# Patient Record
Sex: MALE | Race: WHITE
[De-identification: names, ages, dates, MRNs, and addresses within clinical notes are randomized per-mention and may not be internally consistent; named-entity substitution may affect disease eponyms.]

---

## 2017-03-31 ENCOUNTER — HOSPITAL ENCOUNTER (INPATIENT)
Dept: HOSPITAL 47 - EC | Age: 72
LOS: 4 days | Discharge: HOME | DRG: 871 | End: 2017-04-04
Attending: HOSPITALIST | Admitting: HOSPITALIST
Payer: OTHER GOVERNMENT

## 2017-03-31 VITALS — BODY MASS INDEX: 42.5 KG/M2

## 2017-03-31 DIAGNOSIS — E66.01: ICD-10-CM

## 2017-03-31 DIAGNOSIS — Z96.60: ICD-10-CM

## 2017-03-31 DIAGNOSIS — Z79.84: ICD-10-CM

## 2017-03-31 DIAGNOSIS — Z79.899: ICD-10-CM

## 2017-03-31 DIAGNOSIS — G47.33: ICD-10-CM

## 2017-03-31 DIAGNOSIS — Z79.01: ICD-10-CM

## 2017-03-31 DIAGNOSIS — R33.9: ICD-10-CM

## 2017-03-31 DIAGNOSIS — Z79.82: ICD-10-CM

## 2017-03-31 DIAGNOSIS — Z79.4: ICD-10-CM

## 2017-03-31 DIAGNOSIS — Z88.5: ICD-10-CM

## 2017-03-31 DIAGNOSIS — E78.5: ICD-10-CM

## 2017-03-31 DIAGNOSIS — I50.23: ICD-10-CM

## 2017-03-31 DIAGNOSIS — Z82.49: ICD-10-CM

## 2017-03-31 DIAGNOSIS — K59.00: ICD-10-CM

## 2017-03-31 DIAGNOSIS — E86.1: ICD-10-CM

## 2017-03-31 DIAGNOSIS — I42.9: ICD-10-CM

## 2017-03-31 DIAGNOSIS — M19.91: ICD-10-CM

## 2017-03-31 DIAGNOSIS — J98.11: ICD-10-CM

## 2017-03-31 DIAGNOSIS — I11.0: ICD-10-CM

## 2017-03-31 DIAGNOSIS — N17.9: ICD-10-CM

## 2017-03-31 DIAGNOSIS — I27.2: ICD-10-CM

## 2017-03-31 DIAGNOSIS — F32.9: ICD-10-CM

## 2017-03-31 DIAGNOSIS — H91.90: ICD-10-CM

## 2017-03-31 DIAGNOSIS — E87.2: ICD-10-CM

## 2017-03-31 DIAGNOSIS — J96.01: ICD-10-CM

## 2017-03-31 DIAGNOSIS — Z91.19: ICD-10-CM

## 2017-03-31 DIAGNOSIS — I08.1: ICD-10-CM

## 2017-03-31 DIAGNOSIS — A41.9: Primary | ICD-10-CM

## 2017-03-31 DIAGNOSIS — E11.9: ICD-10-CM

## 2017-03-31 DIAGNOSIS — I48.1: ICD-10-CM

## 2017-03-31 DIAGNOSIS — E03.9: ICD-10-CM

## 2017-03-31 DIAGNOSIS — Z90.49: ICD-10-CM

## 2017-03-31 DIAGNOSIS — J18.9: ICD-10-CM

## 2017-03-31 LAB
ALP SERPL-CCNC: 105 U/L (ref 38–126)
ALT SERPL-CCNC: 44 U/L (ref 21–72)
ANION GAP SERPL CALC-SCNC: 17 MMOL/L
APTT BLD: 29.9 SEC (ref 22–30)
AST SERPL-CCNC: 51 U/L (ref 17–59)
BUN SERPL-SCNC: 34 MG/DL (ref 9–20)
CALCIUM SPEC-MCNC: 9.3 MG/DL (ref 8.4–10.2)
CELLS COUNTED: 100
CH: 26.5
CHCM: 32.2
CHLORIDE SERPL-SCNC: 103 MMOL/L (ref 98–107)
CK SERPL-CCNC: 760 U/L (ref 55–170)
CO2 SERPL-SCNC: 23 MMOL/L (ref 22–30)
ERYTHROCYTE [DISTWIDTH] IN BLOOD BY AUTOMATED COUNT: 4.71 M/UL (ref 4.3–5.9)
ERYTHROCYTE [DISTWIDTH] IN BLOOD: 15.3 % (ref 11.5–15.5)
GLUCOSE BLD-MCNC: 113 MG/DL (ref 75–99)
GLUCOSE BLD-MCNC: 87 MG/DL (ref 75–99)
GLUCOSE SERPL-MCNC: 97 MG/DL (ref 74–99)
HCT VFR BLD AUTO: 39 % (ref 39–53)
HDW: 2.96
HEMOGLOBIN A1C: 5.3 % (ref 4.2–6.1)
HGB BLD-MCNC: 12.4 GM/DL (ref 13–17.5)
INR PPP: 1.9 (ref ?–1.1)
MCH RBC QN AUTO: 26.3 PG (ref 25–35)
MCHC RBC AUTO-ENTMCNC: 31.8 G/DL (ref 31–37)
MCV RBC AUTO: 82.7 FL (ref 80–100)
NON-AFRICAN AMERICAN GFR(MDRD): 19
PARTICLE COUNT: 4166
PH UR: 5 [PH] (ref 5–8)
POTASSIUM SERPL-SCNC: 4.6 MMOL/L (ref 3.5–5.1)
PROT SERPL-MCNC: 7.4 G/DL (ref 6.3–8.2)
PT BLD: 18.6 SEC (ref 9–12)
RBC UR QL: 67 /HPF (ref 0–5)
SODIUM SERPL-SCNC: 143 MMOL/L (ref 137–145)
SP GR UR: 1.01 (ref 1–1.03)
SQUAMOUS UR QL AUTO: <1 /HPF (ref 0–4)
TROPONIN I SERPL-MCNC: 0.03 NG/ML (ref 0–0.03)
UA BILLING (MACRO VS. MICRO): (no result)
UROBILINOGEN UR QL STRIP: <2 MG/DL (ref ?–2)
WBC # BLD AUTO: 13 K/UL (ref 3.8–10.6)
WBC #/AREA URNS HPF: 4 /HPF (ref 0–5)
WBC (PEROX): 14.19

## 2017-03-31 PROCEDURE — 83036 HEMOGLOBIN GLYCOSYLATED A1C: CPT

## 2017-03-31 PROCEDURE — 93005 ELECTROCARDIOGRAM TRACING: CPT

## 2017-03-31 PROCEDURE — 81001 URINALYSIS AUTO W/SCOPE: CPT

## 2017-03-31 PROCEDURE — 74000: CPT

## 2017-03-31 PROCEDURE — 87086 URINE CULTURE/COLONY COUNT: CPT

## 2017-03-31 PROCEDURE — 82550 ASSAY OF CK (CPK): CPT

## 2017-03-31 PROCEDURE — 96375 TX/PRO/DX INJ NEW DRUG ADDON: CPT

## 2017-03-31 PROCEDURE — 85025 COMPLETE CBC W/AUTO DIFF WBC: CPT

## 2017-03-31 PROCEDURE — 84484 ASSAY OF TROPONIN QUANT: CPT

## 2017-03-31 PROCEDURE — 80053 COMPREHEN METABOLIC PANEL: CPT

## 2017-03-31 PROCEDURE — 87040 BLOOD CULTURE FOR BACTERIA: CPT

## 2017-03-31 PROCEDURE — 99285 EMERGENCY DEPT VISIT HI MDM: CPT

## 2017-03-31 PROCEDURE — 82553 CREATINE MB FRACTION: CPT

## 2017-03-31 PROCEDURE — 96374 THER/PROPH/DIAG INJ IV PUSH: CPT

## 2017-03-31 PROCEDURE — 76770 US EXAM ABDO BACK WALL COMP: CPT

## 2017-03-31 PROCEDURE — 87077 CULTURE AEROBIC IDENTIFY: CPT

## 2017-03-31 PROCEDURE — 93306 TTE W/DOPPLER COMPLETE: CPT

## 2017-03-31 PROCEDURE — 83880 ASSAY OF NATRIURETIC PEPTIDE: CPT

## 2017-03-31 PROCEDURE — 87186 SC STD MICRODIL/AGAR DIL: CPT

## 2017-03-31 PROCEDURE — 51798 US URINE CAPACITY MEASURE: CPT

## 2017-03-31 PROCEDURE — 36415 COLL VENOUS BLD VENIPUNCTURE: CPT

## 2017-03-31 PROCEDURE — 85027 COMPLETE CBC AUTOMATED: CPT

## 2017-03-31 PROCEDURE — 71020: CPT

## 2017-03-31 PROCEDURE — 85610 PROTHROMBIN TIME: CPT

## 2017-03-31 PROCEDURE — 80048 BASIC METABOLIC PNL TOTAL CA: CPT

## 2017-03-31 PROCEDURE — 85730 THROMBOPLASTIN TIME PARTIAL: CPT

## 2017-03-31 RX ADMIN — TEMAZEPAM SCH MG: 15 CAPSULE ORAL at 20:34

## 2017-03-31 RX ADMIN — GABAPENTIN SCH MG: 300 CAPSULE ORAL at 20:33

## 2017-03-31 RX ADMIN — ATORVASTATIN CALCIUM SCH MG: 10 TABLET, FILM COATED ORAL at 20:34

## 2017-03-31 RX ADMIN — METOPROLOL TARTRATE SCH MG: 25 TABLET, FILM COATED ORAL at 20:34

## 2017-03-31 RX ADMIN — CEFAZOLIN SCH: 330 INJECTION, POWDER, FOR SOLUTION INTRAMUSCULAR; INTRAVENOUS at 17:39

## 2017-03-31 RX ADMIN — INSULIN LISPRO SCH: 100 INJECTION, SOLUTION INTRAVENOUS; SUBCUTANEOUS at 17:39

## 2017-03-31 NOTE — US
EXAMINATION TYPE: US kidneys/renal and bladder

 

DATE OF EXAM: 3/31/2017 7:36 PM

 

COMPARISON: NONE

 

CLINICAL HISTORY: elevated bun/cr. ABN BUN/Creat, pt states he has been unable to urinate in 2 days

 

EXAM MEASUREMENTS:

Right Kidney:  13.2 x 7.1 x 7.1 cm. Right Kidney: Enlarged, no evidence of hydro  

Left Kidney: 14.8 x 7.6 x 7.1 cm. Left Kidney: Enlarged, no evidence of hydro/ Cyst upper pole= 1.9 x
 1.6 x 1.2 cm  

Bladder: [place] distended with volume= 986 ml.  **Bilateral Jets seen: No

 

There is no evidence for hydronephrosis.  No nephrolithiasis is seen.  No masses are identified.  The
 urinary bladder is anechoic.  Bilateral ureteral jets are seen.

 

IMPRESSION:

1. NO ACUTE PROCESS.

2. INCIDENTAL FINDINGS INCLUDE SMALL AMOUNT OF ASCITES AND TOP NORMAL/BORDERLINE ENLARGED SPLEEN DIME
NSIONS.

## 2017-03-31 NOTE — XR
EXAMINATION TYPE: XR chest 2V

 

DATE OF EXAM: 3/31/2017 1:42 PM

 

COMPARISON: 4/17/2014

 

TECHNIQUE: PA and lateral views submitted.

 

HISTORY: Pain

 

FINDINGS:

The lungs are clear and  there is no pneumothorax, pleural effusion, or focal pneumonia.  Heart size 
is prominent. Hypertrophic change of the spine noted.

 

IMPRESSION: 

1. Stable cardiomegaly. Linear change of the left lung base is felt more typical of atelectasis than 
infiltrate. Correlate clinically for confirmation.

## 2017-03-31 NOTE — ED
General Adult HPI





- General


Source: patient, family, RN notes reviewed


Mode of arrival: wheelchair


Limitations: no limitations





<Roc Card - Last Filed: 03/31/17 14:04>





<Ronnie Pedersen - Last Filed: 03/31/17 14:46>





- General


Chief complaint: Urogenital


Stated complaint: URINE RETENSION X 4 DAYS, SWOLLEN FEET


Time Seen by Provider: 03/31/17 12:37





- History of Present Illness


Initial comments: 


72-year-old male patient presents emergency department today for evaluation of 

cough, leg swelling, urinary retention, and constipation.  Patient states that 

he presented to [facility] on Tuesday for evaluation of leg swelling, was 

found to be in new-onset atrial fibrillation, and was started on Lasix and 

Coumadin.  Patient states that he has not had any urinary output or bowel 

movements since Wednesday afternoon.  Patient states he is still having some 

leg swelling.  Patient is also complaining of a persistent cough, with small 

amount of sputum production.  He denies any fever or chills.  Patient denies 

any chest pain, back pain, shortness of breath, weakness, or dizziness.  

Patient is complaining of some suprapubic abdominal discomfort.  Patient states 

that he does feel like he has to urinate on but he just can't.  He denies any 

history of urinary retention.


 (Roc Card)





- Related Data


 Home Medications











 Medication  Instructions  Recorded  Confirmed


 


Aspirin 325 mg PO DAILY 03/31/17 03/31/17


 


Ferrous Sulfate [Feosol] 325 mg PO TID 03/31/17 03/31/17


 


Furosemide [Lasix] 20 mg PO BID 03/31/17 03/31/17


 


Gabapentin [Neurontin] 600 mg PO TID 03/31/17 03/31/17


 


Insulin Detemir [Levemir] 25 unit SQ HS 03/31/17 03/31/17


 


Latanoprost Ophth [Xalatan 0.005%] 1 drops BOTH EYES  03/31/17 03/31/17


 


Levothyroxine Sodium [Synthroid] 50 mcg PO DAILY 03/31/17 03/31/17


 


Lisinopril [Zestril] 5 mg PO DAILY 03/31/17 03/31/17


 


Omega-3 Fatty Acids/Fish Oil [Fish 1 cap PO DAILY 03/31/17 03/31/17





Oil 1,000 mg Softgel]   


 


Oxybutynin Chloride [Ditropan] 5 mg PO BID 03/31/17 03/31/17


 


Potassium Chloride ER [K-Dur 10] 10 meq PO DAILY 03/31/17 03/31/17


 


Sertraline [Zoloft] 100 mg PO DAILY 03/31/17 03/31/17


 


Simvastatin [Zocor] 20 mg PO HS 03/31/17 03/31/17


 


Temazepam [Restoril] 15 mg PO HS 03/31/17 03/31/17


 


Warfarin [Coumadin] 5 mg PO DAILY 03/31/17 03/31/17


 


guaiFENesin [Mucinex] 600 - 1,200 mg PO Q12H PRN 03/31/17 03/31/17


 


metFORMIN HCL [Glucophage] 1,000 mg PO BID@0900,2100 03/31/17 03/31/17


 


metFORMIN HCL [Glucophage] 500 mg PO DAILY@1200 03/31/17 03/31/17











 Allergies











Allergy/AdvReac Type Severity Reaction Status Date / Time


 


hydromorphone [From Dilaudid] AdvReac  Unknown Verified 03/31/17 13:30


 


meperidine [From Demerol] AdvReac  Nausea & Verified 03/31/17 13:30





   Vomiting &  





   Diarrhea  














Review of Systems


ROS Other: All systems not noted in ROS Statement are negative.





<Roc Card - Last Filed: 03/31/17 14:04>


ROS Other: All systems not noted in ROS Statement are negative.





<Ronnie Pedersen - Last Filed: 03/31/17 14:46>


ROS Statement: 


Those systems with pertinent positive or pertinent negative responses have been 

documented in the HPI.








Past Medical History


Past Medical History: Atrial Fibrillation, Heart Failure, Diabetes Mellitus, 

Hyperlipidemia, Hypertension, Thyroid Disorder


History of Any Multi-Drug Resistant Organisms: None Reported


Past Surgical History: Back Surgery, Cholecystectomy, Joint Replacement


Past Psychological History: No Psychological Hx Reported


Smoking Status: Never smoker


Past Alcohol Use History: None Reported


Past Drug Use History: None Reported





<Roc Card - Last Filed: 03/31/17 14:04>





General Exam


Limitations: no limitations


General appearance: alert, in no apparent distress


Eye exam: Present: normal appearance, PERRL, EOMI.  Absent: scleral icterus, 

conjunctival injection, periorbital swelling


ENT exam: Present: normal exam, normal oropharynx, mucous membranes moist


Neck exam: Present: normal inspection.  Absent: tenderness, meningismus, 

lymphadenopathy


Respiratory exam: Present: normal lung sounds bilaterally.  Absent: respiratory 

distress, wheezes, rales, rhonchi, stridor


Cardiovascular Exam: Present: tachycardia, irregular rhythm, normal heart 

sounds.  Absent: systolic murmur, diastolic murmur, rubs, gallop, clicks


GI/Abdominal exam: Present: soft, tenderness (Suprapubic), normal bowel sounds.

  Absent: distended, guarding, rebound, rigid


Extremities exam: Present: normal inspection, full ROM, normal capillary refill

, pedal edema, other (Bilateral lower extremity edema, 2+).  Absent: tenderness

, joint swelling, calf tenderness


Back exam: Present: normal inspection.  Absent: CVA tenderness (R), CVA 

tenderness (L)


Neurological exam: Present: alert, oriented X3, CN II-XII intact


Psychiatric exam: Present: normal affect, normal mood


Skin exam: Present: warm, dry, intact, normal color.  Absent: rash





<Dedoe,Roc M - Last Filed: 03/31/17 14:04>


General appearance: alert, in no apparent distress


Head exam: Present: atraumatic, normocephalic, normal inspection


Eye exam: Present: normal appearance, PERRL, EOMI.  Absent: scleral icterus, 

conjunctival injection, periorbital swelling


ENT exam: Present: normal exam, mucous membranes moist


Neck exam: Present: normal inspection.  Absent: tenderness, meningismus, 

lymphadenopathy


Respiratory exam: Present: normal lung sounds bilaterally.  Absent: respiratory 

distress, wheezes, rales, rhonchi, stridor


Cardiovascular Exam: Present: tachycardia, irregular rhythm, normal heart 

sounds.  Absent: systolic murmur, diastolic murmur, rubs, gallop, clicks


GI/Abdominal exam: Present: soft, normal bowel sounds.  Absent: distended, 

tenderness, guarding, rebound, rigid


Extremities exam: Present: normal inspection, full ROM, normal capillary 

refill.  Absent: tenderness, pedal edema, joint swelling, calf tenderness


Back exam: Present: normal inspection


Neurological exam: Present: alert, oriented X3, CN II-XII intact


Psychiatric exam: Present: normal affect, normal mood


Skin exam: Present: warm, dry, intact, normal color.  Absent: rash





<Ronnie Pedersen - Last Filed: 03/31/17 14:46>





Course





<Roc Card - Last Filed: 03/31/17 14:04>





<Ronnie Pedersen - Last Filed: 03/31/17 14:46>





 Vital Signs











  03/31/17





  12:12


 


Temperature 99.2 F


 


Pulse Rate 118 H


 


Respiratory 18





Rate 


 


Blood Pressure 130/85


 


O2 Sat by Pulse 96





Oximetry 














- Reevaluation(s)


Reevaluation #1: 





03/31/17 13:20


Bladder scan reveals 29 mL. (Roc Card)


Reevaluation #2: 





03/31/17 14:46


Patient's heart rate is improving control this time, started on IV fluids (

Ronnie Pedersen)





EKG Findings





- EKG Comments:


EKG Findings:: EKG obtained at 1315 reveals atrial fibrillation with rapid 

ventricular response, ventricular rate 102, QRS duration 114, , .  

No ST elevation or depression noted.





<Roc Card - Last Filed: 03/31/17 14:04>





Medical Decision Making





- Lab Data


Result diagrams: 


 03/31/17 13:00





 03/31/17 13:00





- Radiology Data


Radiology results: report reviewed





<Roc Card - Last Filed: 03/31/17 14:04>





- Lab Data


Result diagrams: 


 03/31/17 13:00





 03/31/17 13:00





<Ronnie Pedersen - Last Filed: 03/31/17 14:46>





- Medical Decision Making





72-year-old male present emergency department for multiple complaints.  Patient 

appears to be in acute renal failure, new onset A. fib, CHF.  Patient was 

admitted for cardiology consult, nephrology consult. (Roc Card)





72 male year for evaluation of A. fib with RVR and renal failure secondary to 

dehydration and Lasix use, shells with CHF, will admit for cardiac and 

nephrology evaluation (Ronnie Pedersen)





- Lab Data





 Lab Results











  03/31/17 03/31/17 03/31/17 Range/Units





  13:00 13:00 13:00 


 


WBC   13.0 H   (3.8-10.6)  k/uL


 


RBC   4.71   (4.30-5.90)  m/uL


 


Hgb   12.4 L   (13.0-17.5)  gm/dL


 


Hct   39.0   (39.0-53.0)  %


 


MCV   82.7   (80.0-100.0)  fL


 


MCH   26.3   (25.0-35.0)  pg


 


MCHC   31.8   (31.0-37.0)  g/dL


 


RDW   15.3   (11.5-15.5)  %


 


Plt Count   155   (150-450)  k/uL


 


Neutrophils % (Manual)   87.0   %


 


Lymphocytes % (Manual)   10.0   %


 


Monocytes % (Manual)   2.0   %


 


Eosinophils % (Manual)   1.0   %


 


Neutrophils # (Manual)   11.3 H   (1.3-7.7)  k/uL


 


Lymphocytes # (Manual)   1.3   (1.0-4.8)  k/uL


 


Monocytes # (Manual)   0.3   (0-1.0)  k/uL


 


Eosinophils # (Manual)   0.1   (0-0.7)  k/uL


 


Nucleated RBCs   0   (0-0)  /100 WBC


 


Hypochromasia   Slight   


 


Ovalocytes   Present   


 


PT     (9.0-12.0)  sec


 


INR     (<1.1)  


 


APTT     (22.0-30.0)  sec


 


Sodium    143  (137-145)  mmol/L


 


Potassium    4.6  (3.5-5.1)  mmol/L


 


Chloride    103  ()  mmol/L


 


Carbon Dioxide    23  (22-30)  mmol/L


 


Anion Gap    17  mmol/L


 


BUN    34 H  (9-20)  mg/dL


 


Creatinine    3.30 H  (0.66-1.25)  mg/dL


 


Est GFR (MDRD) Af Amer    23  (>60 ml/min/1.73 sqM)  


 


Est GFR (MDRD) Non-Af    19  (>60 ml/min/1.73 sqM)  


 


Glucose    97  (74-99)  mg/dL


 


Calcium    9.3  (8.4-10.2)  mg/dL


 


Total Bilirubin    1.1  (0.2-1.3)  mg/dL


 


AST    51  (17-59)  U/L


 


ALT    44  (21-72)  U/L


 


Alkaline Phosphatase    105  ()  U/L


 


Total Creatine Kinase  760 H    ()  U/L


 


CK-MB (CK-2)  4.8 H*    (0.0-2.4)  ng/mL


 


CK-MB (CK-2) Rel Index  0.6    


 


Troponin I  0.033    (0.000-0.034)  ng/mL


 


NT-Pro-B Natriuret Pep     pg/mL


 


Total Protein    7.4  (6.3-8.2)  g/dL


 


Albumin    4.3  (3.5-5.0)  g/dL














  03/31/17 03/31/17 Range/Units





  13:00 13:00 


 


WBC    (3.8-10.6)  k/uL


 


RBC    (4.30-5.90)  m/uL


 


Hgb    (13.0-17.5)  gm/dL


 


Hct    (39.0-53.0)  %


 


MCV    (80.0-100.0)  fL


 


MCH    (25.0-35.0)  pg


 


MCHC    (31.0-37.0)  g/dL


 


RDW    (11.5-15.5)  %


 


Plt Count    (150-450)  k/uL


 


Neutrophils % (Manual)    %


 


Lymphocytes % (Manual)    %


 


Monocytes % (Manual)    %


 


Eosinophils % (Manual)    %


 


Neutrophils # (Manual)    (1.3-7.7)  k/uL


 


Lymphocytes # (Manual)    (1.0-4.8)  k/uL


 


Monocytes # (Manual)    (0-1.0)  k/uL


 


Eosinophils # (Manual)    (0-0.7)  k/uL


 


Nucleated RBCs    (0-0)  /100 WBC


 


Hypochromasia    


 


Ovalocytes    


 


PT  18.6 H   (9.0-12.0)  sec


 


INR  1.9   (<1.1)  


 


APTT  29.9   (22.0-30.0)  sec


 


Sodium    (137-145)  mmol/L


 


Potassium    (3.5-5.1)  mmol/L


 


Chloride    ()  mmol/L


 


Carbon Dioxide    (22-30)  mmol/L


 


Anion Gap    mmol/L


 


BUN    (9-20)  mg/dL


 


Creatinine    (0.66-1.25)  mg/dL


 


Est GFR (MDRD) Af Amer    (>60 ml/min/1.73 sqM)  


 


Est GFR (MDRD) Non-Af    (>60 ml/min/1.73 sqM)  


 


Glucose    (74-99)  mg/dL


 


Calcium    (8.4-10.2)  mg/dL


 


Total Bilirubin    (0.2-1.3)  mg/dL


 


AST    (17-59)  U/L


 


ALT    (21-72)  U/L


 


Alkaline Phosphatase    ()  U/L


 


Total Creatine Kinase    ()  U/L


 


CK-MB (CK-2)    (0.0-2.4)  ng/mL


 


CK-MB (CK-2) Rel Index    


 


Troponin I    (0.000-0.034)  ng/mL


 


NT-Pro-B Natriuret Pep   28283  pg/mL


 


Total Protein    (6.3-8.2)  g/dL


 


Albumin    (3.5-5.0)  g/dL














- Radiology Data


X-ray KUB dictated by Dr. Jaramillo reveals a nonspecific abdominal picture.





A two-view chest x-ray impression by Dr. Cobb reveals stable cardiomegaly.  

Linear changes the left lung bases more typical atelectasis and infiltrate.  

Correlate clinically for confirmation. (Roc Card)





Disposition


Time of Disposition: 14:06





<Roc Card - Last Filed: 03/31/17 14:04>





<Ronnie Pedersen - Last Filed: 03/31/17 14:46>


Clinical Impression: 


 New onset a-fib, CHF (congestive heart failure), Acute renal failure





Disposition: ADMITTED AS IP TO THIS HOSP


Condition: Fair


Referrals: 


Roc Powers DO [Primary Care Provider] - 1-2 days

## 2017-03-31 NOTE — HP
DATE OF ADMISSION:  03/31/2017



This patient is a 72-year-old gentleman who came in (      ) cough and 

shortness of breath. Patient denied any orthopnea or PND. Patient 

appears to have a history of atrial fibrillation. Patient is on 

anticoagulation with Coumadin. Patient was found to be in atrial 

fibrillation. Patient was seemingly (      ) and patient was found to 

have significant bilateral lower limb swelling, because of which 

patient was given oral Lasix and sent home. Patient came in here with 

worsening shortness of breath, unable to urinate. Two days after Lasix 

patient did urinate okay, after which patient stopped urinating. On 

exam, patient does have elevated JVD but lungs are clear. Chest x-ray 

did not show any significant abnormality. Patient does have elevated 

BNP of (      ). Patient's leg swelling significantly improved and 

patient has only 1+ pitting pedal edema. Patient has small amount of 

whitish sputum production (      ) no significant pneumonic infiltrate 

on the x-ray. Denied any UTI-like symptoms. Patient denied any 

flu-like symptoms at this point of time. Patient has a cough with 

sputum production.  



Patient had an echocardiogram in 2014 which showed moderate pulmonary 

hypertension. Patient is morbidly obese, does have history of sleep 

apnea; stopped using CPAP machine lately because he stopped snoring 

lately, as per his wife. Patient is still morbidly obese. Patient had 

a normal ejection during that time. Patient had a normal kidney 

function with creatinine of 0.9. Now it has gone up to around 3. 

Patient was subsequently admitted because of (      ) and Cardiology 

was consulted. Oncology consult and Pulmonology for pulmonary 

hypertension. I will obtain a repeat echocardiogram on the patient. I 

believe patient has (      ) severe pulmonary hypertension and if so, 

I will (      ) patient may benefit from phosphodiesterase inhibitors 

like sildenafil.  



Home medications include: 

1. Aspirin. 

2. Ferrous sulfate. 

3. Lasix. 

4. Gabapentin. 

5. Levemir.

6. Latanoprost. 

7. Levothyroxine. 

8. Lisinopril. 

9. Omega-3 fatty acids. 

10. Potassium chloride. 

11. Sertraline. 

12. Simvastatin. 

13. Temazepam. 

14. Warfarin. 

15. Guaifenesin.

16. Metformin.



ALLERGIES: HYDROMORPHONE. 



REVIEW OF SYSTEMS: 

CONSTITUTIONAL: No fever, no malaise, no fatigue. 

HEENT: No recent visual problems or hearing problems. Denied any sore 

throat.  

CARDIOVASCULAR: As described in HPI. 

PULMONARY: As described in HPI. 

GASTROINTESTINAL: No diarrhea, no nausea, no vomiting, no abdominal 

pain. Normoactive bowel sounds.  

NEUROLOGICAL: No headaches, no weakness, no numbness. 

HEMATOLOGICAL: Denies any bleeding or petechiae. 

GENITOURINARY: Denies any burning micturition, frequency, or urgency. 

MUSCULOSKELETAL/RHEUMATOLOGICAL: Denies any joint pain, swelling, or 

any muscle pain.  

ENDOCRINE: Denies any polyuria or polydipsia. 



The rest of the 14 point review of systems is negative. 



Past medical history is significant for:

1. Atrial fibrillation. 

2. Moderate pulmonary hypertension. 

3. Diabetes mellitus, type 2. 

4. Hyperlipidemia. 

5. Hypertension. 

6. Hypothyroidism. 

7. Back surgery. 

8. Cholecystectomy. 

9. Joint replacement surgery. 



SOCIAL HISTORY: Denied any smoking, alcohol abuse or any drug abuse. 



FAMILY HISTORY: Significant for hypertension in the family and 

diabetes mellitus in the family. 



PHYSICAL EXAMINATION: 

VITAL SIGNS: Temperature 99.2, pulse of 118; came down to 90 now after 

IV Cardizem. Respiratory rate of 18. Blood pressure is 130/85. 

Saturating at 96% on room air.  

GENERAL: Morbidly obese. Alert and oriented x3. 

HEENT: Pupils are round and equally reacting to light. EOMI. No 

scleral icterus. No conjunctival pallor. Normocephalic, atraumatic. No 

pharyngeal erythema. No thyromegaly.  

CARDIOVASCULAR: S1 and S2 present. Patient has elevated JVD, as 

mentioned above. Very minimal pedal edema. 

PULMONARY: Chest is clear to auscultation, no wheezing or crackles. 

Patient appears to be clinically a little bit short of breath. Denies 

air entry into bilateral lung fields. No wheezing. No crackles were 

appreciated. 

ABDOMEN: Soft, nontender, nondistended, normoactive bowel sounds. No 

palpable organomegaly.  

MUSCULOSKELETAL: No joint swelling or deformity. 

EXTREMITIES: No cyanosis, clubbing. Very minimal pedal edema.

NEUROLOGICAL: Gross neurological examination did not reveal any focal 

deficits.  

SKIN: No rashes. 



LABORATORY DATA: CBC, CMP are abnormal for elevated WBC count of 

13,000 and BNP is elevated. Patient had a creatinine of 3.3 baseline 

as mentioned above. BUN of 34. Patient in 2016 had normal creatinine 

of 0.9. INR is 1.9. Initially he was started on heparin, which we will 

discontinue because INR is almost therapeutic. Will continue the 

Coumadin.  



ASSESSMENT AND PLAN:  

1. Atrial fibrillation (      ) ventricular rate. Patient (      ) 

Cardizem. Heart rate (      ) patient is already on anticoagulation (  

    ) continued. Her (      ) discontinued. Echocardiogram will be 

obtained.  

2. (      ) probably related to right-sided heart failure. Patient may 

have severe pulmonary hypertension (      )  

3. Obstructive sleep apnea. Stopped using CPAP machine (      ) sleep 

apnea.  

4. Morbid obesity. 

5. Acute renal failure secondary to excessive diuretic therapy, 

superimposed pulmonary hypertension. Patient will be actually started 

on IV fluids (      ) pedal edema to improve his kidney function. 

Consult Nephrology. May benefit from Dobutamine. I will leave the 

decision to Nephrology.  

6. Hypertension. Hold off on ACE inhibitor at this point in time.

7. Hyperlipidemia. 

8. Diabetes mellitus. Hold off on metformin. Patient will be started 

on (      ) insulin.  

9. Anuria secondary to renal failure. 

10. Fever with leukocytosis. Will monitor for signs or symptoms of 

sepsis. Have not started him on antibiotics yet. Patient (      ) 

leukocytosis. Will repeat the chest x-ray, make sure patient does not 

have any pneumonic infiltrate. My suspicion is low for (      ) 

culture.  



Patient's primary care physician is Dr. Roc Powers.

## 2017-03-31 NOTE — XR
EXAMINATION TYPE: XR KUB

 

DATE OF EXAM ORDERED: 3/31/2017 1:42 PM

 

HISTORY: Pain.

 

COMPARISON: None.

 

FINDINGS:  There has been previous right upper quadrant surgery.

 

There is mild gaseous distention of the transverse colon. The remainder of the colon is unremarkable.
 There is no small bowel dilatation. There is no free air. No unusual calcifications are seen.

 

IMPRESSION: 

 

NONSPECIFIC ABDOMINAL PICTURE.

## 2017-04-01 LAB
ANION GAP SERPL CALC-SCNC: 15 MMOL/L
BUN SERPL-SCNC: 42 MG/DL (ref 9–20)
CALCIUM SPEC-MCNC: 8.9 MG/DL (ref 8.4–10.2)
CHLORIDE SERPL-SCNC: 106 MMOL/L (ref 98–107)
CO2 SERPL-SCNC: 21 MMOL/L (ref 22–30)
GLUCOSE BLD-MCNC: 113 MG/DL (ref 75–99)
GLUCOSE BLD-MCNC: 141 MG/DL (ref 75–99)
GLUCOSE BLD-MCNC: 198 MG/DL (ref 75–99)
GLUCOSE BLD-MCNC: 205 MG/DL (ref 75–99)
GLUCOSE SERPL-MCNC: 112 MG/DL (ref 74–99)
INR PPP: 2.3 (ref ?–1.1)
NON-AFRICAN AMERICAN GFR(MDRD): 25
POTASSIUM SERPL-SCNC: 4.4 MMOL/L (ref 3.5–5.1)
PT BLD: 22.5 SEC (ref 9–12)
SODIUM SERPL-SCNC: 142 MMOL/L (ref 137–145)

## 2017-04-01 RX ADMIN — GABAPENTIN SCH MG: 300 CAPSULE ORAL at 15:29

## 2017-04-01 RX ADMIN — ATORVASTATIN CALCIUM SCH MG: 10 TABLET, FILM COATED ORAL at 23:53

## 2017-04-01 RX ADMIN — GABAPENTIN SCH: 300 CAPSULE ORAL at 06:46

## 2017-04-01 RX ADMIN — AZITHROMYCIN SCH MG: 500 TABLET, FILM COATED ORAL at 11:58

## 2017-04-01 RX ADMIN — SERTRALINE HYDROCHLORIDE SCH MG: 100 TABLET ORAL at 08:21

## 2017-04-01 RX ADMIN — ISOSORBIDE MONONITRATE SCH MG: 30 TABLET, EXTENDED RELEASE ORAL at 15:29

## 2017-04-01 RX ADMIN — INSULIN LISPRO SCH: 100 INJECTION, SOLUTION INTRAVENOUS; SUBCUTANEOUS at 06:46

## 2017-04-01 RX ADMIN — METOPROLOL TARTRATE SCH MG: 25 TABLET, FILM COATED ORAL at 23:54

## 2017-04-01 RX ADMIN — INSULIN LISPRO SCH UNIT: 100 INJECTION, SOLUTION INTRAVENOUS; SUBCUTANEOUS at 12:02

## 2017-04-01 RX ADMIN — METOPROLOL TARTRATE SCH MG: 25 TABLET, FILM COATED ORAL at 08:21

## 2017-04-01 RX ADMIN — TEMAZEPAM SCH MG: 15 CAPSULE ORAL at 23:58

## 2017-04-01 RX ADMIN — INSULIN LISPRO SCH: 100 INJECTION, SOLUTION INTRAVENOUS; SUBCUTANEOUS at 08:22

## 2017-04-01 RX ADMIN — LEVOTHYROXINE SODIUM SCH MCG: 50 TABLET ORAL at 08:21

## 2017-04-01 RX ADMIN — GABAPENTIN SCH MG: 300 CAPSULE ORAL at 23:54

## 2017-04-01 RX ADMIN — INSULIN LISPRO SCH UNIT: 100 INJECTION, SOLUTION INTRAVENOUS; SUBCUTANEOUS at 23:54

## 2017-04-01 RX ADMIN — GABAPENTIN SCH MG: 300 CAPSULE ORAL at 08:21

## 2017-04-01 RX ADMIN — CEFAZOLIN SCH: 330 INJECTION, POWDER, FOR SOLUTION INTRAMUSCULAR; INTRAVENOUS at 08:02

## 2017-04-01 RX ADMIN — WARFARIN SODIUM SCH MG: 5 TABLET ORAL at 17:56

## 2017-04-01 RX ADMIN — INSULIN LISPRO SCH UNIT: 100 INJECTION, SOLUTION INTRAVENOUS; SUBCUTANEOUS at 17:53

## 2017-04-01 RX ADMIN — CEFAZOLIN SCH: 330 INJECTION, POWDER, FOR SOLUTION INTRAMUSCULAR; INTRAVENOUS at 16:53

## 2017-04-01 NOTE — XR
EXAMINATION TYPE: XR chest 2V

 

DATE OF EXAM: 4/1/2017 6:58 AM

 

HISTORY: pulmonary HTN.

 

REFERENCE: Previous study dated 3/31/2017.

 

FINDINGS: The heart is enlarged. There is left basilar airspace disease. This has worsened slightly. 
This may represent atelectasis or pneumonia. No pleural fluid is seen.

 

IMPRESSION: 

1. CARDIOMEGALY.

2. INCREASING LEFT BASILAR AIRSPACE DISEASE EITHER REPRESENTING ATELECTASIS OR PNEUMONIA.

## 2017-04-01 NOTE — ECHOF
Referral Reason:CHF



MEASUREMENTS

--------

HEIGHT: 182.9 cm

WEIGHT: 144.7 kg

BP: 142/83

RVIDd:   3.9 cm     (< 3.3)

IVSd:   1.3 cm     (0.6 - 1.1)

LVIDd:   6.7 cm     (3.9 - 5.3)

LVPWd:   1.2 cm     (0.6 - 1.1)

IVSs:   2.1 cm

LVIDs:   4.9 cm

LVPWs:   1.9 cm

LA Diam:   5.7 cm     (2.7 - 3.8)

LAESV Index (A-L):   44.65 ml/m

Ao Diam:   4.0 cm     (2.0 - 3.7)

AV Cusp:   2.2 cm     (1.5 - 2.6)

LA Diam:   3.7 cm     (2.7 - 3.8)

MV EXCURSION:   28.200 mm     (> 18.000)

MV EF SLOPE:   219 mm/s     (70 - 150)

EPSS:   2.0 cm

RAP:   5.00 mmHg

RVSP:   56.65 mmHg







FINDINGS

--------

Atrial fibrillation.

This was a technically difficult study with suboptimal 

views.

The left ventricle is moderately dilated.   There is 

mild concentric left ventricular hypertrophy.   Overall 

left ventricular systolic function is moderate-severely 

impaired with, an EF between 30 - 35 %.   Basal 

inferior LV wall motion is hypokinetic.    Basal 

inferoseptal LV wall motion is hypokinetic.    Basal 

anteroseptal LV wall motion is hypokinetic.    Mid 

inferior LV wall motion is hypokinetic.    Mid 

inferoseptal LV wall motion is hypokinetic.    Mid 

anteroseptal LV wall motion is hypokinetic.    Apical 

inferior LV wall motion is hypokinetic.

The right ventricle is mild to moderately enlarged.

LA is severely dilated >40 ml/m2

The right atrium is mildly enlarged.

1.5mg of Definity was utilized for enhancement of images

Aortic valve is trileaflet and is mildly thickened.

The mitral valve leaflets are mildly thickened.   Mild 

mitral annular calcification present.   

Mild-to-moderate mitral regurgitation is present.

Mild-to-moderate tricuspid regurgitation present.   

There is moderate to severe pulmonary hypertension.   

The right ventricular systolic pressure, as measured by 

Doppler, is 56.65mmHg.

Pulmonic valve appears structurally normal.

The aortic root size is normal.

IVC Not well visulized.

There is no pericardial effusion.



CONCLUSIONS

--------

1. Atrial fibrillation.

2. Mid inferoseptal LV wall motion is hypokinetic.

3. Mid anteroseptal LV wall motion is hypokinetic.

4. Apical inferior LV wall motion is hypokinetic.

5. The right ventricle is mild to moderately enlarged.

6. LA is severely dilated >40 ml/m2

7. The right atrium is mildly enlarged.

8. 1.5mg of Definity was utilized for enhancement of images

9. Aortic valve is trileaflet and is mildly thickened.

10. The mitral valve leaflets are mildly thickened.

11. Mild mitral annular calcification present.

12. This was a technically difficult study with suboptimal 

views.

13. Mild-to-moderate mitral regurgitation is present.

14. Mild-to-moderate tricuspid regurgitation present.

15. There is moderate to severe pulmonary hypertension.

16. The right ventricular systolic pressure, as measured by 

Doppler, is 56.65mmHg.

17. Pulmonic valve appears structurally normal.

18. The aortic root size is normal.

19. IVC Not well visulized.

20. There is no pericardial effusion.

21. The left ventricle is moderately dilated.

22. There is mild concentric left ventricular hypertrophy.

23. Overall left ventricular systolic function is 

moderate-severely impaired with, an EF between 30 - 35 

%.

24. Basal inferior LV wall motion is hypokinetic.

25. Basal inferoseptal LV wall motion is hypokinetic.

26. Basal anteroseptal LV wall motion is hypokinetic.

27. Mid inferior LV wall motion is hypokinetic.





SONOGRAPHER: Juan Avitia RDCS

## 2017-04-01 NOTE — P.NPCON
History of Present Illness





- Reason for Consult


acute renal failure





- History of Present Illness





Reason for consultation: Acute kidney injury





History of present illness:


Patient is a 72-year-old  male seen in renal consultation for acute 

kidney injury.  Patient denies any prior history of kidney disease in his 

creatinine and June 2015 was 0.7.  Patient presented to the hospital with 

generalized weakness and low urine output.  Patient states last week he went to 

an outpatient clinic due to a cough and dyspnea.  He was given antibiotics as 

well as Lasix.  About 48 hours later states he had no urine output and felt 

weak.  His creatinine was elevated at 3.3.  Last night a Serna catheter was 

inserted and it appears he had some evidence of urinary retention as over 600 

mL was retained within the first hour.  He was also started on IV fluids with 

normal saline running at 100 mL an hour and his creatinine is down to 2.5 

today.  He denies any vomiting or diarrhea.  Denies any chest pain.  Appetite 

has been good.  Does admit to taking NSAIDs twice daily for back pain.  Denies 

family history of kidney disease.  No other complaints at this time.





Vital signs are stable.


General: The patient appeared well nourished and normally developed. 


HEENT: Head exam is unremarkable. Neck is without jugular venous distension.


LUNGS: Lungs are clear to auscultation and percussion. Breath sounds decreased.


HEART: Rate and Rhythm are regular. First and second heart sounds normal. No 

murmurs, rubs or gallops. 


ABDOMEN: Abdominal exam reveals normal bowel sounds. Non-tender and non-

distended. No evidence of peritonitis.


EXTREMITITES: Trace edema.





Past Medical History


Past Medical History: Atrial Fibrillation, Heart Failure, Diabetes Mellitus, 

Eye Disorder, Hearing Disorder / Deafness, Hyperlipidemia, Hypertension, 

Osteoarthritis (OA), Pneumonia, Sleep Apnea/CPAP/BIPAP, Thyroid Disorder


History of Any Multi-Drug Resistant Organisms: None Reported


Past Surgical History: Back Surgery, Cholecystectomy, Joint Replacement


Past Anesthesia/Blood Transfusion Reactions: No Reported Reaction


Past Psychological History: No Psychological Hx Reported


Smoking Status: Never smoker


Past Alcohol Use History: None Reported


Past Drug Use History: None Reported





- Past Family History


  ** Father


History Unknown: Yes





  ** Mother


Family Medical History: Unable to Obtain





Medications and Allergies


 Home Medications











 Medication  Instructions  Recorded  Confirmed  Type


 


Aspirin 325 mg PO DAILY 03/31/17 03/31/17 History


 


Ferrous Sulfate [Feosol] 325 mg PO TID 03/31/17 03/31/17 History


 


Furosemide [Lasix] 20 mg PO BID 03/31/17 03/31/17 History


 


Gabapentin [Neurontin] 600 mg PO TID 03/31/17 03/31/17 History


 


Insulin Detemir [Levemir] 25 unit SQ HS 03/31/17 03/31/17 History


 


Latanoprost Ophth [Xalatan 0.005%] 1 drops BOTH EYES HS 03/31/17 03/31/17 

History


 


Levothyroxine Sodium [Synthroid] 50 mcg PO DAILY 03/31/17 03/31/17 History


 


Lisinopril [Zestril] 5 mg PO DAILY 03/31/17 03/31/17 History


 


Omega-3 Fatty Acids/Fish Oil [Fish 1 cap PO DAILY 03/31/17 03/31/17 History





Oil 1,000 mg Softgel]    


 


Oxybutynin Chloride [Ditropan] 5 mg PO BID 03/31/17 03/31/17 History


 


Potassium Chloride ER [K-Dur 10] 10 meq PO DAILY 03/31/17 03/31/17 History


 


Sertraline [Zoloft] 100 mg PO DAILY 03/31/17 03/31/17 History


 


Simvastatin [Zocor] 20 mg PO HS 03/31/17 03/31/17 History


 


Temazepam [Restoril] 15 mg PO HS 03/31/17 03/31/17 History


 


Warfarin [Coumadin] 5 mg PO DAILY 03/31/17 03/31/17 History


 


guaiFENesin [Mucinex] 600 - 1,200 mg PO Q12H PRN 03/31/17 03/31/17 History


 


metFORMIN HCL [Glucophage] 1,000 mg PO BID@0900,2100 03/31/17 03/31/17 History


 


metFORMIN HCL [Glucophage] 500 mg PO DAILY@1200 03/31/17 03/31/17 History











 Allergies











Allergy/AdvReac Type Severity Reaction Status Date / Time


 


hydromorphone [From Dilaudid] AdvReac  Unknown Verified 03/31/17 13:30


 


meperidine [From Demerol] AdvReac  Nausea & Verified 03/31/17 13:30





   Vomiting &  





   Diarrhea  














Physical Exam


Vitals: 


 Vital Signs











  Temp Pulse Pulse Resp BP BP Pulse Ox


 


 04/01/17 04:00  96.1 F L   92  18   143/66  95


 


 04/01/17 00:00  97.4 F L   76  18   120/68  91 L


 


 03/31/17 20:00    123 H    169/88  92 L


 


 03/31/17 18:04  97.5 F L   104 H    141/84  93 L


 


 03/31/17 15:09   90   18    96


 


 03/31/17 15:02  100.6 F H  115 H   16  142/83   93 L








 Intake and Output











 03/31/17 04/01/17 04/01/17





 22:59 06:59 14:59


 


Intake Total 300  


 


Output Total 600 1450 


 


Balance -300 -1450 


 


Intake:   


 


  Oral 300  


 


Output:   


 


  Urine 600 1450 


 


  Post Void Residual 0  


 


Other:   


 


  Voiding Method  Indwelling Catheter 


 


  # Bowel Movements  1 


 


  Weight  142.2 kg 














Results





- Lab Results


 Most recent lab results











Calcium  8.9 mg/dL (8.4-10.2)   04/01/17  06:32    














 03/31/17 13:00





 04/01/17 06:32





Assessment and Plan


Plan: 





Assessment:


#1.  Nonoliguric acute kidney injury secondary to urinary retention as well as 

from the use of NSAIDs and diuretics.  Improving.  Creatinine 2.57 today.  

Creatinine from June 2015 was 0.7   UA negative for proteinuria.  Renal 

ultrasound benign.


#2.  Urinary retention status post Serna catheter placement.


#3.  Systolic CHF with ejection fraction of 30-35% with moderate to severe 

pulmonary hypertension.


#4.  Metabolic acidosis secondary to acute kidney injury.  





Plan:


I will decrease rate of IV fluids to 60 mL an hour.


Encourage oral intake.


Maintain Serna catheter for strict I's and Os.


Avoid nephrotoxic agents and hypotensive episodes.  Continue to hold diuretics 

for now.


Repeat electrolytes in the morning.





Thank you for the consultation.  I will continue to follow patient with you 

during his hospital stay.

## 2017-04-01 NOTE — P.CNPUL
History of Present Illness


Consult date: 04/01/17


Requesting physician: Karl Ruth


Reason for consult: dyspnea, abnormal CXR/CT


Chief complaint: Shortness of breath, lower extremity edema, urinary retention


History of present illness: 





this is a very pleasant 72-year-old gentleman who follows with Dr. Powers as 

his primary care physician.  He also follows through the [facility].  He has a 

history of diabetes mellitus, hypothyroidism,hypertension, depression. He has 

not been seen by a pulmonologist in the past. He does have a history of 

obstructive sleep apnea but has not used a mask in several years. He is a 

lifelong nonsmoker. He was recently seen at the [facility] for complaints of 

increasing shortness of breath, lower extremity edema and urinary retention.  

He was found to be in atrial fibrillation and was stated started on warfarin 

and Lasix.  A few days later he was still having issues with shortness of breath

, lower extremity edema and constipation.  He presented here yesterday for the 

same. A KUB revealed a nonspecific abdomen. An ultrasound of the abdomen 

revealed no acute process.  There was incidental findings including a small 

amount of ascites and borderline enlarged spleen. The patient had been 

evaluated by nephrology who feels the patient has a nonoliguric acute kidney 

injury secondary to urinary retention from diuretics and NSAIDs. An 

echocardiogram revealed evidence of atrial fibrillation, severely impaired left 

ventricular systolic function with estimated ejection fraction 30-35%. There is 

also hypokinesia in the inferior septal and anteroseptal left ventricular wall. 

There is also noted pulmonary hypertension with an RVSP of 56 mmHg. he is seen 

today in consultation on the selective care unit.  He is awake and alert in no 

acute distress.  He denies any worsening shortness of breath at this time.  No 

cough or congestion.  No chest pain or palpitations.  He is dyspneic on minimal 

exertion. He is maintaining good O2 saturations in the mid 90s on room air.  He'

s been afebrile.  His heart rate is better controlled.





Review of Systems





14 point review of system was conducted.  All negative other than as mentioned 

in HPI.





Past Medical History


Past Medical History: Atrial Fibrillation, Heart Failure, Diabetes Mellitus, 

Eye Disorder, Hearing Disorder / Deafness, Hyperlipidemia, Hypertension, 

Osteoarthritis (OA), Pneumonia, Sleep Apnea/CPAP/BIPAP, Thyroid Disorder


History of Any Multi-Drug Resistant Organisms: None Reported


Past Surgical History: Back Surgery, Cholecystectomy, Joint Replacement


Past Anesthesia/Blood Transfusion Reactions: No Reported Reaction


Past Psychological History: No Psychological Hx Reported


Smoking Status: Never smoker


Past Alcohol Use History: None Reported


Past Drug Use History: None Reported





- Past Family History


  ** Father


History Unknown: Yes





  ** Mother


Family Medical History: Unable to Obtain





Medications and Allergies


 Home Medications











 Medication  Instructions  Recorded  Confirmed  Type


 


Aspirin 325 mg PO DAILY 03/31/17 03/31/17 History


 


Ferrous Sulfate [Feosol] 325 mg PO TID 03/31/17 03/31/17 History


 


Furosemide [Lasix] 20 mg PO BID 03/31/17 03/31/17 History


 


Gabapentin [Neurontin] 600 mg PO TID 03/31/17 03/31/17 History


 


Insulin Detemir [Levemir] 25 unit SQ HS 03/31/17 03/31/17 History


 


Latanoprost Ophth [Xalatan 0.005%] 1 drops BOTH EYES HS 03/31/17 03/31/17 

History


 


Levothyroxine Sodium [Synthroid] 50 mcg PO DAILY 03/31/17 03/31/17 History


 


Lisinopril [Zestril] 5 mg PO DAILY 03/31/17 03/31/17 History


 


Omega-3 Fatty Acids/Fish Oil [Fish 1 cap PO DAILY 03/31/17 03/31/17 History





Oil 1,000 mg Softgel]    


 


Oxybutynin Chloride [Ditropan] 5 mg PO BID 03/31/17 03/31/17 History


 


Potassium Chloride ER [K-Dur 10] 10 meq PO DAILY 03/31/17 03/31/17 History


 


Sertraline [Zoloft] 100 mg PO DAILY 03/31/17 03/31/17 History


 


Simvastatin [Zocor] 20 mg PO HS 03/31/17 03/31/17 History


 


Temazepam [Restoril] 15 mg PO HS 03/31/17 03/31/17 History


 


Warfarin [Coumadin] 5 mg PO DAILY 03/31/17 03/31/17 History


 


guaiFENesin [Mucinex] 600 - 1,200 mg PO Q12H PRN 03/31/17 03/31/17 History


 


metFORMIN HCL [Glucophage] 1,000 mg PO BID@0900,2100 03/31/17 03/31/17 History


 


metFORMIN HCL [Glucophage] 500 mg PO DAILY@1200 03/31/17 03/31/17 History











 Allergies











Allergy/AdvReac Type Severity Reaction Status Date / Time


 


hydromorphone [From Dilaudid] AdvReac  Unknown Verified 03/31/17 13:30


 


meperidine [From Demerol] AdvReac  Nausea & Verified 03/31/17 13:30





   Vomiting &  





   Diarrhea  














Physical Exam


Vitals: 


 Vital Signs











  Temp Pulse Pulse Resp BP BP Pulse Ox


 


 04/01/17 04:00  96.1 F L   92  18   143/66  95


 


 04/01/17 00:00  97.4 F L   76  18   120/68  91 L


 


 03/31/17 20:00    123 H    169/88  92 L


 


 03/31/17 18:04  97.5 F L   104 H    141/84  93 L


 


 03/31/17 15:09   90   18    96


 


 03/31/17 15:02  100.6 F H  115 H   16  142/83   93 L








 Intake and Output











 03/31/17 04/01/17 04/01/17





 22:59 06:59 14:59


 


Intake Total 300  0


 


Output Total 600 1450 


 


Balance -300 -1450 0


 


Intake:   


 


  Oral 300  0


 


Output:   


 


  Urine 600 1450 


 


  Post Void Residual 0  


 


Other:   


 


  Voiding Method  Indwelling Catheter 


 


  # Bowel Movements  1 


 


  Weight  142.2 kg 142.2 kg








 Patient Weight











 04/02/17





 06:59


 


Weight 142.2 kg














GENERAL EXAM: Morbidly obese.Alert, comfortable in no apparent distress.


HEAD: Normocephalic.


EYES: Normal reaction of pupils, equal size.


NOSE: Clear with pink turbinates.


THROAT: There is crowding the posterior pharynx. No erythema or exudates.


NECK: Short. No masses, no JVD.


CHEST: No chest wall deformity.


LUNGS: Equal air entry with crackles in the bilateral posterior bases.  

Diminished.


CVS: S1 and S2 normal with audible murmur, irregular rhythm.


ABDOMEN: Distended, normal bowel sounds, no guarding or rigidity.


SPINE: No scoliosis or deformity


SKIN: No rashes


CENTRAL NERVOUS SYSTEM: No focal deficits, tone is normal in all 4 extremities.


Extremities: There is 1-2+ lower extremity peripheral edema.  No clubbing, no 

cyanosis.  Peripheral pulses are intact.





Results





- Laboratory Findings


CBC and BMP: 


 03/31/17 13:00





 04/01/17 06:32


PT/INR, D-dimer











PT  22.5 sec (9.0-12.0)  H  04/01/17  06:32    


 


INR  2.3  (<1.1)   04/01/17  06:32    








Abnormal lab findings: 


 Abnormal Labs











  03/31/17 03/31/17 04/01/17





  21:25 23:32 06:25


 


PT   


 


Carbon Dioxide   


 


BUN   


 


Creatinine   


 


Glucose   


 


POC Glucose (mg/dL)   113 H  113 H


 


Urine Blood  Moderate H  


 


Ur Leukocyte Esterase  Small H  


 


Urine RBC  67 H  


 


Urine Bacteria  Rare H  


 


Hyaline Casts  5 H  


 


Urine Mucus  Rare H  


 


Urine Yeast (Budding)  Occasional H  














  04/01/17 04/01/17 04/01/17





  06:32 06:32 11:58


 


PT  22.5 H  


 


Carbon Dioxide   21 L 


 


BUN   42 H 


 


Creatinine   2.57 H 


 


Glucose   112 H 


 


POC Glucose (mg/dL)    198 H


 


Urine Blood   


 


Ur Leukocyte Esterase   


 


Urine RBC   


 


Urine Bacteria   


 


Hyaline Casts   


 


Urine Mucus   


 


Urine Yeast (Budding)   














- Diagnostic Findings


Chest x-ray: image reviewed





Assessment and Plan


Plan: 





impression:





#1 Acute exacerbation of combined systolic and diastolic congestive heart 

failure with impaired left ventricular systolic function and estimated ejection 

fraction of 30-35%.





#2 Moderate to severe pulmonary hypertension.





#3 Obstructive sleep apnea, noncompliant with CPAP.





#4 Morbid obesity.





#5 New-onset atrial fibrillation, currently anticoagulated with warfarin 

therapeutic today's INR 2.3.





#6 Diabetes mellitus, type II.





#7 Hypertension.





#8 Acute renal failure secondary to recent initiation of diuretics and urinary 

retention current creatinine 2.57.





#9Hyperlipidemia.





#10 Hypothyroidism.  





Plan:





The patient was seen and evaluated by Dr. Linares.  His chest x-ray, 

echocardiogram and labs were reviewed.  The patient's dyspnea is most likely 

secondary to his congestive heart failure and fluid volume overload as well as 

pulmonary hypertension and obesity. He was educated regarding the importance of 

CPAP compliance with a diagnosis of obstructive sleep apnea and its deleterious 

effects including atrial fibrillation and heart failure. he would benefit from 

a sleep study in the outpatient setting to reevaluate the severity of his FAM 

and possible obesity/hypoventilation syndrome.  In the interim, we'll continue 

with his current medications.  Nephrology and cardiology are on the case as 

well.  We will continue to follow make further recommendations based on his 

clinical status.

## 2017-04-01 NOTE — P.CRDCN
History of Present Illness


Consult date: 04/01/17


Requesting physician: Karl Ruth


Consult reason: atrial fibrillation


Chief complaint: Inability to urinate


History of present illness: 


This is a pleasant 72-year-old  gentleman with history of hypertension

, diabetes, hyperlipidemia, hypothyroidism, sleep apnea, who [facility] 

[facility], Dr. Rodriguez is his primary care doctor.  He presented to 

the hospital with symptoms of weakness, with associated decrease in urine 

output.  According to the patient he was seen at the [facility], patient had been 

having a mild cough, he denies any shortness of breath, he did also have a 

bilateral leg swelling.  [facility] was treated [facility] the [facility] with antibiotics, and 

was given 3 doses of by mouth Lasix 20 mg.  The patient also states that that 

time he was noted to be in atrial fibrillation which was new for him, was 

started on [facility] by the [facility] at that time.  On Tuesday, patient states he 

urinated approximately 1:00 and since then has not urinated.  For this reason 

he came to the emergency room for further evaluation.  Laboratory data on 

admission, creatinine 3.3, 2.5 this morning.  According to the patient he has 

had no prior kidney issues.  Troponin on admission 0.033.  BNP level 17,200.  

INR 2.3 this morning.  White blood cell count 13,000, hemoglobin 12.4.  EKG on 

admission showed atrial fibrillation with a controlled ventricular response.  

Chest x-ray reveals stable cardiomegaly.  More typical of atelectasis and 

infiltrate.  KUB negative.  Patient had an echocardiogram with Doppler study 

performed on admission here, which revealed an ejection fraction of 30-35%.  

Basal inferior inferior septal and anterior septal hypokinesia.  Mild to 

moderate mitral regurg, mild to moderate tricuspid regurg and moderate to 

severe pulmonary hypertension with an RVSP of 56.  The patient did have an 

echocardiogram with Doppler study performed in 2014 at which time his LV 

function was reported to be normal, he did have moderate to severe MR at that 

time with mild to moderate pulmonary hypertension.  Ultrasound of the abdomen 

and bladder was performed which did not reveal any significant findings.  Chest 

x-ray again repeated this morning showed cardiomegaly, with increasing left 

basilar airspace disease representing atelectasis or pneumonia.  Patient was 

seen in consultation by nephrology, Lasix was held on admission here and 

patient was given IV fluids.  The pressure this morning 142/66.  Patient was 

noted to have a temperature of 102.6 at 3:00 yesterday afternoon, afebrile this 

morning.





Past Medical History


Past Medical History: Atrial Fibrillation, Heart Failure, Diabetes Mellitus, 

Eye Disorder, Hearing Disorder / Deafness, Hyperlipidemia, Hypertension, 

Osteoarthritis (OA), Pneumonia, Sleep Apnea/CPAP/BIPAP, Thyroid Disorder


History of Any Multi-Drug Resistant Organisms: None Reported


Past Surgical History: Back Surgery, Cholecystectomy, Joint Replacement


Past Anesthesia/Blood Transfusion Reactions: No Reported Reaction


Past Psychological History: No Psychological Hx Reported


Smoking Status: Never smoker


Past Alcohol Use History: None Reported


Past Drug Use History: None Reported





- Past Family History


  ** Father


History Unknown: Yes





  ** Mother


Family Medical History: Unable to Obtain





Medications and Allergies


 Home Medications











 Medication  Instructions  Recorded  Confirmed  Type


 


Aspirin 325 mg PO DAILY 03/31/17 03/31/17 History


 


Ferrous Sulfate [Feosol] 325 mg PO TID 03/31/17 03/31/17 History


 


Furosemide [Lasix] 20 mg PO BID 03/31/17 03/31/17 History


 


Gabapentin [Neurontin] 600 mg PO TID 03/31/17 03/31/17 History


 


Insulin Detemir [Levemir] 25 unit SQ HS 03/31/17 03/31/17 History


 


Latanoprost Ophth [Xalatan 0.005%] 1 drops BOTH EYES HS 03/31/17 03/31/17 

History


 


Levothyroxine Sodium [Synthroid] 50 mcg PO DAILY 03/31/17 03/31/17 History


 


Lisinopril [Zestril] 5 mg PO DAILY 03/31/17 03/31/17 History


 


Omega-3 Fatty Acids/Fish Oil [Fish 1 cap PO DAILY 03/31/17 03/31/17 History





Oil 1,000 mg Softgel]    


 


Oxybutynin Chloride [Ditropan] 5 mg PO BID 03/31/17 03/31/17 History


 


Potassium Chloride ER [K-Dur 10] 10 meq PO DAILY 03/31/17 03/31/17 History


 


Sertraline [Zoloft] 100 mg PO DAILY 03/31/17 03/31/17 History


 


Simvastatin [Zocor] 20 mg PO HS 03/31/17 03/31/17 History


 


Temazepam [Restoril] 15 mg PO HS 03/31/17 03/31/17 History


 


Warfarin [Coumadin] 5 mg PO DAILY 03/31/17 03/31/17 History


 


guaiFENesin [Mucinex] 600 - 1,200 mg PO Q12H PRN 03/31/17 03/31/17 History


 


metFORMIN HCL [Glucophage] 1,000 mg PO BID@0900,2100 03/31/17 03/31/17 History


 


metFORMIN HCL [Glucophage] 500 mg PO DAILY@1200 03/31/17 03/31/17 History











 Allergies











Allergy/AdvReac Type Severity Reaction Status Date / Time


 


hydromorphone [From Dilaudid] AdvReac  Unknown Verified 03/31/17 13:30


 


meperidine [From Demerol] AdvReac  Nausea & Verified 03/31/17 13:30





   Vomiting &  





   Diarrhea  














Physical Exam


Vitals: 


 Vital Signs











  Temp Pulse Pulse Resp BP BP Pulse Ox


 


 04/01/17 04:00  96.1 F L   92  18   143/66  95


 


 04/01/17 00:00  97.4 F L   76  18   120/68  91 L


 


 03/31/17 20:00    123 H    169/88  92 L


 


 03/31/17 18:04  97.5 F L   104 H    141/84  93 L


 


 03/31/17 15:09   90   18    96


 


 03/31/17 15:02  100.6 F H  115 H   16  142/83   93 L








 Intake and Output











 03/31/17 04/01/17 04/01/17





 22:59 06:59 14:59


 


Intake Total 300  0


 


Output Total 600 1450 


 


Balance -300 -1450 0


 


Intake:   


 


  Oral 300  0


 


Output:   


 


  Urine 600 1450 


 


  Post Void Residual 0  


 


Other:   


 


  Voiding Method  Indwelling Catheter 


 


  # Bowel Movements  1 


 


  Weight  142.2 kg 














PHYSICAL EXAMINATION: 





HEENT: Head is atraumatic, normocephalic.  Pupils equal, round.  Neck is 

supple.  There is no elevated jugular venous pressure.





HEART EXAMINATION: S1 and S2 irregularly irregular, systolic murmur is heard.





CHEST EXAMINATION: Lungs are clear with fine crackles to bilateral bases.





ABDOMEN:  Soft, obese, nontender. Bowel sounds are heard. No organomegaly noted.


 


EXTREMITIES:1+ peripheral pulses with 1-2+  evidence of peripheral edema and no 

calf tenderness noted.





NEUROLOGIC patient is awake, alert and oriented -3.


 


.


 








Results





 03/31/17 13:00





 04/01/17 06:32


 Coagulation











  04/01/17 Range/Units





  06:32 


 


PT  22.5 H  (9.0-12.0)  sec








 Comprehensive Metabolic Panel











  04/01/17 Range/Units





  06:32 


 


Sodium  142  (137-145)  mmol/L


 


Potassium  4.4  (3.5-5.1)  mmol/L


 


Chloride  106  ()  mmol/L


 


Carbon Dioxide  21 L  (22-30)  mmol/L


 


BUN  42 H  (9-20)  mg/dL


 


Creatinine  2.57 H  (0.66-1.25)  mg/dL


 


Glucose  112 H  (74-99)  mg/dL


 


Calcium  8.9  (8.4-10.2)  mg/dL








 Current Medications











Generic Name Dose Route Start Last Admin





  Trade Name Freq  PRN Reason Stop Dose Admin


 


Aspirin  325 mg  04/01/17 09:00  04/01/17 08:20





  Aspirin  PO   325 mg





  DAILY [facility]   Administration


 


Atorvastatin Calcium  10 mg  03/31/17 21:00  03/31/17 20:34





  Lipitor  PO   10 mg





  HS [facility]   Administration


 


Gabapentin  300 mg  03/31/17 16:00  04/01/17 08:21





  Neurontin  PO   300 mg





  TID [facility]   Administration


 


Sodium Chloride  1,000 mls @ 60 mls/hr  03/31/17 16:15  04/01/17 08:02





  Saline 0.9%  IV   Not Given





  [id number] [facility]   


 


Insulin Human Lispro  0 unit  03/31/17 17:30  04/01/17 08:22





  Humalog  SQ   Not Given





  [facility]   





  Protocol   


 


Levothyroxine Sodium  50 mcg  04/01/17 06:30  04/01/17 08:21





  Synthroid  PO   50 mcg





  DAILY@0630 [facility]   Administration


 


Metoprolol Tartrate  25 mg  03/31/17 21:00  04/01/17 08:21





  Lopressor  PO   25 mg





  BID [facility]   Administration


 


Sertraline HCl  100 mg  04/01/17 09:00  04/01/17 08:21





  Zoloft  PO   100 mg





  DAILY [facility]   Administration


 


Temazepam  15 mg  03/31/17 21:00  03/31/17 20:34





  Restoril  PO   15 mg





  HS [facility]   Administration


 


Warfarin Sodium  5 mg  04/01/17 18:00  





  Coumadin  PO   





  DAILY@1800 [facility]   








 Intake and Output











 03/31/17 04/01/17 04/01/17





 22:59 06:59 14:59


 


Intake Total 300  0


 


Output Total 600 1450 


 


Balance -300 -1450 0


 


Intake:   


 


  Oral 300  0


 


Output:   


 


  Urine 600 1450 


 


  Post Void Residual 0  


 


Other:   


 


  Voiding Method  Indwelling Catheter 


 


  # Bowel Movements  1 


 


  Weight  142.2 kg 








 





 04/01/17 06:32 











EKG Interpretations (text)





EKG shows atrial fibrillation with a controlled ventricular response.





Assessment and Plan


Plan: 


Assessment and plan


#1 symptoms of progressive weakness with evidence of acute kidney injury likely 

secondary to urinary retention.  Diuretics on hold.  Nephrology following.


#2 systolic congestive heart failure acute on chronic


#3 severe pulmonary hypertension


#4 hypertension


#5 atrial fibrillation, appears to be newly diagnosed last week, on Coumadin, 

therapeutic.


#6 cardiomyopathy, echo this admission reveals an ejection fraction of 30-35%, 

echo performed in 2014 revealed normal left ventricular systolic function.


#7 diabetes


#8 hyperlipidemia


#9 sleep apnea, patient stopped using his CPAP a number of years ago.


#10 urinary retention, Serna catheter in place, patient was receiving IV fluids 

of 100 mL per hour, currently at 60 mL per hour.  Diuretics on hold.








Plan


We will decrease patient's aspirin to 81 mg daily, continue metoprolol tartrate 

25 mg one tablet by mouth twice a day along with Coumadin.    We will hold off 

on initiating an ACE inhibitor or Aldactone at this time because of the patient'

s renal function.  Continue to monitor daily lytes BUN and creatinine as well 

as urine output.  Further recommendations to follow.








DNP note has been reviewed, I agree with a documented findings and plan of 

care.  Patient was seen and examined.

## 2017-04-01 NOTE — PN
Patient is a 72-year-old admitted secondary to hypoxemia. Patient is 

found to be in congestive heart failure ejection fraction of 30% to 

35%, which is new. Patient although is on the hypovolemic side because 

of which patient is actually receiving IV fluids at this point of 

time. Patient has moderate to severe pulmonary hypertension. I cannot 

completely rule out pneumonia because the patient does have left 

basilar infiltrate along with leukocytosis and low grade fever 

yesterday because of which I am going ahead and starting him on 

antibiotics for pneumonia and sputum cultures will be obtained.  The 

patient is having  greenish sputum production. Patient at this point 

of time since last night is afebrile. Patient is getting IV fluids at 

this point of time. Kidney function is improving. Patient does not 

have any urinary retention, Serna catheter will be removed. Patient 

does have wall motion abnormalities on the echocardiogram. Cardiology 

is following the patient.  



REVIEW OF SYSTEMS: 

CARDIOVASCULAR:  No chest pain, no orthopnea, no PND, no palpitations.  

RESPIRATORY:  Improving shortness of breath. 

GASTROINTESTINAL:  No diarrhea, nausea or vomiting.  No abdominal 

pain. Normoactive bowel sounds.  

NEUROLOGIC:  No headaches, no weakness, no numbness. 



Medications were reviewed. 



PHYSICAL EXAMINATION: Temperature 96.1, pulse of 92, respiratory rate 

of 18, blood pressure 143/83, saturating at 95% on room air.  

GENERAL: Morbidly obese. Alert and oriented x3. 

HEENT: Pupils are round and equally reacting to light. EOMI. No 

scleral icterus. No conjunctival pallor. Normocephalic, atraumatic. No 

pharyngeal erythema. No thyromegaly.  

CARDIOVASCULAR: S1 and S2 present. Patient does have JVD. Patient's 

pedal edema did worsen a little bit, which is not unexpected of.  

PULMONARY: Chest is clear to auscultation, no wheezing or crackles. 

ABDOMEN: Soft, nontender, nondistended, normoactive bowel sounds. No 

palpable organomegaly.  

MUSCULOSKELETAL: No joint swelling or deformity. 

EXTREMITIES: No cyanosis, clubbing, or pedal edema. 

NEUROLOGICAL: Gross neurological examination did not reveal any focal 

deficits.  

SKIN: No rashes. 



LABORATORY DATA: CBC, CMP are abnormal for elevated WBC count of 

13,000, bicarbonate of 21, BUN of 42, creatinine of 2.52. Hemoglobin 

A1c 5.3. Repeat chest x-ray was reviewed. Patient does have 

leukocytosis.  



ASSESSMENT AND PLAN: 

1. Acute hypoxic respiratory failure. 

2. Atrial fibrillation, presently rate controlled at this point of 

time. Continue with anticoagulation on Coumadin.  

3. Acute hypoxemia related to mostly pulmonary hypertension, morbid 

obesity and sleep apnea. Patient needs to use CPAP machine.  

4. Patient does have congestive heart failure with ejection fraction 

of 35%, but patient is actually on the hypovolemic side because of 

which receiving IV fluids.  

5. Morbid obesity. 

6. Acute renal failure due to excessive diuretic therapy with 

superimposed pulmonary hypertension decreasing perfusion to the left 

ventricle.  

7. Hypertension. 

8. Hyperlipidemia. 

9. Type 2 diabetes mellitus. Continue with sliding scale insulin. 

10. Acute renal failure is improving, which is prerenal azotemia (     

 ) to intravascular depletion.  

11. Bilateral pedal edema due to right-sided heart failure. 

12. Fever with leukocytosis with possibility of community-acquired 

pneumonia most probably pneumococcal contributing to his shortness of 

breath. Will start him on antibiotics today morning. Patient did have 

fever and patient does have leukocytosis.

## 2017-04-02 LAB
ALP SERPL-CCNC: 103 U/L (ref 38–126)
ALT SERPL-CCNC: 60 U/L (ref 21–72)
ANION GAP SERPL CALC-SCNC: 11 MMOL/L
AST SERPL-CCNC: 70 U/L (ref 17–59)
BUN SERPL-SCNC: 39 MG/DL (ref 9–20)
CALCIUM SPEC-MCNC: 8.6 MG/DL (ref 8.4–10.2)
CH: 25.9
CHCM: 31.2
CHLORIDE SERPL-SCNC: 108 MMOL/L (ref 98–107)
CO2 SERPL-SCNC: 24 MMOL/L (ref 22–30)
ERYTHROCYTE [DISTWIDTH] IN BLOOD BY AUTOMATED COUNT: 4.19 M/UL (ref 4.3–5.9)
ERYTHROCYTE [DISTWIDTH] IN BLOOD: 15 % (ref 11.5–15.5)
GLUCOSE BLD-MCNC: 156 MG/DL (ref 75–99)
GLUCOSE BLD-MCNC: 184 MG/DL (ref 75–99)
GLUCOSE BLD-MCNC: 247 MG/DL (ref 75–99)
GLUCOSE BLD-MCNC: 250 MG/DL (ref 75–99)
GLUCOSE SERPL-MCNC: 152 MG/DL (ref 74–99)
HCT VFR BLD AUTO: 34.9 % (ref 39–53)
HDW: 2.84
HGB BLD-MCNC: 10.9 GM/DL (ref 13–17.5)
INR PPP: 2.5 (ref ?–1.1)
MCH RBC QN AUTO: 26 PG (ref 25–35)
MCHC RBC AUTO-ENTMCNC: 31.2 G/DL (ref 31–37)
MCV RBC AUTO: 83.3 FL (ref 80–100)
NON-AFRICAN AMERICAN GFR(MDRD): 54
POTASSIUM SERPL-SCNC: 4.3 MMOL/L (ref 3.5–5.1)
PROT SERPL-MCNC: 6.4 G/DL (ref 6.3–8.2)
PT BLD: 24.2 SEC (ref 9–12)
SODIUM SERPL-SCNC: 143 MMOL/L (ref 137–145)
WBC # BLD AUTO: 9.6 K/UL (ref 3.8–10.6)

## 2017-04-02 RX ADMIN — ASPIRIN 81 MG CHEWABLE TABLET SCH MG: 81 TABLET CHEWABLE at 07:45

## 2017-04-02 RX ADMIN — AZITHROMYCIN SCH MG: 500 TABLET, FILM COATED ORAL at 07:45

## 2017-04-02 RX ADMIN — INSULIN LISPRO SCH UNIT: 100 INJECTION, SOLUTION INTRAVENOUS; SUBCUTANEOUS at 21:03

## 2017-04-02 RX ADMIN — METOPROLOL TARTRATE SCH MG: 25 TABLET, FILM COATED ORAL at 21:03

## 2017-04-02 RX ADMIN — CEFAZOLIN SCH MLS/HR: 330 INJECTION, POWDER, FOR SOLUTION INTRAMUSCULAR; INTRAVENOUS at 06:21

## 2017-04-02 RX ADMIN — TEMAZEPAM SCH MG: 15 CAPSULE ORAL at 21:03

## 2017-04-02 RX ADMIN — INSULIN LISPRO SCH UNIT: 100 INJECTION, SOLUTION INTRAVENOUS; SUBCUTANEOUS at 06:59

## 2017-04-02 RX ADMIN — SERTRALINE HYDROCHLORIDE SCH MG: 100 TABLET ORAL at 07:46

## 2017-04-02 RX ADMIN — METOPROLOL TARTRATE SCH MG: 25 TABLET, FILM COATED ORAL at 07:45

## 2017-04-02 RX ADMIN — GABAPENTIN SCH MG: 300 CAPSULE ORAL at 15:36

## 2017-04-02 RX ADMIN — LEVOTHYROXINE SODIUM SCH MCG: 50 TABLET ORAL at 06:22

## 2017-04-02 RX ADMIN — ATORVASTATIN CALCIUM SCH MG: 10 TABLET, FILM COATED ORAL at 21:03

## 2017-04-02 RX ADMIN — CEFAZOLIN SCH: 330 INJECTION, POWDER, FOR SOLUTION INTRAMUSCULAR; INTRAVENOUS at 19:03

## 2017-04-02 RX ADMIN — GABAPENTIN SCH MG: 300 CAPSULE ORAL at 07:45

## 2017-04-02 RX ADMIN — ISOSORBIDE MONONITRATE SCH MG: 30 TABLET, EXTENDED RELEASE ORAL at 07:46

## 2017-04-02 RX ADMIN — INSULIN LISPRO SCH UNIT: 100 INJECTION, SOLUTION INTRAVENOUS; SUBCUTANEOUS at 17:16

## 2017-04-02 RX ADMIN — INSULIN LISPRO SCH UNIT: 100 INJECTION, SOLUTION INTRAVENOUS; SUBCUTANEOUS at 12:29

## 2017-04-02 RX ADMIN — GABAPENTIN SCH MG: 300 CAPSULE ORAL at 21:03

## 2017-04-02 RX ADMIN — TAMSULOSIN HYDROCHLORIDE SCH MG: 0.4 CAPSULE ORAL at 11:52

## 2017-04-02 RX ADMIN — SODIUM CHLORIDE SCH MLS/HR: 9 INJECTION, SOLUTION INTRAVENOUS at 14:04

## 2017-04-02 RX ADMIN — WARFARIN SODIUM SCH MG: 5 TABLET ORAL at 17:41

## 2017-04-02 NOTE — P.PN
Subjective





72-year-old male who was seen with a diagnosis of primarily heart failure.  He 

has a combination of both systolic and diastolic heart failure.  Systolic 

ejection fraction about 30%.  The patient is improved from yesterday we saw 

him.  He was seen by myself and our nurse practitioner.  In addition to the 

heart failure he has a history of moderate to severe pulmonary hypertension 

sleep apnea syndrome noncompliant with CPAP morbid obesity atrial fibrillation 

diabetes hypertension acute kidney injury hyperlipidemia and hypothyroidism.  

The chest x-ray was consistent with heart failure and I believe his dyspnea 

primarily related to heart failure.  The patient was seen by cardiology and the 

primary service.  We also recommend that her compliance with his CPAP device.  

He is a bit better today compared to yesterday.  Less short of breath.











Objective





- Vital Signs


Vital signs: 


 Vital Signs











Temp  98 F   04/02/17 07:42


 


Pulse  86   04/02/17 08:00


 


Resp  20   04/02/17 08:00


 


BP  130/67   04/02/17 07:42


 


Pulse Ox  93 L  04/02/17 04:00








 Intake & Output











 04/01/17 04/02/17 04/02/17





 18:59 06:59 18:59


 


Intake Total 690  240


 


Output Total  5800 


 


Balance 690 -5800 240


 


Weight 142.2 kg 143.4 kg 


 


Intake:   


 


  Intake, IV Titration 460  





  Amount   


 


    Sodium Chloride 0.9% 1, 360  





    000 ml @ 60 mls/hr IV .   





    [id number] [facility] Rx#:[id number]   


 


    cefTRIAXone 1,000 mg In 100  





    Sodium Chloride 0.9% 50   





    ml @ 100 mls/hr IVPB   





    Q24HR [facility] Rx#:[id number]   


 


  Oral 230  240


 


Output:   


 


  Urine  5800 


 


Other:   


 


  Voiding Method Indwelling Catheter Indwelling Catheter Indwelling Catheter


 


  # Bowel Movements  2 














- Exam





No acute distress, oriented 3.  Wearing nasal O2.





HEENT examination is grossly unremarkable.  Mucous membranes are moist.  No 

oral lesions.





Neck supple.  Full range of motion.  No adenopathy or thyromegaly.  Cannot 

comment on neck veins.





Cardiovascular examination reveals regular rhythm rate.  S1-S2 normal.  Soft 

systolic murmur noted.





Lungs reveal few bibasilar crackles.  No wheezes or rhonchi.  Breath sounds are 

diminished.





Abdomen is obese.  Bowel sounds are heard.





Extremities are intact.  This one to 2+ pitting pedal edema.  It's pitting.





- Labs


CBC & Chem 7: 


 04/02/17 06:31





 04/02/17 06:31


Labs: 


 Abnormal Lab Results - Last 24 Hours (Table)











  04/01/17 04/01/17 04/02/17 Range/Units





  17:06 21:46 05:54 


 


RBC     (4.30-5.90)  m/uL


 


Hgb     (13.0-17.5)  gm/dL


 


Hct     (39.0-53.0)  %


 


PT     (9.0-12.0)  sec


 


Chloride     ()  mmol/L


 


BUN     (9-20)  mg/dL


 


Creatinine     (0.66-1.25)  mg/dL


 


Glucose     (74-99)  mg/dL


 


POC Glucose (mg/dL)  141 H  205 H  156 H  (75-99)  mg/dL


 


AST     (17-59)  U/L














  04/02/17 04/02/17 04/02/17 Range/Units





  06:31 06:31 06:31 


 


RBC    4.19 L  (4.30-5.90)  m/uL


 


Hgb    10.9 L  (13.0-17.5)  gm/dL


 


Hct    34.9 L  (39.0-53.0)  %


 


PT  24.2 H    (9.0-12.0)  sec


 


Chloride   108 H   ()  mmol/L


 


BUN   39 H   (9-20)  mg/dL


 


Creatinine   1.31 H   (0.66-1.25)  mg/dL


 


Glucose   152 H   (74-99)  mg/dL


 


POC Glucose (mg/dL)     (75-99)  mg/dL


 


AST   70 H   (17-59)  U/L














  04/02/17 Range/Units





  12:04 


 


RBC   (4.30-5.90)  m/uL


 


Hgb   (13.0-17.5)  gm/dL


 


Hct   (39.0-53.0)  %


 


PT   (9.0-12.0)  sec


 


Chloride   ()  mmol/L


 


BUN   (9-20)  mg/dL


 


Creatinine   (0.66-1.25)  mg/dL


 


Glucose   (74-99)  mg/dL


 


POC Glucose (mg/dL)  184 H  (75-99)  mg/dL


 


AST   (17-59)  U/L








 Microbiology - Last 24 Hours (Table)











 03/31/17 21:25 Urine Culture - Final





 Urine,Catheterized 


 


 04/01/17 10:52 Blood Culture Gram Stain - Preliminary





 Blood 


 


 04/01/17 10:52 Blood Culture - Final





 Blood 














Assessment and Plan


(1) Sleep apnea syndrome


Status: Acute   





(2) Hypertension


Status: Acute   





(3) Hyperlipidemia


Status: Acute   





(4) Obesity


Status: Acute   





(5) Acute kidney injury


Status: Acute   





(6) Morbid obesity


Status: Acute   





(7) Acute renal failure


Status: Acute   





(8) CHF (congestive heart failure)


Status: Acute   





(9) New onset a-fib


Status: Acute   


Plan: 





Plan dated 04/02/2017





We'll continue with current regimen.  No additional recommendations are made.  

Prognosis is guarded.  He is very noncompliant.  We'll continue to follow.  

Medications x-rays and labs have all been reviewed.


Time with Patient: Less than 30

## 2017-04-02 NOTE — P.PN
Subjective





Patient is seen in follow-up for acute kidney injury.  Creatinine was 3.3 on 

admission and is improved to 1.3 today.  Diuretics are held and is currently 

maintained on normal saline running at 60 mL an hour.  He was also noted to 

have urinary retention and has a Serna catheter in place.  His urine output 

over the last 24 hours was greater than 5 L.  Appetite is good.  Denies chest 

pain or shortness of breath.  He does have systolic CHF with ejection fraction 

of 30-35% with moderate pulmonary hypertension.  No vomiting or diarrhea.  Does 

have some lower extremity edema.





Vital signs are stable.


General: The patient appeared well nourished and normally developed. 


HEENT: Head exam is unremarkable. Neck is without jugular venous distension.


LUNGS: Lungs are clear to auscultation and percussion. Breath sounds decreased.


HEART: Rate and Rhythm are regular. First and second heart sounds normal. No 

murmurs, rubs or gallops. 


ABDOMEN: Abdominal exam reveals normal bowel sounds. Non-tender and non-

distended. No evidence of peritonitis.


EXTREMITITES: 1+ edema.





Objective





- Vital Signs


Vital signs: 


 Vital Signs











Temp  98 F   04/02/17 07:42


 


Pulse  86   04/02/17 07:42


 


Resp  18   04/02/17 07:42


 


BP  130/67   04/02/17 07:42


 


Pulse Ox  93 L  04/02/17 04:00








 Intake & Output











 04/01/17 04/02/17 04/02/17





 18:59 06:59 18:59


 


Intake Total 690  240


 


Output Total  5800 


 


Balance 690 -5800 240


 


Weight 142.2 kg 143.4 kg 


 


Intake:   


 


  Intake, IV Titration 460  





  Amount   


 


    Sodium Chloride 0.9% 1, 360  





    000 ml @ 60 mls/hr IV .   





    [id number] [facility] Rx#:[id number]   


 


    cefTRIAXone 1,000 mg In 100  





    Sodium Chloride 0.9% 50   





    ml @ 100 mls/hr IVPB   





    Q24HR [facility] Rx#:[id number]   


 


  Oral 230  240


 


Output:   


 


  Urine  5800 


 


Other:   


 


  Voiding Method Indwelling Catheter Indwelling Catheter 


 


  # Bowel Movements  2 














- Labs


CBC & Chem 7: 


 04/02/17 06:31





 04/02/17 06:31


Labs: 


 Abnormal Lab Results - Last 24 Hours (Table)











  04/01/17 04/01/17 04/01/17 Range/Units





  11:58 17:06 21:46 


 


RBC     (4.30-5.90)  m/uL


 


Hgb     (13.0-17.5)  gm/dL


 


Hct     (39.0-53.0)  %


 


PT     (9.0-12.0)  sec


 


Chloride     ()  mmol/L


 


BUN     (9-20)  mg/dL


 


Creatinine     (0.66-1.25)  mg/dL


 


Glucose     (74-99)  mg/dL


 


POC Glucose (mg/dL)  198 H  141 H  205 H  (75-99)  mg/dL


 


AST     (17-59)  U/L














  04/02/17 04/02/17 04/02/17 Range/Units





  05:54 06:31 06:31 


 


RBC     (4.30-5.90)  m/uL


 


Hgb     (13.0-17.5)  gm/dL


 


Hct     (39.0-53.0)  %


 


PT   24.2 H   (9.0-12.0)  sec


 


Chloride    108 H  ()  mmol/L


 


BUN    39 H  (9-20)  mg/dL


 


Creatinine    1.31 H  (0.66-1.25)  mg/dL


 


Glucose    152 H  (74-99)  mg/dL


 


POC Glucose (mg/dL)  156 H    (75-99)  mg/dL


 


AST    70 H  (17-59)  U/L














  04/02/17 Range/Units





  06:31 


 


RBC  4.19 L  (4.30-5.90)  m/uL


 


Hgb  10.9 L  (13.0-17.5)  gm/dL


 


Hct  34.9 L  (39.0-53.0)  %


 


PT   (9.0-12.0)  sec


 


Chloride   ()  mmol/L


 


BUN   (9-20)  mg/dL


 


Creatinine   (0.66-1.25)  mg/dL


 


Glucose   (74-99)  mg/dL


 


POC Glucose (mg/dL)   (75-99)  mg/dL


 


AST   (17-59)  U/L








 Microbiology - Last 24 Hours (Table)











 04/01/17 10:52 Blood Culture Gram Stain - Preliminary





 Blood 


 


 04/01/17 10:52 Blood Culture - Preliminary





 Blood 


 


 03/31/17 21:25 Urine Culture - Preliminary





 Urine,Catheterized 














Assessment and Plan


Plan: 





Assessment:


#1.  Nonoliguric acute kidney injury secondary to urinary retention as well as 

from the use of NSAIDs and diuretics.  Improving.  Creatinine 1.3 today.  

Creatinine from June 2015 was 0.7   UA negative for proteinuria.  Renal 

ultrasound benign.


#2.  Urinary retention status post Serna catheter placement.


#3.  Systolic CHF with ejection fraction of 30-35% with moderate to severe 

pulmonary hypertension.


#4.  Metabolic acidosis secondary to acute kidney injury.  Resolved.





Plan:


Hep-Lock IV fluids.


Lasix 40 mg IV once today.


Encourage oral intake.


Discontinue Serna catheter today and monitor serial postvoid residuals.


Add Flomax.


Avoid nephrotoxic agents and hypotensive episodes.


Repeat electrolytes in the morning.

## 2017-04-02 NOTE — PN
Mr. Mercado is a 72-year-old male who presented with evidence of 

worsening renal failure. He has atrial fibrillation. He is feeling 

better today. His breathing is better. His energy is better. He is 

ambulating in the room.  



He continues to be on aspirin 81 mg daily, Lipitor 10 mg daily, 

isosorbide mononitrate 30 mg daily, metoprolol tartrate 25 mg twice a 

day, Coumadin, hydralazine 25 mg twice a day.  



PHYSICAL EXAMINATION: Blood pressure 130/60 with a heart rate in the 

80s.  

LUNGS: Clear. 

HEART: Irregularly irregular. S1, S2, no S3, no rub. 

ABDOMEN: Soft and nontender. 

EXTREMITIES: +1 edema bilaterally. 



He had an echocardiogram done that showed a severely impaired left 

ventricular systolic function, which is new to him. His lab data 

revealed BUN and creatinine of 39 and 1.31, improving compared with 

his admission. His potassium 4.3. INR of 2.5. Hemoglobin of 10.9.  



IMPRESSION: 

1. Renal failure, nonoliguric, improving. 

2. Atrial fibrillation anticoagulated.  

3. Cardiomyopathy of unclear etiology. 



RECOMMENDATIONS: From the cardiac standpoint, will continue on the 

present therapy, continue to follow his renal function. The patient 

would require further intervention because of his cardiomyopathy of 

unclear etiology, but I will await until his renal function is 

stabilized, most likely he would require coronary angiography. Will 

follow his renal function closely and increase his level of activity 

as tolerated.

## 2017-04-02 NOTE — PN
Patient is admitted with atrial fibrillation, shortness of breath 

secondary to that. Patient is clinically doing much better today and 

patient is also being treated for pneumonia, renal failure and patient 

has moderate to severe pulmonary hypertension and patient has ejection 

fraction of 35% although the patient is hypovolemic. Kidney function 

did improve with IV fluids and that is being managed by Nephrology. 

Patient has bilateral pedal edema, which is a bit worse.   



REVIEW OF SYSTEMS:  

CARDIOVASCULAR:  No chest pain, no orthopnea, no PND, no palpitations.  

PULMONARY:  Denied any shortness of breath.  No cough or hemoptysis. 

GASTROINTESTINAL:  No diarrhea, nausea or vomiting.  No abdominal 

pain. Normoactive bowel sounds.  

NEUROLOGIC:  No headaches, no weakness, no numbness. 



Medications were reviewed. 



PHYSICAL EXAMINATION: 

VITAL SIGNS: Temperature 96, pulse of 86, respiratory rate 20, blood 

pressure is 130/67. Saturating at 88% on room air.  

GENERAL: Morbidly obese. Alert and oriented x3. Respiratory rate is 

significantly improved.  

HEENT: Pupils are round and equally reacting to light. EOMI. No 

scleral icterus. No conjunctival pallor. Normocephalic, atraumatic. No 

pharyngeal erythema. No thyromegaly.  

CARDIOVASCULAR: S1 and S2 present. No murmurs, rubs, or gallops. 

PULMONARY: Chest is clear to auscultation, no wheezing or crackles. 

ABDOMEN: Soft, nontender, nondistended, normoactive bowel sounds. No 

palpable organomegaly.  

MUSCULOSKELETAL: No joint swelling or deformity. 

EXTREMITIES: Patient does not have any cyanosis or clubbing, but 

patient does have minimally worsening pedal edema.  

NEUROLOGICAL: Gross neurological examination did not reveal any focal 

deficits.  

SKIN: No rashes. 



LABORATORY DATA: CBC and CMP are abnormal for elevated BUN and 

creatinine of 39 and 1.21, which is an improvement. Chloride is 

elevated, because of IV normal saline and patient's leukocytosis is 

improved.  



ASSESSMENT AND PLAN: 

1. Acute hypoxic respiratory failure (    ) secondary to atrial 

fibrillation and also possibility of pneumonia, community-acquired, 

and most probably pneumococcal in origin.  

2. Patient also has bacteremia with gram-positive cocci in clusters. 

Will repeat blood cultures today and tomorrow. Patient will be started 

on IV vancomycin.  

3. Possible sepsis secondary to either pneumonia or bacteremia. 

4. Acute hypoxemia that is contribution of pulmonary hypertension too. 

5. Atrial fibrillation, rate controlled at this point of time. 

Continue with anticoagulation with Coumadin. Patient is therapeutic on 

Coumadin. Continue to monitor INR.  

6. Morbid obesity. 

7. Severe pulmonary hypertension, moderate to severe pulmonary 

hypertension.  

8. Hypertension. 

9. Hyperlipidemia.

10. Congestive heart failure, chronic systolic dysfunction, ejection 

fraction of around 30% to 35%. Patient is hypovolemic.  

11. Acute renal failure, prerenal azotemia, secondary to intravascular 

volume depletion and patient is improving with IV fluid gentle IV 

hydration.  

12. Bilateral pedal edema secondary to congestive heart failure as 

well as predominant right-sided heart failure, which is getting a bit 

worse.  

13. Sepsis secondary to assessment #1.

## 2017-04-03 VITALS — RESPIRATION RATE: 18 BRPM

## 2017-04-03 LAB
ANION GAP SERPL CALC-SCNC: 10 MMOL/L
BUN SERPL-SCNC: 34 MG/DL (ref 9–20)
CALCIUM SPEC-MCNC: 8.6 MG/DL (ref 8.4–10.2)
CHLORIDE SERPL-SCNC: 108 MMOL/L (ref 98–107)
CO2 SERPL-SCNC: 25 MMOL/L (ref 22–30)
GLUCOSE BLD-MCNC: 138 MG/DL (ref 75–99)
GLUCOSE BLD-MCNC: 161 MG/DL (ref 75–99)
GLUCOSE BLD-MCNC: 209 MG/DL (ref 75–99)
GLUCOSE BLD-MCNC: 215 MG/DL (ref 75–99)
GLUCOSE SERPL-MCNC: 138 MG/DL (ref 74–99)
INR PPP: 2.6 (ref ?–1.1)
NON-AFRICAN AMERICAN GFR(MDRD): >60
POTASSIUM SERPL-SCNC: 3.9 MMOL/L (ref 3.5–5.1)
PT BLD: 25 SEC (ref 9–12)
SODIUM SERPL-SCNC: 143 MMOL/L (ref 137–145)

## 2017-04-03 RX ADMIN — INSULIN LISPRO SCH UNIT: 100 INJECTION, SOLUTION INTRAVENOUS; SUBCUTANEOUS at 21:16

## 2017-04-03 RX ADMIN — GABAPENTIN SCH MG: 300 CAPSULE ORAL at 08:38

## 2017-04-03 RX ADMIN — INSULIN LISPRO SCH UNIT: 100 INJECTION, SOLUTION INTRAVENOUS; SUBCUTANEOUS at 06:30

## 2017-04-03 RX ADMIN — ASPIRIN 81 MG CHEWABLE TABLET SCH MG: 81 TABLET CHEWABLE at 08:38

## 2017-04-03 RX ADMIN — METOPROLOL TARTRATE SCH MG: 25 TABLET, FILM COATED ORAL at 21:16

## 2017-04-03 RX ADMIN — INSULIN LISPRO SCH UNIT: 100 INJECTION, SOLUTION INTRAVENOUS; SUBCUTANEOUS at 17:34

## 2017-04-03 RX ADMIN — ISOSORBIDE MONONITRATE SCH MG: 30 TABLET, EXTENDED RELEASE ORAL at 08:39

## 2017-04-03 RX ADMIN — ATORVASTATIN CALCIUM SCH MG: 10 TABLET, FILM COATED ORAL at 21:16

## 2017-04-03 RX ADMIN — TAMSULOSIN HYDROCHLORIDE SCH MG: 0.4 CAPSULE ORAL at 08:38

## 2017-04-03 RX ADMIN — GABAPENTIN SCH MG: 300 CAPSULE ORAL at 15:25

## 2017-04-03 RX ADMIN — AZITHROMYCIN SCH MG: 500 TABLET, FILM COATED ORAL at 08:39

## 2017-04-03 RX ADMIN — GABAPENTIN SCH MG: 300 CAPSULE ORAL at 21:16

## 2017-04-03 RX ADMIN — SODIUM CHLORIDE SCH MLS/HR: 9 INJECTION, SOLUTION INTRAVENOUS at 05:27

## 2017-04-03 RX ADMIN — SERTRALINE HYDROCHLORIDE SCH MG: 100 TABLET ORAL at 08:39

## 2017-04-03 RX ADMIN — WARFARIN SODIUM SCH MG: 5 TABLET ORAL at 17:35

## 2017-04-03 RX ADMIN — LEVOTHYROXINE SODIUM SCH MCG: 50 TABLET ORAL at 06:30

## 2017-04-03 RX ADMIN — METOPROLOL TARTRATE SCH MG: 25 TABLET, FILM COATED ORAL at 08:39

## 2017-04-03 RX ADMIN — INSULIN LISPRO SCH UNIT: 100 INJECTION, SOLUTION INTRAVENOUS; SUBCUTANEOUS at 11:42

## 2017-04-03 RX ADMIN — TEMAZEPAM SCH MG: 15 CAPSULE ORAL at 21:17

## 2017-04-03 NOTE — P.PN
Subjective





Date of service 04/03/2017





Progress note being dictated for Dr. Forrest.








Interval history: This a 72-year-old gentleman admitted with shortness of breath

, atrial fibrillation, pneumonia, renal failure, moderate to severe pulmonary 

hypertension with an EF of 35% and multiple other medical issues.  Maintained 

on Zithromax and Rocephin.  Hypovolemic and received IV fluids, worsening pedal 

edema, renal function improving.  Chest x-ray reporting stable left basilar 

airspace disease possible small left effusion, cardiomegaly.  Denies chest pain

, palpitations or increasing shortness of breath





Objective





- Vital Signs


Vital signs: 


 Vital Signs











Temp  96.7 F L  04/03/17 15:20


 


Pulse  84   04/03/17 15:20


 


Resp  18   04/03/17 15:20


 


BP  120/66   04/03/17 15:20


 


Pulse Ox  95   04/03/17 15:20








 Intake & Output











 04/02/17 04/03/17 04/03/17





 18:59 06:59 18:59


 


Intake Total 2240 420 822


 


Output Total 1500 2700 


 


Balance 740 -2280 822


 


Weight  143 kg 


 


Intake:   


 


  Intake, IV Titration 1080 420 





  Amount   


 


    Sodium Chloride 0.9% 1, 480 420 





    000 ml @ 60 mls/hr IV .   





    [id number] [facility] Rx#:[id number]   


 


    Vancomycin 2,000 mg In 500  





    Sodium Chloride 0.9% 500   





    ml @ 167 mls/hr IVPB Q16H   





    [facility] Rx#:[id number]   


 


    cefTRIAXone 1,000 mg In 100  





    Sodium Chloride 0.9% 50   





    ml @ 100 mls/hr IVPB   





    Q24HR [facility] Rx#:[id number]   


 


  Oral 1160  822


 


Output:   


 


  Urine 800 2700 


 


    Straight  650 


 


    Uretheral (Serna)  475 


 


  Post Void Residual 700  


 


Other:   


 


  Voiding Method Urinal  Indwelling Catheter


 


  # Voids 0  1


 


  # Bowel Movements   2














- Exam





PHYSICAL EXAM:


VITAL SIGNS: [As above]


GENERAL: [Sitting up in bed, no acute distress]


HEENT: [Pupils equal conjunctiva normal.]


NECK: [Supple, no JVD]


RESPIRATORY EFFORT:[Normal]


LUNGS:  [Clear to auscultation, no wheezes rhonchi or crackles]


CARDIOVASCULAR[irregular, no murmurs rubs or gallops, worsening pedal edema]


GI: [Abdomen soft, nontender, positive bowel sounds.]


PSYCH: [Alert and oriented -3, mood and affect normal.]


NEURO: Gross neurological examination did not reveal any focal deficits








- Labs


CBC & Chem 7: 


 04/02/17 06:31





 04/03/17 06:12


Labs: 


 Abnormal Lab Results - Last 24 Hours (Table)











  04/02/17 04/03/17 04/03/17 Range/Units





  20:25 06:12 06:12 


 


PT   25.0 H   (9.0-12.0)  sec


 


Chloride    108 H  ()  mmol/L


 


BUN    34 H  (9-20)  mg/dL


 


Glucose    138 H  (74-99)  mg/dL


 


POC Glucose (mg/dL)  247 H    (75-99)  mg/dL














  04/03/17 04/03/17 04/03/17 Range/Units





  06:18 11:41 16:29 


 


PT     (9.0-12.0)  sec


 


Chloride     ()  mmol/L


 


BUN     (9-20)  mg/dL


 


Glucose     (74-99)  mg/dL


 


POC Glucose (mg/dL)  138 H  161 H  215 H  (75-99)  mg/dL








 Microbiology - Last 24 Hours (Table)











 04/02/17 12:51 Blood Culture - Preliminary





 Blood    No Growth after 24 hours


 


 04/01/17 10:52 Blood Culture Gram Stain - Preliminary





 Blood Blood Culture - Preliminary





    Coagulase Negative Staph














Assessment and Plan


Plan: 


1. [Acute hypoxic respiratory failure secondary to atrial fibrillation, 

possible pneumonia, community-acquired and most probably pneumococcal in origin]

.


2.  Bacteremia with coagulase-negative staph, repeat blood cultures pending].


3. [Possible sepsis secondary to either pneumonia or bacteremia].


4. [Severe Pulmonary hypertension].


5. [Acute renal failure, prerenal azotemia secondary to intravascular volume 

depletion, improved with gentle IV fluid hydration].


6. [Chronic persistent Atrial fibrillation,].


7. [Coumadin monitoring].


8.  Morbid obesity, BMI 42.8


9.  Hypertension


10.  Hyperlipidemia


11.  Congestive heart failure, chronic systolic dysfunction, EF 30-35%.  Newly 

diagnosed Cardiomyopathy with unknown etiology


12.  Diabetes


13.  Urinary retention, Serna catheter placed.  Outpatient urology follow-up.


14.  Sleep apnea














Plan: Continue on current medication regime , Flomax, monitoring and 

symptomatic treatment.  IV fluids have discontinued.  Follow cultures closely.  

Cardiology discussing cardiac catheterization in the future, date to be 

arranged after patient discharged.  Compression stockings ordered.  Discharge 

planning in progress for tomorrow, pending clearance from consults.











The impression and plan of care has been dictated as directed.





:


I performed a H&P examination of this patient and discussed the same with the 

dictator.  I agree with the dictator's note.  Any additional findings/opinions/

etc. will be noted.

## 2017-04-03 NOTE — XR
EXAMINATION TYPE: XR chest 2V

 

DATE OF EXAM: 4/3/2017 11:39 AM

 

HISTORY: f/u.

 

REFERENCE: Previous study dated 4/1/2017.

 

FINDINGS: The heart is enlarged. There is left basilar airspace disease, unchanged from previous. The
re is some blunting of the left CP angle. I could not exclude a small effusion.

 

IMPRESSION: 

1. CARDIOMEGALY.

2. STABLE LEFT BASILAR AIRSPACE DISEASE.

3. I CANNOT EXCLUDE A SMALL LEFT EFFUSION.

## 2017-04-03 NOTE — P.PN
Subjective








this is a very pleasant 72-year-old gentleman who follows with Dr. Powers as 

his primary care physician.  He also follows through the [facility].  He has a 

history of diabetes mellitus, hypothyroidism,hypertension, depression. He has 

not been seen by a pulmonologist in the past. He does have a history of 

obstructive sleep apnea but has not used a mask in several years. He is a 

lifelong nonsmoker. He was recently seen at the [facility] for complaints of 

increasing shortness of breath, lower extremity edema and urinary retention.  

He was found to be in atrial fibrillation and was stated started on warfarin 

and Lasix.  A few days later he was still having issues with shortness of breath

, lower extremity edema and constipation.  He presented here yesterday for the 

same. A KUB revealed a nonspecific abdomen. An ultrasound of the abdomen 

revealed no acute process.  There was incidental findings including a small 

amount of ascites and borderline enlarged spleen. The patient had been 

evaluated by nephrology who feels the patient has a nonoliguric acute kidney 

injury secondary to urinary retention from diuretics and NSAIDs. An 

echocardiogram revealed evidence of atrial fibrillation, severely impaired left 

ventricular systolic function with estimated ejection fraction 30-35%. There is 

also hypokinesia in the inferior septal and anteroseptal left ventricular wall. 

There is also noted pulmonary hypertension with an RVSP of 56 mmHg. he is seen 

today in consultation on the selective care unit.  He is awake and alert in no 

acute distress.  He denies any worsening shortness of breath at this time.  No 

cough or congestion.  No chest pain or palpitations.  He is dyspneic on minimal 

exertion. He is maintaining good O2 saturations in the mid 90s on room air.  He'

s been afebrile.  His heart rate is better controlled.





On today's evaluation of 04/03/2017, the patient is less short of breath.  The 

patient has normalized renal function creatinine is down to 1.0.  The patient 

has diuresed more than 8 L since he came into the hospital.  The net fluid 

balance for yesterday was -1.5 L.  The patient continues to have a therapeutic 

PT/INR.  The rest of the electrodes are all within normal limits.  The chest x-

ray shows cardiac megaly with a stable left basilar airspace disease and small 

effusion.  The patient is afebrile.  The patient is hemodynamically stable.  

The patient was further moved to her room air oxygen and his current pulse ox 

is around 95%.





Objective





- Vital Signs


Vital signs: 


 Vital Signs











Temp  96.7 F L  04/03/17 15:20


 


Pulse  84   04/03/17 15:20


 


Resp  18   04/03/17 15:20


 


BP  120/66   04/03/17 15:20


 


Pulse Ox  95   04/03/17 15:20








 Intake & Output











 04/02/17 04/03/17 04/03/17





 18:59 06:59 18:59


 


Intake Total 2240 420 822


 


Output Total 1500 2700 


 


Balance 740 -2280 822


 


Weight  143 kg 


 


Intake:   


 


  Intake, IV Titration 1080 420 





  Amount   


 


    Sodium Chloride 0.9% 1, 480 420 





    000 ml @ 60 mls/hr IV .   





    [id number] [facility] Rx#:[id number]   


 


    Vancomycin 2,000 mg In 500  





    Sodium Chloride 0.9% 500   





    ml @ 167 mls/hr IVPB Q16H   





    [facility] Rx#:[id number]   


 


    cefTRIAXone 1,000 mg In 100  





    Sodium Chloride 0.9% 50   





    ml @ 100 mls/hr IVPB   





    Q24HR [facility] Rx#:[id number]   


 


  Oral 1160  822


 


Output:   


 


  Urine 800 2700 


 


    Straight  650 


 


    Uretheral (Serna)  475 


 


  Post Void Residual 700  


 


Other:   


 


  Voiding Method Urinal  Indwelling Catheter


 


  # Voids 0  1


 


  # Bowel Movements   2














- Exam





Head exam was generally normal. There was no scleral icterus or corneal arcus. 

Mucous membranes were moist.Neck was supple and without jugular venous 

distension, thyromegaly, or carotid bruits. Carotids were easily palpable 

bilaterally. There was no adenopathy.  Lung sounds are diminished and there is 

some few crackles in lung bases bilaterally.  Heart sounds are irregular, 

positive S1-S2, no cervical murmurs appreciated.Abdominal exam revealed normal 

bowel sounds. The abdomen was soft, non-tender, and without masses, organomegaly

, or appreciable enlargement of the abdominal aorta.  Extremities show +1 

pitting edema there is no cyanosis or clubbing.  Neurologic exam is nonfocal.





- Labs


CBC & Chem 7: 


 04/02/17 06:31





 04/03/17 06:12


Labs: 


 Abnormal Lab Results - Last 24 Hours (Table)











  04/02/17 04/03/17 04/03/17 Range/Units





  20:25 06:12 06:12 


 


PT   25.0 H   (9.0-12.0)  sec


 


Chloride    108 H  ()  mmol/L


 


BUN    34 H  (9-20)  mg/dL


 


Glucose    138 H  (74-99)  mg/dL


 


POC Glucose (mg/dL)  247 H    (75-99)  mg/dL














  04/03/17 04/03/17 04/03/17 Range/Units





  06:18 11:41 16:29 


 


PT     (9.0-12.0)  sec


 


Chloride     ()  mmol/L


 


BUN     (9-20)  mg/dL


 


Glucose     (74-99)  mg/dL


 


POC Glucose (mg/dL)  138 H  161 H  215 H  (75-99)  mg/dL








 Microbiology - Last 24 Hours (Table)











 04/02/17 12:51 Blood Culture - Preliminary





 Blood    No Growth after 24 hours


 


 04/01/17 10:52 Blood Culture Gram Stain - Preliminary





 Blood Blood Culture - Preliminary





    Coagulase Negative Staph














Assessment and Plan


Plan: 





Assessment





1 acute CHF exacerbation.  The patient is known to have systolic dysfunction 

with an ejection fraction of 30-35%.  In any rate, the patient was in 

significant fluid overload and he has diuresed more than 7 L since his current 

hospitalization.  He is less short of breath and he is oxidation is improved 

considerably.





2 acute kidney injury, recovered and the renal function is normalized





3 hypertension





4 chronic atrial fibrillation rate is controlled and the patient is on long-

term medical condition with warfarin with a therapeutic PT/INR





5 secondary severe pulmonary hypertension





6 diabetes mellitus





7 hyperlipidemia





8 obstructive sleep apnea





9 urinary retention, recovered post Serna catheter insertion.  Rule out a 

component of obstructive uropathy.





Plan coagulase-negative staph in the blood, likely a contaminant.





Plan





Continue same treatment.  We will likely stop antibiotics and the next 24-48 

hours.  Continue long-term and evaluation with warfarin.  Chest x-ray from 

today was noted.  Echocardiogram was noted.  Outpatient cardiac 

catheterization.  We'll follow.

## 2017-04-03 NOTE — PN
Patient is seen for follow-up for acute kidney injury. His renal 

function has improved. Patient was hydrated with fluids and his serum 

creatinine is down from 3.3 on initial admission to 1.0 now. He does 

have significant lower extremity edema. Patient has been eating and 

drinking fairly well. We can cut down his IV fluids.  



On examination, blood pressure is 122/83, heart rate 86 per minute. 

She is afebrile. Examination of the heart S1 and S2. Examination of 

the bilateral breath sounds are heard.  Decreased breath sounds at the 

bases.  

ABDOMEN: Soft, nontender, obese. Examination of lower extremities 

shows edema with chronic skin changes.  

Bilateral extremities are currently wrapped.



Labs show sodium 143, potassium 3.9, chloride 108, BUN 34, serum 

creatinine 1.0.  



ASSESSMENT:

1. Acute kidney injury, prerenal, currently improved with IV 

hydration.  We can discontinue the IV fluids at this time.  

2. Cardiomyopathy with ejection fraction of 30% to 35%. Currently not 

in clinical heart failure.  

3. Urine retention, status post Serna catheter placement and patient 

will need to see urology.  

4. Moderate to severe pulmonary hypertension with resultant lower 

extremity edema which is chronic.  

5. Metabolic acidosis secondary to acute kidney injury, currently 

resolved.  

6. Coagulase negative Staph bacteremia on 04/01 and another culture on 

the same day showing the same gram-positive cocci.  



PLAN: Discontinue IV fluids. May continue with Serna catheter. See 

urology as outpatient. The patient may need to be discharged with 

Serna catheter. We can do voiding trials if it has not been done yet. 

He has been started on Flomax.   Patient was given one dose of IV 

vancomycin. We should repeat another set of blood cultures.

## 2017-04-03 NOTE — P.PN
Subjective


Principal diagnosis: 





Acute renal failure


This is a pleasant 72-year-old  gentleman who presented to the 

hospital with acute renal failure and decreased urine output.  Patient has 

known chronic persistent atrial fibrillation, therapeutic on Coumadin.  He did 

have an echocardiogram with Doppler study performed on this admission which 

revealed an ejection fraction of 30-35%, echocardiogram with Doppler study 

performed in 2014 was reported to be normal.  Because of the newly diagnosed 

cardiomyopathy patient was advised at some point that he would need to undergo 

cardiac catheterization.  Seen and examined today, INR 2.6.  Creatinine 1.0, 

patient overall is feeling quite well today.  Dr. Schwartz did have a lengthy 

discussion with the patient and his wife, once he is discharged home from the 

hospital he will follow-up in the office, outpatient cardiac catheterization 

will then be performed once the patient is more stable.








Objective





- Vital Signs


Vital signs: 


 Vital Signs











Temp  97.4 F L  04/03/17 11:05


 


Pulse  86   04/03/17 11:05


 


Resp  18   04/03/17 11:05


 


BP  114/67   04/03/17 11:05


 


Pulse Ox  96   04/03/17 11:05








 Intake & Output











 04/02/17 04/03/17 04/03/17





 18:59 06:59 18:59


 


Intake Total 2240 420 822


 


Output Total 1500 2700 


 


Balance 740 -2280 822


 


Weight  143 kg 


 


Intake:   


 


  Intake, IV Titration 1080 420 





  Amount   


 


    Sodium Chloride 0.9% 1, 480 420 





    000 ml @ 60 mls/hr IV .   





    [id number] [facility] Rx#:[id number]   


 


    Vancomycin 2,000 mg In 500  





    Sodium Chloride 0.9% 500   





    ml @ 167 mls/hr IVPB Q16H   





    [facility] Rx#:[id number]   


 


    cefTRIAXone 1,000 mg In 100  





    Sodium Chloride 0.9% 50   





    ml @ 100 mls/hr IVPB   





    Q24HR [facility] Rx#:[id number]   


 


  Oral 1160  822


 


Output:   


 


  Urine 800 2700 


 


    Straight  650 


 


    Uretheral (Serna)  475 


 


  Post Void Residual 700  


 


Other:   


 


  Voiding Method Urinal  Indwelling Catheter


 


  # Voids 0  


 


  # Bowel Movements   1














- Exam


PHYSICAL EXAMINATION: 





HEENT: Head is atraumatic, normocephalic.  Pupils equal, round.  Neck is 

supple.  There is no elevated jugular venous pressure.





HEART EXAMINATION: R S1 and S2 irregularly irregular





CHEST EXAMINATION: Lungs are clear to auscultation and precussion. No chest 

wall tenderness is noted on palpation or with deep breathing.





ABDOMEN:  Soft, obese, nontender. Bowel sounds are heard. No organomegaly noted.


 


EXTREMITIES: 2+ peripheral pulses with 1+  evidence of peripheral edema and no 

calf tenderness noted.





NEUROLOGIC patient is awake, alert and oriented -3.


 


.


 











- Labs


CBC & Chem 7: 


 04/02/17 06:31





 04/03/17 06:12


Labs: 


 Abnormal Lab Results - Last 24 Hours (Table)











  04/02/17 04/02/17 04/03/17 Range/Units





  16:49 20:25 06:12 


 


PT    25.0 H  (9.0-12.0)  sec


 


Chloride     ()  mmol/L


 


BUN     (9-20)  mg/dL


 


Glucose     (74-99)  mg/dL


 


POC Glucose (mg/dL)  250 H  247 H   (75-99)  mg/dL














  04/03/17 04/03/17 04/03/17 Range/Units





  06:12 06:18 11:41 


 


PT     (9.0-12.0)  sec


 


Chloride  108 H    ()  mmol/L


 


BUN  34 H    (9-20)  mg/dL


 


Glucose  138 H    (74-99)  mg/dL


 


POC Glucose (mg/dL)   138 H  161 H  (75-99)  mg/dL








 Microbiology - Last 24 Hours (Table)











 04/02/17 12:51 Blood Culture - Preliminary





 Blood    No Growth after 24 hours


 


 04/01/17 10:52 Blood Culture Gram Stain - Preliminary





 Blood Blood Culture - Preliminary





    Coagulase Negative Staph


 


 03/31/17 21:25 Urine Culture - Final





 Urine,Catheterized 














Assessment and Plan


Plan: 


Assessment and plan


#1 symptoms of progressive weakness with evidence of acute kidney injury likely 

secondary to urinary retention.  Creatinine 1.0 today.  Nephrology following.


#2 systolic congestive heart failure acute on chronic


#3 severe pulmonary hypertension


#4 hypertension


#5 atrial fibrillation, chronic persistent, on Coumadin


#6 cardiomyopathy, unknown etiology, echo this admission reveals an ejection 

fraction of 30-35%, echo performed in 2014 revealed normal left ventricular 

systolic function.


#7 diabetes


#8 hyperlipidemia


#9 sleep apnea, patient stopped using his CPAP a number of years ago.


#10 urinary retention, Serna catheter in place, patient was receiving IV fluids 

of 100 mL per hour, currently at 60 mL per hour.  Diuretics on hold.








Plan


From cardiology's perspective, we'll continue current medications.  Once the 

patient is discharged home from the hospital follow-up appointment will be made 

with Dr. Schwartz in the office.  Outpatient cardiac catheterization, to evaluate 

the new diagnosed cardiomyopathy.  Plan of care was discussed in detail with 

the patient and his wife by Dr. Schwartz.





DNP note has been reviewed, I agree with a documented findings and plan of 

care.  Patient was seen and examined.

## 2017-04-04 VITALS — SYSTOLIC BLOOD PRESSURE: 121 MMHG | DIASTOLIC BLOOD PRESSURE: 74 MMHG | HEART RATE: 85 BPM

## 2017-04-04 VITALS — TEMPERATURE: 96.4 F

## 2017-04-04 LAB
ANION GAP SERPL CALC-SCNC: 13 MMOL/L
BUN SERPL-SCNC: 28 MG/DL (ref 9–20)
CALCIUM SPEC-MCNC: 8.8 MG/DL (ref 8.4–10.2)
CHLORIDE SERPL-SCNC: 110 MMOL/L (ref 98–107)
CO2 SERPL-SCNC: 21 MMOL/L (ref 22–30)
GLUCOSE BLD-MCNC: 159 MG/DL (ref 75–99)
GLUCOSE BLD-MCNC: 213 MG/DL (ref 75–99)
GLUCOSE BLD-MCNC: 275 MG/DL (ref 75–99)
GLUCOSE SERPL-MCNC: 156 MG/DL (ref 74–99)
INR PPP: 2.4 (ref ?–1.1)
NON-AFRICAN AMERICAN GFR(MDRD): >60
POTASSIUM SERPL-SCNC: 4.4 MMOL/L (ref 3.5–5.1)
PT BLD: 22.7 SEC (ref 9–12)
SODIUM SERPL-SCNC: 144 MMOL/L (ref 137–145)

## 2017-04-04 RX ADMIN — SERTRALINE HYDROCHLORIDE SCH MG: 100 TABLET ORAL at 08:26

## 2017-04-04 RX ADMIN — TAMSULOSIN HYDROCHLORIDE SCH MG: 0.4 CAPSULE ORAL at 08:27

## 2017-04-04 RX ADMIN — INSULIN LISPRO SCH UNIT: 100 INJECTION, SOLUTION INTRAVENOUS; SUBCUTANEOUS at 11:33

## 2017-04-04 RX ADMIN — METOPROLOL TARTRATE SCH MG: 25 TABLET, FILM COATED ORAL at 08:26

## 2017-04-04 RX ADMIN — LEVOTHYROXINE SODIUM SCH MCG: 50 TABLET ORAL at 06:43

## 2017-04-04 RX ADMIN — ISOSORBIDE MONONITRATE SCH MG: 30 TABLET, EXTENDED RELEASE ORAL at 08:26

## 2017-04-04 RX ADMIN — AZITHROMYCIN SCH MG: 500 TABLET, FILM COATED ORAL at 08:26

## 2017-04-04 RX ADMIN — GABAPENTIN SCH MG: 300 CAPSULE ORAL at 16:32

## 2017-04-04 RX ADMIN — INSULIN LISPRO SCH UNIT: 100 INJECTION, SOLUTION INTRAVENOUS; SUBCUTANEOUS at 06:43

## 2017-04-04 RX ADMIN — ASPIRIN 81 MG CHEWABLE TABLET SCH MG: 81 TABLET CHEWABLE at 08:27

## 2017-04-04 RX ADMIN — GABAPENTIN SCH MG: 300 CAPSULE ORAL at 08:26

## 2017-04-04 NOTE — P.DS
Providers


Date of admission: 


03/31/17 14:45





Expected date of discharge: 04/04/17


Attending physician: 


MD Dr. Adriane French





Consults: 





 





03/31/17 15:51


Consult Physician Urgent 


   Consulting Provider: Frankie Linares


   Consult Reason/Comments: pulmonary HTN


   Do you want consulting provider notified?: Yes








[facility]


[facility] care physician: 


[facility]





[facility] Course: 


Final Diagnoses:











1. [Acute hypoxic respiratory failure secondary to atrial fibrillation, 

possible pneumonia, community-acquired and most probably pneumococcal in origin]

.


2.  Bacteremia with coagulase-negative staph, repeat blood cultures pending].


3. [Possible sepsis secondary to either pneumonia or bacteremia].


4. [Severe Pulmonary hypertension].


5. [Acute renal failure, prerenal azotemia secondary to intravascular volume 

depletion, improved with gentle IV fluid hydration].


6. [Chronic persistent Atrial fibrillation,].


7. [Coumadin monitoring].


8.  Morbid obesity, BMI 42.8


9.  Hypertension


10.  Hyperlipidemia


11.  Congestive heart failure, chronic systolic dysfunction, EF 30-35%.  Newly 

diagnosed Cardiomyopathy with unknown etiology


12.  Diabetes


13.  Urinary retention, Serna catheter placed.  Outpatient urology follow-up.


14.  Sleep apnea, stopped using his CPAP machine years ago; to follow-up with 

pulmonary outpatient to arrange for sleep study/CPAP machine.




















Hospital course:This is a 72-year-old gentleman admitted with shortness of 

breath, atrial fibrillation, pneumonia, renal failure, moderate to severe 

pulmonary hypertension with an EF of 35% and multiple other medical issues.  

Evaluated by multiple consults, cardiology, pulmonary, nephrology .Maintained 

on Zithromax and Rocephin.  Hypovolemic and received IV fluids, Lasix placed on 

hold.  Chest x-ray reporting stable left basilar airspace disease possible 

small left effusion, cardiomegaly.  Prior echo of 2014 reported as normal, 

echocardiogram from this admission reported EF of 30-35%. Patient will follow-

up with cardiology outpatient to arrange for cardiac catheterization/further 

workup with newly diagnosed cardiomyopathy. Despite addition of Flomax to med 

regime, continued to have urinary retention.  UA negative for protein uremia, 

renal ultrasound benign. Patient will go home with Serna catheter in follow-up 

with urology in 1 week.  Significant clinical improvement.  Patient has been 

cleared for discharge by all consults.





 Microbiology





04/02/17 12:51   Blood   Blood Culture - Preliminary


                            No Growth after 48 hours


04/03/17 06:12   Blood   Blood Culture - Preliminary


                            No Growth after 24 hours


04/01/17 10:52   Blood   Blood Culture Gram Stain - Final


04/01/17 10:52   Blood   Blood Culture - Final


                            Staphylococcus epidermidis


03/31/17 13:00   Blood   Blood Culture - Preliminary


                            No Growth after 72 hours


03/31/17 21:25   Urine,Catheterized   Urine Culture - Final


04/01/17 10:52   Blood   Blood Culture - Final




















Patient Condition at Discharge: Stable





Plan - Discharge Summary


New Discharge Prescriptions: 


Aspirin 81 mg PO DAILY #30 


Azithromycin [Zithromax] 500 mg PO DAILY #4 tab


Isosorbide Mononitrate ER [Imdur] 30 mg PO DAILY #30 tab.er.24h


Metoprolol Tartrate [Lopressor] 25 mg PO BID #60 tab


Tamsulosin [Flomax] 0.4 mg PO PC-BRKFST #30 cap.er.24h


hydrALAZINE HCL [Apresoline] 25 mg PO BID #60 tab


Discharge Medication List





Ferrous Sulfate [Feosol] 325 mg PO TID 03/31/17 [History]


Gabapentin [Neurontin] 600 mg PO TID 03/31/17 [History]


Insulin Detemir [Levemir] 25 unit SQ HS 03/31/17 [History]


Latanoprost Ophth [Xalatan 0.005%] 1 drops BOTH EYES HS 03/31/17 [History]


Levothyroxine Sodium [Synthroid] 50 mcg PO DAILY 03/31/17 [History]


Lisinopril [Zestril] 5 mg PO DAILY 03/31/17 [History]


Omega-3 Fatty Acids/Fish Oil [Fish Oil 1,000 mg Softgel] 1 cap PO DAILY 03/31/ 17 [History]


Oxybutynin Chloride [Ditropan] 5 mg PO BID 03/31/17 [History]


Sertraline [Zoloft] 100 mg PO DAILY 03/31/17 [History]


Simvastatin [Zocor] 20 mg PO HS 03/31/17 [History]


Temazepam [Restoril] 15 mg PO HS 03/31/17 [History]


Warfarin [Coumadin] 5 mg PO DAILY 03/31/17 [History]


guaiFENesin [Mucinex] 600 - 1,200 mg PO Q12H PRN 03/31/17 [History]


Aspirin 81 mg PO DAILY #30  04/04/17 [Rx]


Azithromycin [Zithromax] 500 mg PO DAILY #4 tab 04/04/17 [Rx]


Isosorbide Mononitrate ER [Imdur] 30 mg PO DAILY #30 tab.er.24h 04/04/17 [Rx]


Metoprolol Tartrate [Lopressor] 25 mg PO BID #60 tab 04/04/17 [Rx]


Tamsulosin [Flomax] 0.4 mg PO PC-BRKFST #30 cap.er.24h 04/04/17 [Rx]


hydrALAZINE HCL [Apresoline] 25 mg PO BID #60 tab 04/04/17 [Rx]








Follow up Appointment(s)/Referral(s): 


Tita Cervantes MD [STAFF PHYSICIAN] - 1 Week


Sky Schwartz MD [STAFF PHYSICIAN] - 1 Week


Yfn Guzman MD [STAFF PHYSICIAN] - 1 Week (Urinary retention, home with Serna)


Roc Powers DO [Primary Care Provider] - 3 Days


Dion Holman MD [STAFF PHYSICIAN] - 1 Week


Ambulatory/Diagnostic Orders: 


Prothrombin Time INR [LAB.AMB] Time Frame: 04/07/17, Location: Determined By 

Patient


Activity/Diet/Wound Care/Special Instructions: 


   Diuretics on hold until F/U with cardiology.








Please arrange sleep study with Dr. Linares.











Diet: Consistent carb, cardiac





Accu-Cheks before meals and at bedtime, maintaining log, take to follow-up 

visit with PCP for further recommendations











Bilateral Compression stockings as advised











Final culture results to PCP


Discharge Disposition: HOME WITH HOME HEALTH SERVICES

## 2017-04-04 NOTE — P.PN
Subjective








this is a very pleasant 72-year-old gentleman who follows with Dr. Powers as 

his primary care physician.  He also follows through the [facility].  He has a 

history of diabetes mellitus, hypothyroidism,hypertension, depression. He has 

not been seen by a pulmonologist in the past. He does have a history of 

obstructive sleep apnea but has not used a mask in several years. He is a 

lifelong nonsmoker. He was recently seen at the [facility] for complaints of 

increasing shortness of breath, lower extremity edema and urinary retention.  

He was found to be in atrial fibrillation and was stated started on warfarin 

and Lasix.  A few days later he was still having issues with shortness of breath

, lower extremity edema and constipation.  He presented here yesterday for the 

same. A KUB revealed a nonspecific abdomen. An ultrasound of the abdomen 

revealed no acute process.  There was incidental findings including a small 

amount of ascites and borderline enlarged spleen. The patient had been 

evaluated by nephrology who feels the patient has a nonoliguric acute kidney 

injury secondary to urinary retention from diuretics and NSAIDs. An 

echocardiogram revealed evidence of atrial fibrillation, severely impaired left 

ventricular systolic function with estimated ejection fraction 30-35%. There is 

also hypokinesia in the inferior septal and anteroseptal left ventricular wall. 

There is also noted pulmonary hypertension with an RVSP of 56 mmHg. he is seen 

today in consultation on the selective care unit.  He is awake and alert in no 

acute distress.  He denies any worsening shortness of breath at this time.  No 

cough or congestion.  No chest pain or palpitations.  He is dyspneic on minimal 

exertion. He is maintaining good O2 saturations in the mid 90s on room air.  He'

s been afebrile.  His heart rate is better controlled.





On today's evaluation of 04/03/2017, the patient is less short of breath.  The 

patient has normalized renal function creatinine is down to 1.0.  The patient 

has diuresed more than 8 L since he came into the hospital.  The net fluid 

balance for yesterday was -1.5 L.  The patient continues to have a therapeutic 

PT/INR.  The rest of the electrodes are all within normal limits.  The chest x-

ray shows cardiac megaly with a stable left basilar airspace disease and small 

effusion.  The patient is afebrile.  The patient is hemodynamically stable.  

The patient was further moved to her room air oxygen and his current pulse ox 

is around 95%.





On 04/04/2017 the patient is feeling well.  Lower extremity edema is also 

improving.  No cough or sputum production.  No chest pain.  The patient was 

able to lay down in his bed using 1-2 pillows.  No orthopnea this point.  No 

chest pain.  He is hemodynamically stable.  His ambulating.  His PT/INR is 

therapeutic right now while being on Coumadin.  Renal function is also stable.





Objective





- Vital Signs


Vital signs: 


 Vital Signs











Temp  96.4 F L  04/04/17 08:00


 


Pulse  90   04/04/17 08:00


 


Resp  18   04/04/17 08:00


 


BP  127/64   04/04/17 08:00


 


Pulse Ox  92 L  04/04/17 08:00








 Intake & Output











 04/03/17 04/04/17 04/04/17





 18:59 06:59 18:59


 


Intake Total 1222 250 


 


Output Total  1000 


 


Balance 1222 -750 


 


Weight  146 kg 


 


Intake:   


 


  Intake, IV Titration  10 





  Amount   


 


    Sodium Chloride 0.9% 1,  10 





    000 ml @ 60 mls/hr IV .   





    [id number] [facility] Rx#:[id number]   


 


  Oral 1222 240 


 


Output:   


 


  Urine  1000 


 


    Uretheral (Serna)  1000 


 


Other:   


 


  Voiding Method Indwelling Catheter Indwelling Catheter Indwelling Catheter


 


  # Voids 1  


 


  # Bowel Movements 2  














- Exam





Head exam was generally normal. There was no scleral icterus or corneal arcus. 

Mucous membranes were moist.Neck was supple and without jugular venous 

distension, thyromegaly, or carotid bruits. Carotids were easily palpable 

bilaterally. There was no adenopathy.  Lung sounds are diminished and there is 

some few crackles in lung bases bilaterally.  Heart sounds are irregular, 

positive S1-S2, no cervical murmurs appreciated.Abdominal exam revealed normal 

bowel sounds. The abdomen was soft, non-tender, and without masses, organomegaly

, or appreciable enlargement of the abdominal aorta.  Extremities show +1 

pitting edema there is no cyanosis or clubbing.  Neurologic exam is nonfocal.





- Labs


CBC & Chem 7: 


 04/02/17 06:31





 04/04/17 06:01


Labs: 


 Abnormal Lab Results - Last 24 Hours (Table)











  04/03/17 04/03/17 04/04/17 Range/Units





  16:29 20:48 06:01 


 


PT    22.7 H  (9.0-12.0)  sec


 


Chloride     ()  mmol/L


 


Carbon Dioxide     (22-30)  mmol/L


 


BUN     (9-20)  mg/dL


 


Glucose     (74-99)  mg/dL


 


POC Glucose (mg/dL)  215 H  209 H   (75-99)  mg/dL














  04/04/17 04/04/17 04/04/17 Range/Units





  06:01 06:26 11:29 


 


PT     (9.0-12.0)  sec


 


Chloride  110 H    ()  mmol/L


 


Carbon Dioxide  21 L    (22-30)  mmol/L


 


BUN  28 H    (9-20)  mg/dL


 


Glucose  156 H    (74-99)  mg/dL


 


POC Glucose (mg/dL)   159 H  213 H  (75-99)  mg/dL








 Microbiology - Last 24 Hours (Table)











 04/03/17 06:12 Blood Culture - Preliminary





 Blood    No Growth after 24 hours


 


 04/01/17 10:52 Blood Culture Gram Stain - Final





 Blood Blood Culture - Final





    Staphylococcus epidermidis


 


 04/02/17 12:51 Blood Culture - Preliminary





 Blood    No Growth after 24 hours














Assessment and Plan


Plan: 





Assessment





1 acute CHF exacerbation.  The patient is known to have systolic dysfunction 

with an ejection fraction of 30-35%.  In any rate, the patient was in 

significant fluid overload and he has diuresed more than 7 L since his current 

hospitalization.  He is less short of breath and he is oxidation is improved 

considerably.





On 04/04/2017, the patient's condition is stable.  His chest x-ray from 

yesterday showed a limited left basilar infiltrate and there are no overt signs 

of failure at this point.





2 acute kidney injury, recovered and the renal function is normalized





3 hypertension





4 chronic atrial fibrillation rate is controlled and the patient is on long-

term medical condition with warfarin with a therapeutic PT/INR





5 secondary severe pulmonary hypertension





6 diabetes mellitus





7 hyperlipidemia





8 obstructive sleep apnea





9 urinary retention, recovered post Serna catheter insertion.  Rule out a 

component of obstructive uropathy.





10 coagulase-negative staph in the blood, likely a contaminant.





Plan





 we'll complete a total of seven-day course of antibiotics.  Patient is on 

Rocephin and Zithromax and this will be continued.  A follow-up chest x-ray in 

a.m..  Continue long-term and evaluation with warfarin.  Chest x-ray from today 

was noted.  Echocardiogram was noted.  Outpatient cardiac catheterization.  We'

ll follow.

## 2017-04-04 NOTE — P.PN
Subjective


Principal diagnosis: 





Acute renal failure


This is a pleasant 72-year-old  gentleman who presented to the 

hospital with acute renal failure and decreased urine output.  Patient has 

known chronic persistent atrial fibrillation, therapeutic on Coumadin.  He did 

have an echocardiogram with Doppler study performed on this admission which 

revealed an ejection fraction of 30-35%, echocardiogram with Doppler study 

performed in 2014 was reported to be normal.  Because of the newly diagnosed 

cardiomyopathy patient was advised at some point that he would need to undergo 

cardiac catheterization.  Seen and examined today, INR 2.4.  Creatinine 0.8, 

patient overall is feeling quite well today.  Dr. Schwartz did have a lengthy 

discussion with the patient and his wife, once he is discharged home from the 

hospital he will follow-up in the office, outpatient cardiac catheterization 

will then be performed once the patient is more stable.








Objective





- Vital Signs


Vital signs: 


 Vital Signs











Temp  96.4 F L  04/04/17 08:00


 


Pulse  85   04/04/17 12:00


 


Resp  18   04/04/17 12:00


 


BP  121/74   04/04/17 12:00


 


Pulse Ox  97   04/04/17 12:00








 Intake & Output











 04/03/17 04/04/17 04/04/17





 18:59 06:59 18:59


 


Intake Total 1222 250 240


 


Output Total  1000 


 


Balance 1222 -750 240


 


Weight  146 kg 


 


Intake:   


 


  Intake, IV Titration  10 





  Amount   


 


    Sodium Chloride 0.9% 1,  10 





    000 ml @ 60 mls/hr IV .   





    [id number] [facility] Rx#:[id number]   


 


  Oral 1222 240 240


 


Output:   


 


  Urine  1000 


 


    Uretheral (Serna)  1000 


 


Other:   


 


  Voiding Method Indwelling Catheter Indwelling Catheter Indwelling Catheter


 


  # Voids 1  


 


  # Bowel Movements 2  














- Exam


PHYSICAL EXAMINATION: 





HEENT: Head is atraumatic, normocephalic.  Pupils equal, round.  Neck is 

supple.  There is no elevated jugular venous pressure.





HEART EXAMINATION: R S1 and S2 irregularly irregular





CHEST EXAMINATION: Lungs are clear to auscultation and precussion. No chest 

wall tenderness is noted on palpation or with deep breathing.





ABDOMEN:  Soft, obese, nontender. Bowel sounds are heard. No organomegaly noted.


 


EXTREMITIES: 2+ peripheral pulses with 1+  evidence of peripheral edema and no 

calf tenderness noted.





NEUROLOGIC patient is awake, alert and oriented -3.


 


.


 











- Labs


CBC & Chem 7: 


 04/02/17 06:31





 04/04/17 06:01


Labs: 


 Abnormal Lab Results - Last 24 Hours (Table)











  04/03/17 04/03/17 04/04/17 Range/Units





  16:29 20:48 06:01 


 


PT    22.7 H  (9.0-12.0)  sec


 


Chloride     ()  mmol/L


 


Carbon Dioxide     (22-30)  mmol/L


 


BUN     (9-20)  mg/dL


 


Glucose     (74-99)  mg/dL


 


POC Glucose (mg/dL)  215 H  209 H   (75-99)  mg/dL














  04/04/17 04/04/17 04/04/17 Range/Units





  06:01 06:26 11:29 


 


PT     (9.0-12.0)  sec


 


Chloride  110 H    ()  mmol/L


 


Carbon Dioxide  21 L    (22-30)  mmol/L


 


BUN  28 H    (9-20)  mg/dL


 


Glucose  156 H    (74-99)  mg/dL


 


POC Glucose (mg/dL)   159 H  213 H  (75-99)  mg/dL








 Microbiology - Last 24 Hours (Table)











 04/03/17 06:12 Blood Culture - Preliminary





 Blood    No Growth after 24 hours


 


 04/01/17 10:52 Blood Culture Gram Stain - Final





 Blood Blood Culture - Final





    Staphylococcus epidermidis


 


 04/02/17 12:51 Blood Culture - Preliminary





 Blood    No Growth after 24 hours














Assessment and Plan


Plan: 


Assessment and plan


#1 symptoms of progressive weakness with evidence of acute kidney injury likely 

secondary to urinary retention.  Creatinine 0.8 today.  Nephrology following.


#2 systolic congestive heart failure acute on chronic


#3 severe pulmonary hypertension


#4 hypertension


#5 atrial fibrillation, chronic persistent, on Coumadin, INR 2.4


#6 cardiomyopathy, unknown etiology, echo this admission reveals an ejection 

fraction of 30-35%, echo performed in 2014 revealed normal left ventricular 

systolic function.


#7 diabetes


#8 hyperlipidemia


#9 sleep apnea, patient stopped using his CPAP a number of years ago.


#10 urinary retention, Serna catheter in place, patient was receiving IV fluids 

of 100 mL per hour, currently at 60 mL per hour.  Diuretics on hold.








Plan


From cardiology's perspective, we'll continue current medications.  Once the 

patient is discharged home from the hospital follow-up appointment will be made 

with Dr. Schwartz in the office.  Outpatient cardiac catheterization, to evaluate 

the new diagnosed cardiomyopathy.  Plan of care was discussed in detail with 

the patient and his wife by Dr. Schwartz.





DNP note has been reviewed, I agree with a documented findings and plan of 

care.  Patient was seen and examined.

## 2017-04-05 NOTE — PN
DATE OF SERVICE:  04/04/2017



Patient is seen for followup for acute kidney injury. His renal 

function has improved with creatinine down to 0.8 mg/dL from 3.3 on 

initial admission.  



On examination, patient is comfortable. 

Blood pressure is 127/64, heart rate 90 per minute. He is afebrile. 

Examination of the heart, S1 and S2. Examination of the lungs, 

bilateral breath sounds are heard. Abdomen is soft, nontender. 

Examination of the lower extremities shows bilateral extremities to be 

wrapped.  Chronic skin changes, chronic edema is noted.  



Labs show serum creatinine 0.8, sodium 144, potassium 4.4. 



ASSESSMENT: 

1. Acute kidney injury, currently resolved, mainly prerenal IV fluids 

have been discontinued.  

2. Cardiomyopathy, ejection fraction of 30% to 35%. 

3. Urine retention, status post Serna catheter placement. 

4. Moderate to severe pulmonary hypertension with chronic lower 

extremity edema.  

5. Coagulase-negative staph bacteremia. 



PLAN: Patient can be discharged.  Follow up as outpatient with 

Urology. They have tried previous voiding trials, which were 

unsuccessful; therefore, Serna catheter will need to be left in.

## 2017-04-06 NOTE — CDI
In responding to this query, please exercise your independent professional 
judgment. The [facility] Coding Staff and Clinical Documentation Specialists 
appreciate your assistance in clarifying documentation, maintaining compliance 
with coding guidelines, accurately documenting patients condition and 
capturing severity of illness. The fact that a question is asked does not imply 
that any particular answer is desired or expected. Communication forms are a 
method of clarifying documentation and are not made part of the Legal Health 
Record. Thank you in advance for your clarification.

 Last Revision, November 2015



Kattyee Robison

[address] [place] [address]



Documentation Clarification Form



Date: 4/6/2017 12:20:00 PM

From: Jannet Bagley

Phone: 

MRN: [id number]

Admit Date: 3/31/2017 2:45:00 PM

Patient Name: Garry Mercado

Visit Number: [id number]

Discharge Date:  4/6/17



Dr. Paola Forrest



Conflicting documentation has been found in the medical record.



in the discharge summary documentation it states "Bacteremia  with coagulase-
negative staph, repeat blood cultures pending and Possible sepsis secondary to 
either pneumonia or bacteremia." 4/4 PN by Dr LUIS Holman documents "Coagulase-
negative staph in the blood, likely a contaminant." 4/3 blood culture 
preliminary results no growth".



History/Risk Factors: pneumonia, persistent atrial fibrillation, acute renal 
failure, acute on chronic systolic CHF, cardiomyopathy



Clinical Indicators: WBC 13.0, Neutrophils 11.3, Temp 100.6, KS - 118, 02 93



Treatment:  IV Zithromax & IV Ceftriaxone then changed to IV Vancomycin



In your opinion what is the most clinically appropriate diagnosis for this 
patient?  



      Bacteremia

      Sepsis

      OTHER explanation of clinical findings

      Unable to determine (no explanation for clinical findings)



Please document in your progress notes and discharge summary in order to 
capture severity of illness and risk of mortality. Include clinical findings 
that support your diagnosis.



FYI: Press F11 to launch patient chart.



Jannet Bagley, JEET, CCS, AHIMA Certified I-10 /Trainer

 II

Coagulase neg staph is contamination not infection

MTDD

## 2017-04-10 NOTE — CDI
In responding to this query, please exercise your independent professional 
judgment. The [facility] Coding Staff and Clinical Documentation Specialists 
appreciate your assistance in clarifying documentation, maintaining compliance 
with coding guidelines, accurately documenting patients condition and 
capturing severity of illness. The fact that a question is asked does not imply 
that any particular answer is desired or expected. Communication forms are a 
method of clarifying documentation and are not made part of the Legal Health 
Record. Thank you in advance for your clarification.

 Last Revision, November 2015



Kattyee Robison

[address] [facility] [place] [address]



Documentation Clarification Form



Date: 4/6/2017 12:20:00 PM

From: Jannet Bagley

Phone: 

MRN: [id number]

Admit Date: 3/31/2017 2:45:00 PM

Patient Name: Garry Mercado

Visit Number: [id number]

Discharge Date:  4/10/17





Dr. Paola Forrest



Conflicting documentation has been found in the medical record.



in the discharge summary documentation it states "Bacteremia  with coagulase-
negative staph, repeat blood cultures pending and Possible sepsis secondary to 
either pneumonia or bacteremia." 4/4 PN by Dr LUIS Holman documents "Coagulase-
negative staph in the blood, likely a contaminant." 4/3 blood culture final 
results no growth".



History/Risk Factors: pneumonia, persistent atrial fibrillation, acute renal 
failure, acute on chronic systolic CHF, cardiomyopathy



Clinical Indicators: WBC 13.0, Neutrophils 11.3, Temp 100.6, [place] - 118, 02 93



Treatment:  IV Zithromax & IV Ceftriaxone then changed to IV Vancomycin



In your opinion what is the most clinically appropriate diagnosis for this 
patient?  



      Bacteremia

      Sepsis

      OTHER explanation of clinical findings

      Unable to determine (no explanation for clinical findings)



Please document in your progress notes and discharge summary in order to 
capture severity of illness and risk of mortality. Include clinical findings 
that support your diagnosis.



FYI: Press F11 to launch patient chart.



Jannet Bagley, JEET, CCS, AHIMA Certified I-10 /Trainer

 II



Coagulase negative is contamination.
MTDD

## 2017-04-25 ENCOUNTER — HOSPITAL ENCOUNTER (OUTPATIENT)
Dept: HOSPITAL 47 - CATHCVL | Age: 72
Discharge: HOME | End: 2017-04-25
Payer: MEDICARE

## 2017-04-25 VITALS — BODY MASS INDEX: 37.3 KG/M2

## 2017-04-25 VITALS — DIASTOLIC BLOOD PRESSURE: 72 MMHG | RESPIRATION RATE: 18 BRPM | SYSTOLIC BLOOD PRESSURE: 132 MMHG

## 2017-04-25 VITALS — TEMPERATURE: 97.9 F | HEART RATE: 80 BPM

## 2017-04-25 DIAGNOSIS — Z79.84: ICD-10-CM

## 2017-04-25 DIAGNOSIS — E11.9: ICD-10-CM

## 2017-04-25 DIAGNOSIS — I10: ICD-10-CM

## 2017-04-25 DIAGNOSIS — E78.00: ICD-10-CM

## 2017-04-25 DIAGNOSIS — Z79.82: ICD-10-CM

## 2017-04-25 DIAGNOSIS — Z79.01: ICD-10-CM

## 2017-04-25 DIAGNOSIS — I25.10: Primary | ICD-10-CM

## 2017-04-25 DIAGNOSIS — Z88.5: ICD-10-CM

## 2017-04-25 DIAGNOSIS — I48.1: ICD-10-CM

## 2017-04-25 DIAGNOSIS — I34.0: ICD-10-CM

## 2017-04-25 DIAGNOSIS — I42.9: ICD-10-CM

## 2017-04-25 DIAGNOSIS — Z91.09: ICD-10-CM

## 2017-04-25 DIAGNOSIS — E78.2: ICD-10-CM

## 2017-04-25 DIAGNOSIS — I27.2: ICD-10-CM

## 2017-04-25 DIAGNOSIS — Z79.899: ICD-10-CM

## 2017-04-25 LAB
GLUCOSE BLD-MCNC: 115 MG/DL (ref 75–99)
INR PPP: 1.4 (ref ?–1.1)
PT BLD: 13.3 SEC (ref 9–12)

## 2017-04-25 PROCEDURE — 93458 L HRT ARTERY/VENTRICLE ANGIO: CPT

## 2017-04-25 PROCEDURE — 99153 MOD SED SAME PHYS/QHP EA: CPT

## 2017-04-25 PROCEDURE — 85610 PROTHROMBIN TIME: CPT

## 2017-04-25 PROCEDURE — 99152 MOD SED SAME PHYS/QHP 5/>YRS: CPT

## 2017-04-25 RX ADMIN — VERAPAMIL HYDROCHLORIDE ONE ML: 2.5 INJECTION, SOLUTION INTRAVENOUS at 07:37

## 2017-04-25 RX ADMIN — VERAPAMIL HYDROCHLORIDE ONE ML: 2.5 INJECTION, SOLUTION INTRAVENOUS at 07:46

## 2017-04-25 RX ADMIN — VERAPAMIL HYDROCHLORIDE ONE ML: 2.5 INJECTION, SOLUTION INTRAVENOUS at 07:45

## 2017-04-25 NOTE — LTR
April 25, 2017













RE:  Garry Mercado







Dear Dr. Rojo;



I had the pleasure to perform cardiac catheterization on Mr. Mercado at 

[facility] on April 25, 2017 and a full copy of the procedure 

note will be forwarded to you. 



In brief, he was found to have moderate coronary artery disease with 

evidence of severely impaired left ventricular systolic function 

consistent with nonischemic cardiomyopathy and based on those 

findings, I have recommended to maximize his medical therapy and 

depending on his progress, further recommendation will be made.  



Thank you again from allowing me to participate in this patient's 

care. Please feel free to call for any questions.  





Sincerely yours, 







ADWOA GUZMAN MD

## 2017-04-25 NOTE — CC
DATE OF SERVICE:  



Mr. Mercado is a 72-year-old male with a known history of atrial 

fibrillation, history of hypertension, hyperlipidemia, and diabetes 

mellitus. Recently was presented with symptoms of congestive heart 

failure and was found to have a cardiomyopathy.  In view of that, 

recommendation was made regarding cardiac catheterization. The 

procedure as well as risks and complications were discussed with the 

patient who is in full understanding and agreement.  



PROCEDURE: Patient was brought to the Cath Lab in a fasting 

semisedated state after receiving fentanyl and Benadryl. He was draped 

and prepped in conventional fashion. Using Xylocaine anesthesia and 

Seldinger technique, a 6 [place] sheath was introduced in the right 

radial artery. Selective right and left coronary angiography was 

performed using 5 [place] 3-1/2 Bend right Justine catheter and a 6 

[place] Jalil catheter. Images of the coronary arteries including 

hemiaxial views were obtained. Following that, a 5 [place] tight 

pigtail catheter was inserted in the left ventricle and a 30 degree 

BUNCH view of the lft ventricle was obtained. Following that, catheter 

and sheaths were removed. Hemostasis was obtained with deployment of a 

TR band. There was no complication. Patient is returned to his room in 

stable condition.  



FINDINGS: 

LEFT MAIN: This is a short-sized vessel bifurcating into left 

circumflex, left anterior descending artery. Left main coronary artery 

is without any significant obstructive coronary artery disease.  

LEFT ANTERIOR DESCENDING ARTERY: This is a large-size vessel reaching 

toward the apex with a wraparound apex segment, giving rise to a large 

septal  proximally.  After the take off of the septal 

, there is 10% plaque. The rest of the vessel has no 

high-grade stenosis.  

LEFT CIRCUMFLEX: This is a large nondominant vessel, giving rise to a 

large obtuse marginal branch. The left circumflex as well as its 

branches have no evidence of significant obstructive coronary artery 

disease.  

RIGHT CORONARY ARTERY: This is a moderate-sized dominant vessel, 

bifurcating distally into PDA and a PLV.  The right coronary artery in 

mid-segment has 10% to 20% plaque without any evidence of high-grade 

stenosis.  



LEFT VENTRICULOGRAM: Left ventriculogram was performed in 30 degree 

BUNCH view and revealed a dilated left ventricle with severe global 

hypokinesis. The estimated ejection fraction is 30%. There was 2+ 

mitral regurgitation.  

HEMODYNAMICS: There was no gradient across the aortic valve. The left 

ventricular end-diastolic pressure was 12 to 15 mmHg.  



CONCLUSION: 

1. Mild coronary artery disease involving the left anterior descending 

and the right coronary artery. 

2. Severely impaired left ventricular systolic function with 2+ mitral 

regurgitation.  



RECOMMENDATION: Those findings are consistent with nonischemic 

cardiomyopathy. I would recommend to maximize his medical therapy and 

depending on his progress, further recommendation will be made. Those 

findings and recommendations were discussed with the patient and his 

family and they are in full understanding and agreement.

## 2017-04-28 NOTE — CDI
The cardiac cath report states "semisedated state after receiving fentanyl and 
Benadryl." Will you please clarify the level of sedation, such as moderate. 
This information is required for proper coding and billing purposes. Please 
answer as an addendum to your op report. If you don't understand what is needed
, please contact my , Cammie Caceres [phone]. Thank you.

CASTILLO Penny

Please add moderate sedation.
MTDD

## 2017-05-05 NOTE — CDI
The cardiac cath report states "semisedated state after receiving fentanyl and 
Benadryl." Will you please clarify the level of sedation, such as moderate. 
This information is required for proper coding and billing purposes. Please 
answer as an addendum to your op report. If you don't understand what is needed
, please contact my , Cammie Caceres [phone]. Thank you.

CASTILLO Penny

## 2017-05-15 NOTE — PCN
DATE OF PROCEDURE:  



 

ADDENDUM TO PROCEDURE: 



Moderate sedation was utilized during this patient's procedure.

## 2017-05-19 ENCOUNTER — HOSPITAL ENCOUNTER (OUTPATIENT)
Dept: HOSPITAL 47 - CATHCVL | Age: 72
Discharge: HOME | End: 2017-05-19
Payer: MEDICARE

## 2017-05-19 VITALS — TEMPERATURE: 98 F

## 2017-05-19 VITALS — HEART RATE: 56 BPM | SYSTOLIC BLOOD PRESSURE: 97 MMHG | DIASTOLIC BLOOD PRESSURE: 55 MMHG

## 2017-05-19 VITALS — RESPIRATION RATE: 18 BRPM

## 2017-05-19 VITALS — BODY MASS INDEX: 34.5 KG/M2

## 2017-05-19 DIAGNOSIS — Z79.01: ICD-10-CM

## 2017-05-19 DIAGNOSIS — N40.0: ICD-10-CM

## 2017-05-19 DIAGNOSIS — I25.10: ICD-10-CM

## 2017-05-19 DIAGNOSIS — Z79.899: ICD-10-CM

## 2017-05-19 DIAGNOSIS — E78.2: ICD-10-CM

## 2017-05-19 DIAGNOSIS — I42.9: ICD-10-CM

## 2017-05-19 DIAGNOSIS — Z88.5: ICD-10-CM

## 2017-05-19 DIAGNOSIS — I11.9: ICD-10-CM

## 2017-05-19 DIAGNOSIS — F32.9: ICD-10-CM

## 2017-05-19 DIAGNOSIS — Z79.84: ICD-10-CM

## 2017-05-19 DIAGNOSIS — E11.9: ICD-10-CM

## 2017-05-19 DIAGNOSIS — Z88.8: ICD-10-CM

## 2017-05-19 DIAGNOSIS — E07.9: ICD-10-CM

## 2017-05-19 DIAGNOSIS — I08.3: ICD-10-CM

## 2017-05-19 DIAGNOSIS — R94.31: ICD-10-CM

## 2017-05-19 DIAGNOSIS — I48.1: Primary | ICD-10-CM

## 2017-05-19 DIAGNOSIS — Z79.4: ICD-10-CM

## 2017-05-19 LAB
ANION GAP SERPL CALC-SCNC: 11 MMOL/L
BUN SERPL-SCNC: 23 MG/DL (ref 9–20)
CALCIUM SPEC-MCNC: 9.5 MG/DL (ref 8.4–10.2)
CHLORIDE SERPL-SCNC: 108 MMOL/L (ref 98–107)
CO2 SERPL-SCNC: 22 MMOL/L (ref 22–30)
GLUCOSE BLD-MCNC: 106 MG/DL (ref 75–99)
GLUCOSE BLD-MCNC: 110 MG/DL (ref 75–99)
GLUCOSE SERPL-MCNC: 109 MG/DL (ref 74–99)
INR PPP: 2 (ref ?–1.1)
NON-AFRICAN AMERICAN GFR(MDRD): >60
POTASSIUM SERPL-SCNC: 4.6 MMOL/L (ref 3.5–5.1)
PT BLD: 19.5 SEC (ref 9–12)
SODIUM SERPL-SCNC: 141 MMOL/L (ref 137–145)

## 2017-05-19 PROCEDURE — 93312 ECHO TRANSESOPHAGEAL: CPT

## 2017-05-19 PROCEDURE — 92960 CARDIOVERSION ELECTRIC EXT: CPT

## 2017-05-19 PROCEDURE — 93320 DOPPLER ECHO COMPLETE: CPT

## 2017-05-19 PROCEDURE — 80048 BASIC METABOLIC PNL TOTAL CA: CPT

## 2017-05-19 PROCEDURE — 85610 PROTHROMBIN TIME: CPT

## 2017-05-19 PROCEDURE — 93325 DOPPLER ECHO COLOR FLOW MAPG: CPT

## 2017-05-19 NOTE — ECHOT
DATE OF SERVICE:  



INDICATION:  Evaluation of left atrial appendage. 



PROCEDURE: After explaining the procedure to patient, its risk and 

complications, his blood pressure, heart rate, O2 saturation was 

monitored. The throat was sprayed with Cetacaine. He received sedation 

per Anesthesia Department. The probe was introduced into the esophagus 

without difficulty. Images were obtained. Following that, the probe 

was removed. There were no immediate complications.  



FINDINGS:  Left atrial size is dilated.  Left atrial appendage is 

normal.  Right atrial size is dilated.  Right  ventricular size is 

normal. The overall ventricular systolic function is severely 

impaired. Estimated ejection fraction is 30% with global hypokinesis. 

The aortic valve revealed mitral fibrocalcification of the aortic 

cusp.  (   ) Mitral valve and tricuspid valve appear to be normal. 

Descending thoracic aorta appears to be normal. No pericardial 

effusion was noted. Contrast bubble study revealed no evidence of 

shunting across the interatrial septum.  



Doppler pulse wave obtained, revealed moderate to severe eccentric 

mitral regurgitation with moderate tricuspid regurgitation.  The 

estimated right ventricular systolic pressure was 35 mmHg.  There was 

no shunting by color Doppler study.  



CONCLUSION: 

1. Biatrial enlargement.

2. Normal left ventricular size with severe global hypokinesis. 

3. Moderate to severe eccentric mitral regurgitation with moderate 

tricuspid regurgitation.  

4. Mild aortic regurgitation. 

5. There was no shunting across the interatrial septum. 







FINDINGS:  



FINAL IMPRESSION:

## 2017-05-19 NOTE — CE
DATE OF SERVICE:  



CARDIOVERSION PROCEDURE NOTE 



INDICATION: Atrial fibrillation. 



PROCEDURE: After explaining the procedure to the patient as well as 

risks and complications and performing transesophageal echocardiogram 

and obtaining sedated state by the anesthesia department, a 

synchronized biphasic cardioversion was performed using 200, 300 and 

subsequently 360 joules that was successful in restoring normal sinus 

rhythm. There was no immediate complication.

## 2017-06-28 ENCOUNTER — HOSPITAL ENCOUNTER (OUTPATIENT)
Dept: HOSPITAL 47 - LABPAT | Age: 72
Discharge: HOME | End: 2017-06-28
Payer: MEDICARE

## 2017-06-28 DIAGNOSIS — I50.9: ICD-10-CM

## 2017-06-28 DIAGNOSIS — R35.0: ICD-10-CM

## 2017-06-28 DIAGNOSIS — E78.5: ICD-10-CM

## 2017-06-28 DIAGNOSIS — Z01.810: Primary | ICD-10-CM

## 2017-06-28 DIAGNOSIS — I11.0: ICD-10-CM

## 2017-06-28 DIAGNOSIS — Z01.812: ICD-10-CM

## 2017-06-28 DIAGNOSIS — E03.9: ICD-10-CM

## 2017-06-28 DIAGNOSIS — N40.1: ICD-10-CM

## 2017-06-28 LAB
ANION GAP SERPL CALC-SCNC: 14 MMOL/L
BASOPHILS # BLD AUTO: 0 K/UL (ref 0–0.2)
BASOPHILS NFR BLD AUTO: 0 %
BUN SERPL-SCNC: 27 MG/DL (ref 9–20)
CALCIUM SPEC-MCNC: 9.3 MG/DL (ref 8.4–10.2)
CH: 26.8
CHCM: 33.2
CHLORIDE SERPL-SCNC: 101 MMOL/L (ref 98–107)
CO2 SERPL-SCNC: 26 MMOL/L (ref 22–30)
EOSINOPHIL # BLD AUTO: 0.2 K/UL (ref 0–0.7)
EOSINOPHIL NFR BLD AUTO: 2 %
ERYTHROCYTE [DISTWIDTH] IN BLOOD BY AUTOMATED COUNT: 4.49 M/UL (ref 4.3–5.9)
ERYTHROCYTE [DISTWIDTH] IN BLOOD: 18.1 % (ref 11.5–15.5)
GLUCOSE SERPL-MCNC: 77 MG/DL (ref 74–99)
HCT VFR BLD AUTO: 36.4 % (ref 39–53)
HDW: 2.8
HGB BLD-MCNC: 12.3 GM/DL (ref 13–17.5)
LUC NFR BLD AUTO: 1 %
LYMPHOCYTES # SPEC AUTO: 1.1 K/UL (ref 1–4.8)
LYMPHOCYTES NFR SPEC AUTO: 13 %
MCH RBC QN AUTO: 27.3 PG (ref 25–35)
MCHC RBC AUTO-ENTMCNC: 33.7 G/DL (ref 31–37)
MCV RBC AUTO: 81 FL (ref 80–100)
MONOCYTES # BLD AUTO: 0.3 K/UL (ref 0–1)
MONOCYTES NFR BLD AUTO: 4 %
NEUTROPHILS # BLD AUTO: 6.9 K/UL (ref 1.3–7.7)
NEUTROPHILS NFR BLD AUTO: 80 %
NON-AFRICAN AMERICAN GFR(MDRD): 55
POTASSIUM SERPL-SCNC: 5.2 MMOL/L (ref 3.5–5.1)
SODIUM SERPL-SCNC: 141 MMOL/L (ref 137–145)
WBC # BLD AUTO: 0.11 10*3/UL
WBC # BLD AUTO: 8.7 K/UL (ref 3.8–10.6)
WBC (PEROX): 8.32

## 2017-06-28 PROCEDURE — 87086 URINE CULTURE/COLONY COUNT: CPT

## 2017-06-28 PROCEDURE — 80048 BASIC METABOLIC PNL TOTAL CA: CPT

## 2017-06-28 PROCEDURE — 93005 ELECTROCARDIOGRAM TRACING: CPT

## 2017-06-28 PROCEDURE — 36415 COLL VENOUS BLD VENIPUNCTURE: CPT

## 2017-06-28 PROCEDURE — 85025 COMPLETE CBC W/AUTO DIFF WBC: CPT

## 2017-07-06 ENCOUNTER — HOSPITAL ENCOUNTER (OUTPATIENT)
Dept: HOSPITAL 47 - OR | Age: 72
LOS: 1 days | Discharge: HOME | End: 2017-07-07
Payer: MEDICARE

## 2017-07-06 VITALS — BODY MASS INDEX: 34.9 KG/M2

## 2017-07-06 VITALS — HEART RATE: 57 BPM

## 2017-07-06 DIAGNOSIS — Z79.01: ICD-10-CM

## 2017-07-06 DIAGNOSIS — E78.00: ICD-10-CM

## 2017-07-06 DIAGNOSIS — Z79.899: ICD-10-CM

## 2017-07-06 DIAGNOSIS — Z88.5: ICD-10-CM

## 2017-07-06 DIAGNOSIS — I10: ICD-10-CM

## 2017-07-06 DIAGNOSIS — H40.9: ICD-10-CM

## 2017-07-06 DIAGNOSIS — E11.9: ICD-10-CM

## 2017-07-06 DIAGNOSIS — Z79.84: ICD-10-CM

## 2017-07-06 DIAGNOSIS — E03.9: ICD-10-CM

## 2017-07-06 DIAGNOSIS — E78.5: ICD-10-CM

## 2017-07-06 DIAGNOSIS — I48.91: ICD-10-CM

## 2017-07-06 DIAGNOSIS — Z79.891: ICD-10-CM

## 2017-07-06 DIAGNOSIS — N40.1: Primary | ICD-10-CM

## 2017-07-06 DIAGNOSIS — Z79.82: ICD-10-CM

## 2017-07-06 DIAGNOSIS — R33.9: ICD-10-CM

## 2017-07-06 DIAGNOSIS — E66.01: ICD-10-CM

## 2017-07-06 DIAGNOSIS — I50.9: ICD-10-CM

## 2017-07-06 LAB
APTT BLD: 26.4 SEC (ref 22–30)
BASOPHILS # BLD AUTO: 0 K/UL (ref 0–0.2)
BASOPHILS NFR BLD AUTO: 0 %
CH: 26.9
CHCM: 32.6
EOSINOPHIL # BLD AUTO: 0 K/UL (ref 0–0.7)
EOSINOPHIL NFR BLD AUTO: 0 %
ERYTHROCYTE [DISTWIDTH] IN BLOOD BY AUTOMATED COUNT: 4.29 M/UL (ref 4.3–5.9)
ERYTHROCYTE [DISTWIDTH] IN BLOOD: 18.5 % (ref 11.5–15.5)
GLUCOSE BLD-MCNC: 105 MG/DL (ref 75–99)
GLUCOSE BLD-MCNC: 143 MG/DL (ref 75–99)
GLUCOSE BLD-MCNC: 330 MG/DL (ref 75–99)
GLUCOSE BLD-MCNC: 340 MG/DL (ref 75–99)
HCT VFR BLD AUTO: 35.6 % (ref 39–53)
HDW: 2.7
HEMOGLOBIN A1C: 6.4 % (ref 4.2–6.1)
HGB BLD-MCNC: 11.7 GM/DL (ref 13–17.5)
INR PPP: 1.3 (ref ?–1.1)
LUC NFR BLD AUTO: 0 %
LYMPHOCYTES # SPEC AUTO: 0.6 K/UL (ref 1–4.8)
LYMPHOCYTES NFR SPEC AUTO: 6 %
MCH RBC QN AUTO: 27.3 PG (ref 25–35)
MCHC RBC AUTO-ENTMCNC: 32.9 G/DL (ref 31–37)
MCV RBC AUTO: 82.9 FL (ref 80–100)
MONOCYTES # BLD AUTO: 0.1 K/UL (ref 0–1)
MONOCYTES NFR BLD AUTO: 1 %
NEUTROPHILS # BLD AUTO: 9.8 K/UL (ref 1.3–7.7)
NEUTROPHILS NFR BLD AUTO: 93 %
PT BLD: 12.5 SEC (ref 9–12)
WBC # BLD AUTO: 0.02 10*3/UL
WBC # BLD AUTO: 10.6 K/UL (ref 3.8–10.6)
WBC (PEROX): 11.26

## 2017-07-06 PROCEDURE — 94760 N-INVAS EAR/PLS OXIMETRY 1: CPT

## 2017-07-06 PROCEDURE — 85025 COMPLETE CBC W/AUTO DIFF WBC: CPT

## 2017-07-06 PROCEDURE — 99283 EMERGENCY DEPT VISIT LOW MDM: CPT

## 2017-07-06 PROCEDURE — 85730 THROMBOPLASTIN TIME PARTIAL: CPT

## 2017-07-06 PROCEDURE — 88305 TISSUE EXAM BY PATHOLOGIST: CPT

## 2017-07-06 PROCEDURE — 84132 ASSAY OF SERUM POTASSIUM: CPT

## 2017-07-06 PROCEDURE — 55899 UNLISTED PX MALE GENITAL SYS: CPT

## 2017-07-06 PROCEDURE — 85610 PROTHROMBIN TIME: CPT

## 2017-07-06 PROCEDURE — 83036 HEMOGLOBIN GLYCOSYLATED A1C: CPT

## 2017-07-06 RX ADMIN — CIPROFLOXACIN SCH MG: 500 TABLET, FILM COATED ORAL at 21:12

## 2017-07-06 RX ADMIN — Medication SCH MG: at 15:42

## 2017-07-06 RX ADMIN — INSULIN LISPRO SCH UNIT: 100 INJECTION, SOLUTION INTRAVENOUS; SUBCUTANEOUS at 18:36

## 2017-07-06 RX ADMIN — FUROSEMIDE SCH MG: 20 TABLET ORAL at 15:42

## 2017-07-06 RX ADMIN — DOCUSATE SODIUM SCH MG: 100 CAPSULE, LIQUID FILLED ORAL at 21:12

## 2017-07-06 RX ADMIN — GABAPENTIN SCH MG: 300 CAPSULE ORAL at 15:42

## 2017-07-06 RX ADMIN — Medication SCH MG: at 21:12

## 2017-07-06 RX ADMIN — INSULIN LISPRO SCH UNIT: 100 INJECTION, SOLUTION INTRAVENOUS; SUBCUTANEOUS at 21:10

## 2017-07-06 RX ADMIN — CEFAZOLIN SCH MLS/HR: 330 INJECTION, POWDER, FOR SOLUTION INTRAMUSCULAR; INTRAVENOUS at 15:41

## 2017-07-06 RX ADMIN — GABAPENTIN SCH MG: 300 CAPSULE ORAL at 21:12

## 2017-07-06 RX ADMIN — METOPROLOL TARTRATE SCH: 25 TABLET, FILM COATED ORAL at 21:12

## 2017-07-06 RX ADMIN — POTASSIUM CHLORIDE SCH MLS: 14.9 INJECTION, SOLUTION INTRAVENOUS at 08:32

## 2017-07-07 ENCOUNTER — HOSPITAL ENCOUNTER (EMERGENCY)
Dept: HOSPITAL 47 - EC | Age: 72
Discharge: HOME | End: 2017-07-07
Payer: MEDICARE

## 2017-07-07 VITALS — DIASTOLIC BLOOD PRESSURE: 57 MMHG | SYSTOLIC BLOOD PRESSURE: 114 MMHG | RESPIRATION RATE: 18 BRPM | TEMPERATURE: 97 F

## 2017-07-07 VITALS — DIASTOLIC BLOOD PRESSURE: 58 MMHG | HEART RATE: 59 BPM | TEMPERATURE: 98 F | SYSTOLIC BLOOD PRESSURE: 114 MMHG

## 2017-07-07 VITALS — RESPIRATION RATE: 16 BRPM

## 2017-07-07 DIAGNOSIS — Z79.01: ICD-10-CM

## 2017-07-07 DIAGNOSIS — I11.0: ICD-10-CM

## 2017-07-07 DIAGNOSIS — Z98.890: ICD-10-CM

## 2017-07-07 DIAGNOSIS — Z79.84: ICD-10-CM

## 2017-07-07 DIAGNOSIS — T83.098A: Primary | ICD-10-CM

## 2017-07-07 DIAGNOSIS — E11.9: ICD-10-CM
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
## 2017-07-07 DIAGNOSIS — I50.9: ICD-10-CM

## 2017-07-07 DIAGNOSIS — I48.91: ICD-10-CM

## 2017-07-07 DIAGNOSIS — Z88.5: ICD-10-CM

## 2017-07-07 DIAGNOSIS — E07.9: ICD-10-CM

## 2017-07-07 DIAGNOSIS — Z79.899: ICD-10-CM

## 2017-07-07 DIAGNOSIS — E78.5: ICD-10-CM

## 2017-07-07 DIAGNOSIS — Z79.4: ICD-10-CM

## 2017-07-07 LAB
GLUCOSE BLD-MCNC: 136 MG/DL (ref 75–99)
GLUCOSE BLD-MCNC: 228 MG/DL (ref 75–99)

## 2017-07-07 PROCEDURE — 99283 EMERGENCY DEPT VISIT LOW MDM: CPT

## 2017-07-07 RX ADMIN — CIPROFLOXACIN SCH MG: 500 TABLET, FILM COATED ORAL at 08:13

## 2017-07-07 RX ADMIN — CEFAZOLIN SCH MLS/HR: 330 INJECTION, POWDER, FOR SOLUTION INTRAMUSCULAR; INTRAVENOUS at 04:32

## 2017-07-07 RX ADMIN — Medication SCH MG: at 08:13

## 2017-07-07 RX ADMIN — DOCUSATE SODIUM SCH MG: 100 CAPSULE, LIQUID FILLED ORAL at 08:12

## 2017-07-07 RX ADMIN — GABAPENTIN SCH MG: 300 CAPSULE ORAL at 08:14

## 2017-07-07 RX ADMIN — POTASSIUM CHLORIDE SCH: 14.9 INJECTION, SOLUTION INTRAVENOUS at 05:55

## 2017-07-07 RX ADMIN — METOPROLOL TARTRATE SCH MG: 25 TABLET, FILM COATED ORAL at 08:14

## 2017-07-07 RX ADMIN — INSULIN LISPRO SCH UNIT: 100 INJECTION, SOLUTION INTRAVENOUS; SUBCUTANEOUS at 08:12

## 2017-07-07 RX ADMIN — FUROSEMIDE SCH MG: 20 TABLET ORAL at 08:14

## 2017-07-07 RX ADMIN — INSULIN LISPRO SCH UNIT: 100 INJECTION, SOLUTION INTRAVENOUS; SUBCUTANEOUS at 12:29

## 2017-07-07 NOTE — ED
Male Urogenital HPI





- General


Chief complaint: Urogenital


Stated complaint: catheter plugged post op


Time Seen by Provider: 07/07/17 19:09


Source: patient, family, RN notes reviewed


Mode of arrival: ambulatory


Limitations: no limitations





- History of Present Illness


Initial comments: 





This a 72-year-old male presents emergency Department with chief complaint of 

plugged Serna.  Patient states that he had TURP procedure by Dr. Guzman 

yesterday and was released from the hospital today.  Patient states he had 

irrigated his Serna several times while in the hospital.  Patient states that 

is leaking around his Serna and he is advised to come here to have it irrigated 

by his urologist.  Patient states that when he hasn't urged ago it just leaks 

around and states that he just wants a does not have much discomfort.  Denies 

fever or chills.  Patient denies any nausea vomiting diarrhea constipation.





- Related Data


 Home Medications











 Medication  Instructions  Recorded  Confirmed


 


Ferrous Sulfate [Feosol] 325 mg PO TID 03/31/17 07/07/17


 


Gabapentin [Neurontin] 600 mg PO TID 03/31/17 07/07/17


 


Insulin Detemir [Levemir] 25 unit SQ DAILY@2000 03/31/17 07/07/17


 


Latanoprost Ophth [Xalatan 0.005%] 1 drops BOTH EYES  03/31/17 07/07/17


 


Levothyroxine Sodium [Synthroid] 50 mcg PO QAM 03/31/17 07/07/17


 


Lisinopril [Zestril] 5 mg PO QAM 03/31/17 07/07/17


 


Omega-3 Fatty Acids/Fish Oil [Fish 1 cap PO DAILY 03/31/17 07/07/17





Oil 1,000 mg Softgel]   


 


Sertraline [Zoloft] 100 mg PO DAILY 03/31/17 07/07/17


 


Simvastatin [Zocor] 20 mg PO HS 03/31/17 07/07/17


 


Temazepam [Restoril] 15 mg PO HS 03/31/17 07/07/17


 


metFORMIN HCL 1,000 mg PO BID 04/25/17 07/07/17


 


metFORMIN HCL [Glucophage] 500 mg PO DAILY@1200 04/25/17 07/07/17


 


Amiodarone [Cordarone] 200 mg PO QAM 05/16/17 07/07/17


 


Furosemide [Lasix] 20 mg PO BID 05/16/17 07/07/17


 


Isosorbide Mononitrate ER [Imdur] 30 mg PO QAM 05/16/17 07/07/17


 


Sleep Aid 1 tab PO HS 05/16/17 07/07/17


 


Warfarin [Coumadin] 7.5 mg PO HS 05/16/17 07/07/17


 


traMADol HCL [Ultram] 50 mg PO TID PRN 05/16/17 07/07/17


 


Tamsulosin [Flomax] 0.4 mg PO DAILY 06/30/17 07/07/17


 


Ciprofloxacin HCl [Cipro] 500 mg PO Q12HR 07/06/17 07/07/17








 Previous Rx's











 Medication  Instructions  Recorded


 


Metoprolol Tartrate [Lopressor] 25 mg PO BID #60 tab 04/04/17











 Allergies











Allergy/AdvReac Type Severity Reaction Status Date / Time


 


hydromorphone [From Dilaudid] AdvReac  SEVERE Verified 07/07/17 19:09





   VOMITING  


 


meperidine [From Demerol] AdvReac  Nausea & Verified 07/07/17 19:09





   Vomiting &  





   Diarrhea  














Review of Systems


ROS Statement: 


Those systems with pertinent positive or pertinent negative responses have been 

documented in the HPI.





ROS Other: All systems not noted in ROS Statement are negative.





Past Medical History


Past Medical History: Atrial Fibrillation, Heart Failure, Diabetes Mellitus, 

Eye Disorder, Hearing Disorder / Deafness, Hyperlipidemia, Hypertension, 

Osteoarthritis (OA), Pneumonia, Prostate Disorder, Sleep Apnea/CPAP/BIPAP, 

Thyroid Disorder


Additional Past Medical History / Comment(s): GLAUCOMA, STATES AWAITING 

PROSTATE SX


History of Any Multi-Drug Resistant Organisms: None Reported


Past Surgical History: Back Surgery, Cholecystectomy, Heart Catheterization, 

Joint Replacement, Prostate Surgery


Additional Past Surgical History / Comment(s): greer knee replacement, 

cardioverion, bilc cataracts, Bipolar TURP


Past Anesthesia/Blood Transfusion Reactions: No Reported Reaction


Past Psychological History: No Psychological Hx Reported


Smoking Status: Never smoker


Past Alcohol Use History: Rare


Past Drug Use History: None Reported





- Past Family History


  ** Father


History Unknown: Yes





  ** Mother


Family Medical History: No Reported History





  ** Brother(s)


Family Medical History: Cancer





General Exam


Limitations: no limitations


General appearance: alert, in no apparent distress


Respiratory exam: Present: normal lung sounds bilaterally.  Absent: respiratory 

distress, wheezes, rales, rhonchi, stridor


Cardiovascular Exam: Present: regular rate, normal rhythm, normal heart sounds.

  Absent: systolic murmur, diastolic murmur, rubs, gallop, clicks


GI/Abdominal exam: Present: soft, tenderness (Mild suprapubic), normal bowel 

sounds.  Absent: distended, guarding, rebound, rigid


Back exam: Present: CVA tenderness (L).  Absent: CVA tenderness (R)


Neurological exam: Present: alert, oriented X3, CN II-XII intact


Skin exam: Present: warm, dry, intact, normal color.  Absent: rash





Course


 Vital Signs











  07/07/17 07/07/17 07/07/17





  19:06 19:21 20:39


 


Temperature 96.7 F L  98.0 F


 


Pulse Rate 59 L 60 59 L


 


Respiratory 18 16 16





Rate   


 


Blood Pressure 111/59 115/59 114/58


 


O2 Sat by Pulse 97 94 L 95





Oximetry   














Medical Decision Making





- Medical Decision Making





72-year-old male present emergency department for clogged Serna catheter after 

TURP procedure.  This was irrigated and is flowing.  Patient states he feels 

much improved.  Patient will be discharged return parameters were discussed.





Disposition


Clinical Impression: 


 Serna catheter problem





Disposition: HOME SELF-CARE


Condition: Stable


Instructions:  Serna Catheter Placement and Care (ED)


Additional Instructions: 


Please return to the Emergency Department if symptoms worsen or any other 

concerns.


Referrals: 


Mike Rojo DO [Primary Care Provider] - 1-2 days


Time of Disposition: 20:49

## 2017-07-07 NOTE — P.DS
Providers


Expected date of discharge: 07/07/17


Attending physician: 


Yfn Guzman





Primary care physician: 


[facility] Course: 





On the day of admission, the patient underwent an uncomplicated bipolar TURP.  

The prostate was significantly enlarged.  The postoperative course was 

unremarkable.  On the first postoperative day, the Serna catheter was draining 

blood-tinged urine.  The patient was comfortable.  He was afebrile and 

hemodynamically stable.


Procedures: 





Cysto, TURP on 7/6/2017.


Patient Condition at Discharge: Good





Plan - Discharge Summary


New Discharge Prescriptions: 


No Action


   Ferrous Sulfate [Feosol] 325 mg PO TID


   Omega-3 Fatty Acids/Fish Oil [Fish Oil 1,000 mg Softgel] 1 cap PO DAILY


   Levothyroxine Sodium [Synthroid] 50 mcg PO QAM


   Latanoprost Ophth [Xalatan 0.005%] 1 drops BOTH EYES HS


   Temazepam [Restoril] 15 mg PO HS


   Sertraline [Zoloft] 100 mg PO DAILY


   Simvastatin [Zocor] 20 mg PO HS


   Lisinopril [Zestril] 5 mg PO QAM


   Gabapentin [Neurontin] 600 mg PO TID


   Insulin Detemir [Levemir] 25 unit SQ 2000


   Metoprolol Tartrate [Lopressor] 25 mg PO BID #60 tab


   metFORMIN HCL [Glucophage] 500 mg PO 1200


   metFORMIN HCL 1,000 mg PO BID


   Isosorbide Mononitrate ER [Imdur] 30 mg PO QAM


   Warfarin [Coumadin] 7.5 mg PO HS


   Sleep Aid 1 tab PO HS


   Furosemide [Lasix] 20 mg PO BID


   traMADol HCL [Ultram] 50 mg PO TID PRN


     PRN Reason: Pain


   Amiodarone [Cordarone] 200 mg PO QAM


   Tamsulosin [Flomax] 0.4 mg PO DAILY


   Ciprofloxacin HCl [Cipro] 500 mg PO Q12HR


Discharge Medication List





Ferrous Sulfate [Feosol] 325 mg PO TID 03/31/17 [History]


Gabapentin [Neurontin] 600 mg PO TID 03/31/17 [History]


Insulin Detemir [Levemir] 25 unit SQ 2000 03/31/17 [History]


Latanoprost Ophth [Xalatan 0.005%] 1 drops BOTH EYES HS 03/31/17 [History]


Levothyroxine Sodium [Synthroid] 50 mcg PO QAM 03/31/17 [History]


Lisinopril [Zestril] 5 mg PO QAM 03/31/17 [History]


Omega-3 Fatty Acids/Fish Oil [Fish Oil 1,000 mg Softgel] 1 cap PO DAILY 03/31/ 17 [History]


Sertraline [Zoloft] 100 mg PO DAILY 03/31/17 [History]


Simvastatin [Zocor] 20 mg PO HS 03/31/17 [History]


Temazepam [Restoril] 15 mg PO HS 03/31/17 [History]


Metoprolol Tartrate [Lopressor] 25 mg PO BID #60 tab 04/04/17 [Rx]


metFORMIN HCL 1,000 mg PO BID 04/25/17 [History]


metFORMIN HCL [Glucophage] 500 mg PO 1200 04/25/17 [History]


Amiodarone [Cordarone] 200 mg PO QAM 05/16/17 [History]


Furosemide [Lasix] 20 mg PO BID 05/16/17 [History]


Isosorbide Mononitrate ER [Imdur] 30 mg PO QAM 05/16/17 [History]


Sleep Aid 1 tab PO HS 05/16/17 [History]


Warfarin [Coumadin] 7.5 mg PO HS 05/16/17 [History]


traMADol HCL [Ultram] 50 mg PO TID PRN 05/16/17 [History]


Tamsulosin [Flomax] 0.4 mg PO DAILY 06/30/17 [History]


Ciprofloxacin HCl [Cipro] 500 mg PO Q12HR 07/06/17 [History]








Follow up Appointment(s)/Referral(s): 


Yfn Guzman MD [STAFF PHYSICIAN] - 07/12/17 8:00 am


Activity/Diet/Wound Care/Special Instructions: 


Discharge home with Serna.  Diet as tolerated.  No strenuous activity.  Take 

stool softener if needed to prevent constipation.  Hold Coumadin.


Discharge Disposition: HOME SELF-CARE

## 2017-07-07 NOTE — P.OP
Date of Procedure: 07/06/17


Preoperative Diagnosis: 


Urinary Retention Secondary to BPH


Postoperative Diagnosis: 


Same


Procedure(s) Performed: 


Cystoscopy, Bipolar Transurethral Resection of the Prostate (TURP)


Implants: 





Anesthesia: spinal


Surgeon: Yfn Guzman


Estimated Blood Loss (ml): 250


IV fluids (ml): 500


Pathology: other (Prostate chips)


Condition: stable


Disposition: PACU


Indications for Procedure: 


He is a 72-year-old male who has taken oxybutynin chloride for over one year. 

He recently underwent Serna catheter placement for urinary retention, and was 

placed on tamsulosin. PITA revealed the prostate to be moderately enlarged but 

smooth. Oxybutynin chloride was discontinued, but the retention persists. A CMG 

showed detrusor recompensation, and cystoscopy showed BPH. He desires a TURP 

and understands that this will require that he be off of Coumadin for a minimum 

of 2 weeks. TUMT, Urolift, and Greenlight laser were discussed as alternative 

treatment modalities which would minimize the time off of Coumadin.


Operative Findings: 


Complete occlusion, significantly enlarged prostate, bilobar configuration.


Description of Procedure: 


The patient was taken in the operating room and placed in the dorsolithotomy 

position after being given a spinal anesthetic,  The external genitalia was 

prepped and draped sterilely.  The 25-[place] [employer] resectoscope sheath was 

introduced into the bladder.  The bladder was inspected.  Both ureteral 

orifices were of normal anatomic location and configuration, and clear urine 

effluxed from both.  No tumors or foreign bodies were seen.  Examination of the 

prostate revealed complete obstruction with a bilobar configuration.  Using the 

bipolar cutting loop, the lateral lobes were resected down to the surgical 

capsule.  The floor of the prostate was then resected, proximal to the 

verumontanum.  Lastly, any remaining anterior tissue was resected.  The 

prostatic fossa was then carefully examined.  The remaining apical tissue was 

then carefully resected.  The resection was carried down to the surgical 

capsule in all 4 quadrants.  The prostatic fossa was then carefully examined, 

and any areas of bleeding were controlled with electrocautery.  Adequate 

hemostasis was attained.  The resectoscope was withdrawn into the bulbous 

urethra.  The external urinary sphincter remained intact.  The prostatic fossa 

was open.  The [employer] evacuator was used to remove all prostate chips from the 

bladder.  These were saved and sent for pathologic examination.  The 

resectoscope was removed, and a 22 [place]ian, 3-Way Serna catheter was placed.  

The return was essentially clear.  Continuous bladder irrigation was started 

using 0.9 normal saline.  The patient tolerated the procedure well was taken to 

the recovery room in stable condition.

## 2017-08-21 ENCOUNTER — HOSPITAL ENCOUNTER (OUTPATIENT)
Dept: HOSPITAL 47 - LABWHC1 | Age: 72
Discharge: HOME | End: 2017-08-21
Payer: MEDICARE

## 2017-08-21 DIAGNOSIS — E78.2: Primary | ICD-10-CM

## 2017-08-21 DIAGNOSIS — I48.1: ICD-10-CM

## 2017-08-21 LAB
ALP SERPL-CCNC: 102 U/L (ref 38–126)
ALT SERPL-CCNC: 41 U/L (ref 21–72)
ANION GAP SERPL CALC-SCNC: 10 MMOL/L
AST SERPL-CCNC: 27 U/L (ref 17–59)
BUN SERPL-SCNC: 19 MG/DL (ref 9–20)
CALCIUM SPEC-MCNC: 8.8 MG/DL (ref 8.4–10.2)
CHLORIDE SERPL-SCNC: 104 MMOL/L (ref 98–107)
CHOLEST SERPL-MCNC: 170 MG/DL (ref ?–200)
CO2 SERPL-SCNC: 28 MMOL/L (ref 22–30)
GLUCOSE SERPL-MCNC: 140 MG/DL (ref 74–99)
HDLC SERPL-MCNC: 43 MG/DL (ref 40–60)
NON-AFRICAN AMERICAN GFR(MDRD): >60
POTASSIUM SERPL-SCNC: 5 MMOL/L (ref 3.5–5.1)
PROT SERPL-MCNC: 6.9 G/DL (ref 6.3–8.2)
SODIUM SERPL-SCNC: 142 MMOL/L (ref 137–145)

## 2017-08-21 PROCEDURE — 80061 LIPID PANEL: CPT

## 2017-08-21 PROCEDURE — 80053 COMPREHEN METABOLIC PANEL: CPT

## 2017-08-21 PROCEDURE — 36415 COLL VENOUS BLD VENIPUNCTURE: CPT

## 2017-08-21 PROCEDURE — 84443 ASSAY THYROID STIM HORMONE: CPT

## 2018-03-13 ENCOUNTER — HOSPITAL ENCOUNTER (EMERGENCY)
Dept: HOSPITAL 47 - EC | Age: 73
Discharge: HOME | End: 2018-03-13
Payer: MEDICARE

## 2018-03-13 VITALS
SYSTOLIC BLOOD PRESSURE: 145 MMHG | TEMPERATURE: 97.6 F | RESPIRATION RATE: 20 BRPM | DIASTOLIC BLOOD PRESSURE: 78 MMHG | HEART RATE: 66 BPM

## 2018-03-13 DIAGNOSIS — Z88.5: ICD-10-CM

## 2018-03-13 DIAGNOSIS — Z99.89: ICD-10-CM

## 2018-03-13 DIAGNOSIS — Z79.4: ICD-10-CM

## 2018-03-13 DIAGNOSIS — Z79.01: ICD-10-CM

## 2018-03-13 DIAGNOSIS — E78.5: ICD-10-CM

## 2018-03-13 DIAGNOSIS — K62.89: ICD-10-CM

## 2018-03-13 DIAGNOSIS — F31.9: ICD-10-CM

## 2018-03-13 DIAGNOSIS — I50.9: ICD-10-CM

## 2018-03-13 DIAGNOSIS — K59.00: Primary | ICD-10-CM

## 2018-03-13 DIAGNOSIS — I48.91: ICD-10-CM

## 2018-03-13 DIAGNOSIS — I11.0: ICD-10-CM

## 2018-03-13 DIAGNOSIS — E07.9: ICD-10-CM

## 2018-03-13 DIAGNOSIS — H40.9: ICD-10-CM

## 2018-03-13 DIAGNOSIS — H91.90: ICD-10-CM

## 2018-03-13 DIAGNOSIS — Z79.899: ICD-10-CM

## 2018-03-13 DIAGNOSIS — Z96.653: ICD-10-CM

## 2018-03-13 DIAGNOSIS — E11.9: ICD-10-CM

## 2018-03-13 DIAGNOSIS — Z90.49: ICD-10-CM

## 2018-03-13 DIAGNOSIS — G47.30: ICD-10-CM

## 2018-03-13 LAB
ALBUMIN SERPL-MCNC: 3.9 G/DL (ref 3.5–5)
ALP SERPL-CCNC: 98 U/L (ref 38–126)
ALT SERPL-CCNC: 30 U/L (ref 21–72)
AMYLASE SERPL-CCNC: 32 U/L (ref 30–110)
ANION GAP SERPL CALC-SCNC: 12 MMOL/L
APTT BLD: 37.1 SEC (ref 22–30)
AST SERPL-CCNC: 19 U/L (ref 17–59)
BASOPHILS # BLD AUTO: 0 K/UL (ref 0–0.2)
BASOPHILS NFR BLD AUTO: 0 %
BUN SERPL-SCNC: 13 MG/DL (ref 9–20)
CALCIUM SPEC-MCNC: 9.4 MG/DL (ref 8.4–10.2)
CHLORIDE SERPL-SCNC: 109 MMOL/L (ref 98–107)
CO2 SERPL-SCNC: 24 MMOL/L (ref 22–30)
EOSINOPHIL # BLD AUTO: 0.2 K/UL (ref 0–0.7)
EOSINOPHIL NFR BLD AUTO: 2 %
ERYTHROCYTE [DISTWIDTH] IN BLOOD BY AUTOMATED COUNT: 4.7 M/UL (ref 4.3–5.9)
ERYTHROCYTE [DISTWIDTH] IN BLOOD: 14.6 % (ref 11.5–15.5)
GLUCOSE SERPL-MCNC: 148 MG/DL (ref 74–99)
GLUCOSE UR QL: (no result)
HCT VFR BLD AUTO: 37.9 % (ref 39–53)
HGB BLD-MCNC: 12.8 GM/DL (ref 13–17.5)
INR PPP: 4.3 (ref ?–1.2)
LIPASE SERPL-CCNC: 36 U/L (ref 23–300)
LYMPHOCYTES # SPEC AUTO: 0.9 K/UL (ref 1–4.8)
LYMPHOCYTES NFR SPEC AUTO: 10 %
MCH RBC QN AUTO: 27.3 PG (ref 25–35)
MCHC RBC AUTO-ENTMCNC: 33.8 G/DL (ref 31–37)
MCV RBC AUTO: 80.7 FL (ref 80–100)
MONOCYTES # BLD AUTO: 0.5 K/UL (ref 0–1)
MONOCYTES NFR BLD AUTO: 5 %
NEUTROPHILS # BLD AUTO: 7.8 K/UL (ref 1.3–7.7)
NEUTROPHILS NFR BLD AUTO: 83 %
PH UR: 5.5 [PH] (ref 5–8)
PLATELET # BLD AUTO: 149 K/UL (ref 150–450)
POTASSIUM SERPL-SCNC: 4.1 MMOL/L (ref 3.5–5.1)
PROT SERPL-MCNC: 6.9 G/DL (ref 6.3–8.2)
PROT UR QL: (no result)
PT BLD: 38.5 SEC (ref 9–12)
RBC UR QL: 1 /HPF (ref 0–5)
SODIUM SERPL-SCNC: 145 MMOL/L (ref 137–145)
SP GR UR: 1.01 (ref 1–1.03)
UROBILINOGEN UR QL STRIP: <2 MG/DL (ref ?–2)
WBC # BLD AUTO: 9.4 K/UL (ref 3.8–10.6)
WBC #/AREA URNS HPF: 3 /HPF (ref 0–5)

## 2018-03-13 PROCEDURE — 99284 EMERGENCY DEPT VISIT MOD MDM: CPT

## 2018-03-13 PROCEDURE — 36415 COLL VENOUS BLD VENIPUNCTURE: CPT

## 2018-03-13 PROCEDURE — 85610 PROTHROMBIN TIME: CPT

## 2018-03-13 PROCEDURE — 85730 THROMBOPLASTIN TIME PARTIAL: CPT

## 2018-03-13 PROCEDURE — 81001 URINALYSIS AUTO W/SCOPE: CPT

## 2018-03-13 PROCEDURE — 85025 COMPLETE CBC W/AUTO DIFF WBC: CPT

## 2018-03-13 PROCEDURE — 74018 RADEX ABDOMEN 1 VIEW: CPT

## 2018-03-13 PROCEDURE — 83690 ASSAY OF LIPASE: CPT

## 2018-03-13 PROCEDURE — 82150 ASSAY OF AMYLASE: CPT

## 2018-03-13 PROCEDURE — 80053 COMPREHEN METABOLIC PANEL: CPT

## 2018-03-13 NOTE — XR
EXAMINATION TYPE: XR KUB

 

DATE OF EXAM: 3/13/2018

 

COMPARISON: NONE

 

INDICATION: Abdomen pain rectal pain

 

TECHNIQUE: Single view abdomen frontal upright view

 

FINDINGS:  

Nonspecific small bowel gas is within the mid abdomen. No dilated loops of bowel are evident. Colonic
 bowel gas present. Surgical clips in the right upper quadrant. No free air is evident. No suspicious
 air-fluid levels or differential air-fluid levels are present. Small amount of fecal debris is in th
e proximal descending colon.

Psoas margins are normal.

No organomegaly is present.

Mild degenerative changes are within the lumbar spine.

 

IMPRESSION: 

1. Nonspecific abdomen.

## 2018-03-13 NOTE — ED
Abdominal Pain HPI





- General


Chief Complaint: Abdominal Pain


Stated Complaint: Constipation


Time Seen by Provider: 03/13/18 07:19


Source: patient, RN notes reviewed


Mode of arrival: wheelchair


Limitations: no limitations





- History of Present Illness


Initial Comments: 





This is a 73-year-old male presents emergency Department chief complaint of 

constipation.  Patient states that he's having rectal pain.  Patient states he 

feels that stools coming out but not completely, now.  Patient states he is 

having some leakage.  Patient states pain is become unbearable.  Patient denies 

any fever, chills, nausea vomiting.  Patient does take tramadol daily.  Patient 

states a stress and stool softeners and laxatives.  Patient denies any and was.

  Patient denies any rectal bleeding at this time.





- Related Data


 Home Medications











 Medication  Instructions  Recorded  Confirmed


 


Ferrous Sulfate [Feosol] 325 mg PO BID@1200,2100 03/31/17 03/13/18


 


Gabapentin [Neurontin] 600 mg PO TID 03/31/17 03/13/18


 


Insulin Detemir [Levemir] 40 unit SQ HS 03/31/17 03/13/18


 


Latanoprost Ophth [Xalatan 0.005%] 1 drops BOTH EYES HS 03/31/17 03/13/18


 


Omega-3 Fatty Acids/Fish Oil [Fish 1 cap PO DAILY 03/31/17 03/13/18





Oil 1,000 mg Softgel]   


 


Sertraline [Zoloft] 100 mg PO DAILY 03/31/17 03/13/18


 


Simvastatin [Zocor] 40 mg PO HS 03/31/17 03/13/18


 


Temazepam [Restoril] 15 mg PO HS 03/31/17 03/13/18


 


Sleep Aid 1 tab PO HS 05/16/17 03/13/18


 


Warfarin [Coumadin] 7.5 mg PO HS 05/16/17 03/13/18


 


traMADol HCL [Ultram] 50 mg PO TID PRN 05/16/17 03/13/18


 


Calcitriol [Rocaltrol] 0.25 mcg PO Q7D 03/13/18 03/13/18


 


Docusate [Colace] 100 mg PO DAILY 03/13/18 03/13/18


 


Ergocalciferol (Vitamin D2) 50,000 unit PO Q30D 03/13/18 03/13/18





[Vitamin D2]   


 


Levothyroxine Sodium [Synthroid] 100 mcg PO DAILY 03/13/18 03/13/18


 


Metoprolol Tartrate [Lopressor] 12.5 mg PO HS 03/13/18 03/13/18











 Allergies











Allergy/AdvReac Type Severity Reaction Status Date / Time


 


hydromorphone [From Dilaudid] AdvReac  SEVERE Verified 03/13/18 07:52





   VOMITING  


 


meperidine [From Demerol] AdvReac  Nausea & Verified 03/13/18 07:52





   Vomiting &  





   Diarrhea  














Review of Systems


ROS Statement: 


Those systems with pertinent positive or pertinent negative responses have been 

documented in the HPI.





ROS Other: All systems not noted in ROS Statement are negative.





Past Medical History


Past Medical History: Atrial Fibrillation, Heart Failure, Diabetes Mellitus, 

Eye Disorder, Hearing Disorder / Deafness, Hyperlipidemia, Hypertension, 

Osteoarthritis (OA), Pneumonia, Prostate Disorder, Sleep Apnea/CPAP/BIPAP, 

Thyroid Disorder


Additional Past Medical History / Comment(s): GLAUCOMA, STATES AWAITING 

PROSTATE SX


History of Any Multi-Drug Resistant Organisms: None Reported


Past Surgical History: Back Surgery, Cholecystectomy, Heart Catheterization, 

Joint Replacement, Prostate Surgery


Additional Past Surgical History / Comment(s): greer knee replacement, 

cardioverion, bilc cataracts, Bipolar TURP


Past Anesthesia/Blood Transfusion Reactions: No Reported Reaction


Past Psychological History: Bipolar


Smoking Status: Never smoker


Past Alcohol Use History: Rare


Past Drug Use History: None Reported





- Past Family History


  ** Father


History Unknown: Yes





  ** Mother


Family Medical History: No Reported History





  ** Brother(s)


Family Medical History: Cancer





General Exam


General appearance: alert, in no apparent distress


Head exam: Present: atraumatic, normocephalic, normal inspection


Respiratory exam: Present: normal lung sounds bilaterally.  Absent: respiratory 

distress, wheezes, rales, rhonchi, stridor


Cardiovascular Exam: Present: regular rate, normal rhythm, normal heart sounds.

  Absent: systolic murmur, diastolic murmur, rubs, gallop, clicks


GI/Abdominal exam: Present: soft, tenderness (Minimal left lower quadrant), 

normal bowel sounds.  Absent: distended, guarding, rebound, rigid


Back exam: Absent: CVA tenderness (R), CVA tenderness (L)


Skin exam: Present: warm, dry, intact, normal color.  Absent: rash





Course


 Vital Signs











  03/13/18





  07:15


 


Temperature 97.9 F


 


Pulse Rate 63


 


Respiratory 22





Rate 


 


Blood Pressure 142/73


 


O2 Sat by Pulse 98





Oximetry 














Medical Decision Making





- Medical Decision Making





73-year-old male present emergency from for constipation.  Patient lab work, x-

ray consistent with constipation.  Patient did receive an enema had some stool 

out.  Patient states that he still felt some pressure digital disimpaction was 

performed by me.  There was soft stool in multiple seeds noted.  Patient then 

tried had another bowel movement which he did have some up and felt some 

relief.  Patient states he still feels some pressure he was offered another, 

though he declines.  Patient states he would rather drink something orally and 

go home.  I did discuss rectal care lidocaine jelly over-the-counter.  Patient 

will.  Return parameters were discussed.





- Lab Data


Result diagrams: 


 03/13/18 08:45





 03/13/18 08:45


 Lab Results











  03/13/18 03/13/18 03/13/18 Range/Units





  08:45 08:45 08:45 


 


WBC    9.4  (3.8-10.6)  k/uL


 


RBC    4.70  (4.30-5.90)  m/uL


 


Hgb    12.8 L  (13.0-17.5)  gm/dL


 


Hct    37.9 L  (39.0-53.0)  %


 


MCV    80.7  (80.0-100.0)  fL


 


MCH    27.3  (25.0-35.0)  pg


 


MCHC    33.8  (31.0-37.0)  g/dL


 


RDW    14.6  (11.5-15.5)  %


 


Plt Count    149 L  (150-450)  k/uL


 


Neutrophils %    83  %


 


Lymphocytes %    10  %


 


Monocytes %    5  %


 


Eosinophils %    2  %


 


Basophils %    0  %


 


Neutrophils #    7.8 H  (1.3-7.7)  k/uL


 


Lymphocytes #    0.9 L  (1.0-4.8)  k/uL


 


Monocytes #    0.5  (0-1.0)  k/uL


 


Eosinophils #    0.2  (0-0.7)  k/uL


 


Basophils #    0.0  (0-0.2)  k/uL


 


PT  38.5 H    (9.0-12.0)  sec


 


INR  4.3 H    (<1.2)  


 


APTT  37.1 H    (22.0-30.0)  sec


 


Sodium   145   (137-145)  mmol/L


 


Potassium   4.1   (3.5-5.1)  mmol/L


 


Chloride   109 H   ()  mmol/L


 


Carbon Dioxide   24   (22-30)  mmol/L


 


Anion Gap   12   mmol/L


 


BUN   13   (9-20)  mg/dL


 


Creatinine   0.74   (0.66-1.25)  mg/dL


 


Est GFR (CKD-EPI)AfAm   >90   (>60 ml/min/1.73 sqM)  


 


Est GFR (CKD-EPI)NonAf   >90   (>60 ml/min/1.73 sqM)  


 


Glucose   148 H   (74-99)  mg/dL


 


Calcium   9.4   (8.4-10.2)  mg/dL


 


Total Bilirubin   1.1   (0.2-1.3)  mg/dL


 


AST   19   (17-59)  U/L


 


ALT   30   (21-72)  U/L


 


Alkaline Phosphatase   98   ()  U/L


 


Total Protein   6.9   (6.3-8.2)  g/dL


 


Albumin   3.9   (3.5-5.0)  g/dL


 


Amylase   32   ()  U/L


 


Lipase   36   ()  U/L


 


Urine Color     


 


Urine Appearance     (Clear)  


 


Urine pH     (5.0-8.0)  


 


Ur Specific Gravity     (1.001-1.035)  


 


Urine Protein     (Negative)  


 


Urine Glucose (UA)     (Negative)  


 


Urine Ketones     (Negative)  


 


Urine Blood     (Negative)  


 


Urine Nitrite     (Negative)  


 


Urine Bilirubin     (Negative)  


 


Urine Urobilinogen     (<2.0)  mg/dL


 


Ur Leukocyte Esterase     (Negative)  


 


Urine RBC     (0-5)  /hpf


 


Urine WBC     (0-5)  /hpf


 


Urine Mucus     (None)  /hpf














  03/13/18 Range/Units





  08:55 


 


WBC   (3.8-10.6)  k/uL


 


RBC   (4.30-5.90)  m/uL


 


Hgb   (13.0-17.5)  gm/dL


 


Hct   (39.0-53.0)  %


 


MCV   (80.0-100.0)  fL


 


MCH   (25.0-35.0)  pg


 


MCHC   (31.0-37.0)  g/dL


 


RDW   (11.5-15.5)  %


 


Plt Count   (150-450)  k/uL


 


Neutrophils %   %


 


Lymphocytes %   %


 


Monocytes %   %


 


Eosinophils %   %


 


Basophils %   %


 


Neutrophils #   (1.3-7.7)  k/uL


 


Lymphocytes #   (1.0-4.8)  k/uL


 


Monocytes #   (0-1.0)  k/uL


 


Eosinophils #   (0-0.7)  k/uL


 


Basophils #   (0-0.2)  k/uL


 


PT   (9.0-12.0)  sec


 


INR   (<1.2)  


 


APTT   (22.0-30.0)  sec


 


Sodium   (137-145)  mmol/L


 


Potassium   (3.5-5.1)  mmol/L


 


Chloride   ()  mmol/L


 


Carbon Dioxide   (22-30)  mmol/L


 


Anion Gap   mmol/L


 


BUN   (9-20)  mg/dL


 


Creatinine   (0.66-1.25)  mg/dL


 


Est GFR (CKD-EPI)AfAm   (>60 ml/min/1.73 sqM)  


 


Est GFR (CKD-EPI)NonAf   (>60 ml/min/1.73 sqM)  


 


Glucose   (74-99)  mg/dL


 


Calcium   (8.4-10.2)  mg/dL


 


Total Bilirubin   (0.2-1.3)  mg/dL


 


AST   (17-59)  U/L


 


ALT   (21-72)  U/L


 


Alkaline Phosphatase   ()  U/L


 


Total Protein   (6.3-8.2)  g/dL


 


Albumin   (3.5-5.0)  g/dL


 


Amylase   ()  U/L


 


Lipase   ()  U/L


 


Urine Color  Yellow  


 


Urine Appearance  Clear  (Clear)  


 


Urine pH  5.5  (5.0-8.0)  


 


Ur Specific Gravity  1.015  (1.001-1.035)  


 


Urine Protein  1+ H  (Negative)  


 


Urine Glucose (UA)  3+ H  (Negative)  


 


Urine Ketones  Negative  (Negative)  


 


Urine Blood  Negative  (Negative)  


 


Urine Nitrite  Negative  (Negative)  


 


Urine Bilirubin  Negative  (Negative)  


 


Urine Urobilinogen  <2.0  (<2.0)  mg/dL


 


Ur Leukocyte Esterase  Trace H  (Negative)  


 


Urine RBC  1  (0-5)  /hpf


 


Urine WBC  3  (0-5)  /hpf


 


Urine Mucus  Rare H  (None)  /hpf














Disposition


Clinical Impression: 


 Constipation, Rectal pain





Disposition: HOME SELF-CARE


Condition: Stable


Instructions:  Constipation (ED)


Additional Instructions: 


Please return to the Emergency Department if symptoms worsen or any other 

concerns.  Please  over-the-counter recticare over-the-counter lidocaine 

jelly.


Referrals: 


Mike Rojo DO [Primary Care Provider] - 1-2 days


Time of Disposition: 11:22

## 2019-11-26 ENCOUNTER — HOSPITAL ENCOUNTER (OUTPATIENT)
Dept: HOSPITAL 47 - SLEEP | Age: 74
Discharge: HOME | End: 2019-11-26
Attending: INTERNAL MEDICINE
Payer: MEDICARE

## 2019-11-26 DIAGNOSIS — Z79.890: ICD-10-CM

## 2019-11-26 DIAGNOSIS — R06.83: ICD-10-CM

## 2019-11-26 DIAGNOSIS — E78.5: ICD-10-CM

## 2019-11-26 DIAGNOSIS — E61.1: ICD-10-CM

## 2019-11-26 DIAGNOSIS — F32.9: ICD-10-CM

## 2019-11-26 DIAGNOSIS — Z79.01: ICD-10-CM

## 2019-11-26 DIAGNOSIS — Z79.899: ICD-10-CM

## 2019-11-26 DIAGNOSIS — H40.9: ICD-10-CM

## 2019-11-26 DIAGNOSIS — N40.0: ICD-10-CM

## 2019-11-26 DIAGNOSIS — E03.9: ICD-10-CM

## 2019-11-26 DIAGNOSIS — Z79.4: ICD-10-CM

## 2019-11-26 DIAGNOSIS — E11.9: Primary | ICD-10-CM

## 2019-11-26 DIAGNOSIS — I48.20: ICD-10-CM

## 2019-11-26 PROCEDURE — 99211 OFF/OP EST MAY X REQ PHY/QHP: CPT

## 2019-11-26 NOTE — CONS
CONSULTATION



Consultation for sleep apnea.



A 74-year-old male patient, suspected of obstructive sleep apnea. Referred to me.  The

patient still does not have any complaints.  He has a history of sleep apnea.  Came

across as the patient was requesting a refill for tramadol and there was concern of a

brain disorder and use of narcotics for pain control.  For that reason, was referred to

me.  He sleeps well.  He goes to bed around 11 o'clock, wakes up 5-6 o'clock in the

morning.  Occasional snores, patient has no stenotic consistent symptoms.  Wakes up few

times.  Use the bathroom and urinate.  No major hypersomnia or sleepiness during the

day.  Or[place] score is at 2.



PAST MEDICAL HISTORY:

Diabetes mellitus, hypothyroidism, hypervolemia, glaucoma, iron-deficiency RLS,

peripheral neuropathy, chronic atrial fibrillation, depression, BPH.



SURGICAL HISTORY:

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

eye surgery for removal of a lens, cardioversion on 3 different occasions for AFIB.

Cardiac catheterization or stent insertion 2017, cataracts in both eyes. Shots to the

back for chronic back pain.  Bilateral knee replacement, cholecystectomy.



SOCIAL HISTORY:

The patient is a nonsmoker.  No history of alcohol.  No history of IV drug use.



FAMILY HISTORY:

Negative for sleep apnea and for other form of sleep breathing disorder.



OUTPATIENT MEDICATION LIST:

Includes gabapentin 300 mg 2 in the morning, 2 at noontime and 2 at night.  Zoloft 100

mg p.o. q. daily, levothyroxine 88 mcg p.o. q. daily, metoprolol 25 mg half tablet a

day, Zocor 80 mg p.o. q. daily, iron tablet 325 mg p.o. q. daily, insulin Levemir 50

units q. daily, sleep aids, latanoprost eye drops. Warfarin 7.5 alternating with 5 mg.

Metformin 500 mg p.o. twice a day, melatonin 1 mg 2 tablets at bedtime, Requip 8 mg at

bedtime.



REVIEW OF SYSTEMS:

A 14-point review of system was done.  Positive findings as mentioned above in history

of present illness.  No issues with hearing, no issues with vision and no-issues with

weight gain or weight loss.  No grinding of the teeth.  No sleepwalking or sleep

talking.  She has restlessness and neuropathy in lower extremities bilaterally.  No

issues with waking up, tiredness or fatigue or sleepiness.  No issues with memory and

concentration.  No anxiety was noted.  No active signs of depression are well treated

with Zoloft.



PHYSICAL EXAMINATION:

VITAL SIGNS: BP is 155/82, pulse 78, respirations 16, temperature 97.1 saturation 92%

on room air.  Height is _____, weight is 285.  BMI is 38.6.  Neck size 17.5 inches.

GENERAL APPEARANCE:  Calm, comfortable.

HEAD:  Atraumatic, normocephalic.

NECK:  Supple.  There is no JVD.  No goiter or neck mass.  Mallampati class I.

LUNGS:  Clear to auscultation.

HEART:  Heart sounds are regular rate and rhythm.  Normal S1, S2.  No murmurs.

ABDOMEN:  Soft, nontender.  No organomegaly.

EXTREMITIES:  No edema.  No cyanosis or clubbing.  Neurologic we the patient is awake

and alert.  There are no focal neurological deficits.  He has some signs of peripheral

neuropathy in lower extremities bilaterally.



IMPRESSION:

1. .  Snoring with low suspicion for obstructive sleep apnea.

2. Is no major hypersomnia or sleepiness.  Orlando score is a 2.

3. Restless leg syndrome/peripheral neuropathy.

4. Diabetes mellitus.

5. Hypothyroidism.

6. Hyperlipidemia.

7. Glaucoma.

8. Iron deficiency.

9. Chronic atrial fibrillation.

10.Depression.

11.Benign prostatic hypertrophy.



PLAN:

Overall suspicion for sleep apnea is low.  We will proceed with a home sleep study And

make appropriate recommendations for the patient and his primary care team.





MMODL / IJN: [id number] / Job#: [id number]

## 2020-01-31 ENCOUNTER — HOSPITAL ENCOUNTER (EMERGENCY)
Dept: HOSPITAL 47 - EC | Age: 75
Discharge: HOME | End: 2020-01-31
Payer: OTHER GOVERNMENT

## 2020-01-31 VITALS — DIASTOLIC BLOOD PRESSURE: 85 MMHG | SYSTOLIC BLOOD PRESSURE: 150 MMHG | HEART RATE: 75 BPM | RESPIRATION RATE: 18 BRPM

## 2020-01-31 VITALS — TEMPERATURE: 97.8 F

## 2020-01-31 DIAGNOSIS — H91.90: ICD-10-CM

## 2020-01-31 DIAGNOSIS — H40.9: ICD-10-CM

## 2020-01-31 DIAGNOSIS — R79.89: ICD-10-CM

## 2020-01-31 DIAGNOSIS — Z98.890: ICD-10-CM

## 2020-01-31 DIAGNOSIS — Z79.899: ICD-10-CM

## 2020-01-31 DIAGNOSIS — E07.9: ICD-10-CM

## 2020-01-31 DIAGNOSIS — Z95.818: ICD-10-CM

## 2020-01-31 DIAGNOSIS — I11.0: ICD-10-CM

## 2020-01-31 DIAGNOSIS — I44.0: ICD-10-CM

## 2020-01-31 DIAGNOSIS — Z79.4: ICD-10-CM

## 2020-01-31 DIAGNOSIS — M19.90: ICD-10-CM

## 2020-01-31 DIAGNOSIS — E11.9: ICD-10-CM

## 2020-01-31 DIAGNOSIS — I49.9: Primary | ICD-10-CM

## 2020-01-31 DIAGNOSIS — Z79.890: ICD-10-CM

## 2020-01-31 DIAGNOSIS — F31.9: ICD-10-CM

## 2020-01-31 DIAGNOSIS — I50.9: ICD-10-CM

## 2020-01-31 DIAGNOSIS — I48.91: ICD-10-CM

## 2020-01-31 DIAGNOSIS — Z96.653: ICD-10-CM

## 2020-01-31 DIAGNOSIS — T45.515A: ICD-10-CM

## 2020-01-31 DIAGNOSIS — E78.5: ICD-10-CM

## 2020-01-31 DIAGNOSIS — R60.0: ICD-10-CM

## 2020-01-31 DIAGNOSIS — Z88.5: ICD-10-CM

## 2020-01-31 DIAGNOSIS — Z79.01: ICD-10-CM

## 2020-01-31 LAB
ALBUMIN SERPL-MCNC: 3.8 G/DL (ref 3.5–5)
ALP SERPL-CCNC: 100 U/L (ref 38–126)
ALT SERPL-CCNC: 24 U/L (ref 4–49)
ANION GAP SERPL CALC-SCNC: 9 MMOL/L
AST SERPL-CCNC: 36 U/L (ref 17–59)
BASOPHILS # BLD AUTO: 0 K/UL (ref 0–0.2)
BASOPHILS NFR BLD AUTO: 0 %
BUN SERPL-SCNC: 10 MG/DL (ref 9–20)
CALCIUM SPEC-MCNC: 8.2 MG/DL (ref 8.4–10.2)
CHLORIDE SERPL-SCNC: 108 MMOL/L (ref 98–107)
CO2 SERPL-SCNC: 25 MMOL/L (ref 22–30)
EOSINOPHIL # BLD AUTO: 0.2 K/UL (ref 0–0.7)
EOSINOPHIL NFR BLD AUTO: 5 %
ERYTHROCYTE [DISTWIDTH] IN BLOOD BY AUTOMATED COUNT: 4.46 M/UL (ref 4.3–5.9)
ERYTHROCYTE [DISTWIDTH] IN BLOOD: 14.9 % (ref 11.5–15.5)
GLUCOSE SERPL-MCNC: 138 MG/DL (ref 74–99)
HCT VFR BLD AUTO: 36.9 % (ref 39–53)
HGB BLD-MCNC: 11.8 GM/DL (ref 13–17.5)
INR PPP: 8.6 (ref ?–1.2)
LYMPHOCYTES # SPEC AUTO: 0.6 K/UL (ref 1–4.8)
LYMPHOCYTES NFR SPEC AUTO: 14 %
MAGNESIUM SPEC-SCNC: 1.6 MG/DL (ref 1.6–2.3)
MCH RBC QN AUTO: 26.4 PG (ref 25–35)
MCHC RBC AUTO-ENTMCNC: 31.9 G/DL (ref 31–37)
MCV RBC AUTO: 82.7 FL (ref 80–100)
MONOCYTES # BLD AUTO: 0.2 K/UL (ref 0–1)
MONOCYTES NFR BLD AUTO: 5 %
NEUTROPHILS # BLD AUTO: 3.3 K/UL (ref 1.3–7.7)
NEUTROPHILS NFR BLD AUTO: 74 %
PLATELET # BLD AUTO: 130 K/UL (ref 150–450)
POTASSIUM SERPL-SCNC: 3.9 MMOL/L (ref 3.5–5.1)
PROT SERPL-MCNC: 6.9 G/DL (ref 6.3–8.2)
PT BLD: 89.3 SEC (ref 9–12)
SODIUM SERPL-SCNC: 142 MMOL/L (ref 137–145)
WBC # BLD AUTO: 4.4 K/UL (ref 3.8–10.6)

## 2020-01-31 PROCEDURE — 93005 ELECTROCARDIOGRAM TRACING: CPT

## 2020-01-31 PROCEDURE — 83880 ASSAY OF NATRIURETIC PEPTIDE: CPT

## 2020-01-31 PROCEDURE — 99283 EMERGENCY DEPT VISIT LOW MDM: CPT

## 2020-01-31 PROCEDURE — 85025 COMPLETE CBC W/AUTO DIFF WBC: CPT

## 2020-01-31 PROCEDURE — 85610 PROTHROMBIN TIME: CPT

## 2020-01-31 PROCEDURE — 80053 COMPREHEN METABOLIC PANEL: CPT

## 2020-01-31 PROCEDURE — 36415 COLL VENOUS BLD VENIPUNCTURE: CPT

## 2020-01-31 PROCEDURE — 83735 ASSAY OF MAGNESIUM: CPT

## 2020-01-31 NOTE — ED
Recheck HPI





- General


Chief Complaint: Recheck/Abnormal Lab/Rx


Stated Complaint: INR level


Time Seen by Provider: 01/31/20 11:39


Source: patient, RN notes reviewed


Mode of arrival: ambulatory


Limitations: no limitations





- History of Present Illness


Initial Comments: 





This is a 75-year-old male history of atrial fibrillation who is on Coumadin who

[facility] and was found have an INR of

8 he denies any easy bruising denies any blood per rectum coughing up any blood 

denies any epistaxis.  He does state he has some increased swelling to his lower

extremities recently he has recently also had the flu he says he is getting over

that.  No fevers chills nausea vomiting sweats no chest pain or other symptoms. 

No other modifying factors at this time.  He has no recent changes medication 

regimen.


MD Complaint: abnormal lab





- Related Data


                                Home Medications











 Medication  Instructions  Recorded  Confirmed


 


Ferrous Sulfate [Feosol] 325 mg PO BID@1200,2100 03/31/17 03/13/18


 


Gabapentin [Neurontin] 600 mg PO TID 03/31/17 03/13/18


 


Insulin Detemir (Levemir) [Levemir] 40 unit SQ HS 03/31/17 03/13/18


 


Latanoprost Ophth [Xalatan 0.005%] 1 drops BOTH EYES HS 03/31/17 03/13/18


 


Omega-3 Fatty Acids/Fish Oil [Fish 1 cap PO DAILY 03/31/17 03/13/18





Oil 1,000 mg Softgel]   


 


Sertraline [Zoloft] 100 mg PO DAILY 03/31/17 03/13/18


 


Simvastatin [Zocor] 40 mg PO HS 03/31/17 03/13/18


 


Temazepam [Restoril] 15 mg PO HS 03/31/17 03/13/18


 


Sleep Aid 1 tab PO HS 05/16/17 03/13/18


 


Warfarin [Coumadin] 7.5 mg PO HS 05/16/17 03/13/18


 


traMADol HCL [Ultram] 50 mg PO TID PRN 05/16/17 03/13/18


 


Calcitriol [Rocaltrol] 0.25 mcg PO Q7D 03/13/18 03/13/18


 


Docusate [Colace] 100 mg PO DAILY 03/13/18 03/13/18


 


Ergocalciferol (Vitamin D2) 50,000 unit PO Q30D 03/13/18 03/13/18





[Vitamin D2]   


 


Levothyroxine Sodium [Synthroid] 100 mcg PO DAILY 03/13/18 03/13/18


 


Metoprolol Tartrate [Lopressor] 12.5 mg PO HS 03/13/18 03/13/18








                                  Previous Rx's











 Medication  Instructions  Recorded


 


Furosemide [Lasix] 20 mg PO DAILY #3 tab 01/31/20











                                    Allergies











Allergy/AdvReac Type Severity Reaction Status Date / Time


 


hydromorphone [From Dilaudid] AdvReac  SEVERE Verified 01/31/20 11:33





   VOMITING  


 


meperidine [From Demerol] AdvReac  Nausea & Verified 01/31/20 11:33





   Vomiting &  





   Diarrhea  














Review of Systems


ROS Statement: 


Those systems with pertinent positive or pertinent negative responses have been 

documented in the HPI.





ROS Other: All systems not noted in ROS Statement are negative.





Past Medical History


Past Medical History: Atrial Fibrillation, Heart Failure, Diabetes Mellitus, Eye

Disorder, Hearing Disorder / Deafness, Hyperlipidemia, Hypertension, 

Osteoarthritis (OA), Pneumonia, Prostate Disorder, Sleep Apnea/CPAP/BIPAP, 

Thyroid Disorder


Additional Past Medical History / Comment(s): GLAUCOMA, STATES AWAITING PROSTATE

SX


History of Any Multi-Drug Resistant Organisms: None Reported


Past Surgical History: Back Surgery, Cholecystectomy, Heart Catheterization, 

Joint Replacement, Prostate Surgery


Additional Past Surgical History / Comment(s): greer knee replacement, 

cardioverion, bilc cataracts, Bipolar TURP


Past Anesthesia/Blood Transfusion Reactions: No Reported Reaction


Past Psychological History: Bipolar


Smoking Status: Never smoker


Past Alcohol Use History: Rare


Past Drug Use History: None Reported





- Past Family History


  ** Father


History Unknown: Yes





  ** Mother


Family Medical History: No Reported History





  ** Brother(s)


Family Medical History: Cancer





General Exam





- General Exam Comments


Initial Comments: 





This is a well-developed well-nourished awake alert oriented 3 male


Limitations: no limitations


General appearance: alert, in no apparent distress


Head exam: Present: atraumatic, normocephalic, normal inspection


Eye exam: Present: normal appearance, PERRL, EOMI.  Absent: scleral icterus, 

conjunctival injection, periorbital swelling


ENT exam: Present: mucous membranes dry


Neck exam: Present: normal inspection.  Absent: tenderness, meningismus, 

lymphadenopathy


Respiratory exam: Present: normal lung sounds bilaterally.  Absent: respiratory 

distress, wheezes, rales, rhonchi, stridor


Cardiovascular Exam: Present: irregular rhythm.  Absent: systolic murmur, new

tolic murmur, rubs, gallop, clicks


GI/Abdominal exam: Present: soft, normal bowel sounds.  Absent: distended, 

tenderness, guarding, rebound, rigid


Extremities exam: Present: full ROM, normal capillary refill, pedal edema, other

(Pulse 1 to +2 edema laterally some stasis changes noted.  No increased 

localized temperature).  Absent: tenderness, joint swelling, calf tenderness


Back exam: Present: normal inspection


Neurological exam: Present: alert, oriented X3, CN II-XII intact


Psychiatric exam: Present: normal affect, normal mood


Skin exam: Present: warm, dry, intact, normal color.  Absent: rash





Course


                                   Vital Signs











  01/31/20





  11:31


 


Temperature 97.8 F


 


Pulse Rate 73


 


Respiratory 20





Rate 


 


Blood Pressure 140/82


 


O2 Sat by Pulse 95





Oximetry 














Medical Decision Making





- Medical Decision Making





Patient is asymptomatic with the INR noted 8.6.  Due to the patient's risk for 

fall be given the low dose of vitamin K he is are ready get a follow-up at the 

[facility] clinic in 4 days he will keep his follow-up was suggested that he 

not take any more Coumadin by both his provider I also agree with this.  

Additionally the patient does have elevated BNP and edema he is currently not on

any diuretics he is asymptomatic with respect to this he'll be placed on a low-

dose of Lasix for several days and follow-up again as planned.





- Lab Data


Result diagrams: 


                                 01/31/20 12:02





                                 01/31/20 12:02


                                   Lab Results











  01/31/20 01/31/20 01/31/20 Range/Units





  12:02 12:02 12:02 


 


WBC    4.4  (3.8-10.6)  k/uL


 


RBC    4.46  (4.30-5.90)  m/uL


 


Hgb    11.8 L  (13.0-17.5)  gm/dL


 


Hct    36.9 L  (39.0-53.0)  %


 


MCV    82.7  (80.0-100.0)  fL


 


MCH    26.4  (25.0-35.0)  pg


 


MCHC    31.9  (31.0-37.0)  g/dL


 


RDW    14.9  (11.5-15.5)  %


 


Plt Count    130 L  (150-450)  k/uL


 


Neutrophils %    74  %


 


Lymphocytes %    14  %


 


Monocytes %    5  %


 


Eosinophils %    5  %


 


Basophils %    0  %


 


Neutrophils #    3.3  (1.3-7.7)  k/uL


 


Lymphocytes #    0.6 L  (1.0-4.8)  k/uL


 


Monocytes #    0.2  (0-1.0)  k/uL


 


Eosinophils #    0.2  (0-0.7)  k/uL


 


Basophils #    0.0  (0-0.2)  k/uL


 


Hypochromasia    Slight  


 


Poikilocytosis    Slight  


 


PT  89.3 H    (9.0-12.0)  sec


 


INR  8.6 H*    (<1.2)  


 


Sodium     (137-145)  mmol/L


 


Potassium     (3.5-5.1)  mmol/L


 


Chloride     ()  mmol/L


 


Carbon Dioxide     (22-30)  mmol/L


 


Anion Gap     mmol/L


 


BUN     (9-20)  mg/dL


 


Creatinine     (0.66-1.25)  mg/dL


 


Est GFR (CKD-EPI)AfAm     (>60 ml/min/1.73 sqM)  


 


Est GFR (CKD-EPI)NonAf     (>60 ml/min/1.73 sqM)  


 


Glucose     (74-99)  mg/dL


 


Calcium     (8.4-10.2)  mg/dL


 


Magnesium     (1.6-2.3)  mg/dL


 


Total Bilirubin     (0.2-1.3)  mg/dL


 


AST     (17-59)  U/L


 


ALT     (4-49)  U/L


 


Alkaline Phosphatase     ()  U/L


 


NT-Pro-B Natriuret Pep   3750   pg/mL


 


Total Protein     (6.3-8.2)  g/dL


 


Albumin     (3.5-5.0)  g/dL














  01/31/20 Range/Units





  12:02 


 


WBC   (3.8-10.6)  k/uL


 


RBC   (4.30-5.90)  m/uL


 


Hgb   (13.0-17.5)  gm/dL


 


Hct   (39.0-53.0)  %


 


MCV   (80.0-100.0)  fL


 


MCH   (25.0-35.0)  pg


 


MCHC   (31.0-37.0)  g/dL


 


RDW   (11.5-15.5)  %


 


Plt Count   (150-450)  k/uL


 


Neutrophils %   %


 


Lymphocytes %   %


 


Monocytes %   %


 


Eosinophils %   %


 


Basophils %   %


 


Neutrophils #   (1.3-7.7)  k/uL


 


Lymphocytes #   (1.0-4.8)  k/uL


 


Monocytes #   (0-1.0)  k/uL


 


Eosinophils #   (0-0.7)  k/uL


 


Basophils #   (0-0.2)  k/uL


 


Hypochromasia   


 


Poikilocytosis   


 


PT   (9.0-12.0)  sec


 


INR   (<1.2)  


 


Sodium  142  (137-145)  mmol/L


 


Potassium  3.9  (3.5-5.1)  mmol/L


 


Chloride  108 H  ()  mmol/L


 


Carbon Dioxide  25  (22-30)  mmol/L


 


Anion Gap  9  mmol/L


 


BUN  10  (9-20)  mg/dL


 


Creatinine  0.81  (0.66-1.25)  mg/dL


 


Est GFR (CKD-EPI)AfAm  >90  (>60 ml/min/1.73 sqM)  


 


Est GFR (CKD-EPI)NonAf  88  (>60 ml/min/1.73 sqM)  


 


Glucose  138 H  (74-99)  mg/dL


 


Calcium  8.2 L  (8.4-10.2)  mg/dL


 


Magnesium  1.6  (1.6-2.3)  mg/dL


 


Total Bilirubin  0.6  (0.2-1.3)  mg/dL


 


AST  36  (17-59)  U/L


 


ALT  24  (4-49)  U/L


 


Alkaline Phosphatase  100  ()  U/L


 


NT-Pro-B Natriuret Pep   pg/mL


 


Total Protein  6.9  (6.3-8.2)  g/dL


 


Albumin  3.8  (3.5-5.0)  g/dL














Disposition


Clinical Impression: 


 Coumadin toxicity, Peripheral edema, Leg edema





Disposition: HOME SELF-CARE


Condition: Good


Instructions (If sedation given, give patient instructions):  Leg Edema (ED)


Additional Instructions: 


Hold her Coumadin into your seen by your provider as already planned, Lasix 

daily 3 days.  Your prescription was sent to the preferred [facility] pharmacy in

[facility]


Prescriptions: 


Furosemide [Lasix] 20 mg PO DAILY #3 tab


Is patient prescribed a controlled substance at d/c from ED?: No


Referrals: 


[facility] [Primary Care Provider] - 1-2 days

## 2020-09-21 ENCOUNTER — HOSPITAL ENCOUNTER (EMERGENCY)
Dept: HOSPITAL 47 - EC | Age: 75
Discharge: HOME | End: 2020-09-21
Payer: OTHER GOVERNMENT

## 2020-09-21 VITALS — HEART RATE: 68 BPM

## 2020-09-21 VITALS — TEMPERATURE: 96.9 F | RESPIRATION RATE: 18 BRPM

## 2020-09-21 VITALS — DIASTOLIC BLOOD PRESSURE: 90 MMHG | SYSTOLIC BLOOD PRESSURE: 145 MMHG

## 2020-09-21 DIAGNOSIS — Z99.89: ICD-10-CM
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
## 2020-09-21 DIAGNOSIS — E78.5: ICD-10-CM

## 2020-09-21 DIAGNOSIS — Z79.890: ICD-10-CM

## 2020-09-21 DIAGNOSIS — H42: ICD-10-CM

## 2020-09-21 DIAGNOSIS — R93.89: ICD-10-CM

## 2020-09-21 DIAGNOSIS — H91.90: ICD-10-CM

## 2020-09-21 DIAGNOSIS — Z79.899: ICD-10-CM

## 2020-09-21 DIAGNOSIS — E07.9: ICD-10-CM
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
## 2020-09-21 DIAGNOSIS — Z88.5: ICD-10-CM

## 2020-09-21 DIAGNOSIS — S00.212A: Primary | ICD-10-CM

## 2020-09-21 DIAGNOSIS — I50.9: ICD-10-CM

## 2020-09-21 DIAGNOSIS — G47.30: ICD-10-CM

## 2020-09-21 DIAGNOSIS — E11.39: ICD-10-CM

## 2020-09-21 DIAGNOSIS — Z96.653: ICD-10-CM

## 2020-09-21 DIAGNOSIS — F31.9: ICD-10-CM

## 2020-09-21 DIAGNOSIS — I11.0: ICD-10-CM

## 2020-09-21 DIAGNOSIS — Z79.01: ICD-10-CM
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
## 2020-09-21 DIAGNOSIS — Z79.4: ICD-10-CM

## 2020-09-21 PROCEDURE — 71046 X-RAY EXAM CHEST 2 VIEWS: CPT

## 2020-09-21 PROCEDURE — 99284 EMERGENCY DEPT VISIT MOD MDM: CPT

## 2020-09-21 PROCEDURE — 72125 CT NECK SPINE W/O DYE: CPT

## 2020-09-21 PROCEDURE — 70450 CT HEAD/BRAIN W/O DYE: CPT

## 2020-09-21 NOTE — CT
EXAMINATION TYPE: CT brain cspine wo con

 

DATE OF EXAM: 9/21/2020

 

COMPARISON: None

 

HISTORY: Fall with head injury.

 

CT DLP: 1705.7 mGycm

Automated exposure control for dose reduction was used.

 

There is cerebral cortical atrophy. There is no mass effect nor midline shift. There is no evidence o
f intracranial hemorrhage. The calvarium is intact. Temporal bones are intact. The occipital bone is 
intact. Skull base appears normal. There is mucus retention cyst in the right maxillary sinus.

 

Cervical vertebra have normal alignment. There is narrowing of disc spaces from C5 to T1 with spurrin
g of the endplates. There is moderate uncovertebral spurring and neural foraminal impingement at C5-6
 bilaterally. There is multilevel cervical facet arthropathy. There is no compression fracture. There
 is moderately severe spinal stenosis at C5-6 due to posterior disc bulging and spur formation. Spina
l canal measures 5 to 6 mm. There is less severe spinal stenosis at L3-4 and L4-5.

 

IMPRESSION:

Cerebral atrophy. No acute intracranial abnormality.

 

Spondylotic changes in the cervical spine. Multilevel moderate cervical spinal stenosis. This is more
 severe at C5-6 measuring 5 mm. No fracture seen. Left pleural effusion is noted. Fluid has intermedi
ate density at that raises the possibility of a hemothorax.

## 2020-09-21 NOTE — XR
EXAMINATION TYPE: XR chest 2V

 

DATE OF EXAM: 9/21/2020

 

COMPARISON: 4/3/2017

 

HISTORY: Cough. Short of breath

 

TECHNIQUE: 2 views

 

FINDINGS: Heart is enlarged. There is coarse pulmonary interstitial density throughout the lungs. The
re is slight blunting left costophrenic angle. There is a fracture of the right posterior seventh rib
 of uncertain age. This fracture appears to be new compared to old exam. IMPRESSION: Small left pleur
al effusion. Coarse pulmonary interstitial density consistent with pulmonary fibrosis. Abnormalities 
appear increased compared to old exam..

 

Right side rib fracture could be acute.

## 2020-09-21 NOTE — ED
Fall HPI





- General


Chief Complaint: Fall


Stated Complaint: fall/head lac


Time Seen by Provider: 09/21/20 17:23


Source: patient, RN notes reviewed, old records reviewed


Mode of arrival: wheelchair





- History of Present Illness


Initial Comments: 





75-year-old male presented to the ER today for evaluation for complaint of fall 

minor head injury and abrasion over the left eyebrow.  Patient reports he 

[facility].  He states that he struck 

the eyebrow on the edge of the curb.  He is on Coumadin, and reports INR was 

recently checked and within normal limits.   He states that he has no other 

complaints at this time.  He is sent here for further evaluation.  Patient had 

no loss of consciousness.





- Related Data


                                Home Medications











 Medication  Instructions  Recorded  Confirmed


 


Ferrous Sulfate [Feosol] 325 mg PO BID@1200,2100 03/31/17 03/13/18


 


Gabapentin [Neurontin] 600 mg PO TID 03/31/17 03/13/18


 


Insulin Detemir (Levemir) [Levemir] 40 unit SQ HS 03/31/17 03/13/18


 


Latanoprost Ophth [Xalatan 0.005%] 1 drops BOTH EYES HS 03/31/17 03/13/18


 


Omega-3 Fatty Acids/Fish Oil [Fish 1 cap PO DAILY 03/31/17 03/13/18





Oil 1,000 mg Softgel]   


 


Sertraline [Zoloft] 100 mg PO DAILY 03/31/17 03/13/18


 


Simvastatin [Zocor] 40 mg PO HS 03/31/17 03/13/18


 


Temazepam [Restoril] 15 mg PO HS 03/31/17 03/13/18


 


Sleep Aid 1 tab PO HS 05/16/17 03/13/18


 


Warfarin [Coumadin] 7.5 mg PO HS 05/16/17 03/13/18


 


traMADol HCL [Ultram] 50 mg PO TID PRN 05/16/17 03/13/18


 


Docusate [Colace] 100 mg PO DAILY 03/13/18 03/13/18


 


Ergocalciferol (Vitamin D2) 50,000 unit PO Q30D 03/13/18 03/13/18





[Vitamin D2]   


 


Levothyroxine Sodium [Synthroid] 100 mcg PO DAILY 03/13/18 03/13/18


 


Metoprolol Tartrate [Lopressor] 12.5 mg PO HS 03/13/18 03/13/18


 


calcitrioL [Rocaltrol] 0.25 mcg PO Q7D 03/13/18 03/13/18








                                  Previous Rx's











 Medication  Instructions  Recorded


 


Furosemide [Lasix] 20 mg PO DAILY #3 tab 01/31/20











                                    Allergies











Allergy/AdvReac Type Severity Reaction Status Date / Time


 


hydromorphone [From Dilaudid] AdvReac  SEVERE Verified 09/21/20 16:58





   VOMITING  


 


meperidine [From Demerol] AdvReac  Nausea & Verified 09/21/20 16:58





   Vomiting &  





   Diarrhea  














Review of Systems


ROS Statement: 


Those systems with pertinent positive or pertinent negative responses have been 

documented in the HPI.





ROS Other: All systems not noted in ROS Statement are negative.





Past Medical History


Past Medical History: Atrial Fibrillation, Heart Failure, Diabetes Mellitus, Eye

Disorder, Hearing Disorder / Deafness, Hyperlipidemia, Hypertension, 

Osteoarthritis (OA), Pneumonia, Prostate Disorder, Sleep Apnea/CPAP/BIPAP, 

Thyroid Disorder


Additional Past Medical History / Comment(s): GLAUCOMA, STATES AWAITING PROSTATE

SX


History of Any Multi-Drug Resistant Organisms: None Reported


Past Surgical History: Back Surgery, Cholecystectomy, Heart Catheterization, 

Joint Replacement, Prostate Surgery


Additional Past Surgical History / Comment(s): greer knee replacement, 

cardioverion, bilc cataracts, Bipolar TURP


Past Anesthesia/Blood Transfusion Reactions: No Reported Reaction


Past Psychological History: Bipolar


Smoking Status: Never smoker


Past Alcohol Use History: None Reported


Past Drug Use History: None Reported





- Past Family History


  ** Father


History Unknown: Yes





  ** Mother


Family Medical History: No Reported History





  ** Brother(s)


Family Medical History: Cancer





General Exam





- General Exam Comments


Initial Comments: 





75-year-old male.  Alert and oriented.  No distress.


Limitations: no limitations


General appearance: alert, in no apparent distress


Head exam: Present: atraumatic, normocephalic, normal inspection


Eye exam: Present: normal appearance, PERRL, EOMI, other ( has a abrasion 

and contusion over the left eyebrow.  No active bleeding.).  Absent: scleral 

icterus, conjunctival injection, periorbital swelling


ENT exam: Present: normal exam, mucous membranes moist


Neck exam: Present: normal inspection.  Absent: tenderness, meningismus, 

lymphadenopathy


Respiratory exam: Present: normal lung sounds bilaterally.  Absent: respiratory 

distress, wheezes, rales, rhonchi, stridor


Cardiovascular Exam: Present: regular rate, normal rhythm, normal heart sounds. 

Absent: systolic murmur, diastolic murmur, rubs, gallop, clicks


GI/Abdominal exam: Present: soft, normal bowel sounds.  Absent: distended, 

tenderness, guarding, rebound, rigid


Extremities exam: Present: normal inspection, full ROM, normal capillary refill.

 Absent: tenderness, pedal edema, joint swelling, calf tenderness


Back exam: Present: normal inspection


Neurological exam: Present: alert, oriented X3, CN II-XII intact


Psychiatric exam: Present: normal affect, normal mood


Skin exam: Present: warm, dry, intact, normal color.  Absent: rash





Course


                                   Vital Signs











  09/21/20 09/21/20 09/21/20





  16:53 18:56 20:36


 


Temperature 96.9 F L  96.9 F L


 


Pulse Rate 71 68 68


 


Respiratory 18 18 18





Rate   


 


Blood Pressure 151/72 141/100 145/90


 


O2 Sat by Pulse 94 L 95 95





Oximetry   














Procedures





- Laceration


  ** Laceration #1


Site: face (Left eyebrow abrasion)


Size (cm): 2


Description: linear


Depth: simple, single layer


Pre-repair: wound explored, irrigated extensively


Type of Sutures: other (dermabond)


Patient Tolerated Procedure: well, no complications





Medical Decision Making





- Medical Decision Making





This patient's a 75-year-old male with a history of trip and fall today [facility].  He states that he is on Coumadin.  

He has an abrasion over the left eyebrow that was closed with Dermabond and some

contusion.  Had no loss of consciousness.  She has no acute neurological 

deficits.  He denies any other injury from the fall.  Patient CT of the brain 

was negative for acute intracranial abnormality or bleed.  He does have evidence

of cervical spinal stenosis.  Computed tomography scan did mention evidence of a

left pleural effusion.  He has had no fall or trauma and complaining of no chest

pain or significant shortness of breath today.  Recommend doing a chest x-ray 

from radiology.  Chest x-ray was completed and does show small left pleural 

effusion with pulmonary fibrosis.  Patient's oxygen is 95-96% on room air while 

evaluating him.  He has no significant shortness of breath.  He has no abdominal

or rib tenderness or contusion from the fall today.  He does report that a few 

months ago he bent over his hot tub and felt a pull in his right rib.  His chest

x-ray did show a right posterior seventh rib fracture.  I discussed this is 

likely subacute that he has no pain bruising or tenderness or shortness of 

breath at this time.  I advised Patient to follow-up with PCP in regards to 

chest x-ray findings that she is in no distress no further workup at this time 

Patient is anxious to be discharged home.





- Radiology Data


Radiology results: report reviewed


Chest x-ray impression shows a small left pleural effusion.  Coarse pulmonary 

interstitial density with pulmonary fibrosis.  Abnormalities compared increased 

compared old exam.  Right-sided rib fracture could be acute.  Read by Dr. Kwong.





CT shows cervical atrophy.  No acute intracranial normality.  Spondylitic 

changes in cervical spine.  Multilevel moderate spinal stenosis.  More severe 

C5-C6 measured 5 mm.  No fracture is identified.  Left pleural effusion is 

noted.  Fluid has intermediate density raises possibility of hemothorax.





Disposition


Clinical Impression: 


 Fall, Abrasion of eyebrow, Abnormal CXR





Disposition: HOME SELF-CARE


Condition: Good


Instructions (If sedation given, give patient instructions):  Head Injury (ED), 

Pleural Effusion (ED)


Additional Instructions: 


Patient advised to allow the skin glue to follow up on its own.  Please follow 

up with family doctor if symptoms have not improved over the next two days and 

about Chest XR findings.  Please return to the emergency room if your symptoms 

increase or worsen or for any other concerns.


Is patient prescribed a controlled substance at d/c from ED?: No


Referrals: 


[facility] [Primary Care Provider] - 1-2 days


Time of Disposition: 19:19

## 2021-04-14 ENCOUNTER — HOSPITAL ENCOUNTER (OUTPATIENT)
Dept: HOSPITAL 47 - RADXRMAIN | Age: 76
Discharge: HOME | End: 2021-04-14
Attending: FAMILY MEDICINE
Payer: OTHER GOVERNMENT

## 2021-04-14 DIAGNOSIS — I10: ICD-10-CM

## 2021-04-14 DIAGNOSIS — R91.8: Primary | ICD-10-CM

## 2021-04-14 PROCEDURE — 71046 X-RAY EXAM CHEST 2 VIEWS: CPT

## 2021-05-13 ENCOUNTER — HOSPITAL ENCOUNTER (INPATIENT)
Dept: HOSPITAL 47 - EC | Age: 76
LOS: 7 days | Discharge: HOME | DRG: 292 | End: 2021-05-20
Attending: INTERNAL MEDICINE | Admitting: INTERNAL MEDICINE
Payer: MEDICARE

## 2021-05-13 VITALS — BODY MASS INDEX: 43.6 KG/M2

## 2021-05-13 DIAGNOSIS — Z96.653: ICD-10-CM

## 2021-05-13 DIAGNOSIS — Z79.890: ICD-10-CM

## 2021-05-13 DIAGNOSIS — Z90.79: ICD-10-CM

## 2021-05-13 DIAGNOSIS — E66.01: ICD-10-CM

## 2021-05-13 DIAGNOSIS — D69.6: ICD-10-CM

## 2021-05-13 DIAGNOSIS — D63.8: ICD-10-CM

## 2021-05-13 DIAGNOSIS — I11.0: Primary | ICD-10-CM

## 2021-05-13 DIAGNOSIS — H40.9: ICD-10-CM

## 2021-05-13 DIAGNOSIS — Z79.899: ICD-10-CM

## 2021-05-13 DIAGNOSIS — G25.81: ICD-10-CM

## 2021-05-13 DIAGNOSIS — E11.40: ICD-10-CM

## 2021-05-13 DIAGNOSIS — Z86.16: ICD-10-CM

## 2021-05-13 DIAGNOSIS — I08.1: ICD-10-CM

## 2021-05-13 DIAGNOSIS — I48.0: ICD-10-CM

## 2021-05-13 DIAGNOSIS — H91.90: ICD-10-CM

## 2021-05-13 DIAGNOSIS — N17.9: ICD-10-CM

## 2021-05-13 DIAGNOSIS — N40.0: ICD-10-CM

## 2021-05-13 DIAGNOSIS — Z88.2: ICD-10-CM

## 2021-05-13 DIAGNOSIS — I50.23: ICD-10-CM

## 2021-05-13 DIAGNOSIS — Z88.5: ICD-10-CM

## 2021-05-13 DIAGNOSIS — T45.515A: ICD-10-CM

## 2021-05-13 DIAGNOSIS — R79.1: ICD-10-CM

## 2021-05-13 DIAGNOSIS — E78.5: ICD-10-CM

## 2021-05-13 DIAGNOSIS — I27.20: ICD-10-CM

## 2021-05-13 DIAGNOSIS — G47.33: ICD-10-CM

## 2021-05-13 DIAGNOSIS — Z79.01: ICD-10-CM

## 2021-05-13 DIAGNOSIS — J96.11: ICD-10-CM

## 2021-05-13 DIAGNOSIS — I42.8: ICD-10-CM

## 2021-05-13 DIAGNOSIS — L03.115: ICD-10-CM

## 2021-05-13 DIAGNOSIS — I25.10: ICD-10-CM

## 2021-05-13 DIAGNOSIS — Z79.84: ICD-10-CM

## 2021-05-13 DIAGNOSIS — E03.9: ICD-10-CM

## 2021-05-13 DIAGNOSIS — F33.9: ICD-10-CM

## 2021-05-13 DIAGNOSIS — L03.116: ICD-10-CM

## 2021-05-13 DIAGNOSIS — Z20.822: ICD-10-CM

## 2021-05-13 DIAGNOSIS — M15.9: ICD-10-CM

## 2021-05-13 DIAGNOSIS — Z90.49: ICD-10-CM

## 2021-05-13 LAB
ALBUMIN SERPL-MCNC: 3.8 G/DL (ref 3.5–5)
ALP SERPL-CCNC: 131 U/L (ref 38–126)
ALT SERPL-CCNC: 20 U/L (ref 4–49)
ANION GAP SERPL CALC-SCNC: 7 MMOL/L
APTT BLD: 41 SEC (ref 22–30)
AST SERPL-CCNC: 45 U/L (ref 17–59)
BASOPHILS # BLD AUTO: 0 K/UL (ref 0–0.2)
BASOPHILS NFR BLD AUTO: 0 %
BUN SERPL-SCNC: 38 MG/DL (ref 9–20)
CALCIUM SPEC-MCNC: 9.1 MG/DL (ref 8.4–10.2)
CHLORIDE SERPL-SCNC: 101 MMOL/L (ref 98–107)
CO2 SERPL-SCNC: 32 MMOL/L (ref 22–30)
EOSINOPHIL # BLD AUTO: 0.2 K/UL (ref 0–0.7)
EOSINOPHIL NFR BLD AUTO: 4 %
ERYTHROCYTE [DISTWIDTH] IN BLOOD BY AUTOMATED COUNT: 3.67 M/UL (ref 4.3–5.9)
ERYTHROCYTE [DISTWIDTH] IN BLOOD: 17.1 % (ref 11.5–15.5)
GLUCOSE SERPL-MCNC: 121 MG/DL (ref 74–99)
HCT VFR BLD AUTO: 29.2 % (ref 39–53)
HGB BLD-MCNC: 9.6 GM/DL (ref 13–17.5)
INR PPP: 3.4 (ref ?–1.2)
LYMPHOCYTES # SPEC AUTO: 0.5 K/UL (ref 1–4.8)
LYMPHOCYTES NFR SPEC AUTO: 9 %
MCH RBC QN AUTO: 26.2 PG (ref 25–35)
MCHC RBC AUTO-ENTMCNC: 32.9 G/DL (ref 31–37)
MCV RBC AUTO: 79.5 FL (ref 80–100)
MONOCYTES # BLD AUTO: 0.3 K/UL (ref 0–1)
MONOCYTES NFR BLD AUTO: 5 %
NEUTROPHILS # BLD AUTO: 4.5 K/UL (ref 1.3–7.7)
NEUTROPHILS NFR BLD AUTO: 80 %
PLATELET # BLD AUTO: 119 K/UL (ref 150–450)
POTASSIUM SERPL-SCNC: 5.4 MMOL/L (ref 3.5–5.1)
PROT SERPL-MCNC: 6.9 G/DL (ref 6.3–8.2)
PT BLD: 32.7 SEC (ref 9–12)
SODIUM SERPL-SCNC: 140 MMOL/L (ref 137–145)
WBC # BLD AUTO: 5.6 K/UL (ref 3.8–10.6)

## 2021-05-13 PROCEDURE — 85730 THROMBOPLASTIN TIME PARTIAL: CPT

## 2021-05-13 PROCEDURE — 83880 ASSAY OF NATRIURETIC PEPTIDE: CPT

## 2021-05-13 PROCEDURE — 80048 BASIC METABOLIC PNL TOTAL CA: CPT

## 2021-05-13 PROCEDURE — 93306 TTE W/DOPPLER COMPLETE: CPT

## 2021-05-13 PROCEDURE — 84484 ASSAY OF TROPONIN QUANT: CPT

## 2021-05-13 PROCEDURE — 93005 ELECTROCARDIOGRAM TRACING: CPT

## 2021-05-13 PROCEDURE — 83735 ASSAY OF MAGNESIUM: CPT

## 2021-05-13 PROCEDURE — 71046 X-RAY EXAM CHEST 2 VIEWS: CPT

## 2021-05-13 PROCEDURE — 85027 COMPLETE CBC AUTOMATED: CPT

## 2021-05-13 PROCEDURE — 85025 COMPLETE CBC W/AUTO DIFF WBC: CPT

## 2021-05-13 PROCEDURE — 99285 EMERGENCY DEPT VISIT HI MDM: CPT

## 2021-05-13 PROCEDURE — 36415 COLL VENOUS BLD VENIPUNCTURE: CPT

## 2021-05-13 PROCEDURE — 85610 PROTHROMBIN TIME: CPT

## 2021-05-13 PROCEDURE — 94760 N-INVAS EAR/PLS OXIMETRY 1: CPT

## 2021-05-13 PROCEDURE — 87635 SARS-COV-2 COVID-19 AMP PRB: CPT

## 2021-05-13 PROCEDURE — 80053 COMPREHEN METABOLIC PANEL: CPT

## 2021-05-13 PROCEDURE — 96374 THER/PROPH/DIAG INJ IV PUSH: CPT

## 2021-05-13 NOTE — ED
General Adult HPI





- General


Chief complaint: Extremity Problem,Nontraumatic


Stated complaint: Bilateral Leg Swelling


Time Seen by Provider: 05/13/21 17:16


Source: patient, RN notes reviewed, old records reviewed


Mode of arrival: wheelchair


Limitations: physical limitation





- History of Present Illness


Initial comments: 





77 yo male presenting from outpatient primary care physician for fluid 

retention, lower extremity edema and dyspnea.  Patient does have history of 

congestive heart failure.  He's had worsening lower extremity edema for several 

months and has become more short of breath.  No fever.  No central chest pain.  

Denies abdominal pain nausea vomiting.  No history of COPD or asthma.





- Related Data


                                Home Medications











 Medication  Instructions  Recorded  Confirmed


 


Ferrous Sulfate [Feosol] 325 mg PO BID@1200,2100 03/31/17 03/13/18


 


Gabapentin [Neurontin] 600 mg PO TID 03/31/17 03/13/18


 


Insulin Detemir (Levemir) [Levemir] 40 unit SQ HS 03/31/17 03/13/18


 


Latanoprost Ophth [Xalatan 0.005%] 1 drops BOTH EYES HS 03/31/17 03/13/18


 


Omega-3 Fatty Acids/Fish Oil [Fish 1 cap PO DAILY 03/31/17 03/13/18





Oil 1,000 mg Softgel]   


 


Sertraline [Zoloft] 100 mg PO DAILY 03/31/17 03/13/18


 


Simvastatin [Zocor] 40 mg PO HS 03/31/17 03/13/18


 


Temazepam [Restoril] 15 mg PO HS 03/31/17 03/13/18


 


Sleep Aid 1 tab PO HS 05/16/17 03/13/18


 


Warfarin [Coumadin] 7.5 mg PO HS 05/16/17 03/13/18


 


traMADol HCL [Ultram] 50 mg PO TID PRN 05/16/17 03/13/18


 


Docusate [Colace] 100 mg PO DAILY 03/13/18 03/13/18


 


Ergocalciferol (Vitamin D2) 50,000 unit PO Q30D 03/13/18 03/13/18





[Vitamin D2]   


 


Levothyroxine Sodium [Synthroid] 100 mcg PO DAILY 03/13/18 03/13/18


 


Metoprolol Tartrate [Lopressor] 12.5 mg PO HS 03/13/18 03/13/18


 


calcitrioL [Rocaltrol] 0.25 mcg PO Q7D 03/13/18 03/13/18








                                  Previous Rx's











 Medication  Instructions  Recorded


 


Furosemide [Lasix] 20 mg PO DAILY #3 tab 01/31/20











                                    Allergies











Allergy/AdvReac Type Severity Reaction Status Date / Time


 


hydromorphone [From Dilaudid] AdvReac  SEVERE Verified 05/13/21 17:02





   VOMITING  


 


meperidine [From Demerol] AdvReac  Nausea & Verified 05/13/21 17:02





   Vomiting &  





   Diarrhea  














Review of Systems


ROS Statement: 


Those systems with pertinent positive or pertinent negative responses have been 

documented in the HPI.





ROS Other: All systems not noted in ROS Statement are negative.





Past Medical History


Past Medical History: Atrial Fibrillation, Heart Failure, Diabetes Mellitus, Eye

Disorder, Hearing Disorder / Deafness, Hyperlipidemia, Hypertension, 

Osteoarthritis (OA), Pneumonia, Prostate Disorder, Sleep Apnea/CPAP/BIPAP, 

Thyroid Disorder


Additional Past Medical History / Comment(s): GLAUCOMA, covid


History of Any Multi-Drug Resistant Organisms: None Reported


Past Surgical History: Back Surgery, Cholecystectomy, Heart Catheterization, 

Joint Replacement, Prostate Surgery


Additional Past Surgical History / Comment(s): greer knee replacement, 

cardioverion, bilc cataracts, TURP


Past Anesthesia/Blood Transfusion Reactions: No Reported Reaction


Past Psychological History: Bipolar


Smoking Status: Never smoker


Past Alcohol Use History: None Reported


Past Drug Use History: None Reported





- Past Family History


  ** Father


History Unknown: Yes





  ** Mother


Family Medical History: No Reported History





  ** Brother(s)


Family Medical History: Cancer





General Exam


Limitations: physical limitation


General appearance: alert, in no apparent distress


Head exam: Present: atraumatic, normocephalic


Eye exam: Present: normal appearance, PERRL


ENT exam: Present: normal exam


Neck exam: Present: normal inspection.  Absent: tenderness, meningismus


Respiratory exam: Present: rales.  Absent: respiratory distress


Cardiovascular Exam: Present: tachycardia, irregular rhythm


GI/Abdominal exam: Present: soft, distended.  Absent: tenderness, guarding


Extremities exam: Present: pedal edema, other (Chronic venous stasis)


Neurological exam: Present: alert, oriented X3, CN II-XII intact.  Absent: motor

sensory deficit


Psychiatric exam: Present: normal affect, normal mood


Skin exam: Present: warm, dry, intact.  Absent: cyanosis, diaphoretic





Course


                                   Vital Signs











  05/13/21





  16:57


 


Temperature 97.9 F


 


Pulse Rate 90


 


Respiratory 19





Rate 


 


Blood Pressure 111/53


 


O2 Sat by Pulse 91 L





Oximetry 














EKG Findings





- EKG Comments:


EKG Findings:: Atrial fibrillation, low voltage no ST segment elevation, rate 

97, QRS duration 110, 





Medical Decision Making





- Medical Decision Making





76-year-old male history of CHF presenting with signs and symptoms of CHF 

exacerbation.  He has a chest x-ray which shows mild pulmonary edema.  He has 

elevated BNP at 8000.  Additionally he has a mild drop in his hemoglobin, he INR

3.4, Coumadin will be held.  He has a creatinine of 1.4.  He'll be admitted for 

IV Lasix.  Case discussed with Dr. Rainey who will admit.





- Lab Data


Result diagrams: 


                                 05/13/21 17:20





                                 05/13/21 17:21


                                   Lab Results











  05/13/21 05/13/21 05/13/21 Range/Units





  17:20 17:21 17:21 


 


WBC  5.6    (3.8-10.6)  k/uL


 


RBC  3.67 L    (4.30-5.90)  m/uL


 


Hgb  9.6 L    (13.0-17.5)  gm/dL


 


Hct  29.2 L    (39.0-53.0)  %


 


MCV  79.5 L    (80.0-100.0)  fL


 


MCH  26.2    (25.0-35.0)  pg


 


MCHC  32.9    (31.0-37.0)  g/dL


 


RDW  17.1 H    (11.5-15.5)  %


 


Plt Count  119 L    (150-450)  k/uL


 


MPV  8.9    


 


Neutrophils %  80    %


 


Lymphocytes %  9    %


 


Monocytes %  5    %


 


Eosinophils %  4    %


 


Basophils %  0    %


 


Neutrophils #  4.5    (1.3-7.7)  k/uL


 


Lymphocytes #  0.5 L    (1.0-4.8)  k/uL


 


Monocytes #  0.3    (0-1.0)  k/uL


 


Eosinophils #  0.2    (0-0.7)  k/uL


 


Basophils #  0.0    (0-0.2)  k/uL


 


Hypochromasia  Slight    


 


Anisocytosis  Slight    


 


Microcytosis  Slight    


 


PT   32.7 H   (9.0-12.0)  sec


 


INR   3.4 H   (<1.2)  


 


APTT   41.0 H   (22.0-30.0)  sec


 


Sodium    140  (137-145)  mmol/L


 


Potassium    5.4 H  (3.5-5.1)  mmol/L


 


Chloride    101  ()  mmol/L


 


Carbon Dioxide    32 H  (22-30)  mmol/L


 


Anion Gap    7  mmol/L


 


BUN    38 H  (9-20)  mg/dL


 


Creatinine    1.46 H  (0.66-1.25)  mg/dL


 


Est GFR (CKD-EPI)AfAm    54  (>60 ml/min/1.73 sqM)  


 


Est GFR (CKD-EPI)NonAf    47  (>60 ml/min/1.73 sqM)  


 


Glucose    121 H  (74-99)  mg/dL


 


Calcium    9.1  (8.4-10.2)  mg/dL


 


Total Bilirubin    0.9  (0.2-1.3)  mg/dL


 


AST    45  (17-59)  U/L


 


ALT    20  (4-49)  U/L


 


Alkaline Phosphatase    131 H  ()  U/L


 


Troponin I     (0.000-0.034)  ng/mL


 


NT-Pro-B Natriuret Pep     pg/mL


 


Total Protein    6.9  (6.3-8.2)  g/dL


 


Albumin    3.8  (3.5-5.0)  g/dL














  05/13/21 05/13/21 Range/Units





  17:21 17:21 


 


WBC    (3.8-10.6)  k/uL


 


RBC    (4.30-5.90)  m/uL


 


Hgb    (13.0-17.5)  gm/dL


 


Hct    (39.0-53.0)  %


 


MCV    (80.0-100.0)  fL


 


MCH    (25.0-35.0)  pg


 


MCHC    (31.0-37.0)  g/dL


 


RDW    (11.5-15.5)  %


 


Plt Count    (150-450)  k/uL


 


MPV    


 


Neutrophils %    %


 


Lymphocytes %    %


 


Monocytes %    %


 


Eosinophils %    %


 


Basophils %    %


 


Neutrophils #    (1.3-7.7)  k/uL


 


Lymphocytes #    (1.0-4.8)  k/uL


 


Monocytes #    (0-1.0)  k/uL


 


Eosinophils #    (0-0.7)  k/uL


 


Basophils #    (0-0.2)  k/uL


 


Hypochromasia    


 


Anisocytosis    


 


Microcytosis    


 


PT    (9.0-12.0)  sec


 


INR    (<1.2)  


 


APTT    (22.0-30.0)  sec


 


Sodium    (137-145)  mmol/L


 


Potassium    (3.5-5.1)  mmol/L


 


Chloride    ()  mmol/L


 


Carbon Dioxide    (22-30)  mmol/L


 


Anion Gap    mmol/L


 


BUN    (9-20)  mg/dL


 


Creatinine    (0.66-1.25)  mg/dL


 


Est GFR (CKD-EPI)AfAm    (>60 ml/min/1.73 sqM)  


 


Est GFR (CKD-EPI)NonAf    (>60 ml/min/1.73 sqM)  


 


Glucose    (74-99)  mg/dL


 


Calcium    (8.4-10.2)  mg/dL


 


Total Bilirubin    (0.2-1.3)  mg/dL


 


AST    (17-59)  U/L


 


ALT    (4-49)  U/L


 


Alkaline Phosphatase    ()  U/L


 


Troponin I  <0.012   (0.000-0.034)  ng/mL


 


NT-Pro-B Natriuret Pep   8010  pg/mL


 


Total Protein    (6.3-8.2)  g/dL


 


Albumin    (3.5-5.0)  g/dL














Disposition


Clinical Impression: 


 CHF (congestive heart failure)





Disposition: ADMITTED AS IP TO THIS [facility]


Condition: Stable


Is patient prescribed a controlled substance at d/c from ED?: No


Referrals: 


Mike Rojo DO [Primary Care Provider] - 1-2 days


Decision to Admit Reason: Admit from EC


Decision Date: 05/13/21


Decision Time: 18:55

## 2021-05-13 NOTE — XR
EXAMINATION: XR chest 2V

DATE AND TIME:  5/13/2021 6:02 PM

 

CLINICAL INDICATION: PHH; difficulty breathing

 

TECHNIQUE: Departmental protocol

 

COMPARISON: 4/14/2021

 

FINDINGS: 

The chest radiograph is similar to that of the 4/14/2021 radiograph, with moderate silhouetting of th
e pulmonary vasculature by a fine reticular pattern of increased attenuation reaching the periphery, 
suggesting mild interstitial phase pulmonary edema. Radiographic differential includes early infectio
n, as well as chronic interstitial lung change, clinical delineation.

 

The pleural spaces are negative.

 

The cardiac silhouette is enlarged, unchanged. 

 

The skeletal structures and soft tissues are negative for acute findings.

 

IMPRESSION:

Suspect interstitial phase pulmonary edema if clinically corroborated, as discussed.

## 2021-05-14 LAB
ANION GAP SERPL CALC-SCNC: 7 MMOL/L
BASOPHILS # BLD AUTO: 0 K/UL (ref 0–0.2)
BASOPHILS NFR BLD AUTO: 0 %
BUN SERPL-SCNC: 38 MG/DL (ref 9–20)
CALCIUM SPEC-MCNC: 9 MG/DL (ref 8.4–10.2)
CHLORIDE SERPL-SCNC: 102 MMOL/L (ref 98–107)
CO2 SERPL-SCNC: 32 MMOL/L (ref 22–30)
EOSINOPHIL # BLD AUTO: 0.2 K/UL (ref 0–0.7)
EOSINOPHIL NFR BLD AUTO: 5 %
ERYTHROCYTE [DISTWIDTH] IN BLOOD BY AUTOMATED COUNT: 3.62 M/UL (ref 4.3–5.9)
ERYTHROCYTE [DISTWIDTH] IN BLOOD: 17.2 % (ref 11.5–15.5)
GLUCOSE BLD-MCNC: 106 MG/DL (ref 75–99)
GLUCOSE BLD-MCNC: 124 MG/DL (ref 75–99)
GLUCOSE BLD-MCNC: 161 MG/DL (ref 75–99)
GLUCOSE BLD-MCNC: 303 MG/DL (ref 75–99)
GLUCOSE SERPL-MCNC: 104 MG/DL (ref 74–99)
HCT VFR BLD AUTO: 29.2 % (ref 39–53)
HGB BLD-MCNC: 9.6 GM/DL (ref 13–17.5)
INR PPP: 4.6 (ref ?–1.2)
LYMPHOCYTES # SPEC AUTO: 0.5 K/UL (ref 1–4.8)
LYMPHOCYTES NFR SPEC AUTO: 10 %
MCH RBC QN AUTO: 26.6 PG (ref 25–35)
MCHC RBC AUTO-ENTMCNC: 32.9 G/DL (ref 31–37)
MCV RBC AUTO: 80.8 FL (ref 80–100)
MONOCYTES # BLD AUTO: 0.3 K/UL (ref 0–1)
MONOCYTES NFR BLD AUTO: 6 %
NEUTROPHILS # BLD AUTO: 4 K/UL (ref 1.3–7.7)
NEUTROPHILS NFR BLD AUTO: 78 %
PLATELET # BLD AUTO: 128 K/UL (ref 150–450)
POTASSIUM SERPL-SCNC: 4.6 MMOL/L (ref 3.5–5.1)
PT BLD: 43.8 SEC (ref 9–12)
SODIUM SERPL-SCNC: 141 MMOL/L (ref 137–145)
WBC # BLD AUTO: 5.1 K/UL (ref 3.8–10.6)

## 2021-05-14 RX ADMIN — Medication SCH MG: at 20:55

## 2021-05-14 RX ADMIN — Medication SCH MG: at 08:46

## 2021-05-14 RX ADMIN — INSULIN ASPART SCH UNIT: 100 INJECTION, SOLUTION INTRAVENOUS; SUBCUTANEOUS at 12:48

## 2021-05-14 RX ADMIN — LEVOTHYROXINE SODIUM SCH MCG: 0.12 TABLET ORAL at 06:02

## 2021-05-14 RX ADMIN — INSULIN ASPART SCH: 100 INJECTION, SOLUTION INTRAVENOUS; SUBCUTANEOUS at 21:08

## 2021-05-14 RX ADMIN — ATORVASTATIN CALCIUM SCH MG: 20 TABLET, FILM COATED ORAL at 20:55

## 2021-05-14 RX ADMIN — Medication SCH MCG: at 08:46

## 2021-05-14 RX ADMIN — INSULIN ASPART SCH: 100 INJECTION, SOLUTION INTRAVENOUS; SUBCUTANEOUS at 07:44

## 2021-05-14 RX ADMIN — SERTRALINE HYDROCHLORIDE SCH MG: 100 TABLET ORAL at 08:45

## 2021-05-14 RX ADMIN — POTASSIUM CHLORIDE SCH MEQ: 20 TABLET, EXTENDED RELEASE ORAL at 08:45

## 2021-05-14 RX ADMIN — INSULIN DETEMIR SCH UNIT: 100 INJECTION, SOLUTION SUBCUTANEOUS at 20:56

## 2021-05-14 RX ADMIN — INSULIN ASPART SCH UNIT: 100 INJECTION, SOLUTION INTRAVENOUS; SUBCUTANEOUS at 17:24

## 2021-05-14 RX ADMIN — GABAPENTIN SCH MG: 300 CAPSULE ORAL at 20:55

## 2021-05-14 RX ADMIN — FUROSEMIDE SCH MG: 10 INJECTION, SOLUTION INTRAMUSCULAR; INTRAVENOUS at 20:54

## 2021-05-14 RX ADMIN — METOPROLOL TARTRATE SCH MG: 25 TABLET, FILM COATED ORAL at 20:55

## 2021-05-14 RX ADMIN — Medication SCH MG: at 16:25

## 2021-05-14 RX ADMIN — LATANOPROST SCH DROPS: 50 SOLUTION OPHTHALMIC at 21:13

## 2021-05-14 NOTE — P.HPIM
History of Present Illness


H&P Date: 05/14/21





76 years old  male patient of Dr. Rojo for past medical history 

of paroxysmal atrial fibrillation, hyperlipidemia, hypertension, coronary artery

disease, history of nonischemic cardiomyopathy with EF 30% 2017, valvular heart 

disease, moderate to severe pulmonary hypertension, hypothyroidism, obesity, 

and, type 2 diabetes comes in with worsening lower extremity swelling and 

shortness of breath for the past 3-4 days.  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 

therapy and was decided to come to the ER.  Patient does endorse shortness of 

breath, denies any chest pain, palpitation, dizziness or lightheadedness.  Mamadou ramírez is given a dose of IV Lasix yesterday and was in admitted with cardiology 

evaluation.


Vitals suggestive of 97.4 pulse 88 respiratory rate 18 blood pressure 1:15/70 

oxygen saturation 99% on 2 L


 labs reviewed hemoglobin 9.6, INR 4.6 this morning increased from 3.4, BUN 32 

creatinine 1.4, baseline creatinine 0.9 troponin negative, BNP 8000.  COVID-19 

negative. 


Chest x-ray pulmonary edema


EKG suggestive of atrial fibrillation











Review of Systems





Constitutional: Denies chills, Denies  fever, Denies lethargy, Denies malaise, 

Denies poor appetite, Denies weakness, Denies weight loss


Eyes: denies decreased vision, denies diplopia, denies discharge, denies pain


Ears: deny: decreased hearing


Ears, nose, mouth and throat: Denies dental pain, Denies headache, Denies nasal 

discharge, Denies nose pain


Cardiovascular: Denies chest pain, endorses decreased exercise tolerance, 

endorses edema, Denies high blood pressure, endorses endorses irregular heart 

beat, Denies palpitations, Denies paroxysmal nocturnal dyspnea, Denies rapid 

heart beat, endorses shortness of breath


Respiratory: Denies congestion, Denies cough, Denies cough with sputum, Denies 

dyspnea, Denies home oxygen, Denies wheezing


Gastrointestinal: Denies abdominal pain, Denies change in bowel habits, Denies 

coffee ground emesis, Denies early satiety, Denies excessive gas, Denies 

heartburn, Denies hematemesis, Denies hematochezia, Denies loss of appetite, 

Denies nausea, Denies vomiting


Genitourinary: Denies dysuria, Denies flank pain, Denies kidney stones, Denies 

menorrhagia, Denies urgency, Denies urinary frequency


Musculoskeletal: Denies gait dysfunction, Denies limitation of motion, Denies 

morning stiffness, Denies muscle cramps


Integumentary: Denies rash, Denies wounds, Denies brittle nails, Denies change 

in hair/nails, Denies darkening of skin


Neurological: Denies balance difficulties, Denies change in speech, Denies 

double vision, Denies gait dysfunction, Denies loss of vision, Denies motor 

disturbance, Denies numbness, Denies paralysis, Denies paresthesias, Denies 

seizures


Psychiatric: Denies anxiety, Denies depression


Endocrine: Denies excessive sweating, Denies excessive thirst, Denies high blood

sugars, Denies palpitations


Hematologic/Lymphatic: Denies easy bruising, Denies lymphadenopathy





Past Medical History


Past Medical History: Atrial Fibrillation, Heart Failure, Diabetes Mellitus, Eye

Disorder, Hearing Disorder / Deafness, Hyperlipidemia, Hypertension, 

Osteoarthritis (OA), Pneumonia, Prostate Disorder, Sleep Apnea/CPAP/BIPAP, 

Thyroid Disorder


Additional Past Medical History / Comment(s): GLAUCOMA, covid


History of Any Multi-Drug Resistant Organisms: None Reported


Past Surgical History: Back Surgery, Cholecystectomy, Heart Catheterization, Fide

nt Replacement, Prostate Surgery


Additional Past Surgical History / Comment(s): greer knee replacement, 

cardioverion, bilc cataracts, TURP


Past Anesthesia/Blood Transfusion Reactions: No Reported Reaction


Past Psychological History: No Psychological Hx Reported


Smoking Status: Never smoker


Past Alcohol Use History: None Reported


Past Drug Use History: None Reported





- Past Family History


  ** Father


History Unknown: Yes





  ** Mother


Family Medical History: No Reported History





  ** Brother(s)


Family Medical History: Cancer





Medications and Allergies


                                Home Medications











 Medication  Instructions  Recorded  Confirmed  Type


 


Ferrous Sulfate [Feosol] 325 mg PO TID 03/31/17 05/13/21 History


 


Gabapentin [Neurontin] 300 mg PO TID PRN 03/31/17 05/13/21 History


 


Insulin Detemir (Levemir) [Levemir] 40 unit SQ HS 03/31/17 05/13/21 History


 


Latanoprost Ophth [Xalatan 0.005%] 1 drops BOTH EYES HS 03/31/17 05/13/21 

History


 


Sertraline [Zoloft] 100 mg PO DAILY 03/31/17 05/13/21 History


 


Simvastatin [Zocor] 40 mg PO HS 03/31/17 05/13/21 History


 


traMADol HCL [Ultram] 50 mg PO BID PRN 05/16/17 05/13/21 History


 


Metoprolol Tartrate [Lopressor] 12.5 mg PO HS 03/13/18 05/13/21 History


 


Cholecalciferol (Vitamin D3) 125 mcg PO DAILY 05/13/21 05/13/21 History





[Vitamin D3 (5000 Iu)]    


 


Fluocinolone Acetonide Body Oil 1 applic TOPICAL HS 05/13/21 05/13/21 History





0.01%    


 


Furosemide [Lasix] 40 mg PO DAILY 05/13/21 05/13/21 History


 


Levothyroxine Sodium [Synthroid] 125 mcg PO DAILY 05/13/21 05/13/21 History


 


Melatonin 1 mg PO HS 05/13/21 05/13/21 History


 


Nystatin 100,000Unit/gm Cream 1 applic TOPICAL BID PRN 05/13/21 05/13/21 History





[Mycostatin Cream]    


 


Potassium Chloride ER [K-Dur 20] 20 meq PO DAILY 05/13/21 05/13/21 History


 


Triamcinolone 0.1% Cream [Kenalog 1 applicatio TOPICAL BID PRN 05/13/21 05/13/21

 History





0.1% Cream]    


 


Warfarin [Coumadin] 2.5 mg PO TUFR@2100 05/13/21 05/13/21 History


 


Warfarin [Coumadin] 5 mg PO SUMOWETHSA@2100 05/13/21 05/13/21 History


 


metFORMIN HCL [Glucophage] 500 mg PO BID 05/13/21 05/13/21 History


 


rOPINIRole HCL [Requip XL] 8 mg PO HS 05/13/21 05/13/21 History








                                    Allergies











Allergy/AdvReac Type Severity Reaction Status Date / Time


 


hydromorphone [From Dilaudid] AdvReac  SEVERE Verified 05/13/21 17:02





   VOMITING  


 


meperidine [From Demerol] AdvReac  Nausea & Verified 05/13/21 17:02





   Vomiting &  





   Diarrhea  














Physical Exam


Vitals: 


                                   Vital Signs











  Temp Pulse Pulse Resp BP BP Pulse Ox


 


 05/14/21 07:00  97.4 F L   88  16   115/70  99


 


 05/14/21 02:00  97.8 F   83  16   130/78  98


 


 05/13/21 23:35     20   


 


 05/13/21 22:00  97.7 F   95  18   142/78  92 L


 


 05/13/21 20:48     22   


 


 05/13/21 20:30  97.8 F  77   18  129/66   99


 


 05/13/21 19:35   81   18  119/75   99


 


 05/13/21 18:55   87   20  116/68   98


 


 05/13/21 16:57  97.9 F  90   19  111/53   91 L








                                Intake and Output











 05/13/21 05/14/21 05/14/21





 22:59 06:59 14:59


 


Other:   


 


  Voiding Method  Toilet 


 


  # Voids 2 2 


 


  Weight 145.15 kg 149.9 kg 














- Constitutional


General appearance: cooperative, mild acute distress, obese





- EENT


Eyes: anicteric sclerae, PERRLA, normal appearance


ENT: hearing grossly normal





- Neck


Neck: no lymphadenopathy, normal ROM, no other, no rigidity, no stridor, no 

thyromegaly





- Respiratory


Respiratory: bilateral: Mild crackles at the bases negative for wheezing


- Cardiovascular


Rhythm: Irregular


Heart sounds: normal: S1, S2 S3 gallop


Abnormal Heart Sounds: 3/6 systolic murmur, no diastolic murmur, no rub,, no S4 

Gallop, no click





- Gastrointestinal


General gastrointestinal: normal bowel sounds, soft nontender





- Integumentary


Integumentary:  3+ pitting edema up to the knee chronic dermatitis





- Neurologic


Neurologic: No gross motor or sensory deficit





- Musculoskeletal


Musculoskeletal: gait not assessed, strength equal bilaterally





- Psychiatric


Psychiatric: A&O x's 3, appropriate affect





Results


CBC & Chem 7: 


                                 05/14/21 05:59





                                 05/14/21 05:59


Labs: 


                  Abnormal Lab Results - Last 24 Hours (Table)











  05/13/21 05/13/21 05/13/21 Range/Units





  17:20 17:21 17:21 


 


RBC  3.67 L    (4.30-5.90)  m/uL


 


Hgb  9.6 L    (13.0-17.5)  gm/dL


 


Hct  29.2 L    (39.0-53.0)  %


 


MCV  79.5 L    (80.0-100.0)  fL


 


RDW  17.1 H    (11.5-15.5)  %


 


Plt Count  119 L    (150-450)  k/uL


 


Lymphocytes #  0.5 L    (1.0-4.8)  k/uL


 


PT   32.7 H   (9.0-12.0)  sec


 


INR   3.4 H   (<1.2)  


 


APTT   41.0 H   (22.0-30.0)  sec


 


Potassium    5.4 H  (3.5-5.1)  mmol/L


 


Carbon Dioxide    32 H  (22-30)  mmol/L


 


BUN    38 H  (9-20)  mg/dL


 


Creatinine    1.46 H  (0.66-1.25)  mg/dL


 


Glucose    121 H  (74-99)  mg/dL


 


POC Glucose (mg/dL)     (75-99)  mg/dL


 


Alkaline Phosphatase    131 H  ()  U/L














  05/14/21 05/14/21 05/14/21 Range/Units





  05:59 05:59 07:14 


 


RBC  3.62 L    (4.30-5.90)  m/uL


 


Hgb  9.6 L    (13.0-17.5)  gm/dL


 


Hct  29.2 L    (39.0-53.0)  %


 


MCV     (80.0-100.0)  fL


 


RDW  17.2 H    (11.5-15.5)  %


 


Plt Count  128 L    (150-450)  k/uL


 


Lymphocytes #  0.5 L    (1.0-4.8)  k/uL


 


PT     (9.0-12.0)  sec


 


INR     (<1.2)  


 


APTT     (22.0-30.0)  sec


 


Potassium     (3.5-5.1)  mmol/L


 


Carbon Dioxide   32 H   (22-30)  mmol/L


 


BUN   38 H   (9-20)  mg/dL


 


Creatinine   1.44 H   (0.66-1.25)  mg/dL


 


Glucose   104 H   (74-99)  mg/dL


 


POC Glucose (mg/dL)    106 H  (75-99)  mg/dL


 


Alkaline Phosphatase     ()  U/L














  05/14/21 Range/Units





  09:22 


 


RBC   (4.30-5.90)  m/uL


 


Hgb   (13.0-17.5)  gm/dL


 


Hct   (39.0-53.0)  %


 


MCV   (80.0-100.0)  fL


 


RDW   (11.5-15.5)  %


 


Plt Count   (150-450)  k/uL


 


Lymphocytes #   (1.0-4.8)  k/uL


 


PT  43.8 H  (9.0-12.0)  sec


 


INR  4.6 H  (<1.2)  


 


APTT   (22.0-30.0)  sec


 


Potassium   (3.5-5.1)  mmol/L


 


Carbon Dioxide   (22-30)  mmol/L


 


BUN   (9-20)  mg/dL


 


Creatinine   (0.66-1.25)  mg/dL


 


Glucose   (74-99)  mg/dL


 


POC Glucose (mg/dL)   (75-99)  mg/dL


 


Alkaline Phosphatase   ()  U/L














Thrombosis Risk Factor Assmnt





- DVT/VTE Prophylaxis


DVT/VTE Prophylaxis: Pharmacologic Prophylaxis ordered





- Choose All That Apply


Each Risk Factor Represents 2 Points: Age 61-74 years


Thrombosis Risk Factor Assessment Total Risk Factor Score: 2


Thrombosis Risk Factor Assessment Level: Low Risk





Assessment and Plan


Plan: 





#1 acute exacerbation of congestive heart failure with previous EF reported to 

be 30%.  Increase Lasix to 60 IV twice a day.  FIGUEROA.  Daily weight.  ProBNP 

elevated





#2 acute kidney injury likely cardiorenal process as baseline creatinine 0.9.  

Continue Lasix 60 IV twice a day.  Nephrology consulted.





#3 paroxysmal atrial fibrillation on and evaluation with Coumadin.  INRs a 

supratherapeutic hold Coumadin





#4 Coumadin coagulopathy.  Hold Coumadin  goal INR 2.5-3.5





#5 hyperlipidemia on simvastatin 40 mg bedtime





#6 hypertension continue metoprolol 12.5 daily at bedtime





#7 mild coronary artery disease continue Coumadin, metoprolol





#8 nonischemic cardiomyopathy follows cardiology last EF reported to be 30%.  

Repeat echo ordered





#9 hypothyroidism on levothyroxine 125 by mouth daily





#10 type 2 diabetes with diabetic neuropathy.  Gabapentin reduced to 300 twice a

day from 3 times a day continue insulin sliding scale hold metformin continue 

Levemir at 40 units at bedtime





#11 morbid obesity





#12 osteoarthritis continue tramadol for pain control stable





#13 obstructive sleep apnea on CPAP





#14 BPH status post TURP





#15 CODE STATUS full code





#16 DVT prophylaxis INR supratherapeutic hold Coumadin





#17 disposition patient will need 2 inpatient nights for stabilization

## 2021-05-14 NOTE — P.CRDCN
History of Present Illness


Consult date: 05/14/21


History of present illness: 





HISTORY OF PRESENT ILLNESS:  





This is a 76-year-old male with a past medical history significant for 

paroxysmal atrial fibrillation, diabetes mellitus, hypertension, hyperlipidemia,

hypothyroidism, and congestive heart failure. Patient follows in the office with

Dr. Schwartz. We have been asked to see the patient in consultation for congestive

heart failure. Patient examined at the bedside. Patient reports increased sh

ortness of breath and increased lower extremity edema over the past 3-4 days. He

denies chest pain or pressure. He reports compliance with all of his 

medications. Patient was started on IV lasix in the emergency room. He states 

his shortness of breath has improved this morning. Blood pressure 115/70. Heart 

rate in the 80s. He is on 2L nasal cannula with oxygen saturations greater than 

92%. He is afebrile. 





EKG reveals atrial fibrillation with controlled ventricular rate.


Chest xray suspect interstitial phase pulmonary edema 


Laboratory data: WBC 5.1.  Hemoglobin 9.6.  Platelet count 128.  INR 3.4.  

Sodium 141.  Potassium 4.6.  BUN 38.  Creatinine 1.44.  Troponin negative 1.  

BNP 8010.


Current home cardiac medications include Coumadin 5 mg daily on Sunday Monday Wednesday Thursday and Saturday and 2.5 mg on Thursday and Friday, Lasix 40 mg 

daily, simvastatin 40 mg daily, metoprolol tartrate 12.5 mg at night


Most recent echocardiogram obtained in 2017 reveals ejection fraction 30-35%, 

mild-to-moderate mitral regurgitation and mild-to-moderate tricuspid 

regurgitation with moderate-to-severe pulmonary hypertension


Cardiac catheterization history: April 2017 revealing mild coronary artery 

disease involving the left anterior descending and right coronary artery.  

Severely impaired left ventricular systolic function with 2+ mitral 

regurgitation





REVIEW OF SYSTEMS: 


At the time of my exam:


CONSTITUTIONAL: Denies fever or chills.


HEENT: Denies blurred vision, vision changes, or eye pain. Denies hemoptysis 


CARDIOVASCULAR: Denies chest pain. Denies orthopnea. Denies PND. Denies 

palpitations


RESPIRATORY: Denies shortness of breath. 


GASTROINTESTINAL: Denies abdominal pain. Denies nausea or vomiting. 


HEMATOLOGIC: Denies bleeding disorders.


GENITOURINARY:  Denies any blood in urine.


SKIN: Denies pruitis. Denies rash.





PHYSICAL EXAM: 


VITAL SIGNS: Reviewed.


GENERAL: Well-developed in no acute distress. 


HEENT: Head is normocephalic. Pupils are equal, round. Sclerae anicteric. Mucous

membranes of the mouth are moist. Neck supple. No JVD or thyromegaly


LUNGS: Respirations even and unlabored. Lungs essentially clear to auscultation 

bilaterally.


HEART: Irregular rate and rhythm.  S1 and S2 heard.  Systolic murmur noted.


ABDOMEN: Soft. Nondistended. Nontender.


EXTREMITIES: Normal range of motion.  No clubbing or cyanosis.  Peripheral 

pulses intact.  1+ bilateral lower extremity edema


NEUROLOGIC: Awake and alert. Oriented x 3. 





ASSESSMENT: 


Acute exacerbation of chronic systolic heart failure


Paroxysmal atrial fibrillation, on anticoagulation with Coumadin


Hyperlipidemia


Hypertension


Mild nonobstructive coronary artery disease


History of nonischemic cardiomyopathy, EF 30% per cath in 2017


Valvular heart disease


Moderate to severe pulmonary hypertension


Hypothyroidism


Obesity





PLAN: 


Obtain 2D echo to assess cardiac structure and function


Resume home cardiac medications


Continue IV lasix


Monitor kidney function


Daily weights


Accurate I&O


Continue telemetry monitoring


Daily INR. Coumadin per pharmacy


Further recommendations pending patient course








Nurse practitioner note has been reviewed by physician. Signing provider agrees 

with the documented findings, assessment, and plan of care. 








Past Medical History


Past Medical History: Atrial Fibrillation, Heart Failure, Diabetes Mellitus, Eye

Disorder, Hearing Disorder / Deafness, Hyperlipidemia, Hypertension, 

Osteoarthritis (OA), Pneumonia, Prostate Disorder, Sleep Apnea/CPAP/BIPAP, 

Thyroid Disorder


Additional Past Medical History / Comment(s): GLAUCOMA, covid


History of Any Multi-Drug Resistant Organisms: None Reported


Past Surgical History: Back Surgery, Cholecystectomy, Heart Catheterization, 

Joint Replacement, Prostate Surgery


Additional Past Surgical History / Comment(s): greer knee replacement, 

cardioverion, bilc cataracts, TURP


Past Anesthesia/Blood Transfusion Reactions: No Reported Reaction


Past Psychological History: No Psychological Hx Reported


Smoking Status: Never smoker


Past Alcohol Use History: None Reported


Past Drug Use History: None Reported





- Past Family History


  ** Father


History Unknown: Yes





  ** Mother


Family Medical History: No Reported History





  ** Brother(s)


Family Medical History: Cancer





Medications and Allergies


                                Home Medications











 Medication  Instructions  Recorded  Confirmed  Type


 


Ferrous Sulfate [Feosol] 325 mg PO TID 03/31/17 05/13/21 History


 


Gabapentin [Neurontin] 300 mg PO TID PRN 03/31/17 05/13/21 History


 


Insulin Detemir (Levemir) [Levemir] 40 unit SQ HS 03/31/17 05/13/21 History


 


Latanoprost Ophth [Xalatan 0.005%] 1 drops BOTH EYES HS 03/31/17 05/13/21 

History


 


Sertraline [Zoloft] 100 mg PO DAILY 03/31/17 05/13/21 History


 


Simvastatin [Zocor] 40 mg PO HS 03/31/17 05/13/21 History


 


traMADol HCL [Ultram] 50 mg PO BID PRN 05/16/17 05/13/21 History


 


Metoprolol Tartrate [Lopressor] 12.5 mg PO HS 03/13/18 05/13/21 History


 


Cholecalciferol (Vitamin D3) 125 mcg PO DAILY 05/13/21 05/13/21 History





[Vitamin D3 (5000 Iu)]    


 


Fluocinolone Acetonide Body Oil 1 applic TOPICAL HS 05/13/21 05/13/21 History





0.01%    


 


Furosemide [Lasix] 40 mg PO DAILY 05/13/21 05/13/21 History


 


Levothyroxine Sodium [Synthroid] 125 mcg PO DAILY 05/13/21 05/13/21 History


 


Melatonin 1 mg PO HS 05/13/21 05/13/21 History


 


Nystatin 100,000Unit/gm Cream 1 applic TOPICAL BID PRN 05/13/21 05/13/21 History





[Mycostatin Cream]    


 


Potassium Chloride ER [K-Dur 20] 20 meq PO DAILY 05/13/21 05/13/21 History


 


Triamcinolone 0.1% Cream [Kenalog 1 applicatio TOPICAL BID PRN 05/13/21 05/13/21

 History





0.1% Cream]    


 


Warfarin [Coumadin] 2.5 mg PO TUFR@2100 05/13/21 05/13/21 History


 


Warfarin [Coumadin] 5 mg PO SUMOWETHSA@2100 05/13/21 05/13/21 History


 


metFORMIN HCL [Glucophage] 500 mg PO BID 05/13/21 05/13/21 History


 


rOPINIRole HCL [Requip XL] 8 mg PO HS 05/13/21 05/13/21 History








                                    Allergies











Allergy/AdvReac Type Severity Reaction Status Date / Time


 


hydromorphone [From Dilaudid] AdvReac  SEVERE Verified 05/13/21 17:02





   VOMITING  


 


meperidine [From Demerol] AdvReac  Nausea & Verified 05/13/21 17:02





   Vomiting &  





   Diarrhea  














Physical Exam


Vitals: 


                                   Vital Signs











  Temp Pulse Pulse Resp BP BP Pulse Ox


 


 05/14/21 07:00  97.4 F L   88  16   115/70  99


 


 05/14/21 02:00  97.8 F   83  16   130/78  98


 


 05/13/21 23:35     20   


 


 05/13/21 22:00  97.7 F   95  18   142/78  92 L


 


 05/13/21 20:48     22   


 


 05/13/21 20:30  97.8 F  77   18  129/66   99


 


 05/13/21 19:35   81   18  119/75   99


 


 05/13/21 18:55   87   20  116/68   98


 


 05/13/21 16:57  97.9 F  90   19  111/53   91 L








                                Intake and Output











 05/13/21 05/14/21 05/14/21





 22:59 06:59 14:59


 


Other:   


 


  Voiding Method  Toilet 


 


  # Voids 2 2 


 


  Weight 145.15 kg 149.9 kg 














Results





                                 05/14/21 05:59





                                 05/14/21 05:59


                                 Cardiac Enzymes











  05/13/21 05/13/21 Range/Units





  17:21 17:21 


 


AST  45   (17-59)  U/L


 


Troponin I   <0.012  (0.000-0.034)  ng/mL








                                   Coagulation











  05/13/21 Range/Units





  17:21 


 


PT  32.7 H  (9.0-12.0)  sec


 


APTT  41.0 H  (22.0-30.0)  sec








                                       CBC











  05/13/21 05/14/21 Range/Units





  17:20 05:59 


 


WBC  5.6  5.1  (3.8-10.6)  k/uL


 


RBC  3.67 L  3.62 L  (4.30-5.90)  m/uL


 


Hgb  9.6 L  9.6 L  (13.0-17.5)  gm/dL


 


Hct  29.2 L  29.2 L  (39.0-53.0)  %


 


Plt Count  119 L  128 L  (150-450)  k/uL








                          Comprehensive Metabolic Panel











  05/13/21 05/14/21 Range/Units





  17:21 05:59 


 


Sodium  140  141  (137-145)  mmol/L


 


Potassium  5.4 H  4.6  (3.5-5.1)  mmol/L


 


Chloride  101  102  ()  mmol/L


 


Carbon Dioxide  32 H  32 H  (22-30)  mmol/L


 


BUN  38 H  38 H  (9-20)  mg/dL


 


Creatinine  1.46 H  1.44 H  (0.66-1.25)  mg/dL


 


Glucose  121 H  104 H  (74-99)  mg/dL


 


Calcium  9.1  9.0  (8.4-10.2)  mg/dL


 


AST  45   (17-59)  U/L


 


ALT  20   (4-49)  U/L


 


Alkaline Phosphatase  131 H   ()  U/L


 


Total Protein  6.9   (6.3-8.2)  g/dL


 


Albumin  3.8   (3.5-5.0)  g/dL








                               Current Medications











Generic Name Dose Route Start Last Admin





  Trade Name Freq  PRN Reason Stop Dose Admin


 


Acetaminophen  650 mg  05/13/21 18:56 





  Acetaminophen Tab 325 Mg Tab  PO  





  Q6HR PRN  





  Mild Pain or Fever > 100.5  


 


Atorvastatin Calcium  20 mg  05/14/21 21:00 





  Atorvastatin 20 Mg Tab  PO  





  [facility]  


 


Cholecalciferol  125 mcg  05/14/21 09:00  05/14/21 08:46





  Cholecalciferol 25 Mcg (1000 Iu) Tablet  PO   125 mcg





  DAILY [facility]   Administration


 


Ferrous Sulfate  325 mg  05/14/21 09:00  05/14/21 08:46





  Ferrous Sulfate 325 Mg Tab  PO   325 mg





  TID [facility]   Administration


 


Furosemide  40 mg  05/14/21 09:00  05/14/21 08:47





  Furosemide 10 Mg/Ml 4 Ml Vial  IV   40 mg





  Q12HR [facility]   Administration


 


Gabapentin  300 mg  05/14/21 21:00 





  Gabapentin 300 Mg Cap  PO  





  BID [facility]  


 


Insulin Aspart  0 unit  05/14/21 07:30  05/14/21 07:44





  Insulin Aspart (Novolog) 100 Unit/Ml Vial  SQ   Not Given





  [facility]  





  Protocol  


 


Insulin Detemir  40 unit  05/14/21 21:00 





  Insulin Detemir (Levemir) 100 Unit/Ml Syr  SQ  





  [facility]  


 


Latanoprost  1 drops  05/14/21 21:00 





  Latanoprost 0.005% Ophth Drops 2.5 Ml Btl  BOTH EYES  





  [facility]  


 


Levothyroxine Sodium  125 mcg  05/14/21 06:30  05/14/21 06:02





  Levothyroxine 125 Mcg Tab  PO   125 mcg





  DAILY@0630 [facility]   Administration


 


Melatonin  1 mg  05/14/21 21:00 





  Melatonin 1 Mg Tab  PO  





  [facility]  


 


Metoprolol Tartrate  12.5 mg  05/14/21 21:00 





  Metoprolol Tartrate 12.5 Mg Tab  PO  





  [facility]  


 


Miscellaneous Information  0 each  05/14/21 09:01 





  Warfarin Per Pharmacy  MISCELLANE  





  AS DIRECTED PRN  





  INR  


 


Naloxone HCl  0.2 mg  05/13/21 18:56 





  Naloxone 0.4 Mg/Ml 1 Ml Vial  IV  





  Q2M PRN  





  Opioid Reversal  


 


Potassium Chloride  20 meq  05/14/21 09:00  05/14/21 08:45





  Potassium Chloride Er 20 Meq Tab.Er  PO   20 meq





  DAILY [facility]   Administration


 


Ropinirole HCl  2.5 mg  05/14/21 09:00  05/14/21 08:46





  Ropinirole Hcl 1 Mg Tab  PO   2.5 mg





  TID [facility]   Administration


 


Sertraline HCl  100 mg  05/14/21 09:00  05/14/21 08:45





  Sertraline 100 Mg Tab  PO   100 mg





  DAILY [facility]   Administration


 


Tramadol HCl  50 mg  05/13/21 22:42 





  Tramadol 50 Mg Tab  PO  





  BID PRN  





  Pain  








                                Intake and Output











 05/13/21 05/14/21 05/14/21





 22:59 06:59 14:59


 


Other:   


 


  Voiding Method  Toilet 


 


  # Voids 2 2 


 


  Weight 145.15 kg 149.9 kg 








                                        





                                 05/14/21 05:59 





                                 05/14/21 05:59

## 2021-05-14 NOTE — ECHOF
Referral Reason:LV function, CHF



MEASUREMENTS

--------

HEIGHT: 180.3 cm

WEIGHT: 149.7 kg

BP: 

RVIDd:   3.1 cm     (< 3.3)

IVSd:   1.6 cm     (0.6 - 1.1)

LVIDd:   5.4 cm     (3.9 - 5.3)

LVPWd:   1.6 cm     (0.6 - 1.1)

IVSs:   2.0 cm

LVIDs:   4.6 cm

LVPWs:   2.0 cm

Ao Diam:   4.2 cm     (2.0 - 3.7)

AV Cusp:   3.0 cm     (1.5 - 2.6)

LA Diam:   6.3 cm     (2.7 - 3.8)

MV EXCURSION:   22.126 mm     (> 18.000)

MV EF SLOPE:   206 mm/s     (70 - 150)

EPSS:   1.5 cm

RAP:   5.00 mmHg

RVSP:   36.83 mmHg







FINDINGS

--------

This was a technically difficult study with suboptimal views.

The left ventricular size is normal.   There is moderate concentric left ventricular hypertrophy.   O
verall left ventricular systolic function is mild-moderately impaired with, an EF between 40 - 45 %.

The right ventricle is normal in size.

The left atrium is markedly dilated.

The right atrial size is normal.

5.0mg of Lumason was utilized for enhancement of images

The aortic valve is trileaflet and appears structurally normal.

The mitral valve is normal.   Mild mitral regurgitation is present.

The tricuspid valve appears structurally normal.   Mild tricuspid regurgitation present.   Right vent
ricular systolic pressure is normal at < 35 mmHg.

There is no pulmonic regurgitation present.

The aortic root size is normal.

IVC Not well visulized.

There is no pericardial effusion.



CONCLUSIONS

--------

1. The left ventricular size is normal.

2. There is moderate concentric left ventricular hypertrophy.

3. Overall left ventricular systolic function is mild-moderately impaired with, an EF between 40 - 45
 %.

4. The left atrium is markedly dilated.

5. Mild mitral regurgitation is present.

6. Mild tricuspid regurgitation present.

7. There is no pericardial effusion.





SONOGRAPHER: Norah Banegas [facility]

## 2021-05-15 LAB
GLUCOSE BLD-MCNC: 125 MG/DL (ref 75–99)
GLUCOSE BLD-MCNC: 148 MG/DL (ref 75–99)
GLUCOSE BLD-MCNC: 176 MG/DL (ref 75–99)
GLUCOSE BLD-MCNC: 60 MG/DL (ref 75–99)
GLUCOSE BLD-MCNC: 70 MG/DL (ref 75–99)
INR PPP: 2.4 (ref ?–1.2)
PT BLD: 22.9 SEC (ref 9–12)

## 2021-05-15 RX ADMIN — INSULIN ASPART SCH: 100 INJECTION, SOLUTION INTRAVENOUS; SUBCUTANEOUS at 17:36

## 2021-05-15 RX ADMIN — Medication SCH MCG: at 08:20

## 2021-05-15 RX ADMIN — INSULIN ASPART SCH: 100 INJECTION, SOLUTION INTRAVENOUS; SUBCUTANEOUS at 12:46

## 2021-05-15 RX ADMIN — FUROSEMIDE SCH MG: 10 INJECTION, SOLUTION INTRAMUSCULAR; INTRAVENOUS at 23:05

## 2021-05-15 RX ADMIN — Medication SCH MG: at 15:49

## 2021-05-15 RX ADMIN — ATORVASTATIN CALCIUM SCH MG: 20 TABLET, FILM COATED ORAL at 21:18

## 2021-05-15 RX ADMIN — POTASSIUM CHLORIDE SCH MEQ: 20 TABLET, EXTENDED RELEASE ORAL at 08:20

## 2021-05-15 RX ADMIN — SERTRALINE HYDROCHLORIDE SCH MG: 100 TABLET ORAL at 08:20

## 2021-05-15 RX ADMIN — INSULIN ASPART SCH: 100 INJECTION, SOLUTION INTRAVENOUS; SUBCUTANEOUS at 07:43

## 2021-05-15 RX ADMIN — GABAPENTIN SCH MG: 300 CAPSULE ORAL at 08:19

## 2021-05-15 RX ADMIN — GABAPENTIN SCH MG: 300 CAPSULE ORAL at 21:09

## 2021-05-15 RX ADMIN — METOPROLOL TARTRATE SCH MG: 25 TABLET, FILM COATED ORAL at 21:09

## 2021-05-15 RX ADMIN — FUROSEMIDE SCH MG: 10 INJECTION, SOLUTION INTRAMUSCULAR; INTRAVENOUS at 08:20

## 2021-05-15 RX ADMIN — Medication SCH MG: at 08:20

## 2021-05-15 RX ADMIN — Medication SCH MG: at 21:10

## 2021-05-15 RX ADMIN — LEVOTHYROXINE SODIUM SCH MCG: 0.12 TABLET ORAL at 05:42

## 2021-05-15 RX ADMIN — Medication SCH MG: at 21:09

## 2021-05-15 RX ADMIN — FUROSEMIDE SCH MG: 10 INJECTION, SOLUTION INTRAMUSCULAR; INTRAVENOUS at 15:49

## 2021-05-15 RX ADMIN — LATANOPROST SCH DROPS: 50 SOLUTION OPHTHALMIC at 21:10

## 2021-05-15 RX ADMIN — INSULIN DETEMIR SCH UNIT: 100 INJECTION, SOLUTION SUBCUTANEOUS at 21:10

## 2021-05-15 RX ADMIN — INSULIN ASPART SCH: 100 INJECTION, SOLUTION INTRAVENOUS; SUBCUTANEOUS at 21:10

## 2021-05-15 NOTE — P.PN
Subjective





HISTORY OF PRESENT ILLNESS:  





This is a 76-year-old male with a past medical history significant for 

paroxysmal atrial fibrillation, diabetes mellitus, hypertension, hyperlipidemia,

hypothyroidism, and congestive heart failure. Patient follows in the office with

Dr. Schwartz. We have been asked to see the patient in consultation for congestive

heart failure. Patient examined at the bedside. Patient reports increased 

shortness of breath and increased lower extremity edema over the past 3-4 days. 

He denies chest pain or pressure. He reports compliance with all of his 

medications. Patient was started on IV lasix in the emergency room. He states 

his shortness of breath has improved this morning. Blood pressure 115/70. Heart 

rate in the 80s. He is on 2L nasal cannula with oxygen saturations greater than 

92%. He is afebrile. 





5/15


Patient seen and examined.  Patient still has significant lower extremity edema.

 No significant shortness breath.  Discussed keeping his legs elevated.  He has 

good urine output with Lasix 60 mg IV twice a day.  We will increase this to 3 

times a day.  Echocardiogram performed yesterday shows ejection fraction 45%.





REVIEW OF SYSTEMS: 


At the time of my exam:


CONSTITUTIONAL: Denies fever or chills.


HEENT: Denies blurred vision, vision changes, or eye pain. Denies hemoptysis 


CARDIOVASCULAR: Denies chest pain. Denies orthopnea. Denies PND. Denies palpi

tations


RESPIRATORY: Denies shortness of breath. 


GASTROINTESTINAL: Denies abdominal pain. Denies nausea or vomiting. 


HEMATOLOGIC: Denies bleeding disorders.


GENITOURINARY:  Denies any blood in urine.


SKIN: Denies pruitis. Denies rash.





PHYSICAL EXAM: 


VITAL SIGNS: Reviewed.


GENERAL: Well-developed in no acute distress. 


HEENT: Head is normocephalic. Pupils are equal, round. Sclerae anicteric. Mucous

membranes of the mouth are moist. Neck supple. No JVD or thyromegaly


LUNGS: Respirations even and unlabored. Lungs essentially clear to auscultation 

bilaterally.


HEART: Irregular rate and rhythm.  S1 and S2 heard.  Systolic murmur noted.


ABDOMEN: Soft. Nondistended. Nontender.


EXTREMITIES: Normal range of motion.  No clubbing or cyanosis.  Peripheral 

pulses intact.  1+ bilateral lower extremity edema


NEUROLOGIC: Awake and alert. Oriented x 3. 





ASSESSMENT: 


Acute exacerbation of chronic systolic heart failure


Paroxysmal atrial fibrillation, on anticoagulation with Coumadin


Hyperlipidemia


Hypertension


Mild nonobstructive coronary artery disease


History of nonischemic cardiomyopathy, EF 30% per cath in 2017, improved to 45%


Valvular heart disease


Moderate to severe pulmonary hypertension


Hypothyroidism


Obesity





PLAN: 


Echocardiogram reviewed with ejection fraction 45%


Increase Lasix to 60 mg IV 3 times a day


Monitor kidney function


Daily weights


Accurate I&O


Continue telemetry monitoring


Daily INR. Coumadin per pharmacy


Further recommendations pending patient course








Objective





- Vital Signs


Vital signs: 


                                   Vital Signs











Temp  98.3 F   05/15/21 07:35


 


Pulse  72   05/15/21 07:35


 


Resp  16   05/15/21 08:00


 


BP  108/66   05/15/21 07:35


 


Pulse Ox  98   05/15/21 07:35








                                 Intake & Output











 05/14/21 05/15/21 05/15/21





 18:59 06:59 18:59


 


Weight 150.2 kg 150.1 kg 


 


Other:   


 


  Voiding Method  Toilet Toilet














- Labs


CBC & Chem 7: 


                                 05/14/21 05:59





                                 05/14/21 05:59


Labs: 


                  Abnormal Lab Results - Last 24 Hours (Table)











  05/14/21 05/14/21 05/14/21 Range/Units





  09:22 11:23 17:15 


 


PT  43.8 H    (9.0-12.0)  sec


 


INR  4.6 H    (<1.2)  


 


POC Glucose (mg/dL)   161 H  303 H  (75-99)  mg/dL














  05/14/21 05/15/21 05/15/21 Range/Units





  20:25 05:30 07:37 


 


PT   22.9 H   (9.0-12.0)  sec


 


INR   2.4 H   (<1.2)  


 


POC Glucose (mg/dL)  124 H   60 L  (75-99)  mg/dL














  05/15/21 Range/Units





  07:53 


 


PT   (9.0-12.0)  sec


 


INR   (<1.2)  


 


POC Glucose (mg/dL)  70 L  (75-99)  mg/dL

## 2021-05-15 NOTE — P.PN
Subjective





76 years old  male patient of Dr. Rojo for past medical history 

of paroxysmal atrial fibrillation, hyperlipidemia, hypertension, coronary artery

disease, history of nonischemic cardiomyopathy with EF 30% 2017, valvular heart 

disease, moderate to severe pulmonary hypertension, hypothyroidism, obesity, 

and, type 2 diabetes comes in with worsening lower extremity swelling and 

shortness of breath for the past 3-4 days.  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 

therapy and was decided to come to the ER.  Patient does endorse shortness of 

breath, denies any chest pain, palpitation, dizziness or lightheadedness.  

Patient is given a dose of IV Lasix yesterday and was in admitted with 

cardiology evaluation.


Vitals suggestive of 97.4 pulse 88 respiratory rate 18 blood pressure 1:15/70 

oxygen saturation 99% on 2 L


 labs reviewed hemoglobin 9.6, INR 4.6 this morning increased from 3.4, BUN 32 

creatinine 1.4, baseline creatinine 0.9 troponin negative, BNP 8000.  COVID-19 

negative. 


Chest x-ray pulmonary edema


EKG suggestive of atrial fibrillation





5/15: Patient evaluated at bedside for follow-up.  Patient reports his shortness

of breath has improved from yesterday. He continues to have significant lower 

extremity edema, Ace wraps have been ordered.  Lasix was increased to 3 times a 

day per cardiology.  Patient has not had any significant weight loss, will 

continue to monitor I&O's.  Patient's bladder seemed distended on exam today 

post void residual ordered.  He had complaints of insomnia, melatonin was 

ordered. Patient had echocardiogram yesterday which showed moderate concentric 

left ventricular hypertrophy, left ventricular systolic function moderately 

impaired with an EF between 40-45%, mild mitral and tricuspid regurgitation.  

Vital signs are stable, he is afebrile at 98.3, blood pressure 108/66, with a 

heart rate is 72, he is 98% on 2 L via nasal cannula.





Objective





- Vital Signs


Vital signs: 


                                   Vital Signs











Temp  98.3 F   05/15/21 07:35


 


Pulse  72   05/15/21 07:35


 


Resp  16   05/15/21 08:00


 


BP  108/66   05/15/21 07:35


 


Pulse Ox  98   05/15/21 07:35








                                 Intake & Output











 05/14/21 05/15/21 05/15/21





 18:59 06:59 18:59


 


Weight 150.2 kg 150.1 kg 


 


Other:   


 


  Voiding Method  Toilet Toilet














- Exam





- Constitutional


General appearance: cooperative, mild acute distress, obese





- EENT


Eyes: anicteric sclerae, PERRLA, normal appearance


ENT: hearing grossly normal





- Neck


Neck: no lymphadenopathy, normal ROM, no other, no rigidity, no stridor, no 

thyromegaly





- Respiratory


Respiratory: bilateral: Mild crackles at the bases negative for wheezing


- Cardiovascular


Rhythm: Irregular


Heart sounds: normal: S1, S2 S3 gallop


Abnormal Heart Sounds: 3/6 systolic murmur, no diastolic murmur, no rub,, no S4 

Gallop, no click





- Gastrointestinal


General gastrointestinal: normal bowel sounds, soft nontender





- Integumentary


Integumentary:  3+ pitting edema up to the knee, chronic dermatitis





- Neurologic


Neurologic: No gross motor or sensory deficit





- Musculoskeletal


Musculoskeletal: gait not assessed, strength equal bilaterally





- Psychiatric


Psychiatric: A&O x's 3, appropriate affect





- Labs


CBC & Chem 7: 


                                 05/14/21 05:59





                                 05/14/21 05:59


Labs: 


                  Abnormal Lab Results - Last 24 Hours (Table)











  05/14/21 05/14/21 05/15/21 Range/Units





  17:15 20:25 05:30 


 


PT    22.9 H  (9.0-12.0)  sec


 


INR    2.4 H  (<1.2)  


 


POC Glucose (mg/dL)  303 H  124 H   (75-99)  mg/dL














  05/15/21 05/15/21 Range/Units





  07:37 07:53 


 


PT    (9.0-12.0)  sec


 


INR    (<1.2)  


 


POC Glucose (mg/dL)  60 L  70 L  (75-99)  mg/dL














Assessment and Plan


Plan: 


#1 acute exacerbation of congestive heart failure with previous EF reported to 

be 30%.  Increase Lasix to 60 IV 3 times a day.  FIGUEROA.  Daily weight.  ProBNP 

elevated





#2 acute kidney injury likely cardiorenal process as baseline creatinine 0.9.  

Continue Lasix 60 IV twice a day.  Nephrology consulted.





#3 paroxysmal atrial fibrillation on and evaluation with Coumadin.  INRs a 

supratherapeutic hold Coumadin





#4 Coumadin coagulopathy.  Hold Coumadin  goal INR 2.5-3.5.  2.4 today, pharmacy

to dose Coumadin





#5 hyperlipidemia on simvastatin 40 mg bedtime





#6 hypertension continue metoprolol 12.5 daily at bedtime





#7 mild coronary artery disease continue Coumadin, metoprolol





#8 nonischemic cardiomyopathy follows cardiology last EF reported to be 30%.  

Repeat echo shows EF of 40-45%





#9 hypothyroidism on levothyroxine 125 by mouth daily





#10 type 2 diabetes with diabetic neuropathy.  Gabapentin reduced to 300 twice a

day from 3 times a day continue insulin sliding scale hold metformin continue 

Levemir at 40 units at bedtime





#11 morbid obesity





#12 osteoarthritis continue tramadol for pain control stable





#13 obstructive sleep apnea on CPAP





#14 BPH status post TURP





#15 CODE STATUS full code





#16 DVT prophylaxis INR supratherapeutic pharmacy to dose Coumadin





#17 disposition patient will need 2 inpatient nights for stabilization








The above impression and plan of care have been discussed and directed by 

signing physician. Do Maloney nurse practitioner acting as scribe for 

signing physician.

## 2021-05-16 LAB
ALBUMIN SERPL-MCNC: 3.4 G/DL (ref 3.5–5)
ALBUMIN/GLOB SERPL: 1.1 {RATIO}
ALP SERPL-CCNC: 110 U/L (ref 38–126)
ALT SERPL-CCNC: 20 U/L (ref 4–49)
ANION GAP SERPL CALC-SCNC: 8 MMOL/L
AST SERPL-CCNC: 43 U/L (ref 17–59)
BUN SERPL-SCNC: 34 MG/DL (ref 9–20)
CALCIUM SPEC-MCNC: 8.6 MG/DL (ref 8.4–10.2)
CHLORIDE SERPL-SCNC: 99 MMOL/L (ref 98–107)
CO2 SERPL-SCNC: 32 MMOL/L (ref 22–30)
ERYTHROCYTE [DISTWIDTH] IN BLOOD BY AUTOMATED COUNT: 3.38 M/UL (ref 4.3–5.9)
ERYTHROCYTE [DISTWIDTH] IN BLOOD: 17.2 % (ref 11.5–15.5)
GLOBULIN SER CALC-MCNC: 3 G/DL
GLUCOSE BLD-MCNC: 125 MG/DL (ref 75–99)
GLUCOSE BLD-MCNC: 156 MG/DL (ref 75–99)
GLUCOSE BLD-MCNC: 185 MG/DL (ref 75–99)
GLUCOSE BLD-MCNC: 95 MG/DL (ref 75–99)
GLUCOSE SERPL-MCNC: 137 MG/DL (ref 74–99)
HCT VFR BLD AUTO: 27.1 % (ref 39–53)
HGB BLD-MCNC: 8.5 GM/DL (ref 13–17.5)
INR PPP: 1.7 (ref ?–1.2)
MAGNESIUM SPEC-SCNC: 1.6 MG/DL (ref 1.6–2.3)
MCH RBC QN AUTO: 25.2 PG (ref 25–35)
MCHC RBC AUTO-ENTMCNC: 31.4 G/DL (ref 31–37)
MCV RBC AUTO: 80.3 FL (ref 80–100)
PLATELET # BLD AUTO: 103 K/UL (ref 150–450)
POTASSIUM SERPL-SCNC: 3.6 MMOL/L (ref 3.5–5.1)
PROT SERPL-MCNC: 6.4 G/DL (ref 6.3–8.2)
PT BLD: 17.1 SEC (ref 9–12)
SODIUM SERPL-SCNC: 139 MMOL/L (ref 137–145)
WBC # BLD AUTO: 4.1 K/UL (ref 3.8–10.6)

## 2021-05-16 RX ADMIN — SERTRALINE HYDROCHLORIDE SCH MG: 100 TABLET ORAL at 08:37

## 2021-05-16 RX ADMIN — Medication SCH MG: at 16:54

## 2021-05-16 RX ADMIN — Medication SCH MG: at 19:52

## 2021-05-16 RX ADMIN — GABAPENTIN SCH MG: 300 CAPSULE ORAL at 19:52

## 2021-05-16 RX ADMIN — METOPROLOL TARTRATE SCH MG: 25 TABLET, FILM COATED ORAL at 19:52

## 2021-05-16 RX ADMIN — Medication SCH MG: at 19:51

## 2021-05-16 RX ADMIN — INSULIN DETEMIR SCH UNIT: 100 INJECTION, SOLUTION SUBCUTANEOUS at 21:00

## 2021-05-16 RX ADMIN — POTASSIUM CHLORIDE SCH MEQ: 20 TABLET, EXTENDED RELEASE ORAL at 19:52

## 2021-05-16 RX ADMIN — INSULIN ASPART SCH UNIT: 100 INJECTION, SOLUTION INTRAVENOUS; SUBCUTANEOUS at 21:01

## 2021-05-16 RX ADMIN — INSULIN ASPART SCH UNIT: 100 INJECTION, SOLUTION INTRAVENOUS; SUBCUTANEOUS at 17:47

## 2021-05-16 RX ADMIN — FUROSEMIDE SCH MG: 10 INJECTION, SOLUTION INTRAMUSCULAR; INTRAVENOUS at 16:54

## 2021-05-16 RX ADMIN — INSULIN ASPART SCH: 100 INJECTION, SOLUTION INTRAVENOUS; SUBCUTANEOUS at 07:30

## 2021-05-16 RX ADMIN — Medication SCH MG: at 08:38

## 2021-05-16 RX ADMIN — POTASSIUM CHLORIDE SCH MEQ: 20 TABLET, EXTENDED RELEASE ORAL at 08:38

## 2021-05-16 RX ADMIN — FUROSEMIDE SCH MG: 10 INJECTION, SOLUTION INTRAMUSCULAR; INTRAVENOUS at 23:41

## 2021-05-16 RX ADMIN — INSULIN ASPART SCH: 100 INJECTION, SOLUTION INTRAVENOUS; SUBCUTANEOUS at 12:27

## 2021-05-16 RX ADMIN — LATANOPROST SCH DROPS: 50 SOLUTION OPHTHALMIC at 19:52

## 2021-05-16 RX ADMIN — FUROSEMIDE SCH MG: 10 INJECTION, SOLUTION INTRAMUSCULAR; INTRAVENOUS at 09:00

## 2021-05-16 RX ADMIN — ATORVASTATIN CALCIUM SCH MG: 20 TABLET, FILM COATED ORAL at 19:52

## 2021-05-16 RX ADMIN — Medication SCH MCG: at 08:38

## 2021-05-16 RX ADMIN — LEVOTHYROXINE SODIUM SCH MCG: 0.12 TABLET ORAL at 06:10

## 2021-05-16 RX ADMIN — GABAPENTIN SCH MG: 300 CAPSULE ORAL at 08:37

## 2021-05-16 NOTE — P.PN
Subjective





HISTORY OF PRESENT ILLNESS:  





This is a 76-year-old male with a past medical history significant for 

paroxysmal atrial fibrillation, diabetes mellitus, hypertension, hyperlipidemia,

hypothyroidism, and congestive heart failure. Patient follows in the office with

Dr. Schwartz. We have been asked to see the patient in consultation for congestive

heart failure. Patient examined at the bedside. Patient reports increased 

shortness of breath and increased lower extremity edema over the past 3-4 days. 

He denies chest pain or pressure. He reports compliance with all of his 

medications. Patient was started on IV lasix in the emergency room. He states 

his shortness of breath has improved this morning. Blood pressure 115/70. Heart 

rate in the 80s. He is on 2L nasal cannula with oxygen saturations greater than 

92%. He is afebrile. 





5/15


Patient seen and examined.  Patient still has significant lower extremity edema.

 No significant shortness breath.  Discussed keeping his legs elevated.  He has 

good urine output with Lasix 60 mg IV twice a day.  We will increase this to 3 

times a day.  Echocardiogram performed yesterday shows ejection fraction 45%.





5/16


Patient seen and examined.  Lasix was increased to 3 times a day.  He admits to 

continued high urine output.  His creatinine was down to 1.1 today.





REVIEW OF SYSTEMS: 


At the time of my exam:


CONSTITUTIONAL: Denies fever or chills.


HEENT: Denies blurred vision, vision changes, or eye pain. Denies hemoptysis 


CARDIOVASCULAR: Denies chest pain. Denies orthopnea. Denies PND. Denies 

palpitations


RESPIRATORY: Denies shortness of breath. 


GASTROINTESTINAL: Denies abdominal pain. Denies nausea or vomiting. 


HEMATOLOGIC: Denies bleeding disorders.


GENITOURINARY:  Denies any blood in urine.


SKIN: Denies pruitis. Denies rash.





PHYSICAL EXAM: 


VITAL SIGNS: Reviewed.


GENERAL: Well-developed in no acute distress. 


HEENT: Head is normocephalic. Pupils are equal, round. Sclerae anicteric. Mucous

membranes of the mouth are moist. Neck supple. No JVD or thyromegaly


LUNGS: Respirations even and unlabored. Lungs essentially clear to auscultation 

bilaterally.


HEART: Irregular rate and rhythm.  S1 and S2 heard.  Systolic murmur noted.


ABDOMEN: Soft. Nondistended. Nontender.


EXTREMITIES: Normal range of motion.  No clubbing or cyanosis.  Peripheral 

pulses intact.  1+ bilateral lower extremity edema


NEUROLOGIC: Awake and alert. Oriented x 3. 





ASSESSMENT: 


Acute exacerbation of chronic systolic heart failure


Paroxysmal atrial fibrillation, on anticoagulation with Coumadin


Hyperlipidemia


Hypertension


Mild nonobstructive coronary artery disease


History of nonischemic cardiomyopathy, EF 30% per cath in 2017, improved to 45%


Valvular heart disease


Moderate to severe pulmonary hypertension


Hypothyroidism


Obesity





PLAN: 


Echocardiogram reviewed with ejection fraction 45%


Lasix to 60 mg IV 3 times a day


Monitor kidney function, improving with diuresis


Daily weights


Accurate I&O


Continue telemetry monitoring


Daily INR. Coumadin per pharmacy


Further recommendations pending patient course





Objective





- Vital Signs


Vital signs: 


                                   Vital Signs











Temp  97.8 F   05/16/21 06:50


 


Pulse  76   05/16/21 06:50


 


Resp  16   05/16/21 06:50


 


BP  109/67   05/16/21 06:50


 


Pulse Ox  92 L  05/16/21 06:50








                                 Intake & Output











 05/15/21 05/16/21 05/16/21





 18:59 06:59 18:59


 


Output Total  250 


 


Balance  -250 


 


Weight   149.4 kg


 


Output:   


 


  Urine  250 


 


Other:   


 


  Voiding Method Toilet Toilet 














- Labs


CBC & Chem 7: 


                                 05/16/21 04:00





                                 05/16/21 04:00


Labs: 


                  Abnormal Lab Results - Last 24 Hours (Table)











  05/15/21 05/15/21 05/15/21 Range/Units





  11:42 17:30 20:43 


 


RBC     (4.30-5.90)  m/uL


 


Hgb     (13.0-17.5)  gm/dL


 


Hct     (39.0-53.0)  %


 


RDW     (11.5-15.5)  %


 


Plt Count     (150-450)  k/uL


 


PT     (9.0-12.0)  sec


 


INR     (<1.2)  


 


Carbon Dioxide     (22-30)  mmol/L


 


BUN     (9-20)  mg/dL


 


Glucose     (74-99)  mg/dL


 


POC Glucose (mg/dL)  148 H  125 H  176 H  (75-99)  mg/dL


 


Albumin     (3.5-5.0)  g/dL














  05/16/21 05/16/21 05/16/21 Range/Units





  04:00 04:00 04:00 


 


RBC   3.38 L   (4.30-5.90)  m/uL


 


Hgb   8.5 L   (13.0-17.5)  gm/dL


 


Hct   27.1 L   (39.0-53.0)  %


 


RDW   17.2 H   (11.5-15.5)  %


 


Plt Count   103 L   (150-450)  k/uL


 


PT  17.1 H    (9.0-12.0)  sec


 


INR  1.7 H    (<1.2)  


 


Carbon Dioxide    32 H  (22-30)  mmol/L


 


BUN    34 H  (9-20)  mg/dL


 


Glucose    137 H  (74-99)  mg/dL


 


POC Glucose (mg/dL)     (75-99)  mg/dL


 


Albumin    3.4 L  (3.5-5.0)  g/dL

## 2021-05-16 NOTE — XR
EXAMINATION TYPE: XR chest 2V

 

DATE OF EXAM: 5/16/2021

 

COMPARISON: 5/13/2021

 

TECHNIQUE: PA and lateral views submitted.

 

HISTORY: Difficulty breathing

 

FINDINGS:

Persistent diffuse increased interstitial pattern with small left effusion. Heart size stable. No pne
umothorax. Arthropathy of the shoulders.

 

IMPRESSION:

1. Correlate for interstitial pneumonia or venous congestion stable in appearance.

## 2021-05-16 NOTE — P.PN
Subjective





76 years old  male patient of Dr. Rojo for past medical history 

of paroxysmal atrial fibrillation, hyperlipidemia, hypertension, coronary artery

disease, history of nonischemic cardiomyopathy with EF 30% 2017, valvular heart 

disease, moderate to severe pulmonary hypertension, hypothyroidism, obesity, 

and, type 2 diabetes comes in with worsening lower extremity swelling and 

shortness of breath for the past 3-4 days.  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 

therapy and was decided to come to the ER.  Patient does endorse shortness of 

breath, denies any chest pain, palpitation, dizziness or lightheadedness.  

Patient is given a dose of IV Lasix yesterday and was in admitted with 

cardiology evaluation.


Vitals suggestive of 97.4 pulse 88 respiratory rate 18 blood pressure 1:15/70 

oxygen saturation 99% on 2 L


 labs reviewed hemoglobin 9.6, INR 4.6 this morning increased from 3.4, BUN 32 

creatinine 1.4, baseline creatinine 0.9 troponin negative, BNP 8000.  COVID-19 

negative. 


Chest x-ray pulmonary edema


EKG suggestive of atrial fibrillation





5/15: Patient evaluated at bedside for follow-up.  Patient reports his shortness

of breath has improved from yesterday. He continues to have significant lower 

extremity edema, Ace wraps have been ordered.  Lasix was increased to 3 times a 

day per cardiology.  Patient has not had any significant weight loss, will 

continue to monitor I&O's.  Patient's bladder seemed distended on exam today 

post void residual ordered.  He had complaints of insomnia, melatonin was 

ordered. Patient had echocardiogram yesterday which showed moderate concentric 

left ventricular hypertrophy, left ventricular systolic function moderately 

impaired with an EF between 40-45%, mild mitral and tricuspid regurgitation.  

Vital signs are stable, he is afebrile at 98.3, blood pressure 108/66, with a 

heart rate is 72, he is 98% on 2 L via nasal cannula.





5/16: Patient evaluated sitting up at the bedside chair.  Continues to have 

significant lower extremity edema and dyspnea with exertion.  Patient encouraged

to elevate feet while in the bedside chair and also continue with Ace wraps.  He

continues to be maintained on Lasix 60 mg IV 3 times daily, metolazone 2.5 mg 

have been added for Sunday and Thursday.  Will obtain a chest x-ray as well.  

Laboratory values today show WBC 4.1, hemoglobin 8.5, sodium 139, BUN 34, 

creatinine 1.1. He has a 0.7 kg weight loss since yesterday.  Blood pressure a 

little on the softer side at 109/67, he is afebrile with a temperature of 97.8, 

heart rate 76, 95% on 2 L via nasal cannula.





Objective





- Vital Signs


Vital signs: 


                                   Vital Signs











Temp  97.8 F   05/16/21 06:50


 


Pulse  76   05/16/21 06:50


 


Resp  16   05/16/21 06:50


 


BP  109/67   05/16/21 06:50


 


Pulse Ox  92 L  05/16/21 06:50








                                 Intake & Output











 05/15/21 05/16/21 05/16/21





 18:59 06:59 18:59


 


Output Total  250 


 


Balance  -250 


 


Weight   149.4 kg


 


Output:   


 


  Urine  250 


 


Other:   


 


  Voiding Method Toilet Toilet 














- Exam





- Constitutional


General appearance: cooperative, mild acute distress, obese





- EENT


Eyes: anicteric sclerae, PERRLA, normal appearance


ENT: hearing grossly normal





- Neck


Neck: no lymphadenopathy, normal ROM, no other, no rigidity, no stridor, no 

thyromegaly





- Respiratory


Respiratory: bilateral: Mild crackles at the bases negative for wheezing


- Cardiovascular


Rhythm: Irregular


Heart sounds: normal: S1, S2 S3 gallop


Abnormal Heart Sounds: 3/6 systolic murmur, no diastolic murmur, no rub,, no S4 

Gallop, no click





- Gastrointestinal


General gastrointestinal: normal bowel sounds, soft nontender





- Integumentary


Integumentary:  3+ pitting edema up to the knee, chronic dermatitis.





- Neurologic


Neurologic: No gross motor or sensory deficit





- Musculoskeletal


Musculoskeletal: gait not assessed, strength equal bilaterally





- Psychiatric


Psychiatric: A&O x's 3, appropriate affect





- Labs


CBC & Chem 7: 


                                 05/16/21 04:00





                                 05/16/21 04:00


Labs: 


                  Abnormal Lab Results - Last 24 Hours (Table)











  05/15/21 05/15/21 05/15/21 Range/Units





  11:42 17:30 20:43 


 


RBC     (4.30-5.90)  m/uL


 


Hgb     (13.0-17.5)  gm/dL


 


Hct     (39.0-53.0)  %


 


RDW     (11.5-15.5)  %


 


Plt Count     (150-450)  k/uL


 


PT     (9.0-12.0)  sec


 


INR     (<1.2)  


 


Carbon Dioxide     (22-30)  mmol/L


 


BUN     (9-20)  mg/dL


 


Glucose     (74-99)  mg/dL


 


POC Glucose (mg/dL)  148 H  125 H  176 H  (75-99)  mg/dL


 


Albumin     (3.5-5.0)  g/dL














  05/16/21 05/16/21 05/16/21 Range/Units





  04:00 04:00 04:00 


 


RBC   3.38 L   (4.30-5.90)  m/uL


 


Hgb   8.5 L   (13.0-17.5)  gm/dL


 


Hct   27.1 L   (39.0-53.0)  %


 


RDW   17.2 H   (11.5-15.5)  %


 


Plt Count   103 L   (150-450)  k/uL


 


PT  17.1 H    (9.0-12.0)  sec


 


INR  1.7 H    (<1.2)  


 


Carbon Dioxide    32 H  (22-30)  mmol/L


 


BUN    34 H  (9-20)  mg/dL


 


Glucose    137 H  (74-99)  mg/dL


 


POC Glucose (mg/dL)     (75-99)  mg/dL


 


Albumin    3.4 L  (3.5-5.0)  g/dL














Assessment and Plan


Plan: 


#1 acute exacerbation of congestive heart failure with previous EF reported to 

be 30%.  Increase Lasix to 60 IV 3 times a day.  I&O.  Daily weight.  ProBNP 

elevated.  Metolazone 2.5 mg for Sunday and Thursday added





#2 acute kidney injury likely cardiorenal process as baseline creatinine 0.9.  

Continue Lasix 60 IV three a day. continue to monitor kidney function closely 





#3 paroxysmal atrial fibrillation on and evaluation with Coumadin.  INR 1.7 

today, pharmacy dosing Coumadin





#4 Coumadin coagulopathy.   INR was supratherapeutic at 4.6, now 1.7 pharmacy to

dose Coumadin





#5 hyperlipidemia on simvastatin 40 mg bedtime





#6 hypertension continue metoprolol 12.5 daily at bedtime





#7 mild coronary artery disease continue Coumadin, metoprolol





#8 nonischemic cardiomyopathy follows cardiology last EF reported to be 30%.  

Repeat echo shows EF of 40-45%





#9 hypothyroidism on levothyroxine 125 by mouth daily





#10 type 2 diabetes with diabetic neuropathy.  Gabapentin reduced to 300 twice a

day from 3 times a day continue insulin sliding scale hold metformin continue 

Levemir at 40 units at bedtime





#11 morbid obesity.





#12 osteoarthritis continue tramadol for pain control stable





#13 obstructive sleep apnea on CPAP





#14 BPH status post TURP





#15 CODE STATUS full code





#16 DVT prophylaxis INR was supratherapeutic. pharmacy to dose Coumadin





#17 disposition patient will need 2 inpatient nights for stabilization








The above impression and plan of care have been discussed and directed by 

signing physician. Do Maloney nurse practitioner acting as scribe for 

signing physician.

## 2021-05-17 LAB
ALBUMIN SERPL-MCNC: 3.9 G/DL (ref 3.8–4.9)
ALBUMIN/GLOB SERPL: 1.3 G/DL (ref 1.6–3.17)
ALP SERPL-CCNC: 128 U/L (ref 41–126)
ALT SERPL-CCNC: 24 U/L (ref 10–49)
ANION GAP SERPL CALC-SCNC: 9.4 MMOL/L (ref 4–12)
AST SERPL-CCNC: 44 U/L (ref 14–35)
BUN SERPL-SCNC: 33 MG/DL (ref 9–27)
BUN/CREAT SERPL: 27.5 RATIO (ref 12–20)
CALCIUM SPEC-MCNC: 8.9 MG/DL (ref 8.7–10.3)
CHLORIDE SERPL-SCNC: 98 MMOL/L (ref 96–109)
CO2 SERPL-SCNC: 33.6 MMOL/L (ref 21.6–31.8)
ERYTHROCYTE [DISTWIDTH] IN BLOOD BY AUTOMATED COUNT: 3.62 X 10*6/UL (ref 4.4–5.6)
ERYTHROCYTE [DISTWIDTH] IN BLOOD: 17.8 % (ref 11.5–14.5)
GLOBULIN SER CALC-MCNC: 3 G/DL (ref 1.6–3.3)
GLUCOSE BLD-MCNC: 129 MG/DL (ref 75–99)
GLUCOSE BLD-MCNC: 170 MG/DL (ref 75–99)
GLUCOSE BLD-MCNC: 194 MG/DL (ref 75–99)
GLUCOSE BLD-MCNC: 68 MG/DL (ref 75–99)
GLUCOSE BLD-MCNC: 79 MG/DL (ref 75–99)
GLUCOSE SERPL-MCNC: 75 MG/DL (ref 70–110)
HCT VFR BLD AUTO: 29.6 % (ref 39.6–50)
HGB BLD-MCNC: 9 G/DL (ref 13–17)
INR PPP: 1.4 (ref ?–1.2)
MCH RBC QN AUTO: 24.9 PG (ref 27–32)
MCHC RBC AUTO-ENTMCNC: 30.4 G/DL (ref 32–37)
MCV RBC AUTO: 81.8 FL (ref 80–97)
PLATELET # BLD AUTO: 132 X 10*3/UL (ref 140–440)
POTASSIUM SERPL-SCNC: 3.7 MMOL/L (ref 3.5–5.5)
PROT SERPL-MCNC: 6.9 G/DL (ref 6.2–8.2)
PT BLD: 14 SEC (ref 9–12)
SODIUM SERPL-SCNC: 141 MMOL/L (ref 135–145)
WBC # BLD AUTO: 6.19 X 10*3/UL (ref 4.5–10)

## 2021-05-17 RX ADMIN — LATANOPROST SCH DROPS: 50 SOLUTION OPHTHALMIC at 20:46

## 2021-05-17 RX ADMIN — POTASSIUM CHLORIDE SCH MEQ: 20 TABLET, EXTENDED RELEASE ORAL at 20:45

## 2021-05-17 RX ADMIN — INSULIN DETEMIR SCH UNIT: 100 INJECTION, SOLUTION SUBCUTANEOUS at 20:47

## 2021-05-17 RX ADMIN — FUROSEMIDE SCH MG: 10 INJECTION, SOLUTION INTRAMUSCULAR; INTRAVENOUS at 08:15

## 2021-05-17 RX ADMIN — FUROSEMIDE SCH MG: 10 INJECTION, SOLUTION INTRAMUSCULAR; INTRAVENOUS at 23:04

## 2021-05-17 RX ADMIN — INSULIN ASPART SCH UNIT: 100 INJECTION, SOLUTION INTRAVENOUS; SUBCUTANEOUS at 17:21

## 2021-05-17 RX ADMIN — FUROSEMIDE SCH MG: 10 INJECTION, SOLUTION INTRAMUSCULAR; INTRAVENOUS at 15:52

## 2021-05-17 RX ADMIN — GABAPENTIN SCH MG: 300 CAPSULE ORAL at 20:45

## 2021-05-17 RX ADMIN — GABAPENTIN SCH MG: 300 CAPSULE ORAL at 08:16

## 2021-05-17 RX ADMIN — Medication SCH MG: at 15:53

## 2021-05-17 RX ADMIN — SILVER SULFADIAZINE SCH APPLIC: 10 CREAM TOPICAL at 11:48

## 2021-05-17 RX ADMIN — LEVOTHYROXINE SODIUM SCH MCG: 0.12 TABLET ORAL at 06:20

## 2021-05-17 RX ADMIN — METOPROLOL TARTRATE SCH MG: 25 TABLET, FILM COATED ORAL at 20:45

## 2021-05-17 RX ADMIN — Medication SCH MG: at 08:16

## 2021-05-17 RX ADMIN — Medication SCH MG: at 20:45

## 2021-05-17 RX ADMIN — ATORVASTATIN CALCIUM SCH MG: 20 TABLET, FILM COATED ORAL at 20:45

## 2021-05-17 RX ADMIN — INSULIN ASPART SCH: 100 INJECTION, SOLUTION INTRAVENOUS; SUBCUTANEOUS at 13:30

## 2021-05-17 RX ADMIN — Medication SCH MG: at 20:46

## 2021-05-17 RX ADMIN — SERTRALINE HYDROCHLORIDE SCH MG: 100 TABLET ORAL at 08:16

## 2021-05-17 RX ADMIN — Medication SCH MCG: at 08:16

## 2021-05-17 RX ADMIN — INSULIN ASPART SCH UNIT: 100 INJECTION, SOLUTION INTRAVENOUS; SUBCUTANEOUS at 20:46

## 2021-05-17 RX ADMIN — POTASSIUM CHLORIDE SCH MEQ: 20 TABLET, EXTENDED RELEASE ORAL at 08:16

## 2021-05-17 RX ADMIN — INSULIN ASPART SCH: 100 INJECTION, SOLUTION INTRAVENOUS; SUBCUTANEOUS at 07:35

## 2021-05-17 NOTE — P.PN
Subjective


Progress Note Date: 05/17/21


HISTORY OF PRESENT ILLNESS


76 years old  male patient of Dr. Rojo for past medical history 

of paroxysmal atrial fibrillation, hyperlipidemia, hypertension, coronary artery

disease, history of nonischemic cardiomyopathy with EF 30% 2017, valvular heart 

disease, moderate to severe pulmonary hypertension, hypothyroidism, obesity, 

and, type 2 diabetes comes in with worsening lower extremity swelling and 

shortness of breath for the past 3-4 days.  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 

therapy and was decided to come to the ER.  Patient does endorse shortness of 

breath, denies any chest pain, palpitation, dizziness or lightheadedness.  

Patient is given a dose of IV Lasix yesterday and was in admitted with 

cardiology evaluation.


Vitals suggestive of 97.4 pulse 88 respiratory rate 18 blood pressure 1:15/70 

oxygen saturation 99% on 2 L


 labs reviewed hemoglobin 9.6, INR 4.6 this morning increased from 3.4, BUN 32 

creatinine 1.4, baseline creatinine 0.9 troponin negative, BNP 8000.  COVID-19 

negative. 


Chest x-ray pulmonary edema


EKG suggestive of atrial fibrillation





5/15: Patient evaluated at bedside for follow-up.  Patient reports his shortness

of breath has improved from yesterday. He continues to have significant lower 

extremity edema, Ace wraps have been ordered.  Lasix was increased to 3 times a 

day per cardiology.  Patient has not had any significant weight loss, will 

continue to monitor I&O's.  Patient's bladder seemed distended on exam today 

post void residual ordered.  He had complaints of insomnia, melatonin was 

ordered. Patient had echocardiogram yesterday which showed moderate concentric 

left ventricular hypertrophy, left ventricular systolic function moderately 

impaired with an EF between 40-45%, mild mitral and tricuspid regurgitation.  

Vital signs are stable, he is afebrile at 98.3, blood pressure 108/66, with a 

heart rate is 72, he is 98% on 2 L via nasal cannula.





5/16: Patient evaluated sitting up at the bedside chair.  Continues to have 

significant lower extremity edema and dyspnea with exertion.  Patient encouraged

to elevate feet while in the bedside chair and also continue with Ace wraps.  He

continues to be maintained on Lasix 60 mg IV 3 times daily, metolazone 2.5 mg 

have been added for Sunday and Thursday.  Will obtain a chest x-ray as well.  

Laboratory values today show WBC 4.1, hemoglobin 8.5, sodium 139, BUN 34, 

creatinine 1.1. He has a 0.7 kg weight loss since yesterday.  Blood pressure a 

little on the softer side at 109/67, he is afebrile with a temperature of 97.8, 

heart rate 76, 95% on 2 L via nasal cannula.





5/17: Patient continues to have lower extremity edema with mild erythema.  He 

has been up to the bathroom. Home oxygen is 2L n/c.  Pulse ox is 97% on 2 L 

nasal cannula.  He has been afebrile, heart rate 79, blood pressure 110/64.  

Repeat blood work reveals WBC 6.1, hemoglobin 9.0.  Platelet count 132.  CO2 33 

otherwise electrolytes are normal.  BUN 33 creatinine 1.2.  Blood sugars are 

running between 66 and 185.  AST 44, alkaline phosphatase 128.  Patient has been

continued on Lasix 60 mg IV 3 times daily.  INR is 1.4 and pharmacy is dosing 

Coumadin.  Discussed compression options with the patient and recommended Cipro 

compression socks after discharge.  Anticipate IV Lasix for another 24 hours and

possible discharge on Tuesday or Wednesday of this week.





REVIEW OF SYSTEMS


Constitutional: No fever, no chills, no night sweats.  No weight change.  No 

weakness, fatigue or lethargy.  No daytime sleepiness.


EENT: No headache.  No blurred vision or double vision, no loss of vision.  No 

loss of Hearing, no ringing in the ears, no dizziness.  No nasal drainage or 

congestion.  No epistaxis.  No sore throat.


Lungs: No shortness of breath, cough, no sputum production.  No wheezing.


Cardiovascular: No chest pain, reports lower extremity edema.  No palpitations. 

No paroxysmal nocturnal dyspnea.  No orthopnea.  No lightheadedness or 

dizziness.  No syncopal episodes.


Abdominal: No abdominal pain.  No nausea, vomiting.  No diarrhea.  No 

constipation.  No bloody or tarry stools..  No loss of appetite.


Genitourinary: No dysuria, increased frequency, urgency.  No urinary retention.


Musculoskeletal: No myalgias.  Reports muscle weakness, no gait dysfunction, no 

frequent falls.  No back pain.  No neck pain.


Integumentary: Reports wounds, no lesions.  No rash or pruritus.  No unusual 

bruising.  No change in hair or nails.


Neurologic: No aphasia. No facial droop. No change in mentation. No head injury.

No headache. No paralysis. No paresthesia.


Psychiatric: No depression.  No anxiety.  No mood swings.


Endocrine: No abnormal blood sugars.





PHYSICAL EXAMINATION


Gen: This is a morbidly obese 76-year-old  male.  He is resting in a 

recliner and appears to be comfortable and in no acute distress.


HEENT: Head is atraumatic, normocephalic. Pupils equal, round. Sclerae is 

anicteric. 


NECK: Supple. No JVD. No lymphadenopathy. No thyromegaly. 


LUNGS: Clear to auscultation. No wheezes or rhonchi.  No intercostal 

retractions.


HEART: Regular rate and rhythm.  3/6 systolic murmur.  No rub, no gallop, no 

click.   


ABDOMEN: Soft. Bowel sounds are present. No masses.  No tenderness.


EXTREMITIES: 2+ bilateral pedal edema with erythema.  No calf tenderness.


NEUROLOGICAL: Patient is awake, alert and oriented x3. Cranial nerves 2 through 

12 are grossly intact. 





ASSESSMENT AND PLAN


1.  Acute on chronic systolic heart failure.  Continue Lasix 60 mg IV 3 times 

daily, monitor I&O and daily weights, monitor electrolytes and renal function.  

Continue metolazone 2.5 mg on Sunday and Thursday.





2.  Acute kidney injury likely secondary to cardiorenal process.  Continue Lasix

for now, monitor renal function.





3.  Paroxysmal atrial fibrillation on Coumadin.  Pharmacy is dosing Coumadin.  

INR 1.4.





4.  Hypercoagulopathy secondary to Coumadin use and illness.  Patient has been 

resumed on Coumadin and pharmacy is dosing.





5.  Mild acute on chronic cellulitis lower extremities.  Patient started on 

Silvadene.





6.  Hypertension, hypertensive cardiovascular disease.  Continue metoprolol 

tartrate 12.5 mg daily at bedtime.





7.  Mild coronary artery disease.  Continue Coumadin, metoprolol, Lasix.





8.  Nonischemic cardiomyopathy.





9.  Hypothyroidism.  Continue levothyroxine 125 g daily.





10.  Diabetes mellitus type 2 with diabetic neuropathy.  Continue Levemir 40 

units at bedtime, NovoLog scale, gabapentin 3 mg twice daily.





11.  Hyperlipidemia.  Continue simvastatin 40 mg at bedtime.





12.  Obstructive sleep apnea on CPAP.





13.  Benign prostatic hypertrophy status post TURP.





14.  Generalized osteoarthritis.  Continue tramadol for pain.





15.  Recurrent depression.  Continue Zoloft 100 mg daily.





16.  Anemia of chronic disease.  Continue ferrous sulfate.





17.  Restless leg syndrome.  Continue Requip 2.5 mg 3 times daily.





18.  DVT prophylaxis.  Coumadin 





19.  COVID-19 testing negative.  Patient has been hospitalized during a 

pandemic.








DISCHARGE PLAN


Home on Tuesday or Wednesday.





Impression and plan of care have been directed as dictated by the signing 

physician.  Mehnaz Quezada nurse practitioner acting as scribe for signing 

physician.








Objective





- Vital Signs


Vital signs: 


                                   Vital Signs











Temp  98.0 F   05/17/21 07:00


 


Pulse  71   05/17/21 07:00


 


Resp  16   05/17/21 07:00


 


BP  113/66   05/17/21 07:00


 


Pulse Ox  97   05/17/21 08:50








                                 Intake & Output











 05/16/21 05/17/21 05/17/21





 18:59 06:59 18:59


 


Weight 149.4 kg 149.5 kg 


 


Other:   


 


  Voiding Method  Toilet 














- Labs


CBC & Chem 7: 


                                 05/17/21 04:36





                                 05/17/21 04:36


Labs: 


                  Abnormal Lab Results - Last 24 Hours (Table)











  05/16/21 05/16/21 05/16/21 Range/Units





  12:03 17:15 20:44 


 


RBC     (4.40-5.60)  X 10*6/uL


 


Hgb     (13.0-17.0)  g/dL


 


Hct     (39.6-50.0)  %


 


MCH     (27.0-32.0)  pg


 


MCHC     (32.0-37.0)  g/dL


 


RDW     (11.5-14.5)  %


 


Plt Count     (140-440)  X 10*3/uL


 


PT     (9.0-12.0)  sec


 


INR     (<1.2)  


 


POC Glucose (mg/dL)  125 H  156 H  185 H  (75-99)  mg/dL














  05/17/21 05/17/21 05/17/21 Range/Units





  04:36 04:38 07:00 


 


RBC  3.62 L    (4.40-5.60)  X 10*6/uL


 


Hgb  9.0 L    (13.0-17.0)  g/dL


 


Hct  29.6 L    (39.6-50.0)  %


 


MCH  24.9 L    (27.0-32.0)  pg


 


MCHC  30.4 L    (32.0-37.0)  g/dL


 


RDW  17.8 H    (11.5-14.5)  %


 


Plt Count  132 L    (140-440)  X 10*3/uL


 


PT   14.0 H   (9.0-12.0)  sec


 


INR   1.4 H   (<1.2)  


 


POC Glucose (mg/dL)    66 L  (75-99)  mg/dL














  05/17/21 Range/Units





  07:15 


 


RBC   (4.40-5.60)  X 10*6/uL


 


Hgb   (13.0-17.0)  g/dL


 


Hct   (39.6-50.0)  %


 


MCH   (27.0-32.0)  pg


 


MCHC   (32.0-37.0)  g/dL


 


RDW   (11.5-14.5)  %


 


Plt Count   (140-440)  X 10*3/uL


 


PT   (9.0-12.0)  sec


 


INR   (<1.2)  


 


POC Glucose (mg/dL)  68 L  (75-99)  mg/dL

## 2021-05-17 NOTE — P.PN
Subjective


HISTORY OF PRESENT ILLNESS:  





This is a 76-year-old male with a past medical history significant for 

paroxysmal atrial fibrillation, diabetes mellitus, hypertension, hyperlipidemia,

hypothyroidism, and congestive heart failure. Patient follows in the office with

Dr. Schwartz. We have been asked to see the patient in consultation for congestive

heart failure. Patient examined at the bedside. Patient reports increased 

shortness of breath and increased lower extremity edema over the past 3-4 days. 

He denies chest pain or pressure. He reports compliance with all of his 

medications. Patient was started on IV lasix in the emergency room. He states 

his shortness of breath has improved this morning. Blood pressure 115/70. Heart 

rate in the 80s. He is on 2L nasal cannula with oxygen saturations greater than 

92%. He is afebrile.  Echocardiogram performed yesterday shows ejection fraction

45%.





5/17/2021


Patient seen and examined, sitting up in the bedside chair.  No acute distress. 

Endorses his breathing has much improved.  Blood pressure 113/66, heart rate 71,

afebrile, maintaining oxygen saturation is 98% on 2 L nasal cannula.  Telemetry 

reviewed patient continues to be in atrial fibrillation and heart rate is 

controlled 60s and 70s.  He is currently being maintained on IV Lasix 60 mg 

every 8 hours, atorvastatin 20 mg nightly, metolazone 2.5 mg Sunday and 

Thursday, Coumadin daily.  Laboratory data review WBC 6.1, hemoglobin 9, 

platelets 132, INR is 1.4, sodium 141, potassium 3.7, serum creatinine 1.2








PHYSICAL EXAM: 


VITAL SIGNS: Reviewed.


GENERAL: Well-developed in no acute distress. 


HEENT:  Neck supple. No JVD or thyromegaly


LUNGS: Respirations even and unlabored. Lungs essentially clear to auscultation 

bilaterally.


HEART: Irregular rate and rhythm.  S1 and S2 heard.  Systolic murmur noted.


ABDOMEN: Soft. Nondistended. Nontender.


EXTREMITIES: Normal range of motion.  No clubbing or cyanosis.  Peripheral 

pulses intact.  1+ bilateral lower extremity edema


NEUROLOGIC: Awake and alert. Oriented x 3. 





ASSESSMENT: 


Acute exacerbation of chronic systolic heart failure


Paroxysmal atrial fibrillation, on anticoagulation with Coumadin


Hyperlipidemia


Hypertension


Mild nonobstructive coronary artery disease


History of nonischemic cardiomyopathy, EF 30% per cath in 2017, improved to 45%


Valvular heart disease


Moderate to severe pulmonary hypertension


Hypothyroidism


Obesity





PLAN: 


Echocardiogram reviewed with ejection fraction 45%


Lasix to 60 mg IV 3 times a day for one more day, most likely transition to PO 

Lasix tomorrow 


Monitor kidney function, I/Os, daily weights. 


Continue telemetry monitoring


Daily INR. Coumadin per pharmacy


Further recommendations pending patient course








Objective





- Vital Signs


Vital signs: 


                                   Vital Signs











Temp  98.0 F   05/17/21 07:00


 


Pulse  71   05/17/21 07:00


 


Resp  16   05/17/21 07:00


 


BP  113/66   05/17/21 07:00


 


Pulse Ox  97   05/17/21 08:50








                                 Intake & Output











 05/16/21 05/17/21 05/17/21





 18:59 06:59 18:59


 


Weight 149.4 kg 149.5 kg 


 


Other:   


 


  Voiding Method  Toilet 














- Labs


CBC & Chem 7: 


                                 05/17/21 04:36





                                 05/17/21 04:36


Labs: 


                  Abnormal Lab Results - Last 24 Hours (Table)











  05/16/21 05/16/21 05/17/21 Range/Units





  17:15 20:44 04:36 


 


RBC    3.62 L  (4.40-5.60)  X 10*6/uL


 


Hgb    9.0 L  (13.0-17.0)  g/dL


 


Hct    29.6 L  (39.6-50.0)  %


 


MCH    24.9 L  (27.0-32.0)  pg


 


MCHC    30.4 L  (32.0-37.0)  g/dL


 


RDW    17.8 H  (11.5-14.5)  %


 


Plt Count    132 L  (140-440)  X 10*3/uL


 


PT     (9.0-12.0)  sec


 


INR     (<1.2)  


 


Carbon Dioxide     (21.6-31.8)  mmol/L


 


BUN     (9.0-27.0)  mg/dL


 


Est GFR (CKD-EPI)NonAf     (60.0-200.0)   


 


BUN/Creatinine Ratio     (12.00-20.00)  Ratio


 


POC Glucose (mg/dL)  156 H  185 H   (75-99)  mg/dL


 


AST     (14-35)  U/L


 


Alkaline Phosphatase     ()  U/L


 


Albumin/Globulin Ratio     (1.60-3.17)  g/dL














  05/17/21 05/17/21 05/17/21 Range/Units





  04:36 04:38 07:00 


 


RBC     (4.40-5.60)  X 10*6/uL


 


Hgb     (13.0-17.0)  g/dL


 


Hct     (39.6-50.0)  %


 


MCH     (27.0-32.0)  pg


 


MCHC     (32.0-37.0)  g/dL


 


RDW     (11.5-14.5)  %


 


Plt Count     (140-440)  X 10*3/uL


 


PT   14.0 H   (9.0-12.0)  sec


 


INR   1.4 H   (<1.2)  


 


Carbon Dioxide  33.6 H    (21.6-31.8)  mmol/L


 


BUN  33.0 H    (9.0-27.0)  mg/dL


 


Est GFR (CKD-EPI)NonAf  59.2 L    (60.0-200.0)   


 


BUN/Creatinine Ratio  27.50 H    (12.00-20.00)  Ratio


 


POC Glucose (mg/dL)    66 L  (75-99)  mg/dL


 


AST  44 H    (14-35)  U/L


 


Alkaline Phosphatase  128 H    ()  U/L


 


Albumin/Globulin Ratio  1.30 L    (1.60-3.17)  g/dL














  05/17/21 05/17/21 Range/Units





  07:15 12:14 


 


RBC    (4.40-5.60)  X 10*6/uL


 


Hgb    (13.0-17.0)  g/dL


 


Hct    (39.6-50.0)  %


 


MCH    (27.0-32.0)  pg


 


MCHC    (32.0-37.0)  g/dL


 


RDW    (11.5-14.5)  %


 


Plt Count    (140-440)  X 10*3/uL


 


PT    (9.0-12.0)  sec


 


INR    (<1.2)  


 


Carbon Dioxide    (21.6-31.8)  mmol/L


 


BUN    (9.0-27.0)  mg/dL


 


Est GFR (CKD-EPI)NonAf    (60.0-200.0)   


 


BUN/Creatinine Ratio    (12.00-20.00)  Ratio


 


POC Glucose (mg/dL)  68 L  129 H  (75-99)  mg/dL


 


AST    (14-35)  U/L


 


Alkaline Phosphatase    ()  U/L


 


Albumin/Globulin Ratio    (1.60-3.17)  g/dL

## 2021-05-18 LAB
ANION GAP SERPL CALC-SCNC: 6 MMOL/L
BUN SERPL-SCNC: 37 MG/DL (ref 9–20)
CALCIUM SPEC-MCNC: 8.7 MG/DL (ref 8.4–10.2)
CHLORIDE SERPL-SCNC: 95 MMOL/L (ref 98–107)
CO2 SERPL-SCNC: 37 MMOL/L (ref 22–30)
ERYTHROCYTE [DISTWIDTH] IN BLOOD BY AUTOMATED COUNT: 3.25 M/UL (ref 4.3–5.9)
ERYTHROCYTE [DISTWIDTH] IN BLOOD: 17 % (ref 11.5–15.5)
GLUCOSE BLD-MCNC: 134 MG/DL (ref 75–99)
GLUCOSE BLD-MCNC: 169 MG/DL (ref 75–99)
GLUCOSE BLD-MCNC: 254 MG/DL (ref 75–99)
GLUCOSE BLD-MCNC: 97 MG/DL (ref 75–99)
GLUCOSE SERPL-MCNC: 97 MG/DL (ref 74–99)
HCT VFR BLD AUTO: 25.5 % (ref 39–53)
HGB BLD-MCNC: 8.6 GM/DL (ref 13–17.5)
INR PPP: 1.5 (ref ?–1.2)
MCH RBC QN AUTO: 26.5 PG (ref 25–35)
MCHC RBC AUTO-ENTMCNC: 33.7 G/DL (ref 31–37)
MCV RBC AUTO: 78.6 FL (ref 80–100)
PLATELET # BLD AUTO: 107 K/UL (ref 150–450)
POTASSIUM SERPL-SCNC: 3.3 MMOL/L (ref 3.5–5.1)
PT BLD: 15 SEC (ref 9–12)
SODIUM SERPL-SCNC: 138 MMOL/L (ref 137–145)
WBC # BLD AUTO: 4.9 K/UL (ref 3.8–10.6)

## 2021-05-18 RX ADMIN — GABAPENTIN SCH MG: 300 CAPSULE ORAL at 08:39

## 2021-05-18 RX ADMIN — METOPROLOL TARTRATE SCH MG: 25 TABLET, FILM COATED ORAL at 19:53

## 2021-05-18 RX ADMIN — INSULIN ASPART SCH UNIT: 100 INJECTION, SOLUTION INTRAVENOUS; SUBCUTANEOUS at 21:41

## 2021-05-18 RX ADMIN — INSULIN ASPART SCH: 100 INJECTION, SOLUTION INTRAVENOUS; SUBCUTANEOUS at 07:31

## 2021-05-18 RX ADMIN — LATANOPROST SCH DROPS: 50 SOLUTION OPHTHALMIC at 19:55

## 2021-05-18 RX ADMIN — FUROSEMIDE SCH: 10 INJECTION, SOLUTION INTRAMUSCULAR; INTRAVENOUS at 08:38

## 2021-05-18 RX ADMIN — POTASSIUM CHLORIDE SCH MEQ: 20 TABLET, EXTENDED RELEASE ORAL at 16:41

## 2021-05-18 RX ADMIN — ATORVASTATIN CALCIUM SCH MG: 20 TABLET, FILM COATED ORAL at 19:54

## 2021-05-18 RX ADMIN — Medication SCH MG: at 19:53

## 2021-05-18 RX ADMIN — SILVER SULFADIAZINE SCH APPLIC: 10 CREAM TOPICAL at 08:40

## 2021-05-18 RX ADMIN — Medication SCH MG: at 19:54

## 2021-05-18 RX ADMIN — INSULIN DETEMIR SCH UNIT: 100 INJECTION, SOLUTION SUBCUTANEOUS at 21:41

## 2021-05-18 RX ADMIN — INSULIN ASPART SCH UNIT: 100 INJECTION, SOLUTION INTRAVENOUS; SUBCUTANEOUS at 13:10

## 2021-05-18 RX ADMIN — POTASSIUM CHLORIDE SCH MEQ: 20 TABLET, EXTENDED RELEASE ORAL at 19:54

## 2021-05-18 RX ADMIN — SERTRALINE HYDROCHLORIDE SCH MG: 100 TABLET ORAL at 08:39

## 2021-05-18 RX ADMIN — Medication SCH MCG: at 08:39

## 2021-05-18 RX ADMIN — LEVOTHYROXINE SODIUM SCH MCG: 0.12 TABLET ORAL at 06:05

## 2021-05-18 RX ADMIN — Medication SCH MG: at 08:39

## 2021-05-18 RX ADMIN — Medication SCH MG: at 16:41

## 2021-05-18 RX ADMIN — GABAPENTIN SCH MG: 300 CAPSULE ORAL at 19:53

## 2021-05-18 RX ADMIN — INSULIN ASPART SCH UNIT: 100 INJECTION, SOLUTION INTRAVENOUS; SUBCUTANEOUS at 17:40

## 2021-05-18 RX ADMIN — POTASSIUM CHLORIDE SCH MEQ: 20 TABLET, EXTENDED RELEASE ORAL at 08:39

## 2021-05-18 NOTE — P.PN
Subjective


HISTORY OF PRESENT ILLNESS:  





This is a 76-year-old male with a past medical history significant for 

paroxysmal atrial fibrillation, diabetes mellitus, hypertension, hyperlipidemia,

hypothyroidism, and congestive heart failure. Patient follows in the office with

Dr. Schwartz. We have been asked to see the patient in consultation for congestive

heart failure. Patient examined at the bedside. Patient reports increased 

shortness of breath and increased lower extremity edema over the past 3-4 days. 

He denies chest pain or pressure. He reports compliance with all of his 

medications. Patient was started on IV lasix in the emergency room. He states 

his shortness of breath has improved this morning. Blood pressure 115/70. Heart 

rate in the 80s. He is on 2L nasal cannula with oxygen saturations greater than 

92%. He is afebrile.  Echocardiogram performed yesterday shows ejection fraction

45%.





5/18/2021


Patient seen and examined, sitting up in the bedside chair.  No acute distress. 

Endorses his breathing has much improved.  Blood pressure 112/65, heart rate 82,

afebrile, maintaining oxygen saturation is 96% on 2 L nasal cannula.  Telemetry 

reviewed patient continues to be in atrial fibrillation and heart rate is 

controlled 60s and 80s.  He is currently being maintained on IV Lasix 60 mg 

every 8 hours, atorvastatin 20 mg nightly, metolazone 2.5 mg Sunday and 

Thursday, Coumadin daily. Potassium replaced.  Laboratory data review WBC 4.9, 

hemoglobin 8.6, platelets 107, INR is 1.5, sodium 138, potassium 3.3, serum 

creatinine 1.26 (1.2 yesterday). I/Os not documented. Patient with weight 

decrease from 149.5kg to 146.8kg. 





PHYSICAL EXAM: 


VITAL SIGNS: Reviewed.


GENERAL: Well-developed in no acute distress. 


HEENT:  Neck supple. No JVD or thyromegaly


LUNGS: Respirations even and unlabored. Lungs with some mild crackles bilateral 

bases. 


HEART: Irregular rate and rhythm.  S1 and S2 heard.  Systolic murmur noted.


ABDOMEN: Soft. Nondistended. Nontender.


EXTREMITIES: Normal range of motion.  No clubbing or cyanosis.  Peripheral 

pulses intact.  1-2+ bilateral lower extremity edema


NEUROLOGIC: Awake and alert. Oriented x 3. 





ASSESSMENT: 


Acute exacerbation of chronic systolic heart failure


Paroxysmal atrial fibrillation, on anticoagulation with Coumadin


Hyperlipidemia


Hypertension


Mild nonobstructive coronary artery disease


History of nonischemic cardiomyopathy, EF 30% per cath in 2017, improved to 45%


Valvular heart disease


Moderate to severe pulmonary hypertension


Hypothyroidism


Obesity





PLAN: 


Echocardiogram reviewed with ejection fraction 45%


One dose of metolazone today 


Will give 2 more doses of IV Lasix to 60 mg IV today and most likely transition 

to PO Lasix tomorrow 


Monitor kidney function, I/Os, daily weights. 


Continue telemetry monitoring


Daily INR. Coumadin per pharmacy


Further recommendations pending patient course








Objective





- Vital Signs


Vital signs: 


                                   Vital Signs











Temp  98.4 F   05/18/21 07:00


 


Pulse  82   05/18/21 07:00


 


Resp  18   05/18/21 07:00


 


BP  112/65   05/18/21 07:00


 


Pulse Ox  96   05/18/21 08:05








                                 Intake & Output











 05/17/21 05/18/21 05/18/21





 18:59 06:59 18:59


 


Weight  146.8 kg 


 


Other:   


 


  Voiding Method  Toilet Toilet














- Labs


CBC & Chem 7: 


                                 05/18/21 05:22





                                 05/18/21 05:22


Labs: 


                  Abnormal Lab Results - Last 24 Hours (Table)











  05/17/21 05/17/21 05/18/21 Range/Units





  17:13 20:13 05:22 


 


RBC    3.25 L  (4.30-5.90)  m/uL


 


Hgb    8.6 L  (13.0-17.5)  gm/dL


 


Hct    25.5 L  (39.0-53.0)  %


 


MCV    78.6 L  (80.0-100.0)  fL


 


RDW    17.0 H  (11.5-15.5)  %


 


Plt Count    107 L  (150-450)  k/uL


 


PT     (9.0-12.0)  sec


 


INR     (<1.2)  


 


Potassium     (3.5-5.1)  mmol/L


 


Chloride     ()  mmol/L


 


Carbon Dioxide     (22-30)  mmol/L


 


BUN     (9-20)  mg/dL


 


Creatinine     (0.66-1.25)  mg/dL


 


POC Glucose (mg/dL)  170 H  194 H   (75-99)  mg/dL














  05/18/21 05/18/21 05/18/21 Range/Units





  05:22 05:22 11:19 


 


RBC     (4.30-5.90)  m/uL


 


Hgb     (13.0-17.5)  gm/dL


 


Hct     (39.0-53.0)  %


 


MCV     (80.0-100.0)  fL


 


RDW     (11.5-15.5)  %


 


Plt Count     (150-450)  k/uL


 


PT   15.0 H   (9.0-12.0)  sec


 


INR   1.5 H   (<1.2)  


 


Potassium  3.3 L    (3.5-5.1)  mmol/L


 


Chloride  95 L    ()  mmol/L


 


Carbon Dioxide  37 H    (22-30)  mmol/L


 


BUN  37 H    (9-20)  mg/dL


 


Creatinine  1.26 H    (0.66-1.25)  mg/dL


 


POC Glucose (mg/dL)    134 H  (75-99)  mg/dL

## 2021-05-18 NOTE — P.PN
Subjective


Progress Note Date: 05/18/21


HISTORY OF PRESENT ILLNESS


76 years old  male patient of Dr. Rojo for past medical history 

of paroxysmal atrial fibrillation, hyperlipidemia, hypertension, coronary artery

disease, history of nonischemic cardiomyopathy with EF 30% 2017, valvular heart 

disease, moderate to severe pulmonary hypertension, hypothyroidism, obesity, 

and, type 2 diabetes comes in with worsening lower extremity swelling and 

shortness of breath for the past 3-4 days.  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 

therapy and was decided to come to the ER.  Patient does endorse shortness of 

breath, denies any chest pain, palpitation, dizziness or lightheadedness.  

Patient is given a dose of IV Lasix yesterday and was in admitted with 

cardiology evaluation.


Vitals suggestive of 97.4 pulse 88 respiratory rate 18 blood pressure 1:15/70 

oxygen saturation 99% on 2 L


 labs reviewed hemoglobin 9.6, INR 4.6 this morning increased from 3.4, BUN 32 

creatinine 1.4, baseline creatinine 0.9 troponin negative, BNP 8000.  COVID-19 

negative. 


Chest x-ray pulmonary edema


EKG suggestive of atrial fibrillation





5/15: Patient evaluated at bedside for follow-up.  Patient reports his shortness

of breath has improved from yesterday. He continues to have significant lower 

extremity edema, Ace wraps have been ordered.  Lasix was increased to 3 times a 

day per cardiology.  Patient has not had any significant weight loss, will 

continue to monitor I&O's.  Patient's bladder seemed distended on exam today 

post void residual ordered.  He had complaints of insomnia, melatonin was 

ordered. Patient had echocardiogram yesterday which showed moderate concentric 

left ventricular hypertrophy, left ventricular systolic function moderately 

impaired with an EF between 40-45%, mild mitral and tricuspid regurgitation.  

Vital signs are stable, he is afebrile at 98.3, blood pressure 108/66, with a 

heart rate is 72, he is 98% on 2 L via nasal cannula.





5/16: Patient evaluated sitting up at the bedside chair.  Continues to have 

significant lower extremity edema and dyspnea with exertion.  Patient encouraged

to elevate feet while in the bedside chair and also continue with Ace wraps.  He

continues to be maintained on Lasix 60 mg IV 3 times daily, metolazone 2.5 mg 

have been added for Sunday and Thursday.  Will obtain a chest x-ray as well.  

Laboratory values today show WBC 4.1, hemoglobin 8.5, sodium 139, BUN 34, 

creatinine 1.1. He has a 0.7 kg weight loss since yesterday.  Blood pressure a 

little on the softer side at 109/67, he is afebrile with a temperature of 97.8, 

heart rate 76, 95% on 2 L via nasal cannula.





5/17: Patient continues to have lower extremity edema with mild erythema.  He 

has been up to the bathroom. Home oxygen is 2L n/c.  Pulse ox is 97% on 2 L 

nasal cannula.  He has been afebrile, heart rate 79, blood pressure 110/64.  

Repeat blood work reveals WBC 6.1, hemoglobin 9.0.  Platelet count 132.  CO2 33 

otherwise electrolytes are normal.  BUN 33 creatinine 1.2.  Blood sugars are 

running between 66 and 185.  AST 44, alkaline phosphatase 128.  Patient has been

continued on Lasix 60 mg IV 3 times daily.  INR is 1.4 and pharmacy is dosing 

Coumadin.  Discussed compression options with the patient and recommended Cipro 

compression socks after discharge.  Anticipate IV Lasix for another 24 hours and

possible discharge on Tuesday or Wednesday of this week.





5/18:  has been transitioned to oral Lasix by cardiology.  Also metolazone 

was increased to daily dosing.  Patient continues to have significant edema.  He

denies having any cough, no swallowing difficulties.   has been afebrile, 

heart rate 82, blood pressure 112/65, pulse ox 97% on room air.  WBC 4.9, 

hemoglobin 8.6, platelet count 107.  INR 1.5.  Sodium 138, potassium 3.3 and 

replaced, chloride 95, CO2 37, BUN 37 creatinine 1.26.  Blood sugars running 

between 97 and 194.  Anticipate possible discharge in the next 24-48 hours.





REVIEW OF SYSTEMS


Constitutional: No fever, no chills, no night sweats.  No weight change.  No 

weakness, fatigue or lethargy.  No daytime sleepiness.


EENT: No headache.  No blurred vision or double vision, no loss of vision.  No 

loss of Hearing, no ringing in the ears, no dizziness.  No nasal drainage or 

congestion.  No epistaxis.  No sore throat.


Lungs: No shortness of breath, cough, no sputum production.  No wheezing.


Cardiovascular: No chest pain, reports lower extremity edema.  No palpitations. 

No paroxysmal nocturnal dyspnea.  No orthopnea.  No lightheadedness or dizz

iness.  No syncopal episodes.


Abdominal: No abdominal pain.  No nausea, vomiting.  No diarrhea.  No 

constipation.  No bloody or tarry stools..  No loss of appetite.


Genitourinary: No dysuria, increased frequency, urgency.  No urinary retention.


Musculoskeletal: No myalgias.  Reports muscle weakness, no gait dysfunction, no 

frequent falls.  No back pain.  No neck pain.


Integumentary: Reports wounds, no lesions.  No rash or pruritus.  No unusual 

bruising.  No change in hair or nails.


Neurologic: No aphasia. No facial droop. No change in mentation. No head injury.

No headache. No paralysis. No paresthesia.


Psychiatric: No depression.  No anxiety.  No mood swings.


Endocrine: No abnormal blood sugars.





PHYSICAL EXAMINATION


Gen: This is a morbidly obese 76-year-old  male.  He is resting in a 

recliner and appears to be comfortable and in no acute distress.


HEENT: Head is atraumatic, normocephalic. Pupils equal, round. Sclerae is 

anicteric. 


NECK: Supple. No JVD. No lymphadenopathy. No thyromegaly. 


LUNGS: Clear to auscultation. No wheezes or rhonchi.  No intercostal 

retractions.


HEART: Regular rate and rhythm.  3/6 systolic murmur.  No rub, no gallop, no 

click.   


ABDOMEN: Soft. Bowel sounds are present. No masses.  No tenderness.


EXTREMITIES: 2+ bilateral pedal edema with erythema.  No calf tenderness.


NEUROLOGICAL: Patient is awake, alert and oriented x3. Cranial nerves 2 through 

12 are grossly intact. 





ASSESSMENT AND PLAN


1.  Acute on chronic systolic heart failure.  Continue Lasix 60 mg IV 3 times 

daily, monitor I&O and daily weights, monitor electrolytes and renal function.  

Continue metolazone 2.5 mg on Sunday and Thursday.





2.  Acute kidney injury likely secondary to cardiorenal process.  Continue Lasix

for now, monitor renal function.





3.  Paroxysmal atrial fibrillation on Coumadin.  Pharmacy is dosing Coumadin.  

INR 1.5.





4.  Hypercoagulopathy secondary to Coumadin use and illness.  Patient has been 

resumed on Coumadin and pharmacy is dosing.





5.  Mild acute on chronic cellulitis lower extremities.  Patient started on 

Silvadene.





6.  Hypertension, hypertensive cardiovascular disease.  Continue metoprolol 

tartrate 12.5 mg daily at bedtime.





7.  Mild coronary artery disease.  Continue Coumadin, metoprolol, Lasix.





8.  Nonischemic cardiomyopathy.





9.  Hypothyroidism.  Continue levothyroxine 125 g daily.





10.  Diabetes mellitus type 2 with diabetic neuropathy.  Continue Levemir 40 

units at bedtime, NovoLog scale, gabapentin 3 mg twice daily.





11.  Hyperlipidemia.  Continue simvastatin 40 mg at bedtime.





12.  Obstructive sleep apnea on CPAP.





13.  Benign prostatic hypertrophy status post TURP.





14.  Generalized osteoarthritis.  Continue tramadol for pain.





15.  Recurrent depression.  Continue Zoloft 100 mg daily.





16.  Anemia of chronic disease.  Continue ferrous sulfate.





17.  Restless leg syndrome.  Continue Requip 2.5 mg 3 times daily.





18.  DVT prophylaxis.  Coumadin 





19.  COVID-19 testing negative.  Patient has been hospitalized during a 

pandemic.








DISCHARGE PLAN


Home on Wednesday.





Impression and plan of care have been directed as dictated by the signing 

physician.  Mehnaz Quezada nurse practitioner acting as scribe for signing 

physician.








Objective





- Vital Signs


Vital signs: 


                                   Vital Signs











Temp  98.4 F   05/18/21 07:00


 


Pulse  82   05/18/21 07:00


 


Resp  18   05/18/21 07:00


 


BP  112/65   05/18/21 07:00


 


Pulse Ox  96   05/18/21 08:05








                                 Intake & Output











 05/17/21 05/18/21 05/18/21





 18:59 06:59 18:59


 


Weight  146.8 kg 


 


Other:   


 


  Voiding Method  Toilet Toilet














- Labs


CBC & Chem 7: 


                                 05/18/21 05:22





                                 05/18/21 05:22


Labs: 


                  Abnormal Lab Results - Last 24 Hours (Table)











  05/17/21 05/17/21 05/17/21 Range/Units





  04:36 04:36 04:38 


 


RBC  3.62 L    (4.40-5.60)  X 10*6/uL


 


Hgb  9.0 L    (13.0-17.0)  g/dL


 


Hct  29.6 L    (39.6-50.0)  %


 


MCV     (80.0-100.0)  fL


 


MCH  24.9 L    (27.0-32.0)  pg


 


MCHC  30.4 L    (32.0-37.0)  g/dL


 


RDW  17.8 H    (11.5-14.5)  %


 


Plt Count  132 L    (140-440)  X 10*3/uL


 


PT    14.0 H  (9.0-12.0)  sec


 


INR    1.4 H  (<1.2)  


 


Potassium     (3.5-5.1)  mmol/L


 


Chloride     ()  mmol/L


 


Carbon Dioxide   33.6 H   (21.6-31.8)  mmol/L


 


BUN   33.0 H   (9.0-27.0)  mg/dL


 


Creatinine     (0.66-1.25)  mg/dL


 


Est GFR (CKD-EPI)NonAf   59.2 L   (60.0-200.0)   


 


BUN/Creatinine Ratio   27.50 H   (12.00-20.00)  Ratio


 


POC Glucose (mg/dL)     (75-99)  mg/dL


 


AST   44 H   (14-35)  U/L


 


Alkaline Phosphatase   128 H   ()  U/L


 


Albumin/Globulin Ratio   1.30 L   (1.60-3.17)  g/dL














  05/17/21 05/17/21 05/17/21 Range/Units





  12:14 17:13 20:13 


 


RBC     (4.40-5.60)  X 10*6/uL


 


Hgb     (13.0-17.0)  g/dL


 


Hct     (39.6-50.0)  %


 


MCV     (80.0-100.0)  fL


 


MCH     (27.0-32.0)  pg


 


MCHC     (32.0-37.0)  g/dL


 


RDW     (11.5-14.5)  %


 


Plt Count     (140-440)  X 10*3/uL


 


PT     (9.0-12.0)  sec


 


INR     (<1.2)  


 


Potassium     (3.5-5.1)  mmol/L


 


Chloride     ()  mmol/L


 


Carbon Dioxide     (21.6-31.8)  mmol/L


 


BUN     (9.0-27.0)  mg/dL


 


Creatinine     (0.66-1.25)  mg/dL


 


Est GFR (CKD-EPI)NonAf     (60.0-200.0)   


 


BUN/Creatinine Ratio     (12.00-20.00)  Ratio


 


POC Glucose (mg/dL)  129 H  170 H  194 H  (75-99)  mg/dL


 


AST     (14-35)  U/L


 


Alkaline Phosphatase     ()  U/L


 


Albumin/Globulin Ratio     (1.60-3.17)  g/dL














  05/18/21 05/18/21 05/18/21 Range/Units





  05:22 05:22 05:22 


 


RBC  3.25 L    (4.40-5.60)  X 10*6/uL


 


Hgb  8.6 L    (13.0-17.0)  g/dL


 


Hct  25.5 L    (39.6-50.0)  %


 


MCV  78.6 L    (80.0-100.0)  fL


 


MCH     (27.0-32.0)  pg


 


MCHC     (32.0-37.0)  g/dL


 


RDW  17.0 H    (11.5-14.5)  %


 


Plt Count  107 L    (140-440)  X 10*3/uL


 


PT    15.0 H  (9.0-12.0)  sec


 


INR    1.5 H  (<1.2)  


 


Potassium   3.3 L   (3.5-5.1)  mmol/L


 


Chloride   95 L   ()  mmol/L


 


Carbon Dioxide   37 H   (21.6-31.8)  mmol/L


 


BUN   37 H   (9.0-27.0)  mg/dL


 


Creatinine   1.26 H   (0.66-1.25)  mg/dL


 


Est GFR (CKD-EPI)NonAf     (60.0-200.0)   


 


BUN/Creatinine Ratio     (12.00-20.00)  Ratio


 


POC Glucose (mg/dL)     (75-99)  mg/dL


 


AST     (14-35)  U/L


 


Alkaline Phosphatase     ()  U/L


 


Albumin/Globulin Ratio     (1.60-3.17)  g/dL

## 2021-05-19 LAB
ANION GAP SERPL CALC-SCNC: 9 MMOL/L
BUN SERPL-SCNC: 42 MG/DL (ref 9–20)
CALCIUM SPEC-MCNC: 8.7 MG/DL (ref 8.4–10.2)
CHLORIDE SERPL-SCNC: 94 MMOL/L (ref 98–107)
CO2 SERPL-SCNC: 37 MMOL/L (ref 22–30)
ERYTHROCYTE [DISTWIDTH] IN BLOOD BY AUTOMATED COUNT: 3.48 M/UL (ref 4.3–5.9)
ERYTHROCYTE [DISTWIDTH] IN BLOOD: 16.9 % (ref 11.5–15.5)
GLUCOSE BLD-MCNC: 129 MG/DL (ref 75–99)
GLUCOSE BLD-MCNC: 200 MG/DL (ref 75–99)
GLUCOSE BLD-MCNC: 217 MG/DL (ref 75–99)
GLUCOSE BLD-MCNC: 85 MG/DL (ref 75–99)
GLUCOSE SERPL-MCNC: 89 MG/DL (ref 74–99)
HCT VFR BLD AUTO: 27.9 % (ref 39–53)
HGB BLD-MCNC: 9.3 GM/DL (ref 13–17.5)
INR PPP: 1.4 (ref ?–1.2)
MAGNESIUM SPEC-SCNC: 1.6 MG/DL (ref 1.6–2.3)
MCH RBC QN AUTO: 26.6 PG (ref 25–35)
MCHC RBC AUTO-ENTMCNC: 33.2 G/DL (ref 31–37)
MCV RBC AUTO: 80 FL (ref 80–100)
PLATELET # BLD AUTO: 117 K/UL (ref 150–450)
POTASSIUM SERPL-SCNC: 3.3 MMOL/L (ref 3.5–5.1)
PT BLD: 14 SEC (ref 9–12)
SODIUM SERPL-SCNC: 140 MMOL/L (ref 137–145)
WBC # BLD AUTO: 5.4 K/UL (ref 3.8–10.6)

## 2021-05-19 RX ADMIN — METOPROLOL TARTRATE SCH MG: 25 TABLET, FILM COATED ORAL at 21:12

## 2021-05-19 RX ADMIN — SILVER SULFADIAZINE SCH APPLIC: 10 CREAM TOPICAL at 07:40

## 2021-05-19 RX ADMIN — GABAPENTIN SCH MG: 300 CAPSULE ORAL at 07:40

## 2021-05-19 RX ADMIN — CEPHALEXIN SCH MG: 500 CAPSULE ORAL at 16:34

## 2021-05-19 RX ADMIN — POTASSIUM CHLORIDE SCH MEQ: 20 TABLET, EXTENDED RELEASE ORAL at 07:39

## 2021-05-19 RX ADMIN — INSULIN ASPART SCH: 100 INJECTION, SOLUTION INTRAVENOUS; SUBCUTANEOUS at 07:38

## 2021-05-19 RX ADMIN — Medication SCH MG: at 16:34

## 2021-05-19 RX ADMIN — FUROSEMIDE SCH MG: 20 TABLET ORAL at 16:40

## 2021-05-19 RX ADMIN — Medication SCH MG: at 07:39

## 2021-05-19 RX ADMIN — POTASSIUM CHLORIDE SCH MEQ: 20 TABLET, EXTENDED RELEASE ORAL at 21:13

## 2021-05-19 RX ADMIN — SERTRALINE HYDROCHLORIDE SCH MG: 100 TABLET ORAL at 07:40

## 2021-05-19 RX ADMIN — INSULIN ASPART SCH: 100 INJECTION, SOLUTION INTRAVENOUS; SUBCUTANEOUS at 17:25

## 2021-05-19 RX ADMIN — POTASSIUM CHLORIDE SCH MEQ: 20 TABLET, EXTENDED RELEASE ORAL at 16:34

## 2021-05-19 RX ADMIN — LATANOPROST SCH DROPS: 50 SOLUTION OPHTHALMIC at 21:13

## 2021-05-19 RX ADMIN — GABAPENTIN SCH MG: 300 CAPSULE ORAL at 21:09

## 2021-05-19 RX ADMIN — CEPHALEXIN SCH MG: 500 CAPSULE ORAL at 09:54

## 2021-05-19 RX ADMIN — Medication SCH MG: at 21:12

## 2021-05-19 RX ADMIN — INSULIN ASPART SCH UNIT: 100 INJECTION, SOLUTION INTRAVENOUS; SUBCUTANEOUS at 21:09

## 2021-05-19 RX ADMIN — Medication SCH MCG: at 07:39

## 2021-05-19 RX ADMIN — ATORVASTATIN CALCIUM SCH MG: 20 TABLET, FILM COATED ORAL at 21:09

## 2021-05-19 RX ADMIN — INSULIN ASPART SCH UNIT: 100 INJECTION, SOLUTION INTRAVENOUS; SUBCUTANEOUS at 12:04

## 2021-05-19 RX ADMIN — INSULIN DETEMIR SCH UNIT: 100 INJECTION, SOLUTION SUBCUTANEOUS at 21:11

## 2021-05-19 RX ADMIN — CEPHALEXIN SCH MG: 500 CAPSULE ORAL at 21:12

## 2021-05-19 RX ADMIN — LEVOTHYROXINE SODIUM SCH MCG: 0.12 TABLET ORAL at 05:53

## 2021-05-19 NOTE — P.PN
Subjective


Progress Note Date: 05/19/21


HISTORY OF PRESENT ILLNESS


76 years old  male patient of Dr. Rojo for past medical history 

of paroxysmal atrial fibrillation, hyperlipidemia, hypertension, coronary artery

disease, history of nonischemic cardiomyopathy with EF 30% 2017, valvular heart 

disease, moderate to severe pulmonary hypertension, hypothyroidism, obesity, 

and, type 2 diabetes comes in with worsening lower extremity swelling and 

shortness of breath for the past 3-4 days.  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 

therapy and was decided to come to the ER.  Patient does endorse shortness of 

breath, denies any chest pain, palpitation, dizziness or lightheadedness.  

Patient is given a dose of IV Lasix yesterday and was in admitted with 

cardiology evaluation.


Vitals suggestive of 97.4 pulse 88 respiratory rate 18 blood pressure 1:15/70 

oxygen saturation 99% on 2 L


 labs reviewed hemoglobin 9.6, INR 4.6 this morning increased from 3.4, BUN 32 

creatinine 1.4, baseline creatinine 0.9 troponin negative, BNP 8000.  COVID-19 

negative. 


Chest x-ray pulmonary edema


EKG suggestive of atrial fibrillation





5/15: Patient evaluated at bedside for follow-up.  Patient reports his shortness

of breath has improved from yesterday. He continues to have significant lower 

extremity edema, Ace wraps have been ordered.  Lasix was increased to 3 times a 

day per cardiology.  Patient has not had any significant weight loss, will 

continue to monitor I&O's.  Patient's bladder seemed distended on exam today 

post void residual ordered.  He had complaints of insomnia, melatonin was 

ordered. Patient had echocardiogram yesterday which showed moderate concentric 

left ventricular hypertrophy, left ventricular systolic function moderately 

impaired with an EF between 40-45%, mild mitral and tricuspid regurgitation.  

Vital signs are stable, he is afebrile at 98.3, blood pressure 108/66, with a 

heart rate is 72, he is 98% on 2 L via nasal cannula.





5/16: Patient evaluated sitting up at the bedside chair.  Continues to have 

significant lower extremity edema and dyspnea with exertion.  Patient encouraged

to elevate feet while in the bedside chair and also continue with Ace wraps.  He

continues to be maintained on Lasix 60 mg IV 3 times daily, metolazone 2.5 mg 

have been added for Sunday and Thursday.  Will obtain a chest x-ray as well.  

Laboratory values today show WBC 4.1, hemoglobin 8.5, sodium 139, BUN 34, 

creatinine 1.1. He has a 0.7 kg weight loss since yesterday.  Blood pressure a 

little on the softer side at 109/67, he is afebrile with a temperature of 97.8, 

heart rate 76, 95% on 2 L via nasal cannula.





5/17: Patient continues to have lower extremity edema with mild erythema.  He 

has been up to the bathroom. Home oxygen is 2L n/c.  Pulse ox is 97% on 2 L 

nasal cannula.  He has been afebrile, heart rate 79, blood pressure 110/64.  

Repeat blood work reveals WBC 6.1, hemoglobin 9.0.  Platelet count 132.  CO2 33 

otherwise electrolytes are normal.  BUN 33 creatinine 1.2.  Blood sugars are 

running between 66 and 185.  AST 44, alkaline phosphatase 128.  Patient has been

continued on Lasix 60 mg IV 3 times daily.  INR is 1.4 and pharmacy is dosing 

Coumadin.  Discussed compression options with the patient and recommended Cipro 

compression socks after discharge.  Anticipate IV Lasix for another 24 hours and

possible discharge on Tuesday or Wednesday of this week.





5/18:  has been transitioned to oral Lasix by cardiology.  Also metolazone 

was increased to daily dosing.  Patient continues to have significant edema.  He

denies having any cough, no swallowing difficulties.   has been afebrile, 

heart rate 82, blood pressure 112/65, pulse ox 97% on room air.  WBC 4.9, 

hemoglobin 8.6, platelet count 107.  INR 1.5.  Sodium 138, potassium 3.3 and 

replaced, chloride 95, CO2 37, BUN 37 creatinine 1.26.  Blood sugars running 

between 97 and 194.  Anticipate possible discharge in the next 24-48 hours.





5/19: Patient continues to have edema. Weight is down 4 kg. Cardiology ordered 2

more doses of IV Lasix started with plan to transition to oral Lasix today but 

we will continue IV Lasix at 60 mg BID.  Metolazone was discontinued by 

cardiology.  Repeat blood work reveals WBC 5.4, hemoglobin 9.3, platelet count 

117.  INR is 1.4 and pharmacy is dosing Coumadin.  Sodium 140, potassium 3.3 and

will be replaced.  Chloride 94, CO2 37, BUN 42 and creatinine 1.30.  Capillary 

blood glucose running between 85 and 254.  Patient has been afebrile, heart rate

74, blood pressure 134/72, pulse ox 96% on 2 L nasal cannula.  Oxygen assessment

will be performed. Keflex added for possible cellulitis of bilateral lower legs.

 Patient denies having any lightheadedness or dizziness.  Postvoid residual will

be checked today.  Patient is encouraged to sleep in bed at nighttime versus in 

recliner.





REVIEW OF SYSTEMS


Constitutional: No fever, no chills, no night sweats.  No weight change.  No 

weakness, fatigue or lethargy.  No daytime sleepiness.


EENT: No headache.  No blurred vision or double vision, no loss of vision.  No 

loss of Hearing, no ringing in the ears, no dizziness.  No nasal drainage or 

congestion.  No epistaxis.  No sore throat.


Lungs: No shortness of breath, cough, no sputum production.  No wheezing.


Cardiovascular: No chest pain, reports lower extremity edema continued.  No 

palpitations.  No paroxysmal nocturnal dyspnea.  No orthopnea.  No 

lightheadedness or dizziness.  No syncopal episodes.


Abdominal: No abdominal pain.  No nausea, vomiting.  No diarrhea.  No cons

tipation.  No bloody or tarry stools..  No loss of appetite.


Genitourinary: No dysuria, increased frequency, urgency.  No urinary retention.


Musculoskeletal: No myalgias.  Reports muscle weakness, no gait dysfunction, no 

frequent falls.  No back pain.  No neck pain.


Integumentary: Reports wounds, no lesions.  No rash or pruritus.  No unusual 

bruising.  


Neurologic: No aphasia. No facial droop. No change in mentation. No head injury.

No headache. No paralysis. No paresthesia.


Psychiatric: No depression.  No anxiety.  No mood swings.


Endocrine: No abnormal blood sugars.





PHYSICAL EXAMINATION


Gen: This is a morbidly obese 76-year-old  male.  He is resting in a 

recliner and appears to be comfortable and in no acute distress.


HEENT: Head is atraumatic, normocephalic. Pupils equal, round. Sclerae is 

anicteric. 


NECK: Supple. No JVD. No lymphadenopathy. No thyromegaly. 


LUNGS: Clear to auscultation. No wheezes or rhonchi.  No intercostal 

retractions.


HEART: Regular rate and rhythm.  3/6 systolic murmur.  No rub, no gallop, no 

click.   


ABDOMEN: Soft. Bowel sounds are present. No masses.  No tenderness.


EXTREMITIES: 2+ bilateral pedal edema with mild erythema. Ace wraps in place. No

calf tenderness.


NEUROLOGICAL: Patient is awake, alert and oriented x3. Cranial nerves 2 through 

12 are grossly intact. 





ASSESSMENT AND PLAN


1.  Acute on chronic systolic heart failure.  Continue Lasix 60 mg IV twice 

daily, monitor I&O and daily weights, monitor electrolytes and renal function.  

Metolazone was discontinued.





2.  Acute kidney injury likely secondary to cardiorenal process.  Continue Lasix

for now, monitor renal function.





3.  Paroxysmal atrial fibrillation on Coumadin.  Pharmacy is dosing Coumadin.  

INR 1.5.





4.  Hypercoagulopathy secondary to Coumadin use and illness.  Patient has been 

resumed on Coumadin and pharmacy is dosing.





5.  Mild acute on chronic cellulitis lower extremities.  Patient started on 

Silvadene Keflex.





6.  Hypertension, hypertensive cardiovascular disease.  Continue metoprolol 

tartrate 12.5 mg daily at bedtime.





7.  Mild coronary artery disease.  Continue Coumadin, metoprolol, Lasix.





8.  Nonischemic cardiomyopathy.





9.  Hypothyroidism.  Continue levothyroxine 125 g daily.





10.  Diabetes mellitus type 2 with diabetic neuropathy.  Continue Levemir 40 

units at bedtime, NovoLog scale, gabapentin 3 mg twice daily.





11.  Hyperlipidemia.  Continue simvastatin 40 mg at bedtime.





12.  Obstructive sleep apnea on CPAP.





13.  Benign prostatic hypertrophy status post TURP.





14.  Generalized osteoarthritis.  Continue tramadol for pain.





15.  Recurrent depression.  Continue Zoloft 100 mg daily.





16.  Anemia of chronic disease.  Continue ferrous sulfate.





17.  Restless leg syndrome.  Continue Requip 2.5 mg 3 times daily.





18.  DVT prophylaxis.  Coumadin 





19.  COVID-19 testing negative.  Patient has been hospitalized during a 

pandemic.








DISCHARGE PLAN


Home on Thursday.





Impression and plan of care have been directed as dictated by the signing 

physician.  Mehnaz Quezada nurse practitioner acting as scribe for signing 

physician.








Objective





- Vital Signs


Vital signs: 


                                   Vital Signs











Temp  97.8 F   05/19/21 07:00


 


Pulse  65   05/19/21 07:00


 


Resp  18   05/19/21 07:00


 


BP  103/63   05/19/21 07:00


 


Pulse Ox  97   05/19/21 07:00








                                 Intake & Output











 05/18/21 05/19/21 05/19/21





 18:59 06:59 18:59


 


Weight  146.1 kg 


 


Other:   


 


  Voiding Method Toilet Toilet Toilet














- Labs


CBC & Chem 7: 


                                 05/19/21 05:40





                                 05/19/21 05:40


Labs: 


                  Abnormal Lab Results - Last 24 Hours (Table)











  05/18/21 05/18/21 05/18/21 Range/Units





  11:19 17:11 21:10 


 


RBC     (4.30-5.90)  m/uL


 


Hgb     (13.0-17.5)  gm/dL


 


Hct     (39.0-53.0)  %


 


RDW     (11.5-15.5)  %


 


Plt Count     (150-450)  k/uL


 


PT     (9.0-12.0)  sec


 


INR     (<1.2)  


 


Potassium     (3.5-5.1)  mmol/L


 


Chloride     ()  mmol/L


 


Carbon Dioxide     (22-30)  mmol/L


 


BUN     (9-20)  mg/dL


 


Creatinine     (0.66-1.25)  mg/dL


 


POC Glucose (mg/dL)  134 H  254 H  169 H  (75-99)  mg/dL














  05/19/21 05/19/21 05/19/21 Range/Units





  05:40 05:40 05:40 


 


RBC   3.48 L   (4.30-5.90)  m/uL


 


Hgb   9.3 L   (13.0-17.5)  gm/dL


 


Hct   27.9 L   (39.0-53.0)  %


 


RDW   16.9 H   (11.5-15.5)  %


 


Plt Count   117 L   (150-450)  k/uL


 


PT  14.0 H    (9.0-12.0)  sec


 


INR  1.4 H    (<1.2)  


 


Potassium    3.3 L  (3.5-5.1)  mmol/L


 


Chloride    94 L  ()  mmol/L


 


Carbon Dioxide    37 H  (22-30)  mmol/L


 


BUN    42 H  (9-20)  mg/dL


 


Creatinine    1.30 H  (0.66-1.25)  mg/dL


 


POC Glucose (mg/dL)     (75-99)  mg/dL

## 2021-05-19 NOTE — P.PN
Subjective


HISTORY OF PRESENT ILLNESS:  





This is a 76-year-old male with a past medical history significant for 

paroxysmal atrial fibrillation, diabetes mellitus, hypertension, hyperlipidemia,

hypothyroidism, and congestive heart failure. Patient follows in the office with

Dr. Schwartz. We have been asked to see the patient in consultation for congestive

heart failure. Patient examined at the bedside. Patient reports increased 

shortness of breath and increased lower extremity edema over the past 3-4 days. 

He denies chest pain or pressure. He reports compliance with all of his 

medications. Patient was started on IV lasix in the emergency room. He states 

his shortness of breath has improved this morning. Blood pressure 115/70. Heart 

rate in the 80s. He is on 2L nasal cannula with oxygen saturations greater than 

92%. He is afebrile.  Echocardiogram performed yesterday shows ejection fraction

45%.





5/19/2021


Patient seen and examined, sitting up in the bedside chair.  No acute distress. 

Endorses his breathing has much improved.  Blood pressure 103/63, heart rate 65 

afebrile, maintaining oxygen saturation is 97% on 2 L nasal cannula.  Telemetry 

reviewed patient continues to be in atrial fibrillation and heart rate is 

controlled. He is currently being maintained on IV Lasix 60 mg BID, atorvastatin

20 mg nightly,  Coumadin daily. Potassium replaced.  Laboratory data review WBC 

5.4, hemoglobin 9.3, platelets 117, INR is 1.4, sodium 140, potassium 3.3, serum

creatinine 1.30 (1.26 yesterday). I/Os not documented. Patient with weight 

decrease from 149.5kg to 146.1kg. 





PHYSICAL EXAM: 


VITAL SIGNS: Reviewed.


GENERAL: Well-developed in no acute distress. 


HEENT:  Neck supple. No JVD or thyromegaly


LUNGS: Respirations even and unlabored. Lungs with some mild crackles bilateral 

bases. 


HEART: Irregular rate and rhythm.  S1 and S2 heard.  Systolic murmur noted.


ABDOMEN: Soft. Nondistended. Nontender.


EXTREMITIES: Normal range of motion.  No clubbing or cyanosis.  Peripheral 

pulses intact.  1-2+ bilateral lower extremity edema


NEUROLOGIC: Awake and alert. Oriented x 3. 





ASSESSMENT: 


Acute exacerbation of chronic systolic heart failure


Paroxysmal atrial fibrillation, on anticoagulation with Coumadin


Hyperlipidemia


Hypertension


Mild nonobstructive coronary artery disease


History of nonischemic cardiomyopathy, EF 30% per cath in 2017, improved to 45%


Valvular heart disease


Moderate to severe pulmonary hypertension


Hypothyroidism


Obesity


Acute kidney injury- possible due to IV Lasix 





PLAN: 


Echocardiogram reviewed with ejection fraction 45%


Recommend transitioning to oral Lasix 60mg BID 


Metolazone 2.5mg every other day for a short course, patient did receive a dose 

on 5/18


Continue coumadin, patient's INR is subtherapeutic today, pharmacy is dosing 

coumadin 


From cardiology perspective patient can be discharged home, we will follow the 

patient as needed. Please reach out with further questions or concerns. 


Follow up with Dr. Schwartz outpatient 








Objective





- Vital Signs


Vital signs: 


                                   Vital Signs











Temp  97.8 F   05/19/21 07:00


 


Pulse  65   05/19/21 07:00


 


Resp  18   05/19/21 07:00


 


BP  103/63   05/19/21 07:00


 


Pulse Ox  97   05/19/21 07:00








                                 Intake & Output











 05/18/21 05/19/21 05/19/21





 18:59 06:59 18:59


 


Weight  146.1 kg 


 


Other:   


 


  Voiding Method Toilet Toilet Toilet














- Labs


CBC & Chem 7: 


                                 05/19/21 05:40





                                 05/19/21 05:40


Labs: 


                  Abnormal Lab Results - Last 24 Hours (Table)











  05/18/21 05/18/21 05/19/21 Range/Units





  17:11 21:10 05:40 


 


RBC     (4.30-5.90)  m/uL


 


Hgb     (13.0-17.5)  gm/dL


 


Hct     (39.0-53.0)  %


 


RDW     (11.5-15.5)  %


 


Plt Count     (150-450)  k/uL


 


PT    14.0 H  (9.0-12.0)  sec


 


INR    1.4 H  (<1.2)  


 


Potassium     (3.5-5.1)  mmol/L


 


Chloride     ()  mmol/L


 


Carbon Dioxide     (22-30)  mmol/L


 


BUN     (9-20)  mg/dL


 


Creatinine     (0.66-1.25)  mg/dL


 


POC Glucose (mg/dL)  254 H  169 H   (75-99)  mg/dL














  05/19/21 05/19/21 05/19/21 Range/Units





  05:40 05:40 11:19 


 


RBC  3.48 L    (4.30-5.90)  m/uL


 


Hgb  9.3 L    (13.0-17.5)  gm/dL


 


Hct  27.9 L    (39.0-53.0)  %


 


RDW  16.9 H    (11.5-15.5)  %


 


Plt Count  117 L    (150-450)  k/uL


 


PT     (9.0-12.0)  sec


 


INR     (<1.2)  


 


Potassium   3.3 L   (3.5-5.1)  mmol/L


 


Chloride   94 L   ()  mmol/L


 


Carbon Dioxide   37 H   (22-30)  mmol/L


 


BUN   42 H   (9-20)  mg/dL


 


Creatinine   1.30 H   (0.66-1.25)  mg/dL


 


POC Glucose (mg/dL)    200 H  (75-99)  mg/dL

## 2021-05-20 VITALS — HEART RATE: 80 BPM | DIASTOLIC BLOOD PRESSURE: 65 MMHG | TEMPERATURE: 98.7 F | SYSTOLIC BLOOD PRESSURE: 118 MMHG

## 2021-05-20 VITALS — RESPIRATION RATE: 18 BRPM

## 2021-05-20 LAB
GLUCOSE BLD-MCNC: 108 MG/DL (ref 75–99)
GLUCOSE BLD-MCNC: 68 MG/DL (ref 75–99)
GLUCOSE BLD-MCNC: 81 MG/DL (ref 75–99)
INR PPP: 1.4 (ref ?–1.2)
PT BLD: 14.3 SEC (ref 9–12)

## 2021-05-20 RX ADMIN — LEVOTHYROXINE SODIUM SCH MCG: 0.12 TABLET ORAL at 06:00

## 2021-05-20 RX ADMIN — Medication SCH MCG: at 08:04

## 2021-05-20 RX ADMIN — INSULIN ASPART SCH: 100 INJECTION, SOLUTION INTRAVENOUS; SUBCUTANEOUS at 07:50

## 2021-05-20 RX ADMIN — SERTRALINE HYDROCHLORIDE SCH MG: 100 TABLET ORAL at 08:04

## 2021-05-20 RX ADMIN — GABAPENTIN SCH MG: 300 CAPSULE ORAL at 08:04

## 2021-05-20 RX ADMIN — FUROSEMIDE SCH MG: 20 TABLET ORAL at 08:04

## 2021-05-20 RX ADMIN — CEPHALEXIN SCH MG: 500 CAPSULE ORAL at 08:05

## 2021-05-20 RX ADMIN — INSULIN ASPART SCH: 100 INJECTION, SOLUTION INTRAVENOUS; SUBCUTANEOUS at 11:58

## 2021-05-20 RX ADMIN — Medication SCH MG: at 08:05

## 2021-05-20 RX ADMIN — POTASSIUM CHLORIDE SCH MEQ: 20 TABLET, EXTENDED RELEASE ORAL at 08:04

## 2021-05-20 NOTE — P.DS
Providers


Date of admission: 


05/14/21 13:45





Expected date of discharge: 05/20/21


Attending physician: 


Stephani Rainey MD





Consults: 





                                        





05/13/21 18:56


Consult Physician Routine 


   Consulting Provider: Smith Simmons


   Consult Reason/Comments: CHF


   Do you want consulting provider notified?: Yes











[facility] care physician[facility] 


[facility]: 





HISTORY OF PRESENT ILLNESS


76 years old  male patient of Dr. Rojo for past medical history 

of paroxysmal atrial fibrillation, hyperlipidemia, hypertension, coronary artery

disease, history of nonischemic cardiomyopathy with EF 30% 2017, valvular heart 

disease, moderate to severe pulmonary hypertension, hypothyroidism, obesity, 

and, type 2 diabetes comes in with worsening lower extremity swelling and 

shortness of breath for the past 3-4 days.  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 

therapy and was decided to come to the ER.  Patient does endorse shortness of 

breath, denies any chest pain, palpitation, dizziness or lightheadedness.  

Patient is given a dose of IV Lasix yesterday and was in admitted with 

cardiology evaluation.


Vitals suggestive of 97.4 pulse 88 respiratory rate 18 blood pressure 1:15/70 

oxygen saturation 99% on 2 L


 labs reviewed hemoglobin 9.6, INR 4.6 this morning increased from 3.4, BUN 32 

creatinine 1.4, baseline creatinine 0.9 troponin negative, BNP 8000.  COVID-19 

negative. 


Chest x-ray pulmonary edema


EKG suggestive of atrial fibrillation





5/15: Patient evaluated at bedside for follow-up.  Patient reports his shortness

of breath has improved from yesterday. He continues to have significant lower 

extremity edema, Ace wraps have been ordered.  Lasix was increased to 3 times a 

day per cardiology.  Patient has not had any significant weight loss, will 

continue to monitor I&O's.  Patient's bladder seemed distended on exam today 

post void residual ordered.  He had complaints of insomnia, melatonin was 

ordered. Patient had echocardiogram yesterday which showed moderate concentric 

left ventricular hypertrophy, left ventricular systolic function moderately 

impaired with an EF between 40-45%, mild mitral and tricuspid regurgitation.  

Vital signs are stable, he is afebrile at 98.3, blood pressure 108/66, with a 

heart rate is 72, he is 98% on 2 L via nasal cannula.





5/16: Patient evaluated sitting up at the bedside chair.  Continues to have 

significant lower extremity edema and dyspnea with exertion.  Patient encouraged

to elevate feet while in the bedside chair and also continue with Ace wraps.  He

continues to be maintained on Lasix 60 mg IV 3 times daily, metolazone 2.5 mg 

have been added for Sunday and Thursday.  Will obtain a chest x-ray as well.  

Laboratory values today show WBC 4.1, hemoglobin 8.5, sodium 139, BUN 34, 

creatinine 1.1. He has a 0.7 kg weight loss since yesterday.  Blood pressure a 

little on the softer side at 109/67, he is afebrile with a temperature of 97.8, 

heart rate 76, 95% on 2 L via nasal cannula.





5/17: Patient continues to have lower extremity edema with mild erythema.  He 

has been up to the bathroom. Home oxygen is 2L n/c.  Pulse ox is 97% on 2 L 

nasal cannula.  He has been afebrile, heart rate 79, blood pressure 110/64.  

Repeat blood work reveals WBC 6.1, hemoglobin 9.0.  Platelet count 132.  CO2 33 

otherwise electrolytes are normal.  BUN 33 creatinine 1.2.  Blood sugars are 

running between 66 and 185.  AST 44, alkaline phosphatase 128.  Patient has been

continued on Lasix 60 mg IV 3 times daily.  INR is 1.4 and pharmacy is dosing JANES anderson.  Discussed compression options with the patient and recommended Cipro 

compression socks after discharge.  Anticipate IV Lasix for another 24 hours and

possible discharge on Tuesday or Wednesday of this week.





5/18:  has been transitioned to oral Lasix by cardiology.  Also metolazone 

was increased to daily dosing.  Patient continues to have significant edema.  He

denies having any cough, no swallowing difficulties.   has been afebrile, 

heart rate 82, blood pressure 112/65, pulse ox 97% on room air.  WBC 4.9, 

hemoglobin 8.6, platelet count 107.  INR 1.5.  Sodium 138, potassium 3.3 and 

replaced, chloride 95, CO2 37, BUN 37 creatinine 1.26.  Blood sugars running 

between 97 and 194.  Anticipate possible discharge in the next 24-48 hours.





5/19: Radiology ordered 2 more doses of IV Lasix started with plan to transition

to oral Lasix today. Repeat blood work reveals WBC 5.4, hemoglobin 9.3, platelet

count 117.  INR is 1.4 and pharmacy is dosing Coumadin.  Sodium 140, potassium 

3.3 and will be replaced.  Chloride 94, CO2 37, BUN 42 and creatinine 1.30.  

Capillary blood glucose running between 85 and 254.  Patient has been afebrile, 

heart rate 74, blood pressure 134/72, pulse ox 96% on 2 L nasal cannula.  Oxygen

assessment will be performed.





5/20: Patient has been afebrile, heart rate 72, blood pressure 113/64, pulse ox 

92% on 2 L nasal cannula.  Cardiac monitor atrial fibrillation.  INR 1.4 and 

pharmacy is dosing.  Blood sugar this morning was 68 otherwise running between 

81 and 216.  Patient has been cleared for discharge by cardiology with 

recommendations for Lasix to extended milligrams oral twice daily and metolazone

to continue on 2 days per week as previously.   denies any new complaints. 

Cipro compression sock prescription has been provided for the patient.  [facility] 

[facility]  Recommended that he 

have repeat lab work on Monday.  Patient will be discharged home today in stable

condition.








ASSESSMENT AND PLAN


1.  Acute on chronic systolic heart failure.  


2.  Acute kidney injury likely secondary to cardiorenal process.  


3.  Paroxysmal atrial fibrillation on Coumadin.  


4.  Hypercoagulopathy secondary to Coumadin use and illness.  


5.  Mild acute on chronic cellulitis lower extremities.  


6.  Hypertension, hypertensive cardiovascular disease.  


7.  Mild coronary artery disease.  


8.  Nonischemic cardiomyopathy.


9.  Hypothyroidism. 


10.  Diabetes mellitus type 2 with diabetic neuropathy. 


11.  Hyperlipidemia.  


12.  Obstructive sleep apnea on CPAP.


13.  Benign prostatic hypertrophy status post TURP.


14.  Generalized osteoarthritis.  


15.  Recurrent depression.  


16.  Anemia of chronic disease.  


17.  Restless leg syndrome. 


18.  DVT prophylaxis.  Coumadin 


19.  Chronic thrombocytopenia.


20.  Chronic hypoxic respiratory failure.


21.  COVID-19 testing negative.  Patient has been hospitalized during a 

pandemic.








DISCHARGE PLAN


Home 





Impression and plan of care have been directed as dictated by the signing 

physician.  Mehnaz Quezada nurse practitioner acting as scribe for signing 

physician.


Patient Condition at Discharge: Good





Plan - Discharge Summary


Discharge Rx Participant: No


New Discharge Prescriptions: 


New


   Cephalexin [Keflex] 500 mg PO TID #15 cap


   Potassium Chloride ER [K-Dur 20] 20 meq PO TID #90 tab.er.prt





Continue


   Ferrous Sulfate [Feosol] 325 mg PO TID


   Latanoprost Ophth [Xalatan 0.005%] 1 drops BOTH EYES HS


   Sertraline [Zoloft] 100 mg PO DAILY


   Simvastatin [Zocor] 40 mg PO HS


   Gabapentin [Neurontin] 300 mg PO TID PRN


     PRN Reason: Pain


   Insulin Detemir (Levemir) [Levemir] 40 unit SQ HS


   traMADol HCL [Ultram] 50 mg PO BID PRN


     PRN Reason: Pain


   Metoprolol Tartrate [Lopressor] 12.5 mg PO HS


   Fluocinolone Acetonide Body Oil 0.01% 1 applic TOPICAL HS


   Cholecalciferol (Vitamin D3) [Vitamin D3 (5000 Iu)] 125 mcg PO DAILY


   Triamcinolone 0.1% Cream [Kenalog 0.1% Cream] 1 applicatio TOPICAL BID PRN


     PRN Reason: Rash


   Nystatin 100,000Unit/gm Cream [Mycostatin Cream] 1 applic TOPICAL BID PRN


     PRN Reason: Rash


   rOPINIRole HCL [Requip XL] 8 mg PO HS


   Warfarin [Coumadin] 2.5 mg PO TUFR@2100


   Melatonin 1 mg PO HS


   metFORMIN HCL [Glucophage] 500 mg PO BID


   Warfarin [Coumadin] 5 mg PO SUMOWETHSA@2100


   Levothyroxine Sodium [Synthroid] 125 mcg PO DAILY





Changed


   Furosemide [Lasix] 60 mg PO BID #90 tab





Discontinued


   Potassium Chloride ER [K-Dur 20] 20 meq PO DAILY


Discharge Medication List





Ferrous Sulfate [Feosol] 325 mg PO TID 03/31/17 [History]


Gabapentin [Neurontin] 300 mg PO TID PRN 03/31/17 [History]


Insulin Detemir (Levemir) [Levemir] 40 unit SQ HS 03/31/17 [History]


Latanoprost Ophth [Xalatan 0.005%] 1 drops BOTH EYES HS 03/31/17 [History]


Sertraline [Zoloft] 100 mg PO DAILY 03/31/17 [History]


Simvastatin [Zocor] 40 mg PO HS 03/31/17 [History]


traMADol HCL [Ultram] 50 mg PO BID PRN 05/16/17 [History]


Metoprolol Tartrate [Lopressor] 12.5 mg PO HS 03/13/18 [History]


Cholecalciferol (Vitamin D3) [Vitamin D3 (5000 Iu)] 125 mcg PO DAILY 05/13/21 

[History]


Fluocinolone Acetonide Body Oil 0.01% 1 applic TOPICAL HS 05/13/21 [History]


Levothyroxine Sodium [Synthroid] 125 mcg PO DAILY 05/13/21 [History]


Melatonin 1 mg PO HS 05/13/21 [History]


Nystatin 100,000Unit/gm Cream [Mycostatin Cream] 1 applic TOPICAL BID PRN 

05/13/21 [History]


Triamcinolone 0.1% Cream [Kenalog 0.1% Cream] 1 applicatio TOPICAL BID PRN 

05/13/21 [History]


Warfarin [Coumadin] 2.5 mg PO TUFR@2100 05/13/21 [History]


Warfarin [Coumadin] 5 mg PO SUMOWETHSA@2100 05/13/21 [History]


metFORMIN HCL [Glucophage] 500 mg PO BID 05/13/21 [History]


rOPINIRole HCL [Requip XL] 8 mg PO HS 05/13/21 [History]


Cephalexin [Keflex] 500 mg PO TID #15 cap 05/20/21 [Rx]


Furosemide [Lasix] 60 mg PO BID #90 tab 05/20/21 [Rx]


Potassium Chloride ER [K-Dur 20] 20 meq PO TID #90 tab.er.prt 05/20/21 [Rx]








Follow up Appointment(s)/Referral(s): 


Sky Schwartz MD [STAFF PHYSICIAN] - 1 Week


Mike Rojo DO [Primary Care Provider] - 1 Week


Ambulatory/Diagnostic Orders: 


Basic Metabolic Panel [LAB.AMB] Location: None Selected


Prothrombin Time INR [LAB.AMB] Location: None Selected


Patient Instructions/Handouts:  Heart Failure (DC)


Discharge Disposition: HOME SELF-CARE

## 2021-05-20 NOTE — CDI
Documentation Clarification Form



Date: 05/20/2021 10:38:19 AM

From: Gloria Li CCS, CCDS

Phone: [phone]

MRN: [id number]

Admit Date: 05/14/2021 01:45:00 PM

Patient Name: Garry Mercado

Visit Number: [id number]

Discharge Date:  





ATTENTION: The Clinical Documentation Specialists (CDI) and [facility] Coding Staff 
appreciate your assistance in clarifying documentation. Please respond to the 
clarification below the line at the bottom and electronically sign. The CDI & 
[facility] Coding staff will review the response and follow-up if needed. Please note: 
Queries are made part of the Legal Health Record. If you have any questions, 
please contact the author of this message via ITS.



Dr. Stephani Rainey:



SHERRI likely Cardiorenal is documented in the 5/14 H/P and subsequent Progress 
Notes.



Please clarify if there is an additional diagnosis and/or clinical significance 
related to this value.



History/Risk Factors per the 5/13 ED Note: IDDM II, Hypertensive Heart Failure, 
Hyperlipidemia, Atrial Fibrillation, Morbid Obesity.



Clinical Indicators: Presented to the ED on 5/13 with Bilateral Leg Swelling, 
Dyspnea & Fluid Retention with history of CHF. 

ED Clinical Impression: CHF

5/13 VS: T 97.9, P 90, R 19-20, P 111/53, PO 91 RA - 2Lnc, BMI: 43.6



LAB:

BUN: 5/13: 38;  5/14: 38;  5/16: 34;  5/17: 33.0;  5/18: 37;  5/19: 42

Creatinine: 5/13: 1.46;  5/14: 1.44;  5/16: 1.19;  5/17: 1.2;  5/18: 1.26;  
5/19: 1.30

GFR: 5/13: 47;  5/14: 47;  5/16: 60;  5/17: 59.2;  5/18: 56;  5/19: 54

Historical GFR: 3/13/2018: >90;  1/31/2020: 88;  1/12/2020: 84.4



Treatment 5/13: IV Lasix 40 mg x1. 5/14: IV Lasix 40 mg q12H, IV Lasix 60 mg 
q12H. 5/15: IV Lasix 60 mg q8H. 5/18: IV Lasix 60 mg x1. 5/19: IV Lasix 60 mg 
Jagdish. 



Is there an additional diagnosis and/or clinical significance related to the 
above lab result/information?



[x  ] SHERRI

[  ] SHERRI with CKD, please specify stage of CKD: 

     [  ] CKD Stage 1 (GFR > 90)

     [  ] CKD Stage 2 (GFR 60-89)

     [  ] CKD Stage 3 (GFR 30-59)

        ] CKD Stage 3a (GFR 45-59)

[  ] Other, please specify ___________

[  ] Unable to determine





(Template Last Revised: February 2021)

___________________________________________________________________________

MTDD

## 2021-05-20 NOTE — CDI
Documentation Clarification Form



Date: 05/20/2021 10:56:14 AM

From: Gloria Li CCS, CCDS

Phone: [phone]

MRN: [id number]

Admit Date: 05/14/2021 01:45:00 PM

Patient Name: Garry Mercado

Visit Number: [id number]

Discharge Date:  





ATTENTION: The Clinical Documentation Specialists (CDI) and [facility] Coding Staff 
appreciate your assistance in clarifying documentation. Please respond to the 
clarification below the line at the bottom and electronically sign. The CDI & 
[facility] Coding staff will review the response and follow-up if needed. Please note: 
Queries are made part of the Legal Health Record. If you have any questions, 
please contact the author of this message via ITS.



Dr. Stephani Rainey:



Per the 5/14 H/P, the patient presented with Bilateral lower extremity swelling 
& SOB with rales. Diagnosed with Acute Exacerbation of CHF with EF 40-45%, SHERRI 
likely Cardiorenal process, Paroxysmal Atrial Fibrillation, Coumadin 
Coagulopathy. . 



Based on this information and the findings below, is there an additional 
diagnosis that is clinically appropriate for this patient?



History/Risk Factors per the 5/14 H/P:  Chronic Systolic CHF, Paroxysmal Atrial 
Fibrillation on Coumadin, Hypertension, Moderate - Severe Pulmonary 
Hypertension, Mild CAD and Hypertension, Hyperlipidemia, Nonischemic 
Cardiomyopathy, Hypothyroidism, IDDM II w/Diabetic Neuropathy, Morbid Obesity, 
Obstructive Sleep Apnea and Osteoarthritis. Nonsmoker.

Home meds: Ultram, Coumadin, Neurontin, Insulin sq, Requip, Glucophage, Zocor, 
Lopressor, Synthroid, K Dur, Lasix (60 mg BID).



Clinical Indicators: Presented to the ED on 5/13 with Bilateral leg swelling, 
fluid retention and dyspnea with history of CHF. 

ED Clinical Impression: CHF

5/13 VS: T 97.9, P 90, R 19, /53, PO 91 RA - 2Lnc, BMI: 43.6

5/13 LAB: Hgb 9.6, Pl Ct 119, PT 32.7, INR 3.4, APTT 41.0, K 5.4, CO2 32, BUN 
38, Cr 1.35, Glucose 121

5/13 RAD: Suspect interstitial phase pulmonary edema if clinically corroborated



Treatment 5/13: O2 2Lnc, IV Lasix 40 mg x1. 5/14: IV Lasix 40 mg q12Hr, IV Lasix
60 mg q12Hr. 

5/20: O2 2-3Lnc, IV Lasix continued. 



Is there an additional diagnosis that is clinically appropriate for this 
patient?



[  ] Chronic Respiratory Failure

     [  ]  Hypoxic

     [  ]  Hypercapnic

[ x ] Acute Respiratory Insufficiency

[  ] Other Diagnosis, please specify _____

[  ] Unable to determine





(Template Last Revised: March 2021)

___________________________________________________________________________

MTDD

## 2021-06-13 ENCOUNTER — HOSPITAL ENCOUNTER (INPATIENT)
Dept: HOSPITAL 47 - EC | Age: 76
LOS: 9 days | Discharge: HOME HEALTH | DRG: 291 | End: 2021-06-22
Attending: INTERNAL MEDICINE | Admitting: INTERNAL MEDICINE
Payer: MEDICARE

## 2021-06-13 VITALS — BODY MASS INDEX: 40.4 KG/M2

## 2021-06-13 DIAGNOSIS — E03.9: ICD-10-CM

## 2021-06-13 DIAGNOSIS — D63.1: ICD-10-CM

## 2021-06-13 DIAGNOSIS — F33.9: ICD-10-CM

## 2021-06-13 DIAGNOSIS — Z90.49: ICD-10-CM

## 2021-06-13 DIAGNOSIS — N17.0: ICD-10-CM

## 2021-06-13 DIAGNOSIS — Z82.49: ICD-10-CM

## 2021-06-13 DIAGNOSIS — Z79.01: ICD-10-CM

## 2021-06-13 DIAGNOSIS — I27.20: ICD-10-CM

## 2021-06-13 DIAGNOSIS — G47.33: ICD-10-CM

## 2021-06-13 DIAGNOSIS — G25.81: ICD-10-CM

## 2021-06-13 DIAGNOSIS — I50.23: ICD-10-CM

## 2021-06-13 DIAGNOSIS — D69.6: ICD-10-CM

## 2021-06-13 DIAGNOSIS — Z99.81: ICD-10-CM

## 2021-06-13 DIAGNOSIS — R32: ICD-10-CM

## 2021-06-13 DIAGNOSIS — I48.19: ICD-10-CM

## 2021-06-13 DIAGNOSIS — J96.11: ICD-10-CM

## 2021-06-13 DIAGNOSIS — Z82.5: ICD-10-CM

## 2021-06-13 DIAGNOSIS — R33.8: ICD-10-CM

## 2021-06-13 DIAGNOSIS — Z98.42: ICD-10-CM

## 2021-06-13 DIAGNOSIS — Z98.41: ICD-10-CM

## 2021-06-13 DIAGNOSIS — Z83.3: ICD-10-CM

## 2021-06-13 DIAGNOSIS — I08.1: ICD-10-CM

## 2021-06-13 DIAGNOSIS — E66.01: ICD-10-CM

## 2021-06-13 DIAGNOSIS — N40.1: ICD-10-CM

## 2021-06-13 DIAGNOSIS — H42: ICD-10-CM

## 2021-06-13 DIAGNOSIS — Z71.3: ICD-10-CM

## 2021-06-13 DIAGNOSIS — Z90.79: ICD-10-CM

## 2021-06-13 DIAGNOSIS — N18.2: ICD-10-CM

## 2021-06-13 DIAGNOSIS — Z96.653: ICD-10-CM

## 2021-06-13 DIAGNOSIS — I25.10: ICD-10-CM

## 2021-06-13 DIAGNOSIS — Z79.890: ICD-10-CM

## 2021-06-13 DIAGNOSIS — L03.116: ICD-10-CM

## 2021-06-13 DIAGNOSIS — M15.9: ICD-10-CM

## 2021-06-13 DIAGNOSIS — Z79.4: ICD-10-CM

## 2021-06-13 DIAGNOSIS — I95.9: ICD-10-CM

## 2021-06-13 DIAGNOSIS — H40.9: ICD-10-CM

## 2021-06-13 DIAGNOSIS — Z80.9: ICD-10-CM

## 2021-06-13 DIAGNOSIS — Z20.822: ICD-10-CM

## 2021-06-13 DIAGNOSIS — Z86.16: ICD-10-CM

## 2021-06-13 DIAGNOSIS — Z87.01: ICD-10-CM

## 2021-06-13 DIAGNOSIS — M51.9: ICD-10-CM

## 2021-06-13 DIAGNOSIS — Z88.5: ICD-10-CM

## 2021-06-13 DIAGNOSIS — I42.8: ICD-10-CM

## 2021-06-13 DIAGNOSIS — H91.90: ICD-10-CM

## 2021-06-13 DIAGNOSIS — T50.2X5A: ICD-10-CM

## 2021-06-13 DIAGNOSIS — E11.40: ICD-10-CM

## 2021-06-13 DIAGNOSIS — E78.5: ICD-10-CM

## 2021-06-13 DIAGNOSIS — Z98.890: ICD-10-CM

## 2021-06-13 DIAGNOSIS — E11.39: ICD-10-CM

## 2021-06-13 DIAGNOSIS — Z79.899: ICD-10-CM

## 2021-06-13 DIAGNOSIS — E11.22: ICD-10-CM

## 2021-06-13 DIAGNOSIS — Z88.2: ICD-10-CM

## 2021-06-13 DIAGNOSIS — E11.649: ICD-10-CM

## 2021-06-13 DIAGNOSIS — L03.115: ICD-10-CM

## 2021-06-13 DIAGNOSIS — I13.0: Primary | ICD-10-CM

## 2021-06-13 DIAGNOSIS — Z87.438: ICD-10-CM

## 2021-06-13 LAB
ALBUMIN SERPL-MCNC: 3.8 G/DL (ref 3.5–5)
ALP SERPL-CCNC: 106 U/L (ref 38–126)
ALT SERPL-CCNC: 23 U/L (ref 4–49)
ANION GAP SERPL CALC-SCNC: 8 MMOL/L
APTT BLD: 36.4 SEC (ref 22–30)
AST SERPL-CCNC: 48 U/L (ref 17–59)
BASOPHILS # BLD AUTO: 0 K/UL (ref 0–0.2)
BASOPHILS NFR BLD AUTO: 0 %
BUN SERPL-SCNC: 37 MG/DL (ref 9–20)
CALCIUM SPEC-MCNC: 9 MG/DL (ref 8.4–10.2)
CHLORIDE SERPL-SCNC: 98 MMOL/L (ref 98–107)
CO2 SERPL-SCNC: 35 MMOL/L (ref 22–30)
EOSINOPHIL # BLD AUTO: 0.2 K/UL (ref 0–0.7)
EOSINOPHIL NFR BLD AUTO: 4 %
ERYTHROCYTE [DISTWIDTH] IN BLOOD BY AUTOMATED COUNT: 3.62 M/UL (ref 4.3–5.9)
ERYTHROCYTE [DISTWIDTH] IN BLOOD: 16.4 % (ref 11.5–15.5)
GLUCOSE BLD-MCNC: 117 MG/DL (ref 75–99)
GLUCOSE SERPL-MCNC: 89 MG/DL (ref 74–99)
HCT VFR BLD AUTO: 29 % (ref 39–53)
HGB BLD-MCNC: 9.4 GM/DL (ref 13–17.5)
INR PPP: 2.4 (ref ?–1.2)
LYMPHOCYTES # SPEC AUTO: 0.6 K/UL (ref 1–4.8)
LYMPHOCYTES NFR SPEC AUTO: 11 %
MAGNESIUM SPEC-SCNC: 1.7 MG/DL (ref 1.6–2.3)
MCH RBC QN AUTO: 26 PG (ref 25–35)
MCHC RBC AUTO-ENTMCNC: 32.5 G/DL (ref 31–37)
MCV RBC AUTO: 80.2 FL (ref 80–100)
MONOCYTES # BLD AUTO: 0.3 K/UL (ref 0–1)
MONOCYTES NFR BLD AUTO: 5 %
NEUTROPHILS # BLD AUTO: 4.2 K/UL (ref 1.3–7.7)
NEUTROPHILS NFR BLD AUTO: 78 %
PLATELET # BLD AUTO: 117 K/UL (ref 150–450)
POTASSIUM SERPL-SCNC: 4.7 MMOL/L (ref 3.5–5.1)
PROT SERPL-MCNC: 7 G/DL (ref 6.3–8.2)
PT BLD: 23.1 SEC (ref 9–12)
SODIUM SERPL-SCNC: 141 MMOL/L (ref 137–145)
WBC # BLD AUTO: 5.4 K/UL (ref 3.8–10.6)

## 2021-06-13 PROCEDURE — 83735 ASSAY OF MAGNESIUM: CPT

## 2021-06-13 PROCEDURE — 82040 ASSAY OF SERUM ALBUMIN: CPT

## 2021-06-13 PROCEDURE — 83550 IRON BINDING TEST: CPT

## 2021-06-13 PROCEDURE — 96374 THER/PROPH/DIAG INJ IV PUSH: CPT

## 2021-06-13 PROCEDURE — 85730 THROMBOPLASTIN TIME PARTIAL: CPT

## 2021-06-13 PROCEDURE — 36415 COLL VENOUS BLD VENIPUNCTURE: CPT

## 2021-06-13 PROCEDURE — 87635 SARS-COV-2 COVID-19 AMP PRB: CPT

## 2021-06-13 PROCEDURE — 85610 PROTHROMBIN TIME: CPT

## 2021-06-13 PROCEDURE — 83880 ASSAY OF NATRIURETIC PEPTIDE: CPT

## 2021-06-13 PROCEDURE — 72100 X-RAY EXAM L-S SPINE 2/3 VWS: CPT

## 2021-06-13 PROCEDURE — 99285 EMERGENCY DEPT VISIT HI MDM: CPT

## 2021-06-13 PROCEDURE — 84484 ASSAY OF TROPONIN QUANT: CPT

## 2021-06-13 PROCEDURE — 93005 ELECTROCARDIOGRAM TRACING: CPT

## 2021-06-13 PROCEDURE — 80048 BASIC METABOLIC PNL TOTAL CA: CPT

## 2021-06-13 PROCEDURE — 76770 US EXAM ABDO BACK WALL COMP: CPT

## 2021-06-13 PROCEDURE — 85027 COMPLETE CBC AUTOMATED: CPT

## 2021-06-13 PROCEDURE — 82728 ASSAY OF FERRITIN: CPT

## 2021-06-13 PROCEDURE — 85025 COMPLETE CBC W/AUTO DIFF WBC: CPT

## 2021-06-13 PROCEDURE — 71046 X-RAY EXAM CHEST 2 VIEWS: CPT

## 2021-06-13 PROCEDURE — 81001 URINALYSIS AUTO W/SCOPE: CPT

## 2021-06-13 PROCEDURE — 80053 COMPREHEN METABOLIC PANEL: CPT

## 2021-06-13 PROCEDURE — 83540 ASSAY OF IRON: CPT

## 2021-06-13 RX ADMIN — Medication SCH MG: at 21:56

## 2021-06-13 RX ADMIN — METOPROLOL TARTRATE SCH MG: 25 TABLET, FILM COATED ORAL at 21:56

## 2021-06-13 RX ADMIN — Medication SCH MCG: at 21:55

## 2021-06-13 RX ADMIN — FUROSEMIDE SCH MLS/HR: 10 INJECTION, SOLUTION INTRAMUSCULAR; INTRAVENOUS at 21:56

## 2021-06-13 RX ADMIN — LATANOPROST SCH: 50 SOLUTION OPHTHALMIC at 21:56

## 2021-06-13 RX ADMIN — POTASSIUM CHLORIDE SCH MEQ: 20 TABLET, EXTENDED RELEASE ORAL at 21:55

## 2021-06-13 RX ADMIN — ATORVASTATIN CALCIUM SCH: 20 TABLET, FILM COATED ORAL at 21:56

## 2021-06-13 NOTE — ED
General Adult HPI





- General


Chief complaint: Weakness


Stated complaint: AFib, SOB, water retention


Time Seen by Provider: 06/13/21 16:05


Source: patient


Mode of arrival: ambulatory


Limitations: no limitations





- History of Present Illness


Initial comments: 


This is a 76-year-old male with a history of H of fibrillation on Coumadin, CHF,

hypertension, hyperlipidemia presents emergent department for worsening 

bilateral lower extremity edema and dyspnea and waking.  The patient was 

recently hospitalized a few weeks ago for exacerbation of his heart failure.  He

was started on metolazone 2.5 mg every other day and also spironolactone daily. 

The patient is also on Lasix 80 mg twice a day.  He presents emergency 

department today because he is having worsening lower extremity edema now going 

up into his abdomen.  He is also had worsening dyspnea.  The patient is 

chronically on oxygen on 2 L.  Admits to weight gain as well.  He states 

approximately 12-15 pounds since he was discharged from the hospital.  He did 

see Dr. robin snider who is his cardiologist last week who ordered some blood work 

however told him that if his symptoms seem to be worsening over the weekend to 

come to the emergency department for evaluation.  The patient denies any chest 

pain, fever, chills, cough, nausea, vomiting, diarrhea, dark or bloody stools.








- Related Data


                                Home Medications











 Medication  Instructions  Recorded  Confirmed


 


Ferrous Sulfate [Feosol] 325 mg PO TID 03/31/17 05/13/21


 


Gabapentin [Neurontin] 300 mg PO TID PRN 03/31/17 05/13/21


 


Insulin Detemir (Levemir) [Levemir] 40 unit SQ HS 03/31/17 05/13/21


 


Latanoprost Ophth [Xalatan 0.005%] 1 drops BOTH EYES HS 03/31/17 05/13/21


 


Sertraline [Zoloft] 100 mg PO DAILY 03/31/17 05/13/21


 


Simvastatin [Zocor] 40 mg PO HS 03/31/17 05/13/21


 


traMADol HCL [Ultram] 50 mg PO BID PRN 05/16/17 05/13/21


 


Metoprolol Tartrate [Lopressor] 12.5 mg PO HS 03/13/18 05/13/21


 


Cholecalciferol (Vitamin D3) 125 mcg PO DAILY 05/13/21 05/13/21





[Vitamin D3 (5000 Iu)]   


 


Fluocinolone Acetonide Body Oil 1 applic TOPICAL HS 05/13/21 05/13/21





0.01%   


 


Levothyroxine Sodium [Synthroid] 125 mcg PO DAILY 05/13/21 05/13/21


 


Melatonin 1 mg PO HS 05/13/21 05/13/21


 


Nystatin 100,000Unit/gm Cream 1 applic TOPICAL BID PRN 05/13/21 05/13/21





[Mycostatin Cream]   


 


Triamcinolone 0.1% Cream [Kenalog 1 applicatio TOPICAL BID PRN 05/13/21 05/13/21





0.1% Cream]   


 


Warfarin [Coumadin] 2.5 mg PO TUFR@2100 05/13/21 05/13/21


 


Warfarin [Coumadin] 5 mg PO SUMOWETHSA@2100 05/13/21 05/13/21


 


metFORMIN HCL [Glucophage] 500 mg PO BID 05/13/21 05/13/21


 


rOPINIRole HCL [Requip XL] 8 mg PO HS 05/13/21 05/13/21








                                  Previous Rx's











 Medication  Instructions  Recorded


 


Cephalexin [Keflex] 500 mg PO TID #15 cap 05/20/21


 


Furosemide [Lasix] 60 mg PO BID #90 tab 05/20/21


 


Potassium Chloride ER [K-Dur 20] 20 meq PO TID #90 tab.er.prt 05/20/21











                                    Allergies











Allergy/AdvReac Type Severity Reaction Status Date / Time


 


Sulfa (Sulfonamide Allergy  Rash/Hives Verified 06/13/21 16:01





Antibiotics)     


 


hydromorphone [From Dilaudid] AdvReac  SEVERE Verified 06/13/21 16:01





   VOMITING  


 


meperidine [From Demerol] AdvReac  Nausea & Verified 06/13/21 16:01





   Vomiting &  





   Diarrhea  














Review of Systems


ROS Statement: 


Those systems with pertinent positive or pertinent negative responses have been 

documented in the HPI.





ROS Other: All systems not noted in ROS Statement are negative.





Past Medical History


Past Medical History: Atrial Fibrillation, Heart Failure, Diabetes Mellitus, Eye

Disorder, Hearing Disorder / Deafness, Hyperlipidemia, Hypertension, 

Osteoarthritis (OA), Pneumonia, Prostate Disorder, Sleep Apnea/CPAP/BIPAP, T

hyroid Disorder


Additional Past Medical History / Comment(s): GLAUCOMA, covid


History of Any Multi-Drug Resistant Organisms: None Reported


Past Surgical History: Back Surgery, Cholecystectomy, Heart Catheterization, 

Joint Replacement, Prostate Surgery


Additional Past Surgical History / Comment(s): greer knee replacement, 

cardioverion, bilc cataracts, TURP


Past Anesthesia/Blood Transfusion Reactions: No Reported Reaction


Past Psychological History: No Psychological Hx Reported


Smoking Status: Never smoker


Past Alcohol Use History: None Reported


Past Drug Use History: None Reported





- Past Family History


  ** Father


History Unknown: Yes





  ** Mother


Family Medical History: No Reported History





  ** Brother(s)


Family Medical History: Cancer





General Exam





- General Exam Comments


Initial Comments: 


Constitutional: Awake alert Appears comfortable


Head: Normocephalic atraumatic 


Eyes: no conjunctival injection No scleral icterus EOMI


Neck: No JVD Supple


Heart: Regular rate rhythm normal S1-S2 no murmurs


Lungs: Conversation all dyspnea with decreased lung sounds at the bases  No 

wheezing No rales


Abdomen: Soft nondistended nontender, there does seem to be some edema to the 

lower abdomen


Extremities: Pitting edema bilateral lower extremities extending all the way up 

to the DP pulses intact Radial pulses intact


Neuro: A&Ox3 No focal neurologic deficits


Psych: Appropriate mood and affect








Limitations: no limitations





Course


                                   Vital Signs











  06/13/21 06/13/21 06/13/21





  15:57 16:28 17:29


 


Temperature 98.2 F  


 


Pulse Rate 72  78


 


Respiratory 18 16 16





Rate   


 


Blood Pressure 120/81  123/67


 


O2 Sat by Pulse 96  99





Oximetry   














  06/13/21





  18:00


 


Temperature 98.1 F


 


Pulse Rate 77


 


Respiratory 20





Rate 


 


Blood Pressure 112/78


 


O2 Sat by Pulse 99





Oximetry 














EKG Findings





- EKG Comments:


EKG Findings:: EKG showing intrafibrillation with a rate of 78.  There appears 

to be some mild ST depression in the 3.  This T-wave inversions in V2 through 

V6.  QTC is prolonged at 515.  Other intervals are normal.  No ectopy.





Medical Decision Making





- Medical Decision Making





Is a 76-year-old male who presents emergency department for worsening lower 

extremity edema going up to his abdomen and shortness of breath with weight 

gain.  Patient was evaluated at bedside.  Vital signs are stable on his 2 L 

nasal cannula which is his home dose.  The patient does have significant edema. 

BNP is elevated chest x-ray she seems improved from previous.  However due to 

his weight gain and worsening edema be kept in the hospital for IV diuresis.  He

was given 80 mg of Lasix in the ED.  I admitted the patient to Dr. Aguilar and 

radiology was consulted.  The patient was updated and agrees with plan of care.





- Lab Data


Result diagrams: 


                                 06/13/21 16:24





                                 06/13/21 16:24


                                   Lab Results











  06/13/21 06/13/21 06/13/21 Range/Units





  16:24 16:24 16:24 


 


WBC  5.4    (3.8-10.6)  k/uL


 


RBC  3.62 L    (4.30-5.90)  m/uL


 


Hgb  9.4 L    (13.0-17.5)  gm/dL


 


Hct  29.0 L    (39.0-53.0)  %


 


MCV  80.2    (80.0-100.0)  fL


 


MCH  26.0    (25.0-35.0)  pg


 


MCHC  32.5    (31.0-37.0)  g/dL


 


RDW  16.4 H    (11.5-15.5)  %


 


Plt Count  117 L    (150-450)  k/uL


 


MPV  8.5    


 


Neutrophils %  78    %


 


Lymphocytes %  11    %


 


Monocytes %  5    %


 


Eosinophils %  4    %


 


Basophils %  0    %


 


Neutrophils #  4.2    (1.3-7.7)  k/uL


 


Lymphocytes #  0.6 L    (1.0-4.8)  k/uL


 


Monocytes #  0.3    (0-1.0)  k/uL


 


Eosinophils #  0.2    (0-0.7)  k/uL


 


Basophils #  0.0    (0-0.2)  k/uL


 


Anisocytosis  Slight    


 


PT   23.1 H   (9.0-12.0)  sec


 


INR   2.4 H   (<1.2)  


 


APTT   36.4 H   (22.0-30.0)  sec


 


Sodium    141  (137-145)  mmol/L


 


Potassium    4.7  (3.5-5.1)  mmol/L


 


Chloride    98  ()  mmol/L


 


Carbon Dioxide    35 H  (22-30)  mmol/L


 


Anion Gap    8  mmol/L


 


BUN    37 H  (9-20)  mg/dL


 


Creatinine    1.32 H  (0.66-1.25)  mg/dL


 


Est GFR (CKD-EPI)AfAm    61  (>60 ml/min/1.73 sqM)  


 


Est GFR (CKD-EPI)NonAf    53  (>60 ml/min/1.73 sqM)  


 


Glucose    89  (74-99)  mg/dL


 


Calcium    9.0  (8.4-10.2)  mg/dL


 


Magnesium    1.7  (1.6-2.3)  mg/dL


 


Total Bilirubin    0.8  (0.2-1.3)  mg/dL


 


AST    48  (17-59)  U/L


 


ALT    23  (4-49)  U/L


 


Alkaline Phosphatase    106  ()  U/L


 


Troponin I     (0.000-0.034)  ng/mL


 


NT-Pro-B Natriuret Pep     pg/mL


 


Total Protein    7.0  (6.3-8.2)  g/dL


 


Albumin    3.8  (3.5-5.0)  g/dL














  06/13/21 06/13/21 Range/Units





  16:24 16:24 


 


WBC    (3.8-10.6)  k/uL


 


RBC    (4.30-5.90)  m/uL


 


Hgb    (13.0-17.5)  gm/dL


 


Hct    (39.0-53.0)  %


 


MCV    (80.0-100.0)  fL


 


MCH    (25.0-35.0)  pg


 


MCHC    (31.0-37.0)  g/dL


 


RDW    (11.5-15.5)  %


 


Plt Count    (150-450)  k/uL


 


MPV    


 


Neutrophils %    %


 


Lymphocytes %    %


 


Monocytes %    %


 


Eosinophils %    %


 


Basophils %    %


 


Neutrophils #    (1.3-7.7)  k/uL


 


Lymphocytes #    (1.0-4.8)  k/uL


 


Monocytes #    (0-1.0)  k/uL


 


Eosinophils #    (0-0.7)  k/uL


 


Basophils #    (0-0.2)  k/uL


 


Anisocytosis    


 


PT    (9.0-12.0)  sec


 


INR    (<1.2)  


 


APTT    (22.0-30.0)  sec


 


Sodium    (137-145)  mmol/L


 


Potassium    (3.5-5.1)  mmol/L


 


Chloride    ()  mmol/L


 


Carbon Dioxide    (22-30)  mmol/L


 


Anion Gap    mmol/L


 


BUN    (9-20)  mg/dL


 


Creatinine    (0.66-1.25)  mg/dL


 


Est GFR (CKD-EPI)AfAm    (>60 ml/min/1.73 sqM)  


 


Est GFR (CKD-EPI)NonAf    (>60 ml/min/1.73 sqM)  


 


Glucose    (74-99)  mg/dL


 


Calcium    (8.4-10.2)  mg/dL


 


Magnesium    (1.6-2.3)  mg/dL


 


Total Bilirubin    (0.2-1.3)  mg/dL


 


AST    (17-59)  U/L


 


ALT    (4-49)  U/L


 


Alkaline Phosphatase    ()  U/L


 


Troponin I  <0.012   (0.000-0.034)  ng/mL


 


NT-Pro-B Natriuret Pep   9460  pg/mL


 


Total Protein    (6.3-8.2)  g/dL


 


Albumin    (3.5-5.0)  g/dL














Disposition


Clinical Impression: 


 Fluid overload, CHF (congestive heart failure)





Disposition: ADMITTED AS IP TO THIS [facility]


Condition: Stable

## 2021-06-13 NOTE — XR
EXAMINATION TYPE: XR chest 2V

 

DATE OF EXAM: 6/13/2021

 

COMPARISON: 5/16/2021

 

HISTORY: Short of breath

 

TECHNIQUE:

 

FINDINGS: Heart is enlarged. There is no gross heart failure. Lungs are clear of consolidation. There
 are chest leads.

 

IMPRESSION: Cardiomegaly. There is improvement in the pulmonary congestion compared to old exam. No o
bvious heart failure.

## 2021-06-14 LAB
ALBUMIN SERPL-MCNC: 3.7 G/DL (ref 3.5–5)
ALP SERPL-CCNC: 115 U/L (ref 38–126)
ALT SERPL-CCNC: 22 U/L (ref 4–49)
ANION GAP SERPL CALC-SCNC: 8 MMOL/L
AST SERPL-CCNC: 45 U/L (ref 17–59)
BUN SERPL-SCNC: 36 MG/DL (ref 9–20)
CALCIUM SPEC-MCNC: 9 MG/DL (ref 8.4–10.2)
CHLORIDE SERPL-SCNC: 98 MMOL/L (ref 98–107)
CO2 SERPL-SCNC: 37 MMOL/L (ref 22–30)
ERYTHROCYTE [DISTWIDTH] IN BLOOD BY AUTOMATED COUNT: 3.56 M/UL (ref 4.3–5.9)
ERYTHROCYTE [DISTWIDTH] IN BLOOD: 16.5 % (ref 11.5–15.5)
FERRITIN SERPL-MCNC: 244.5 NG/ML (ref 22–322)
GLUCOSE BLD-MCNC: 116 MG/DL (ref 75–99)
GLUCOSE BLD-MCNC: 161 MG/DL (ref 75–99)
GLUCOSE BLD-MCNC: 177 MG/DL (ref 75–99)
GLUCOSE BLD-MCNC: 92 MG/DL (ref 75–99)
GLUCOSE SERPL-MCNC: 83 MG/DL (ref 74–99)
HCT VFR BLD AUTO: 29.5 % (ref 39–53)
HGB BLD-MCNC: 9.1 GM/DL (ref 13–17.5)
INR PPP: 3.3 (ref ?–1.2)
IRON SERPL-MCNC: 24 UG/DL (ref 65–175)
MCH RBC QN AUTO: 25.6 PG (ref 25–35)
MCHC RBC AUTO-ENTMCNC: 30.9 G/DL (ref 31–37)
MCV RBC AUTO: 82.7 FL (ref 80–100)
PH UR: 6 [PH] (ref 5–8)
PLATELET # BLD AUTO: 114 K/UL (ref 150–450)
POTASSIUM SERPL-SCNC: 4.7 MMOL/L (ref 3.5–5.1)
PROT SERPL-MCNC: 6.8 G/DL (ref 6.3–8.2)
PT BLD: 31.8 SEC (ref 9–12)
SODIUM SERPL-SCNC: 143 MMOL/L (ref 137–145)
SP GR UR: 1.01 (ref 1–1.03)
TIBC SERPL-MCNC: 285 UG/DL (ref 228–460)
UROBILINOGEN UR QL STRIP: <0.2 MG/DL (ref ?–2)
WBC # BLD AUTO: 5.2 K/UL (ref 3.8–10.6)

## 2021-06-14 RX ADMIN — Medication SCH MG: at 09:16

## 2021-06-14 RX ADMIN — ISOSORBIDE MONONITRATE SCH MG: 30 TABLET, EXTENDED RELEASE ORAL at 12:15

## 2021-06-14 RX ADMIN — INSULIN DETEMIR SCH: 100 INJECTION, SOLUTION SUBCUTANEOUS at 01:07

## 2021-06-14 RX ADMIN — Medication SCH MG: at 20:52

## 2021-06-14 RX ADMIN — POTASSIUM CHLORIDE SCH MEQ: 20 TABLET, EXTENDED RELEASE ORAL at 20:52

## 2021-06-14 RX ADMIN — INSULIN ASPART SCH UNIT: 100 INJECTION, SOLUTION INTRAVENOUS; SUBCUTANEOUS at 17:21

## 2021-06-14 RX ADMIN — LEVOTHYROXINE SODIUM SCH: 0.12 TABLET ORAL at 07:05

## 2021-06-14 RX ADMIN — ATORVASTATIN CALCIUM SCH MG: 20 TABLET, FILM COATED ORAL at 20:52

## 2021-06-14 RX ADMIN — FUROSEMIDE SCH MLS/HR: 10 INJECTION, SOLUTION INTRAMUSCULAR; INTRAVENOUS at 23:27

## 2021-06-14 RX ADMIN — SERTRALINE HYDROCHLORIDE SCH MG: 100 TABLET ORAL at 09:16

## 2021-06-14 RX ADMIN — FUROSEMIDE SCH: 10 INJECTION, SOLUTION INTRAMUSCULAR; INTRAVENOUS at 07:05

## 2021-06-14 RX ADMIN — INSULIN DETEMIR SCH UNIT: 100 INJECTION, SOLUTION SUBCUTANEOUS at 20:53

## 2021-06-14 RX ADMIN — Medication SCH MCG: at 09:15

## 2021-06-14 RX ADMIN — Medication SCH MG: at 15:29

## 2021-06-14 RX ADMIN — POTASSIUM CHLORIDE SCH MEQ: 20 TABLET, EXTENDED RELEASE ORAL at 09:16

## 2021-06-14 RX ADMIN — FUROSEMIDE SCH MLS/HR: 10 INJECTION, SOLUTION INTRAMUSCULAR; INTRAVENOUS at 15:49

## 2021-06-14 RX ADMIN — METOPROLOL TARTRATE SCH MG: 25 TABLET, FILM COATED ORAL at 09:16

## 2021-06-14 RX ADMIN — POTASSIUM CHLORIDE SCH MEQ: 20 TABLET, EXTENDED RELEASE ORAL at 15:29

## 2021-06-14 RX ADMIN — SPIRONOLACTONE SCH MG: 25 TABLET, FILM COATED ORAL at 09:16

## 2021-06-14 RX ADMIN — INSULIN ASPART SCH UNIT: 100 INJECTION, SOLUTION INTRAVENOUS; SUBCUTANEOUS at 20:58

## 2021-06-14 RX ADMIN — LATANOPROST SCH DROPS: 50 SOLUTION OPHTHALMIC at 20:55

## 2021-06-14 RX ADMIN — METOPROLOL TARTRATE SCH MG: 25 TABLET, FILM COATED ORAL at 20:52

## 2021-06-14 NOTE — P.HPIM
History of Present Illness


H&P Date: 21


Chief Complaint: SHANNA





HISTORY OF PRESENT ILLNESS


76 years old  male patient of Dr. Rojo for past medical history 

of paroxysmal atrial fibrillation on Coumadin, hyperlipidemia, hypertension, 

coronary artery disease, history of nonischemic cardiomyopathy with EF 40%, 

valvular heart disease, moderate to severe pulmonary hypertension, obstructive 

sleep apnea on CPAP, hypothyroidism, morbid obesity, diabetes mellitus type 2 

with diabetic neuropathy, benign prostatic hypertrophy status post TURP, 

generalized osteoarthritis, recurrent depression, anemia of chronic disease, 

restless leg syndrome, chronic thrombocytopenia, chronic hypoxic respiratory 

failure on home O2.  Patient had recent hospitalization May 14 through May 20 

which time he was treated for acute on chronic systolic heart failure and acute 

kidney injury.  Patient was stabilized and discharged home and continued on 

Keflex for lower extremity cellulitis.  Patient now presents with edema to the 

legs and thighs and flank area, lightheadedness, shortness of breath and weight 

gain of 12-15 pounds since he left the hospital.  He had follow-up with Dr. Rojo and with cardiologist last week but worsened over the weekend.





Patient presented to [facility] emergency center for evaluation

and was found to be afebrile, heart rate 72, blood pressure 120/81, pulse ox 96%

on 2 L nasal cannula. EKG was atrial fibrillation controlled rate of 78.  WBC 5.

4, hemoglobin 9.4, platelet count 117.  Sodium 141, potassium 4.7, chloride 98, 

CO2 35, BUN 37 creatinine 1.32.  Blood sugar 89.  Liver function tests were 

normal.  Albumin 3.8.  Troponin negative.  ProBNP 9460.


Chest x-ray reveals cardiomegaly.  Improvement of the pulmonary congestion 

compared to old exam.


Patient has been admitted to the cardiac stepdown unit, status post 1 dose of IV

Lasix last evening of 80 mg and started on Lasix drip last evening, cardiology 

consult








REVIEW OF SYSTEMS


Constitutional: No fever, no chills, no night sweats.  Reports weight gain.  No 

weakness, fatigue or lethargy.  No daytime sleepiness.


EENT: No headache.  No blurred vision or double vision, no loss of vision.  No 

loss of Hearing, no ringing in the ears, reports dizziness.  No nasal drainage 

or congestion.  No epistaxis.  No sore throat.


Lungs: Reports shortness of breath, cough, no sputum production.  No wheezing.


Cardiovascular: No chest pain, reports lower extremity edema.  No palpitations. 

No paroxysmal nocturnal dyspnea.  No orthopnea.  reports lightheadedness or di

zziness.  No syncopal episodes.


Abdominal: No abdominal pain.  No nausea, vomiting.  No diarrhea.  No 

constipation.  No bloody or tarry stools..  No loss of appetite.


Genitourinary: No dysuria, increased frequency, urgency.  No urinary retention.


Musculoskeletal: No myalgias.  Reports muscle weakness, no gait dysfunction, no 

frequent falls.  No back pain.  No neck pain.


Integumentary: Reports wounds, no lesions.  No rash or pruritus.  No unusual 

bruising.  No change in hair or nails.


Neurologic: No aphasia. No facial droop. No change in mentation. No head injury.

No headache. No paralysis. No paresthesia.


Psychiatric: No depression.  No anxiety.  No mood swings.


Endocrine: No abnormal blood sugars.





SOCIAL HISTORY


Patient denies history of smoking, no alcohol use, no illicit drug use.  He 

lives at home with his wife.  He uses a walker at home for ambulation.





FAMILY HISTORY


Mother  of old age with history of diabetes.  Father  from old age with 

history of myocardial infarction.  Patient has one brother with cancer does not 

recall the type.  Patient has sisters had unsure of any medical history.  

Patient has one son with no major medical problems, one son with asthma.





PHYSICAL EXAMINATION


Gen: This is a morbidly obese 76-year-old  male.  He is resting in a 

recliner and appears to be comfortable and in no acute distress.


HEENT: Head is atraumatic, normocephalic. Pupils equal, round. Sclerae is 

anicteric. 


NECK: Supple. No JVD. No lymphadenopathy. No thyromegaly. 


LUNGS: Diminished in the bilateral bases.  No intercostal retractions.


HEART: Irregular rate and rhythm.  3/6 systolic murmur.  No rub, no gallop, no 

click.   


ABDOMEN: Soft. Bowel sounds are present. No masses.  No tenderness.


EXTREMITIES: 2+ bilateral edema to both lower extremities and flank area.  No 

calf tenderness.


NEUROLOGICAL: Patient is awake, alert and oriented x3. Cranial nerves 2 through 

12 are grossly intact. 





ASSESSMENT AND PLAN


1.  Acute on chronic systolic heart failure.  Continue Lasix drip, monitor I&O 

and daily weights, monitor electrolytes and renal function.  Continue Aldactone 

25 mg daily.





2.  Acute kidney injury likely secondary to cardiorenal process.  Continue Lasix

for now, monitor renal function.





3.  Paroxysmal atrial fibrillation on Coumadin.  Pharmacy is dosing Coumadin.  

INR 3.3.





4.  Chronic anemia.  Iron studies ordered.





5.  Mild chronic cellulitis lower extremities stable.  





6.  Hypertension, hypertensive cardiovascular disease.  Continue metoprolol 

tartrate 12.5 mg oral twice daily, Imdur 15 mg daily, Aldactone 25 mg daily, 

hydralazine 25 mg twice daily





7.  Mild coronary artery disease.  Continue Coumadin, metoprolol, Lasix.





8.  Nonischemic cardiomyopathy.





9.  Hypothyroidism.  Continue levothyroxine 125 g daily.





10.  Diabetes mellitus type 2 with diabetic neuropathy.  Continue Levemir 40 

units at bedtime, NovoLog scale, gabapentin 300 mg three times daily.





11.  Hyperlipidemia.  Continue simvastatin 40 mg at bedtime.





12.  Obstructive sleep apnea on CPAP.





13.  Benign prostatic hypertrophy status post TURP.





14.  Generalized osteoarthritis.  Continue tramadol for pain.





15.  Recurrent depression.  Continue Zoloft 100 mg daily.





16.  Anemia of chronic disease.  Continue ferrous sulfate.





17.  Restless leg syndrome.  Continue Requip 2.5 mg 3 times daily.





18.  Thrombocytopenia, chronic.





19.  DVT prophylaxis.  Coumadin 





20.  GI prophylaxis.  Protonix 40 mg oral daily.





Patient admitted to the hospital for a minimum of 2 night stay.





DISCHARGE PLAN


Most likely return home.  Patient may benefit from home care.





Impression and plan of care have been directed as dictated by the signing 

physician.  Mehnaz Quezada nurse practitioner acting as scribe for signing 

physician.








Past Medical History


Past Medical History: Atrial Fibrillation, Heart Failure, Diabetes Mellitus, Eye

Disorder, Hearing Disorder / Deafness, Hyperlipidemia, Hypertension, 

Osteoarthritis (OA), Pneumonia, Prostate Disorder, Sleep Apnea/CPAP/BIPAP, 

Thyroid Disorder


Additional Past Medical History / Comment(s): GLAUCOMA, covid


History of Any Multi-Drug Resistant Organisms: None Reported


Past Surgical History: Back Surgery, Cholecystectomy, Heart Catheterization, 

Joint Replacement, Prostate Surgery


Additional Past Surgical History / Comment(s): greer knee replacement, 

cardioverion, bilc cataracts, TURP


Past Anesthesia/Blood Transfusion Reactions: No Reported Reaction


Past Psychological History: No Psychological Hx Reported


Smoking Status: Never smoker


Past Alcohol Use History: None Reported


Past Drug Use History: None Reported





- Past Family History


  ** Father


History Unknown: Yes





  ** Mother


Family Medical History: No Reported History





  ** Brother(s)


Family Medical History: Cancer





Medications and Allergies


                                Home Medications











 Medication  Instructions  Recorded  Confirmed  Type


 


Ferrous Sulfate [Feosol] 325 mg PO TID 17 History


 


Gabapentin [Neurontin] 300 mg PO TID PRN 17 History


 


Insulin Detemir (Levemir) [Levemir] 40 unit SQ HS 17 History


 


Latanoprost Ophth [Xalatan 0.005%] 1 drops BOTH EYES HS 17 

History


 


Sertraline [Zoloft] 100 mg PO DAILY 17 History


 


Simvastatin [Zocor] 40 mg PO HS 17 History


 


traMADol HCL [Ultram] 50 mg PO BID PRN 17 History


 


Metoprolol Tartrate [Lopressor] 12.5 mg PO BID 18 History


 


Cholecalciferol (Vitamin D3) 125 mcg PO DAILY 21 History





[Vitamin D3 (5000 Iu)]    


 


Fluocinolone Acetonide Body Oil 1 applic TOPICAL HS 21 History





0.01%    


 


Levothyroxine Sodium [Synthroid] 125 mcg PO DAILY 21 History


 


Nystatin 100,000Unit/gm Cream 1 applic TOPICAL BID PRN 21 History





[Mycostatin Cream]    


 


Triamcinolone 0.1% Cream [Kenalog 1 applicatio TOPICAL BID PRN 21

 History





0.1% Cream]    


 


Warfarin [Coumadin] 2.5 mg PO TUFR 21 History


 


Warfarin [Coumadin] 5 mg PO SUTUWETHSA 21 History


 


metFORMIN HCL [Glucophage] 500 mg PO BID 21 History


 


rOPINIRole HCL [Requip XL] 8 mg PO HS 21 History


 


Potassium Chloride ER [K-Dur 20] 20 meq PO TID #90 tab.er.prt 21 

Rx


 


Furosemide [Lasix] 80 mg PO BID 21 History


 


SILVER sulfADIAZINE CREAM 1 applic TOPICAL BID PRN 21 History





[Silvadene Cream]    


 


Spironolactone 25 mg PO DAILY 21 History


 


diphenhydrAMINE [Benadryl] 25 mg PO HS 21 History


 


metOLazone 2.5 mg PO Q48H 21 History








                                    Allergies











Allergy/AdvReac Type Severity Reaction Status Date / Time


 


Sulfa (Sulfonamide Allergy  Rash/Hives Verified 21 20:35





Antibiotics)     


 


hydromorphone [From Dilaudid] AdvReac  SEVERE Verified 21 20:35





   VOMITING  


 


meperidine [From Demerol] AdvReac  Nausea & Verified 21 20:35





   Vomiting &  





   Diarrhea  














Physical Exam


Vitals: 


                                   Vital Signs











  Temp Pulse Pulse Resp BP BP Pulse Ox


 


 21 09:00  96.9 F L   88  18   101/58  96


 


 21 04:58  97.6 F   69  16   127/62  98


 


 21 01:59     16   


 


 21 23:38  98.6 F   86  16   112/68  98


 


 21 21:30  98.1 F   87  16   144/79  97


 


 21 18:00  98.1 F  77   20  112/78   99


 


 21 17:29   78   16  123/67   99


 


 21 16:28     16   


 


 21 15:57  98.2 F  72   18  120/81   96








                                Intake and Output











 21





 22:59 06:59 14:59


 


Intake Total   560


 


Balance   560


 


Intake:   


 


  Oral   560


 


Other:   


 


  Voiding Method Toilet Toilet Toilet





 Diaper Diaper Diaper


 


  # Voids  3 


 


  Weight 151.953 kg 148.6 kg 














Results


CBC & Chem 7: 


                                 21 07:08





                                 21 07:08


Labs: 


                  Abnormal Lab Results - Last 24 Hours (Table)











  21 Range/Units





  16:24 16:24 16:24 


 


RBC  3.62 L    (4.30-5.90)  m/uL


 


Hgb  9.4 L    (13.0-17.5)  gm/dL


 


Hct  29.0 L    (39.0-53.0)  %


 


MCHC     (31.0-37.0)  g/dL


 


RDW  16.4 H    (11.5-15.5)  %


 


Plt Count  117 L    (150-450)  k/uL


 


Lymphocytes #  0.6 L    (1.0-4.8)  k/uL


 


PT   23.1 H   (9.0-12.0)  sec


 


INR   2.4 H   (<1.2)  


 


APTT   36.4 H   (22.0-30.0)  sec


 


Carbon Dioxide    35 H  (22-30)  mmol/L


 


BUN    37 H  (9-20)  mg/dL


 


Creatinine    1.32 H  (0.66-1.25)  mg/dL


 


POC Glucose (mg/dL)     (75-99)  mg/dL














  21 Range/Units





  21:55 07:08 07:08 


 


RBC    3.56 L  (4.30-5.90)  m/uL


 


Hgb    9.1 L  (13.0-17.5)  gm/dL


 


Hct    29.5 L  (39.0-53.0)  %


 


MCHC    30.9 L  (31.0-37.0)  g/dL


 


RDW    16.5 H  (11.5-15.5)  %


 


Plt Count    114 L  (150-450)  k/uL


 


Lymphocytes #     (1.0-4.8)  k/uL


 


PT   31.8 H   (9.0-12.0)  sec


 


INR   3.3 H   (<1.2)  


 


APTT     (22.0-30.0)  sec


 


Carbon Dioxide     (22-30)  mmol/L


 


BUN     (9-20)  mg/dL


 


Creatinine     (0.66-1.25)  mg/dL


 


POC Glucose (mg/dL)  117 H    (75-99)  mg/dL














  21 Range/Units





  07:08 


 


RBC   (4.30-5.90)  m/uL


 


Hgb   (13.0-17.5)  gm/dL


 


Hct   (39.0-53.0)  %


 


MCHC   (31.0-37.0)  g/dL


 


RDW   (11.5-15.5)  %


 


Plt Count   (150-450)  k/uL


 


Lymphocytes #   (1.0-4.8)  k/uL


 


PT   (9.0-12.0)  sec


 


INR   (<1.2)  


 


APTT   (22.0-30.0)  sec


 


Carbon Dioxide  37 H  (22-30)  mmol/L


 


BUN  36 H  (9-20)  mg/dL


 


Creatinine  1.42 H  (0.66-1.25)  mg/dL


 


POC Glucose (mg/dL)   (75-99)  mg/dL














Thrombosis Risk Factor Assmnt





- Choose All That Apply


Each Factor Represents 1 point: Obesity (BMI >25)


Each Risk Factor Represents 2 Points: Age 61-74 years


Thrombosis Risk Factor Assessment Total Risk Factor Score: 3


Thrombosis Risk Factor Assessment Level: Moderate Risk

## 2021-06-14 NOTE — P.CRDCN
History of Present Illness


History of present illness: 





HISTORY OF PRESENTING ILLNESS


This is a pleasant 76-year-old  male past medical history significant 

for chronic persistent atrial fibrillation on long-term anticoagulation, mild 

nonobstructive coronary artery disease, chronic systolic heart failure, 

nonischemic cardiomyopathy, hypertension and dyslipidemia.  He follows in the 

office with Dr. Schwartz. We have been asked to see in consultation for heart 

failure.  He presented to the hospital with symptoms of worsening shortness of 

breath, weight gain and lower extremity edema.  He states he has recently been 

hospitalized earlier this month for similar type symptoms.  When he left the 

hospital he didn't feel as though he is back to his baseline fluid status and 

things have gotten worse since.  On last admission he was initiated on 

Aldactone. Post discharge he saw Dr. Schwartz in the office and was also initiated

on Zaroxolyn every other day. He was recommended to come to hospital for 

diuresis however, he declined. He denies symptoms of chest pain, dizziness or 

palpitations.





DIAGNOSTICS


EKG reveals atrial fibrillation heart rate of 78 T-wave flattening in the high 

lateral leads and nonspecific abnormalities.


Telemetry tracings indicate atrial fibrillation with controlled ventricular 

rate.


Chest xray lungs are clear of consolidation with no obvious heart failure noted.


Laboratory reviewed, WBC 5.4, hemoglobin 9.4 and platelets 117, INR 3.3, sodium 

143, potassium 4.7, creatinine 1.42, cardiac enzymes negative 3 and NT proBNP 

9460.


Current cardiac medications include Lopressor 12.5 mg twice a day, Lasix 80 mg 

twice a day, Aldactone 25 mg daily, Zaroxolyn 2.5 mg every other day, 

simvastatin 40 mg daily and warfarin.


Most recent echocardiogram obtained May 2021 revealed impaired LV systolic 

function with ejection fraction 42%, mild tricuspid regurgitation and mild 

mitral regurgitation noted. 





REVIEW OF SYSTEMS


At the time of my exam:


CONSTITUTIONAL: Denies fever or chills.


CARDIOVASCULAR: His of shortness of breath, PND and orthopnea.  Denies chest 

pain or palpitations.


RESPIRATORY: Denies cough. 


GASTROINTESTINAL: Denies abdominal pain, diarrhea, constipation, nausea or 

vomiting.


MUSCULOSKELETAL: Denies myalgias.


NEUROLOGIC: Denies numbness, tingling, headacbe or weakness.


ENDOCRINE: Denies fatigue, weight change,  polydipsia or polyurina.


GENITOURINARY: Denies burning, hematuria or urgency with micturation.


HEMATOLOGIC: Denies history of anemia or bleeding. 





PHYSICAL EXAMINATION


Blood pressure 127/62 heart rate 69 afebrile and maintaining oxygen saturation 

on nasal cannula.


CONSTITUTIONAL: No apparent distress. 


HEENT: Head is normocephalic. Pupils are equal, round. Sclerae anicteric. Mucous

membranes of the mouth are moist.  No JVD. No carotid bruit.


CHEST EXAMINATION: Lungs are clear to auscultation. No chest wall tenderness is 

noted on palpation or with deep breathing. 


HEART EXAMINATION: Irregular rate and rhythm. S1, S2 heard. No murmurs, gallops 

or rub.


ABDOMEN: Soft, nontender. Positive bowel sounds.


EXTREMITIES: 2+ bilateral lower extremity pitting edema up to the thighs, e

rythema and no calf tenderness.


NEUROLOGIC EXAMINATION: Patient is awake, alert and oriented x3. 





ASSESSMENT


Acute on chronic systolic heart failure


Chronic persistent atrial fibrillation on warfarin


Nonobstructive coronary artery disease


Hypertension


Dyslipidemia


Acute on chronic kidney disease





PLAN


Continue Lasix infusion.


Add Imdur 15 mg daily and hydralazine 25 mg twice a day to increase cardiac 

output and stroke volume.


Continue Lopressor as previously ordered.


Follow renal function and electrolytes daily.


Document accurate intake and output along with daily weights.


Thank you kindly for this consultation.





Nurse Practitioner note has been reviewed, I agree with a documented findings 

and plan of care.  Patient was seen and examined.











Past Medical History


Past Medical History: Atrial Fibrillation, Heart Failure, Diabetes Mellitus, Eye

Disorder, Hearing Disorder / Deafness, Hyperlipidemia, Hypertension, 

Osteoarthritis (OA), Pneumonia, Prostate Disorder, Sleep Apnea/CPAP/BIPAP, 

Thyroid Disorder


Additional Past Medical History / Comment(s): GLAUCOMA, covid


History of Any Multi-Drug Resistant Organisms: None Reported


Past Surgical History: Back Surgery, Cholecystectomy, Heart Catheterization, 

Joint Replacement, Prostate Surgery


Additional Past Surgical History / Comment(s): greer knee replacement, 

cardioverion, bilc cataracts, TURP


Past Anesthesia/Blood Transfusion Reactions: No Reported Reaction


Past Psychological History: No Psychological Hx Reported


Smoking Status: Never smoker


Past Alcohol Use History: None Reported


Past Drug Use History: None Reported





- Past Family History


  ** Father


History Unknown: Yes





  ** Mother


Family Medical History: No Reported History





  ** Brother(s)


Family Medical History: Cancer





Medications and Allergies


                                Home Medications











 Medication  Instructions  Recorded  Confirmed  Type


 


Ferrous Sulfate [Feosol] 325 mg PO TID 03/31/17 06/13/21 History


 


Gabapentin [Neurontin] 300 mg PO TID PRN 03/31/17 06/13/21 History


 


Insulin Detemir (Levemir) [Levemir] 40 unit SQ HS 03/31/17 06/13/21 History


 


Latanoprost Ophth [Xalatan 0.005%] 1 drops BOTH EYES HS 03/31/17 06/13/21 

History


 


Sertraline [Zoloft] 100 mg PO DAILY 03/31/17 06/13/21 History


 


Simvastatin [Zocor] 40 mg PO HS 03/31/17 06/13/21 History


 


traMADol HCL [Ultram] 50 mg PO BID PRN 05/16/17 06/13/21 History


 


Metoprolol Tartrate [Lopressor] 12.5 mg PO BID 03/13/18 06/13/21 History


 


Cholecalciferol (Vitamin D3) 125 mcg PO DAILY 05/13/21 06/13/21 History





[Vitamin D3 (5000 Iu)]    


 


Fluocinolone Acetonide Body Oil 1 applic TOPICAL HS 05/13/21 06/13/21 History





0.01%    


 


Levothyroxine Sodium [Synthroid] 125 mcg PO DAILY 05/13/21 06/13/21 History


 


Nystatin 100,000Unit/gm Cream 1 applic TOPICAL BID PRN 05/13/21 06/13/21 History





[Mycostatin Cream]    


 


Triamcinolone 0.1% Cream [Kenalog 1 applicatio TOPICAL BID PRN 05/13/21 06/13/21

 History





0.1% Cream]    


 


Warfarin [Coumadin] 2.5 mg PO TUFR 05/13/21 06/14/21 History


 


Warfarin [Coumadin] 5 mg PO SUTUWETHSA 05/13/21 06/14/21 History


 


metFORMIN HCL [Glucophage] 500 mg PO BID 05/13/21 06/13/21 History


 


rOPINIRole HCL [Requip XL] 8 mg PO HS 05/13/21 06/13/21 History


 


Potassium Chloride ER [K-Dur 20] 20 meq PO TID #90 tab.er.prt 05/20/21 06/13/21 

Rx


 


Furosemide [Lasix] 80 mg PO BID 06/13/21 06/13/21 History


 


SILVER sulfADIAZINE CREAM 1 applic TOPICAL BID PRN 06/13/21 06/13/21 History





[Silvadene Cream]    


 


Spironolactone 25 mg PO DAILY 06/13/21 06/13/21 History


 


diphenhydrAMINE [Benadryl] 25 mg PO HS 06/13/21 06/13/21 History


 


metOLazone 2.5 mg PO Q48H 06/13/21 06/13/21 History








                                    Allergies











Allergy/AdvReac Type Severity Reaction Status Date / Time


 


Sulfa (Sulfonamide Allergy  Rash/Hives Verified 06/13/21 20:35





Antibiotics)     


 


hydromorphone [From Dilaudid] AdvReac  SEVERE Verified 06/13/21 20:35





   VOMITING  


 


meperidine [From Demerol] AdvReac  Nausea & Verified 06/13/21 20:35





   Vomiting &  





   Diarrhea  














Physical Exam


Vitals: 


                                   Vital Signs











  Temp Pulse Pulse Resp BP BP Pulse Ox


 


 06/14/21 04:58  97.6 F   69  16   127/62  98


 


 06/14/21 01:59     16   


 


 06/13/21 23:38  98.6 F   86  16   112/68  98


 


 06/13/21 21:30  98.1 F   87  16   144/79  97


 


 06/13/21 18:00  98.1 F  77   20  112/78   99


 


 06/13/21 17:29   78   16  123/67   99


 


 06/13/21 16:28     16   


 


 06/13/21 15:57  98.2 F  72   18  120/81   96








                                Intake and Output











 06/13/21 06/14/21 06/14/21





 22:59 06:59 14:59


 


Intake Total   560


 


Balance   560


 


Intake:   


 


  Oral   560


 


Other:   


 


  Voiding Method Toilet Toilet 





 Diaper Diaper 


 


  # Voids  3 


 


  Weight 151.953 kg 148.6 kg 














Results





                                 06/14/21 07:08





                                 06/14/21 07:08


                                 Cardiac Enzymes











  06/13/21 06/13/21 06/13/21 Range/Units





  16:24 16:24 19:14 


 


AST  48    (17-59)  U/L


 


Troponin I   <0.012  0.014  (0.000-0.034)  ng/mL














  06/13/21 06/14/21 Range/Units





  22:37 07:08 


 


AST   45  (17-59)  U/L


 


Troponin I  0.012   (0.000-0.034)  ng/mL








                                   Coagulation











  06/13/21 06/14/21 Range/Units





  16:24 07:08 


 


PT  23.1 H  31.8 H  (9.0-12.0)  sec


 


APTT  36.4 H   (22.0-30.0)  sec








                                       CBC











  06/13/21 Range/Units





  16:24 


 


WBC  5.4  (3.8-10.6)  k/uL


 


RBC  3.62 L  (4.30-5.90)  m/uL


 


Hgb  9.4 L  (13.0-17.5)  gm/dL


 


Hct  29.0 L  (39.0-53.0)  %


 


Plt Count  117 L  (150-450)  k/uL








                          Comprehensive Metabolic Panel











  06/13/21 06/14/21 Range/Units





  16:24 07:08 


 


Sodium  141  143  (137-145)  mmol/L


 


Potassium  4.7  4.7  (3.5-5.1)  mmol/L


 


Chloride  98  98  ()  mmol/L


 


Carbon Dioxide  35 H  37 H  (22-30)  mmol/L


 


BUN  37 H  36 H  (9-20)  mg/dL


 


Creatinine  1.32 H  1.42 H  (0.66-1.25)  mg/dL


 


Glucose  89  83  (74-99)  mg/dL


 


Calcium  9.0  9.0  (8.4-10.2)  mg/dL


 


AST  48  45  (17-59)  U/L


 


ALT  23  22  (4-49)  U/L


 


Alkaline Phosphatase  106  115  ()  U/L


 


Total Protein  7.0  6.8  (6.3-8.2)  g/dL


 


Albumin  3.8  3.7  (3.5-5.0)  g/dL








                               Current Medications











Generic Name Dose Route Start Last Admin





  Trade Name Chris  PRN Reason Stop Dose Admin


 


Atorvastatin Calcium  20 mg  06/13/21 21:15  06/13/21 21:56





  Atorvastatin 20 Mg Tab  PO   Not Given





  [facility]  


 


Cholecalciferol  125 mcg  06/13/21 21:15  06/13/21 21:55





  Cholecalciferol 25 Mcg (1000 Iu) Tablet  PO   125 mcg





  DAILY [facility]   Administration


 


Diphenhydramine HCl  25 mg  06/13/21 21:15  06/13/21 21:56





  Diphenhydramine 25 Mg Cap  PO   25 mg





  [facility]   Administration


 


Ferrous Sulfate  325 mg  06/13/21 22:00  06/13/21 21:56





  Ferrous Sulfate 325 Mg Tab  PO   325 mg





  TID [facility]   Administration


 


Gabapentin  300 mg  06/13/21 21:12 





  Gabapentin 300 Mg Cap  PO  





  TID PRN  





  Pain  


 


Furosemide 100 mg/ Sodium  100 mls @ 10 mls/hr  06/13/21 21:15  06/14/21 07:05





  Chloride  IV   Not Given





  .Q10H [facility]  





  10 MG/HR  


 


Insulin Detemir  40 unit  06/13/21 21:15  06/14/21 01:07





  Insulin Detemir (Levemir) 100 Unit/Ml Syr  SQ   Not Given





  [facility]  


 


Latanoprost  1 drops  06/13/21 21:15  06/13/21 21:56





  Latanoprost 0.005% Ophth Drops 2.5 Ml Btl  BOTH EYES   Not Given





  [facility]  


 


Levothyroxine Sodium  125 mcg  06/14/21 06:30  06/14/21 07:05





  Levothyroxine 125 Mcg Tab  PO   Not Given





  DAILY@0630 [facility]  


 


Metformin HCl  500 mg  06/14/21 07:30  06/14/21 07:05





  Metformin 500 Mg Tab  PO   Not Given





  AC-BID [facility]  


 


Metoprolol Tartrate  12.5 mg  06/13/21 21:15  06/13/21 21:56





  Metoprolol Tartrate 12.5 Mg Tab  PO   12.5 mg





  BID [facility]   Administration


 


Miscellaneous Information  0 each  06/13/21 21:31 





  Warfarin Per Pharmacy  MISCELLANE  





  AS DIRECTED PRN  





  PER PROTOCOL  


 


Naloxone HCl  0.2 mg  06/13/21 17:59 





  Naloxone 0.4 Mg/Ml 1 Ml Vial  IV  





  Q2M PRN  





  Opioid Reversal  


 


Potassium Chloride  20 meq  06/13/21 22:00  06/13/21 21:55





  Potassium Chloride Er 20 Meq Tab.Er  PO   20 meq





  TID [facility]   Administration


 


Ropinirole HCl  2.5 mg  06/13/21 22:00  06/13/21 21:55





  Ropinirole Hcl 1 Mg Tab  PO   2.5 mg





  TID [facility]   Administration


 


Sertraline HCl  100 mg  06/14/21 09:00 





  Sertraline 100 Mg Tab  PO  





  DAILY [facility]  


 


Spironolactone  25 mg  06/14/21 09:00 





  Spironolactone 25 Mg Tab  PO  





  DAILY [facility]  


 


Tramadol HCl  50 mg  06/13/21 21:12 





  Tramadol 50 Mg Tab  PO  





  BID PRN  





  Pain  








                                Intake and Output











 06/13/21 06/14/21 06/14/21





 22:59 06:59 14:59


 


Intake Total   560


 


Balance   560


 


Intake:   


 


  Oral   560


 


Other:   


 


  Voiding Method Toilet Toilet 





 Diaper Diaper 


 


  # Voids  3 


 


  Weight 151.953 kg 148.6 kg 








                                        





                                 06/13/21 16:24 





                                 06/14/21 07:08

## 2021-06-14 NOTE — XR
EXAMINATION TYPE: XR lumbar spine 2 or 3V

 

DATE OF EXAM: 6/14/2021

 

COMPARISON: 3/20/2014

 

HISTORY: Lumbar pain

 

TECHNIQUE: 3 views lumbar spine

 

FINDINGS: 

There are 5 nonrib-bearing lumbar-type vertebral bodies. There is mild retrolisthesis of L2 on 3 and 
L3 on 4, similar to the prior examination measuring up to 3 mm and 4 mm respectively. Multilevel endp
late sclerosis and osteophytosis with multilevel facet arthropathy has worsened since the prior exam.


 

IMPRESSION: 

Interval worsening of multilevel disc disease and osteoarthritic changes of the lumbar spine.

## 2021-06-15 LAB
ANION GAP SERPL CALC-SCNC: 7 MMOL/L
BUN SERPL-SCNC: 34 MG/DL (ref 9–20)
CALCIUM SPEC-MCNC: 9 MG/DL (ref 8.4–10.2)
CHLORIDE SERPL-SCNC: 96 MMOL/L (ref 98–107)
CO2 SERPL-SCNC: 38 MMOL/L (ref 22–30)
GLUCOSE BLD-MCNC: 116 MG/DL (ref 75–99)
GLUCOSE BLD-MCNC: 131 MG/DL (ref 75–99)
GLUCOSE BLD-MCNC: 45 MG/DL (ref 75–99)
GLUCOSE BLD-MCNC: 54 MG/DL (ref 75–99)
GLUCOSE BLD-MCNC: 70 MG/DL (ref 75–99)
GLUCOSE BLD-MCNC: 80 MG/DL (ref 75–99)
GLUCOSE BLD-MCNC: 81 MG/DL (ref 75–99)
GLUCOSE SERPL-MCNC: 64 MG/DL (ref 74–99)
INR PPP: 3 (ref ?–1.2)
POTASSIUM SERPL-SCNC: 4.5 MMOL/L (ref 3.5–5.1)
PT BLD: 28.6 SEC (ref 9–12)
SODIUM SERPL-SCNC: 141 MMOL/L (ref 137–145)

## 2021-06-15 RX ADMIN — ATORVASTATIN CALCIUM SCH MG: 20 TABLET, FILM COATED ORAL at 20:38

## 2021-06-15 RX ADMIN — POTASSIUM CHLORIDE SCH MEQ: 20 TABLET, EXTENDED RELEASE ORAL at 09:10

## 2021-06-15 RX ADMIN — Medication SCH MCG: at 09:10

## 2021-06-15 RX ADMIN — POTASSIUM CHLORIDE SCH MEQ: 20 TABLET, EXTENDED RELEASE ORAL at 20:39

## 2021-06-15 RX ADMIN — INSULIN ASPART SCH: 100 INJECTION, SOLUTION INTRAVENOUS; SUBCUTANEOUS at 20:26

## 2021-06-15 RX ADMIN — SERTRALINE HYDROCHLORIDE SCH MG: 100 TABLET ORAL at 09:10

## 2021-06-15 RX ADMIN — Medication SCH MG: at 09:10

## 2021-06-15 RX ADMIN — POTASSIUM CHLORIDE SCH MEQ: 20 TABLET, EXTENDED RELEASE ORAL at 16:54

## 2021-06-15 RX ADMIN — MIDODRINE HYDROCHLORIDE SCH MG: 5 TABLET ORAL at 17:01

## 2021-06-15 RX ADMIN — MIDODRINE HYDROCHLORIDE SCH MG: 5 TABLET ORAL at 11:55

## 2021-06-15 RX ADMIN — INSULIN ASPART SCH: 100 INJECTION, SOLUTION INTRAVENOUS; SUBCUTANEOUS at 11:53

## 2021-06-15 RX ADMIN — FUROSEMIDE SCH MLS/HR: 10 INJECTION, SOLUTION INTRAMUSCULAR; INTRAVENOUS at 08:25

## 2021-06-15 RX ADMIN — SPIRONOLACTONE SCH MG: 25 TABLET, FILM COATED ORAL at 09:11

## 2021-06-15 RX ADMIN — METOPROLOL TARTRATE SCH MG: 25 TABLET, FILM COATED ORAL at 20:39

## 2021-06-15 RX ADMIN — LEVOTHYROXINE SODIUM SCH MCG: 0.12 TABLET ORAL at 06:50

## 2021-06-15 RX ADMIN — Medication SCH MG: at 16:54

## 2021-06-15 RX ADMIN — PANTOPRAZOLE SODIUM SCH MG: 40 TABLET, DELAYED RELEASE ORAL at 06:50

## 2021-06-15 RX ADMIN — METOPROLOL TARTRATE SCH MG: 25 TABLET, FILM COATED ORAL at 09:09

## 2021-06-15 RX ADMIN — TAMSULOSIN HYDROCHLORIDE SCH MG: 0.4 CAPSULE ORAL at 16:54

## 2021-06-15 RX ADMIN — Medication SCH MG: at 20:39

## 2021-06-15 RX ADMIN — INSULIN ASPART SCH: 100 INJECTION, SOLUTION INTRAVENOUS; SUBCUTANEOUS at 05:44

## 2021-06-15 RX ADMIN — GABAPENTIN PRN MG: 300 CAPSULE ORAL at 09:10

## 2021-06-15 RX ADMIN — INSULIN ASPART SCH UNIT: 100 INJECTION, SOLUTION INTRAVENOUS; SUBCUTANEOUS at 16:58

## 2021-06-15 RX ADMIN — ISOSORBIDE MONONITRATE SCH MG: 30 TABLET, EXTENDED RELEASE ORAL at 09:10

## 2021-06-15 RX ADMIN — LATANOPROST SCH DROPS: 50 SOLUTION OPHTHALMIC at 20:39

## 2021-06-15 NOTE — CDI
Documentation Clarification Form



Date: 06/15/2021 12:27:26 PM

From: Gloria Li CCS, CCDS

Phone: [phone]

MRN: [id number]

Admit Date: 06/13/2021 05:59:00 PM

Patient Name: Garry Mercado

Visit Number: [id number]

Discharge Date:  





ATTENTION: The Clinical Documentation Specialists (CDI) and [facility] Coding Staff 
appreciate your assistance in clarifying documentation. Please respond to the 
clarification below the line at the bottom and electronically sign. The CDI & 
[facility] Coding staff will review the response and follow-up if needed. Please note: 
Queries are made part of the Legal Health Record. If you have any questions, 
please contact the author of this message via ITS.



Dr. Buddy Oconnell:



Unspecified CKD is documented in the 6/14 Cardiology Consult and the 6/15 
Cardiology Progress Note. 

(Nephrology is not consulted) 

Additional clarification regarding the stage of CKD is requested.



History/Risk Factors per the 6/14 H/P: Chronic systolic heart failure, Chronic 
persistent atrial fibrillation, Chronic anemia, Chronic cellulitis bilateral 
lower extremities, Hypertension, Hypertensive cardiovascular disease, Mild CAD, 
Non-ischemic cardiomyopathy, Hypothyroidism, DM II with Diabetic neuropathy, 
Hyperlipidemia, FAM, BPH status post TURP, Generalized OA, Recurrent depression,
Anemia of chronic disease, Restless leg syndrome, Chronic thrombocytopenia. 



Clinical Indicators: Presented to the ED on 6/13 with SOB, Weakness, Atrial 
Fibrillation and Water retention. 

ED Clinical Impression: Fluid overload, CHF 



Historical GFR (1/10/2018): >60

Current BUN: 6/13: 37, 6/14: 36, 6/15: 34

Current Creatinine: 1.32, 6/14 1.42, 6/15: 1.49

Current GFR: 6/13: 53, 6/14: 49, 6/15: 46



Treatment 6/13: IV Lasix, IV Lasix w/Na Cl, Insulin sq, po Coumadin



Please clarify the stage of the CKD, if known:



[  ] CKD Stage 2 (GFR 60-89)

[  ] CKD Stage 3 (GFR 30-59)

[  ] CKD Stage 3a (GFR 45-59)

[  ] Other, please specify ___________

[  ] Unable to determine





(Template Last revised: February 2021)

___________________________________________________________________________

MTDD

## 2021-06-15 NOTE — P.PN
Subjective





HISTORY OF PRESENTING ILLNESS


This is a pleasant 76-year-old  male past medical history significant 

for chronic persistent atrial fibrillation on long-term anticoagulation, mild 

nonobstructive coronary artery disease, chronic systolic heart failure, 

nonischemic cardiomyopathy, hypertension and dyslipidemia.  He follows in the 

office with Dr. Schwartz. We have been asked to see in consultation for heart 

failure.  He presented to the hospital with symptoms of worsening shortness of 

breath, weight gain and lower extremity edema.  He states he has recently been 

hospitalized earlier this month for similar type symptoms.  When he left the 

hospital he didn't feel as though he is back to his baseline fluid status and 

things have gotten worse since.  On last admission he was initiated on 

Aldactone. Post discharge he saw Dr. Schwartz in the office and was also initiated

on Zaroxolyn every other day. He was recommended to come to hospital for 

diuresis however, he declined. He denies symptoms of chest pain, dizziness or 

palpitations.





06/15/2021


Patient seen and examined sitting up in recliner in no acute distress.  He 

continues to have lower extremity edema that has improved since admission.  

There is no longer edema noted above the knee.  Blood pressure 117/69 heart rate

88 afebrile maintaining oxygen saturation on nasal cannula.  Unfortunately there

is no documented output secondary to urinary incontinence.  Weight is down 1 kg 

from admission.  Laboratory data reviewed, INR 3.0, sodium 141, potassium 4.5 

and creatinine 1.49.





PHYSICAL EXAMINATION


CONSTITUTIONAL: No apparent distress. 


HEENT: Head is normocephalic. Pupils are equal, round. Sclerae anicteric. Mucous

membranes of the mouth are moist.  No JVD. No carotid bruit.


CHEST EXAMINATION: Lungs are clear to auscultation. No chest wall tenderness is 

noted on palpation or with deep breathing. 


HEART EXAMINATION: Irregular rate and rhythm. S1, S2 heard. No murmurs, gallops 

or rub.


EXTREMITIES: 2+ bilateral lower extremity pitting edema and erythema and no calf

tenderness.





ASSESSMENT


Acute on chronic systolic heart failure


Chronic persistent atrial fibrillation on warfarin


Nonobstructive coronary artery disease


Hypertension


Dyslipidemia


Acute on chronic kidney disease





PLAN


Continue Lasix infusion.


Follow renal function and electrolytes in the morning.


Further recommendations to follow based upon clinical course.





Nurse Practitioner note has been reviewed, I agree with a documented findings 

and plan of care.  Patient was seen and examined.





Objective





- Vital Signs


Vital signs: 


                                   Vital Signs











Temp  97.7 F   06/15/21 08:25


 


Pulse  80   06/15/21 08:25


 


Resp  18   06/15/21 08:25


 


BP  117/69   06/15/21 08:25


 


Pulse Ox  97   06/15/21 08:25








                                 Intake & Output











 06/14/21 06/15/21 06/15/21





 18:59 06:59 18:59


 


Intake Total 1460 116.333 560


 


Balance 1460 116.333 560


 


Weight  147.3 kg 


 


Intake:   


 


  Intake, IV Titration 100 76.333 





  Amount   


 


    Furosemide 100 mg In 100 76.333 





    Sodium Chloride 0.9% 90   





    ml @ 10 MG/HR 10 mls/hr   





    IV .Q10H [facility] Rx#:   





    [id number]   


 


  Oral 1360 40 560


 


Other:   


 


  Voiding Method Toilet Toilet Toilet





 Diaper Diaper Diaper


 


  # Voids  1 














- Labs


CBC & Chem 7: 


                                 06/14/21 07:08





                                 06/15/21 06:43


Labs: 


                  Abnormal Lab Results - Last 24 Hours (Table)











  06/14/21 06/14/21 06/14/21 Range/Units





  07:08 11:54 16:35 


 


PT     (9.0-12.0)  sec


 


INR     (<1.2)  


 


Chloride     ()  mmol/L


 


Carbon Dioxide     (22-30)  mmol/L


 


BUN     (9-20)  mg/dL


 


Creatinine     (0.66-1.25)  mg/dL


 


Glucose     (74-99)  mg/dL


 


POC Glucose (mg/dL)   116 H  161 H  (75-99)  mg/dL


 


Iron  24 L    ()  ug/dL


 


% Saturation  8.42 L    (15.00-50.00)   














  06/14/21 06/15/21 06/15/21 Range/Units





  20:06 04:35 04:47 


 


PT     (9.0-12.0)  sec


 


INR     (<1.2)  


 


Chloride     ()  mmol/L


 


Carbon Dioxide     (22-30)  mmol/L


 


BUN     (9-20)  mg/dL


 


Creatinine     (0.66-1.25)  mg/dL


 


Glucose     (74-99)  mg/dL


 


POC Glucose (mg/dL)  177 H  45 L  54 L  (75-99)  mg/dL


 


Iron     ()  ug/dL


 


% Saturation     (15.00-50.00)   














  06/15/21 06/15/21 06/15/21 Range/Units





  05:01 06:43 06:43 


 


PT   28.6 H   (9.0-12.0)  sec


 


INR   3.0 H   (<1.2)  


 


Chloride    96 L  ()  mmol/L


 


Carbon Dioxide    38 H  (22-30)  mmol/L


 


BUN    34 H  (9-20)  mg/dL


 


Creatinine    1.49 H  (0.66-1.25)  mg/dL


 


Glucose    64 L  (74-99)  mg/dL


 


POC Glucose (mg/dL)  70 L    (75-99)  mg/dL


 


Iron     ()  ug/dL


 


% Saturation     (15.00-50.00)

## 2021-06-15 NOTE — P.PN
Subjective


Progress Note Date: 06/15/21





HISTORY OF PRESENT ILLNESS


76 years old  male patient of Dr. Rojo for past medical history 

of paroxysmal atrial fibrillation on Coumadin, hyperlipidemia, hypertension, 

coronary artery disease, history of nonischemic cardiomyopathy with EF 40%, 

valvular heart disease, moderate to severe pulmonary hypertension, obstructive 

sleep apnea on CPAP, hypothyroidism, morbid obesity, diabetes mellitus type 2 

with diabetic neuropathy, benign prostatic hypertrophy status post TURP, 

generalized osteoarthritis, recurrent depression, anemia of chronic disease, 

restless leg syndrome, chronic thrombocytopenia, chronic hypoxic respiratory 

failure on home O2.  Patient had recent hospitalization May 14 through May 20 

which time he was treated for acute on chronic systolic heart failure and acute 

kidney injury.  Patient was stabilized and discharged home and continued on Kefl

ex for lower extremity cellulitis.  Patient now presents with edema to the legs 

and thighs and flank area, lightheadedness, shortness of breath and weight gain 

of 12-15 pounds since he left the hospital.  He had follow-up with Dr. Rojo and with cardiologist last week but worsened over the weekend.





Patient presented to [facility] emergency center for evaluation

and was found to be afebrile, heart rate 72, blood pressure 120/81, pulse ox 96%

on 2 L nasal cannula. EKG was atrial fibrillation controlled rate of 78.  WBC 

5.4, hemoglobin 9.4, platelet count 117.  Sodium 141, potassium 4.7, chloride 

98, CO2 35, BUN 37 creatinine 1.32.  Blood sugar 89.  Liver function tests were 

normal.  Albumin 3.8.  Troponin negative.  ProBNP 9460.


Chest x-ray reveals cardiomegaly.  Improvement of the pulmonary congestion 

compared to old exam.


Patient has been admitted to the cardiac stepdown unit, status post 1 dose of IV

Lasix last evening of 80 mg and started on Lasix drip last evening, cardiology 

consult





6/15: Patient has had urinary retention and Flomax will be ordered.  Most likely

patient will require Serna catheter.  Midodrine will be added and Zaroxolyn to 

start tomorrow.  Patient is still on Lasix drip at 10 mg per hour.  97% on 2 L 

nasal cannula.  Repeat blood work reveals INR 3.0.  Sodium 141, potassium 4.5, 

chloride 96, CO2 38, BUN 34 and creatinine 1.49.  Blood sugars are running 

between 54 and 81.  Levemir decreased to 25 units at bedtime





REVIEW OF SYSTEMS


Constitutional: No fever, no chills, no night sweats.  Reports weight gain.  No 

weakness, fatigue or lethargy.  No daytime sleepiness.


EENT: No headache.  No blurred vision or double vision, no loss of vision.  No 

loss of Hearing, no ringing in the ears, reports dizziness.  No nasal drainage 

or congestion.  No epistaxis.  No sore throat.


Lungs: Reports shortness of breath, cough, no sputum production.  No wheezing.


Cardiovascular: No chest pain, reports lower extremity edema.  No palpitations. 

No paroxysmal nocturnal dyspnea.  No orthopnea.  reports lightheadedness or 

dizziness.  No syncopal episodes.


Abdominal: No abdominal pain.  No nausea, vomiting.  No diarrhea.  No 

constipation.  No bloody or tarry stools..  No loss of appetite.


Genitourinary: No dysuria, increased frequency, urgency.  No urinary retention.


Musculoskeletal: No myalgias.  Reports muscle weakness, no gait dysfunction, no 

frequent falls.  No back pain.  No neck pain.


Integumentary: Reports wounds, no lesions.  No rash or pruritus.  No unusual 

bruising.  No change in hair or nails.


Neurologic: No aphasia. No facial droop. No change in mentation. No head injury.

No headache. No paralysis. No paresthesia.


Psychiatric: No depression.  No anxiety.  No mood swings.


Endocrine: No abnormal blood sugars.





PHYSICAL EXAMINATION


Gen: This is a morbidly obese 76-year-old  male.  He is resting in a 

recliner and appears to be comfortable and in no acute distress.


HEENT: Head is atraumatic, normocephalic. Pupils equal, round. Sclerae is 

anicteric. 


NECK: Supple. No JVD. No lymphadenopathy. No thyromegaly. 


LUNGS: Diminished in the bilateral bases.  No intercostal retractions.


HEART: Irregular rate and rhythm.  3/6 systolic murmur.  No rub, no gallop, no 

click.   


ABDOMEN: Soft. Bowel sounds are present. No masses.  No tenderness.


EXTREMITIES: 2+ bilateral edema to both lower extremities and flank area.  No 

calf tenderness.


NEUROLOGICAL: Patient is awake, alert and oriented x3. Cranial nerves 2 through 

12 are grossly intact. 





ASSESSMENT AND PLAN


1.  Acute on chronic systolic heart failure.  Continue Lasix drip, monitor I&O 

and daily weights, monitor electrolytes and renal function.  Continue Aldactone 

25 mg daily.





2.  Acute kidney injury likely secondary to cardiorenal process.  Continue Lasix

for now, monitor renal function.





3.  Paroxysmal atrial fibrillation on Coumadin.  Pharmacy is dosing Coumadin.  

INR 3.3.





4.  Chronic anemia.  Iron studies ordered.





5.  Mild chronic cellulitis lower extremities stable.  





6.  Hypertension, hypertensive cardiovascular disease.  Continue metoprolol 

tartrate 12.5 mg oral twice daily, Imdur 15 mg daily, Aldactone 25 mg daily, 

hydralazine 25 mg twice daily





7.  Mild coronary artery disease.  Continue Coumadin, metoprolol, Lasix.





8.  Nonischemic cardiomyopathy.





9.  Hypothyroidism.  Continue levothyroxine 125 g daily.





10.  Diabetes mellitus type 2 with diabetic neuropathy.  Continue Levemir 

decreased to 25 units at bedtime, NovoLog scale, gabapentin 300 mg three times d

aily.





11.  Hyperlipidemia.  Continue simvastatin 40 mg at bedtime.





12.  Obstructive sleep apnea on CPAP.





13.  Benign prostatic hypertrophy status post TURP.





14.  Generalized osteoarthritis.  Continue tramadol for pain.





15.  Recurrent depression.  Continue Zoloft 100 mg daily.





16.  Anemia of chronic disease.  Continue ferrous sulfate.





17.  Restless leg syndrome.  Continue Requip 2.5 mg 3 times daily.





18.  Thrombocytopenia, chronic.





19.  DVT prophylaxis.  Coumadin 





20.  GI prophylaxis.  Protonix 40 mg oral daily.





DISCHARGE PLAN


Most likely return home.  Patient may benefit from home care.





Impression and plan of care have been directed as dictated by the signing 

physician.  Mehnaz Quezada nurse practitioner acting as scribe for signing 

physician.





Objective





- Vital Signs


Vital signs: 


                                   Vital Signs











Temp  97.6 F   06/15/21 11:51


 


Pulse  73   06/15/21 11:51


 


Resp  16   06/15/21 11:51


 


BP  118/63   06/15/21 11:51


 


Pulse Ox  95   06/15/21 11:51








                                 Intake & Output











 06/14/21 06/15/21 06/15/21





 18:59 06:59 18:59


 


Intake Total 1460 116.333 649.667


 


Balance 1460 116.333 649.667


 


Weight  147.3 kg 


 


Intake:   


 


  Intake, IV Titration 100 76.333 89.667





  Amount   


 


    Furosemide 100 mg In 100 76.333 89.667





    Sodium Chloride 0.9% 90   





    ml @ 10 MG/HR 10 mls/hr   





    IV .Q10H [facility] Rx#:   





    [id number]   


 


  Oral 1360 40 560


 


Other:   


 


  Voiding Method Toilet Toilet Toilet





 Diaper Diaper Diaper


 


  # Voids  1 














- Labs


CBC & Chem 7: 


                                 06/14/21 07:08





                                 06/15/21 06:43


Labs: 


                  Abnormal Lab Results - Last 24 Hours (Table)











  06/14/21 06/14/21 06/14/21 Range/Units





  07:08 16:35 20:06 


 


PT     (9.0-12.0)  sec


 


INR     (<1.2)  


 


Chloride     ()  mmol/L


 


Carbon Dioxide     (22-30)  mmol/L


 


BUN     (9-20)  mg/dL


 


Creatinine     (0.66-1.25)  mg/dL


 


Glucose     (74-99)  mg/dL


 


POC Glucose (mg/dL)   161 H  177 H  (75-99)  mg/dL


 


Iron  24 L    ()  ug/dL


 


% Saturation  8.42 L    (15.00-50.00)   














  06/15/21 06/15/21 06/15/21 Range/Units





  04:35 04:47 05:01 


 


PT     (9.0-12.0)  sec


 


INR     (<1.2)  


 


Chloride     ()  mmol/L


 


Carbon Dioxide     (22-30)  mmol/L


 


BUN     (9-20)  mg/dL


 


Creatinine     (0.66-1.25)  mg/dL


 


Glucose     (74-99)  mg/dL


 


POC Glucose (mg/dL)  45 L  54 L  70 L  (75-99)  mg/dL


 


Iron     ()  ug/dL


 


% Saturation     (15.00-50.00)   














  06/15/21 06/15/21 Range/Units





  06:43 06:43 


 


PT  28.6 H   (9.0-12.0)  sec


 


INR  3.0 H   (<1.2)  


 


Chloride   96 L  ()  mmol/L


 


Carbon Dioxide   38 H  (22-30)  mmol/L


 


BUN   34 H  (9-20)  mg/dL


 


Creatinine   1.49 H  (0.66-1.25)  mg/dL


 


Glucose   64 L  (74-99)  mg/dL


 


POC Glucose (mg/dL)    (75-99)  mg/dL


 


Iron    ()  ug/dL


 


% Saturation    (15.00-50.00)

## 2021-06-16 LAB
ANION GAP SERPL CALC-SCNC: 6 MMOL/L
BUN SERPL-SCNC: 34 MG/DL (ref 9–20)
CALCIUM SPEC-MCNC: 9.1 MG/DL (ref 8.4–10.2)
CHLORIDE SERPL-SCNC: 94 MMOL/L (ref 98–107)
CO2 SERPL-SCNC: 40 MMOL/L (ref 22–30)
ERYTHROCYTE [DISTWIDTH] IN BLOOD BY AUTOMATED COUNT: 3.47 M/UL (ref 4.3–5.9)
ERYTHROCYTE [DISTWIDTH] IN BLOOD: 16.1 % (ref 11.5–15.5)
GLUCOSE BLD-MCNC: 132 MG/DL (ref 75–99)
GLUCOSE BLD-MCNC: 191 MG/DL (ref 75–99)
GLUCOSE BLD-MCNC: 53 MG/DL (ref 75–99)
GLUCOSE BLD-MCNC: 88 MG/DL (ref 75–99)
GLUCOSE BLD-MCNC: 93 MG/DL (ref 75–99)
GLUCOSE SERPL-MCNC: 68 MG/DL (ref 74–99)
HCT VFR BLD AUTO: 27.7 % (ref 39–53)
HGB BLD-MCNC: 9.2 GM/DL (ref 13–17.5)
INR PPP: 2 (ref ?–1.2)
MCH RBC QN AUTO: 26.7 PG (ref 25–35)
MCHC RBC AUTO-ENTMCNC: 33.4 G/DL (ref 31–37)
MCV RBC AUTO: 79.9 FL (ref 80–100)
PLATELET # BLD AUTO: 121 K/UL (ref 150–450)
POTASSIUM SERPL-SCNC: 4.3 MMOL/L (ref 3.5–5.1)
PT BLD: 19.2 SEC (ref 9–12)
SODIUM SERPL-SCNC: 140 MMOL/L (ref 137–145)
WBC # BLD AUTO: 6.9 K/UL (ref 3.8–10.6)

## 2021-06-16 RX ADMIN — MIDODRINE HYDROCHLORIDE SCH MG: 5 TABLET ORAL at 12:40

## 2021-06-16 RX ADMIN — TAMSULOSIN HYDROCHLORIDE SCH MG: 0.4 CAPSULE ORAL at 17:27

## 2021-06-16 RX ADMIN — INSULIN DETEMIR SCH UNIT: 100 INJECTION, SOLUTION SUBCUTANEOUS at 20:24

## 2021-06-16 RX ADMIN — PANTOPRAZOLE SODIUM SCH MG: 40 TABLET, DELAYED RELEASE ORAL at 06:35

## 2021-06-16 RX ADMIN — FUROSEMIDE SCH MLS/HR: 10 INJECTION, SOLUTION INTRAMUSCULAR; INTRAVENOUS at 21:55

## 2021-06-16 RX ADMIN — MIDODRINE HYDROCHLORIDE SCH MG: 5 TABLET ORAL at 17:27

## 2021-06-16 RX ADMIN — POTASSIUM CHLORIDE SCH MEQ: 20 TABLET, EXTENDED RELEASE ORAL at 22:11

## 2021-06-16 RX ADMIN — Medication SCH MG: at 09:41

## 2021-06-16 RX ADMIN — FUROSEMIDE SCH MLS/HR: 10 INJECTION, SOLUTION INTRAMUSCULAR; INTRAVENOUS at 02:26

## 2021-06-16 RX ADMIN — Medication SCH MCG: at 09:41

## 2021-06-16 RX ADMIN — INSULIN ASPART SCH: 100 INJECTION, SOLUTION INTRAVENOUS; SUBCUTANEOUS at 06:22

## 2021-06-16 RX ADMIN — LEVOTHYROXINE SODIUM SCH MCG: 0.12 TABLET ORAL at 06:35

## 2021-06-16 RX ADMIN — ATORVASTATIN CALCIUM SCH MG: 20 TABLET, FILM COATED ORAL at 20:18

## 2021-06-16 RX ADMIN — SPIRONOLACTONE SCH MG: 25 TABLET, FILM COATED ORAL at 09:43

## 2021-06-16 RX ADMIN — FUROSEMIDE SCH MLS/HR: 10 INJECTION, SOLUTION INTRAMUSCULAR; INTRAVENOUS at 09:43

## 2021-06-16 RX ADMIN — Medication SCH MG: at 17:27

## 2021-06-16 RX ADMIN — Medication SCH MG: at 22:10

## 2021-06-16 RX ADMIN — INSULIN ASPART SCH: 100 INJECTION, SOLUTION INTRAVENOUS; SUBCUTANEOUS at 12:19

## 2021-06-16 RX ADMIN — INSULIN ASPART SCH UNIT: 100 INJECTION, SOLUTION INTRAVENOUS; SUBCUTANEOUS at 17:34

## 2021-06-16 RX ADMIN — POTASSIUM CHLORIDE SCH MEQ: 20 TABLET, EXTENDED RELEASE ORAL at 17:27

## 2021-06-16 RX ADMIN — LATANOPROST SCH DROPS: 50 SOLUTION OPHTHALMIC at 20:19

## 2021-06-16 RX ADMIN — ISOSORBIDE MONONITRATE SCH MG: 30 TABLET, EXTENDED RELEASE ORAL at 09:42

## 2021-06-16 RX ADMIN — METOPROLOL TARTRATE SCH MG: 25 TABLET, FILM COATED ORAL at 20:18

## 2021-06-16 RX ADMIN — POTASSIUM CHLORIDE SCH MEQ: 20 TABLET, EXTENDED RELEASE ORAL at 09:42

## 2021-06-16 RX ADMIN — SERTRALINE HYDROCHLORIDE SCH MG: 100 TABLET ORAL at 09:43

## 2021-06-16 RX ADMIN — METOPROLOL TARTRATE SCH MG: 25 TABLET, FILM COATED ORAL at 09:42

## 2021-06-16 RX ADMIN — MIDODRINE HYDROCHLORIDE SCH MG: 5 TABLET ORAL at 06:35

## 2021-06-16 NOTE — P.PN
Subjective


Progress Note Date: 06/16/21





HISTORY OF PRESENT ILLNESS


76 years old  male patient of Dr. Rojo for past medical history 

of paroxysmal atrial fibrillation on Coumadin, hyperlipidemia, hypertension, 

coronary artery disease, history of nonischemic cardiomyopathy with EF 40%, 

valvular heart disease, moderate to severe pulmonary hypertension, obstructive 

sleep apnea on CPAP, hypothyroidism, morbid obesity, diabetes mellitus type 2 

with diabetic neuropathy, benign prostatic hypertrophy status post TURP, 

generalized osteoarthritis, recurrent depression, anemia of chronic disease, 

restless leg syndrome, chronic thrombocytopenia, chronic hypoxic respiratory 

failure on home O2.  Patient had recent hospitalization May 14 through May 20 

which time he was treated for acute on chronic systolic heart failure and acute 

kidney injury.  Patient was stabilized and discharged home and continued on Kefl

ex for lower extremity cellulitis.  Patient now presents with edema to the legs 

and thighs and flank area, lightheadedness, shortness of breath and weight gain 

of 12-15 pounds since he left the hospital.  He had follow-up with Dr. Rojo and with cardiologist last week but worsened over the weekend.





Patient presented to [facility] emergency center for evaluation

and was found to be afebrile, heart rate 72, blood pressure 120/81, pulse ox 96%

on 2 L nasal cannula. EKG was atrial fibrillation controlled rate of 78.  WBC 

5.4, hemoglobin 9.4, platelet count 117.  Sodium 141, potassium 4.7, chloride 

98, CO2 35, BUN 37 creatinine 1.32.  Blood sugar 89.  Liver function tests were 

normal.  Albumin 3.8.  Troponin negative.  ProBNP 9460.


Chest x-ray reveals cardiomegaly.  Improvement of the pulmonary congestion 

compared to old exam.


Patient has been admitted to the cardiac stepdown unit, status post 1 dose of IV

Lasix last evening of 80 mg and started on Lasix drip last evening, cardiology 

consult





6/15: Patient has had urinary retention and Flomax will be ordered.  Most likely

patient will require Serna catheter.  Midodrine will be added and Zaroxolyn to 

start tomorrow.  Patient is still on Lasix drip at 10 mg per hour.  97% on 2 L 

nasal cannula.  Repeat blood work reveals INR 3.0.  Sodium 141, potassium 4.5, 

chloride 96, CO2 38, BUN 34 and creatinine 1.49.  Blood sugars are running 

between 54 and 81.  Levemir decreased to 25 units at bedtime





6/16: Patient's blood sugars are again low this morning of 56 and Levemir 

decreased to 18 units, continue metformin and scale changed to 3 times daily at 

meals only.  He is continued on Lasix drip as well as metolazone 2.5 mg on 

Monday Wednesday Friday and Aldactone 25 mg daily.  He has been afebrile, heart 

rate 84, blood pressure 116/64, pulse ox 94% on 2 L nasal cannula.  At 

controlled rate.  Repeat blood work reveals WBC 6.9, hemoglobin 9.2, platelet 

count 121.  INR is 2.0.  Pharmacy is dosing Coumadin.  Sodium 140, potassium 

4.3, chloride 94, CO2 40, BUN 34 and creatinine 1.64 which is slowly increasing.

 Weight is down 5 kg.





REVIEW OF SYSTEMS


Constitutional: No fever, no chills, no night sweats.  Reports weight gain.  No 

weakness, fatigue or lethargy.  No daytime sleepiness.


EENT: No headache.  No blurred vision or double vision, no loss of vision.  No 

loss of Hearing, no ringing in the ears, reports dizziness.  No nasal drainage 

or congestion.  No epistaxis.  No sore throat.


Lungs: Reports shortness of breath, cough, no sputum production.  No wheezing.


Cardiovascular: No chest pain, reports lower extremity edema.  No palpitations. 

No paroxysmal nocturnal dyspnea.  No orthopnea.  reports lightheadedness or 

dizziness.  No syncopal episodes.


Abdominal: No abdominal pain.  No nausea, vomiting.  No diarrhea.  No 

constipation.  No bloody or tarry stools..  No loss of appetite.


Genitourinary: No dysuria, increased frequency, urgency.  No urinary retention.


Musculoskeletal: No myalgias.  Reports muscle weakness, no gait dysfunction, no 

frequent falls.  No back pain.  No neck pain.


Integumentary: Reports wounds, no lesions.  No rash or pruritus.  No unusual 

bruising.  No change in hair or nails.


Neurologic: No aphasia. No facial droop. No change in mentation. No head injury.

No headache. No paralysis. No paresthesia.


Psychiatric: No depression.  No anxiety.  No mood swings.


Endocrine: Reported abnormal blood sugars.





PHYSICAL EXAMINATION


Gen: This is a morbidly obese 76-year-old  male.  He is resting in a 

recliner and appears to be comfortable and in no acute distress.


HEENT: Head is atraumatic, normocephalic. Pupils equal, round. Sclerae is 

anicteric. 


NECK: Supple. No JVD. No lymphadenopathy. No thyromegaly. 


LUNGS: Diminished in the bilateral bases.  No intercostal retractions.


HEART: Irregular rate and rhythm.  3/6 systolic murmur.  No rub, no gallop, no 

click.   


ABDOMEN: Soft. Bowel sounds are present. No masses.  No tenderness.


EXTREMITIES: 2+ bilateral edema to both lower extremities and flank area.  No 

calf tenderness.


NEUROLOGICAL: Patient is awake, alert and oriented x3. Cranial nerves 2 through 

12 are grossly intact. 





ASSESSMENT AND PLAN


1.  Acute on chronic systolic heart failure.  Continue Lasix drip, monitor I&O 

and daily weights, monitor electrolytes and renal function.  Continue Aldactone 

25 mg daily, Zaroxolyn 2.5 mg on Monday Wednesday Friday started today.





2.  Acute kidney injury likely secondary to cardiorenal process.  Continue Lasix

for now, monitor renal function.





3.  Paroxysmal atrial fibrillation on Coumadin.  Pharmacy is dosing Coumadin.  

INR 2.0.





4.  Chronic anemia.  Iron studies ordered.





5.  Mild chronic cellulitis lower extremities stable.  





6.  Hypertension, hypertensive cardiovascular disease.  Continue metoprolol 

tartrate 12.5 mg oral twice daily, Imdur 15 mg daily, Aldactone 25 mg daily, 

hydralazine 25 mg twice daily





7.  Mild coronary artery disease.  Continue Coumadin, metoprolol, Lasix.





8.  Nonischemic cardiomyopathy.





9.  Hypothyroidism.  Continue levothyroxine 125 g daily.





10.  Diabetes mellitus type 2 with diabetic neuropathy, uncontrolled with 

hypoglycemia.  Continue Levemir decreased to 18 units at bedtime, NovoLog scale 

with meals only and NovoLog scale, gabapentin 300 mg three times daily.





11.  Hyperlipidemia.  Continue simvastatin 40 mg at bedtime.





12.  Obstructive sleep apnea on CPAP.





13.  Benign prostatic hypertrophy status post TURP.





14.  Generalized osteoarthritis.  Continue tramadol for pain.





15.  Recurrent depression.  Continue Zoloft 100 mg daily.





16.  Anemia of chronic disease.  Continue ferrous sulfate.





17.  Restless leg syndrome.  Continue Requip 2.5 mg 3 times daily.





18.  Thrombocytopenia, chronic.





19.  DVT prophylaxis.  Coumadin 





20.  GI prophylaxis.  Protonix 40 mg oral daily.





21.  Chronic hypoxic respiratory failure on home oxygen therapy.





DISCHARGE PLAN


Most likely return home.  Patient may benefit from home care.





Impression and plan of care have been directed as dictated by the signing 

physician.  Mehnaz Quezada nurse practitioner acting as scribe for signing 

physician.





Objective





- Vital Signs


Vital signs: 


                                   Vital Signs











Temp  97.3 F L  06/16/21 08:00


 


Pulse  84   06/16/21 08:00


 


Resp  19   06/16/21 08:00


 


BP  116/64   06/16/21 08:00


 


Pulse Ox  94 L  06/16/21 08:00








                                 Intake & Output











 06/15/21 06/16/21 06/16/21





 18:59 06:59 18:59


 


Intake Total 1189.667 10 312.833


 


Output Total 1360 2875 


 


Balance -170.333 -2865 312.833


 


Weight  146.9 kg 


 


Intake:   


 


  IV  10 


 


    0.9  10 


 


  Intake, IV Titration 189.667  72.833





  Amount   


 


    Furosemide 100 mg In 189.667  72.833





    Sodium Chloride 0.9% 90   





    ml @ 10 MG/HR 10 mls/hr   





    IV .Q10H [facility] Rx#:   





    [id number]   


 


  Oral 1000  240


 


Output:   


 


  Urine 1360 2875 


 


    Uretheral (Serna) 1360  


 


Other:   


 


  Voiding Method Toilet Indwelling Catheter Indwelling Catheter





 Diaper  


 


  # Voids   1














- Labs


CBC & Chem 7: 


                                 06/16/21 07:14





                                 06/16/21 07:14


Labs: 


                  Abnormal Lab Results - Last 24 Hours (Table)











  06/15/21 06/15/21 06/16/21 Range/Units





  16:56 20:20 06:18 


 


RBC     (4.30-5.90)  m/uL


 


Hgb     (13.0-17.5)  gm/dL


 


Hct     (39.0-53.0)  %


 


MCV     (80.0-100.0)  fL


 


RDW     (11.5-15.5)  %


 


Plt Count     (150-450)  k/uL


 


PT     (9.0-12.0)  sec


 


INR     (<1.2)  


 


Chloride     ()  mmol/L


 


Carbon Dioxide     (22-30)  mmol/L


 


BUN     (9-20)  mg/dL


 


Creatinine     (0.66-1.25)  mg/dL


 


Glucose     (74-99)  mg/dL


 


POC Glucose (mg/dL)  131 H  116 H  53 L  (75-99)  mg/dL














  06/16/21 06/16/21 06/16/21 Range/Units





  07:14 07:14 07:14 


 


RBC  3.47 L    (4.30-5.90)  m/uL


 


Hgb  9.2 L    (13.0-17.5)  gm/dL


 


Hct  27.7 L    (39.0-53.0)  %


 


MCV  79.9 L    (80.0-100.0)  fL


 


RDW  16.1 H    (11.5-15.5)  %


 


Plt Count  121 L    (150-450)  k/uL


 


PT    19.2 H  (9.0-12.0)  sec


 


INR    2.0 H  (<1.2)  


 


Chloride   94 L   ()  mmol/L


 


Carbon Dioxide   40 H   (22-30)  mmol/L


 


BUN   34 H   (9-20)  mg/dL


 


Creatinine   1.64 H   (0.66-1.25)  mg/dL


 


Glucose   68 L   (74-99)  mg/dL


 


POC Glucose (mg/dL)     (75-99)  mg/dL

## 2021-06-16 NOTE — P.PN
Subjective





HISTORY OF PRESENTING ILLNESS


This is a pleasant 76-year-old  male past medical history significant 

for chronic persistent atrial fibrillation on long-term anticoagulation, mild 

nonobstructive coronary artery disease, chronic systolic heart failure, 

nonischemic cardiomyopathy, hypertension and dyslipidemia.  He follows in the 

office with Dr. Schwartz. We have been asked to see in consultation for heart 

failure.  He presented to the hospital with symptoms of worsening shortness of 

breath, weight gain and lower extremity edema.  He states he has recently been 

hospitalized earlier this month for similar type symptoms.  When he left the 

hospital he didn't feel as though he is back to his baseline fluid status and 

things have gotten worse since.  On last admission he was initiated on 

Aldactone. Post discharge he saw Dr. Schwartz in the office and was also initiated

on Zaroxolyn every other day. He was recommended to come to hospital for 

diuresis however, he declined. He denies symptoms of chest pain, dizziness or 

palpitations.





06/16/2021


Patient seen and examined sitting up in recliner in no acute distress.  Serna 

catheter was place yesterday to document accurate output for diuresis. Over the 

last 24 hours since f/c placement he has put out 4235 cc of urine. He does not 

verbalize feeling any better. He denies shortness of breath. He has had no chest

pain, dizziness or palpitations. Telemetry tracings reveal persistent atrial 

fibrillation with controlled ventricular rates. Blood pressure 103/63 heart rate

88 afebrile maintaining oxygen saturation on room air.  Laboratory data 

reviewed, WBC 6.9, hemoglobin 9.2, platelets 121, INR 2.0, sodium 140, potassium

4.3 and creatinine 1.64.





PHYSICAL EXAMINATION


CONSTITUTIONAL: No apparent distress. 


HEENT: Head is normocephalic. Pupils are equal, round. Sclerae anicteric. Mucous

membranes of the mouth are moist.  No JVD. No carotid bruit.


CHEST EXAMINATION: Lungs are clear to auscultation. No chest wall tenderness is 

noted on palpation or with deep breathing. 


HEART EXAMINATION: Irregular rate and rhythm. S1, S2 heard. No murmurs, gallops 

or rub.


EXTREMITIES: 2+ bilateral lower extremity pitting edema and erythema, ACE wraps 

in place and no calf tenderness.





ASSESSMENT


Acute on chronic systolic heart failure


Chronic persistent atrial fibrillation on warfarin


Nonobstructive coronary artery disease


Hypertension


Dyslipidemia


Acute on chronic kidney disease





PLAN


Continue Lasix infusion.


Follow renal function and electrolytes in the morning.


Further recommendations to follow based upon clinical course.





Nurse Practitioner note has been reviewed, I agree with a documented findings 

and plan of care.  Patient was seen and examined.





Objective





- Vital Signs


Vital signs: 


                                   Vital Signs











Temp  97.6 F   06/16/21 03:33


 


Pulse  80   06/16/21 03:33


 


Resp  18   06/16/21 03:33


 


BP  103/63   06/16/21 03:33


 


Pulse Ox  94 L  06/16/21 03:33








                                 Intake & Output











 06/15/21 06/16/21 06/16/21





 18:59 06:59 18:59


 


Intake Total 1189.667 10 


 


Output Total 1360 2875 


 


Balance -170.333 -2865 


 


Weight  146.9 kg 


 


Intake:   


 


  IV  10 


 


    0.9  10 


 


  Intake, IV Titration 189.667  





  Amount   


 


    Furosemide 100 mg In 189.667  





    Sodium Chloride 0.9% 90   





    ml @ 10 MG/HR 10 mls/hr   





    IV .Q10H [facility] Rx#:   





    [id number]   


 


  Oral 1000  


 


Output:   


 


  Urine 1360 2875 


 


    Uretheral (Serna) 1360  


 


Other:   


 


  Voiding Method Toilet Indwelling Catheter 





 Diaper  














- Labs


CBC & Chem 7: 


                                 06/16/21 07:14





                                 06/16/21 07:14


Labs: 


                  Abnormal Lab Results - Last 24 Hours (Table)











  06/15/21 06/15/21 06/15/21 Range/Units





  06:43 16:56 20:20 


 


RBC     (4.30-5.90)  m/uL


 


Hgb     (13.0-17.5)  gm/dL


 


Hct     (39.0-53.0)  %


 


MCV     (80.0-100.0)  fL


 


RDW     (11.5-15.5)  %


 


Plt Count     (150-450)  k/uL


 


PT     (9.0-12.0)  sec


 


INR     (<1.2)  


 


Chloride  96 L    ()  mmol/L


 


Carbon Dioxide  38 H    (22-30)  mmol/L


 


BUN  34 H    (9-20)  mg/dL


 


Creatinine  1.49 H    (0.66-1.25)  mg/dL


 


Glucose  64 L    (74-99)  mg/dL


 


POC Glucose (mg/dL)   131 H  116 H  (75-99)  mg/dL














  06/16/21 06/16/21 06/16/21 Range/Units





  06:18 07:14 07:14 


 


RBC   3.47 L   (4.30-5.90)  m/uL


 


Hgb   9.2 L   (13.0-17.5)  gm/dL


 


Hct   27.7 L   (39.0-53.0)  %


 


MCV   79.9 L   (80.0-100.0)  fL


 


RDW   16.1 H   (11.5-15.5)  %


 


Plt Count   121 L   (150-450)  k/uL


 


PT     (9.0-12.0)  sec


 


INR     (<1.2)  


 


Chloride    94 L  ()  mmol/L


 


Carbon Dioxide    40 H  (22-30)  mmol/L


 


BUN    34 H  (9-20)  mg/dL


 


Creatinine    1.64 H  (0.66-1.25)  mg/dL


 


Glucose    68 L  (74-99)  mg/dL


 


POC Glucose (mg/dL)  53 L    (75-99)  mg/dL














  06/16/21 Range/Units





  07:14 


 


RBC   (4.30-5.90)  m/uL


 


Hgb   (13.0-17.5)  gm/dL


 


Hct   (39.0-53.0)  %


 


MCV   (80.0-100.0)  fL


 


RDW   (11.5-15.5)  %


 


Plt Count   (150-450)  k/uL


 


PT  19.2 H  (9.0-12.0)  sec


 


INR  2.0 H  (<1.2)  


 


Chloride   ()  mmol/L


 


Carbon Dioxide   (22-30)  mmol/L


 


BUN   (9-20)  mg/dL


 


Creatinine   (0.66-1.25)  mg/dL


 


Glucose   (74-99)  mg/dL


 


POC Glucose (mg/dL)   (75-99)  mg/dL

## 2021-06-17 LAB
ANION GAP SERPL CALC-SCNC: 13 MMOL/L
BASOPHILS # BLD AUTO: 0 K/UL (ref 0–0.2)
BASOPHILS NFR BLD AUTO: 0 %
BUN SERPL-SCNC: 33 MG/DL (ref 9–20)
CALCIUM SPEC-MCNC: 9.7 MG/DL (ref 8.4–10.2)
CHLORIDE SERPL-SCNC: 91 MMOL/L (ref 98–107)
CO2 SERPL-SCNC: 38 MMOL/L (ref 22–30)
EOSINOPHIL # BLD AUTO: 0.2 K/UL (ref 0–0.7)
EOSINOPHIL NFR BLD AUTO: 4 %
ERYTHROCYTE [DISTWIDTH] IN BLOOD BY AUTOMATED COUNT: 3.56 M/UL (ref 4.3–5.9)
ERYTHROCYTE [DISTWIDTH] IN BLOOD: 16.1 % (ref 11.5–15.5)
GLUCOSE BLD-MCNC: 105 MG/DL (ref 75–99)
GLUCOSE BLD-MCNC: 140 MG/DL (ref 75–99)
GLUCOSE BLD-MCNC: 146 MG/DL (ref 75–99)
GLUCOSE BLD-MCNC: 161 MG/DL (ref 75–99)
GLUCOSE SERPL-MCNC: 88 MG/DL (ref 74–99)
HCT VFR BLD AUTO: 28.4 % (ref 39–53)
HGB BLD-MCNC: 9.3 GM/DL (ref 13–17.5)
INR PPP: 1.6 (ref ?–1.2)
LYMPHOCYTES # SPEC AUTO: 0.7 K/UL (ref 1–4.8)
LYMPHOCYTES NFR SPEC AUTO: 12 %
MCH RBC QN AUTO: 26.2 PG (ref 25–35)
MCHC RBC AUTO-ENTMCNC: 32.9 G/DL (ref 31–37)
MCV RBC AUTO: 79.7 FL (ref 80–100)
MONOCYTES # BLD AUTO: 0.4 K/UL (ref 0–1)
MONOCYTES NFR BLD AUTO: 7 %
NEUTROPHILS # BLD AUTO: 4.5 K/UL (ref 1.3–7.7)
NEUTROPHILS NFR BLD AUTO: 76 %
PLATELET # BLD AUTO: 126 K/UL (ref 150–450)
POTASSIUM SERPL-SCNC: 4.9 MMOL/L (ref 3.5–5.1)
PT BLD: 16.1 SEC (ref 9–12)
SODIUM SERPL-SCNC: 142 MMOL/L (ref 137–145)
WBC # BLD AUTO: 5.9 K/UL (ref 3.8–10.6)

## 2021-06-17 RX ADMIN — FUROSEMIDE SCH MLS/HR: 10 INJECTION, SOLUTION INTRAMUSCULAR; INTRAVENOUS at 17:29

## 2021-06-17 RX ADMIN — POTASSIUM CHLORIDE SCH MEQ: 20 TABLET, EXTENDED RELEASE ORAL at 17:31

## 2021-06-17 RX ADMIN — METOPROLOL TARTRATE SCH MG: 25 TABLET, FILM COATED ORAL at 09:32

## 2021-06-17 RX ADMIN — MIDODRINE HYDROCHLORIDE SCH MG: 5 TABLET ORAL at 06:39

## 2021-06-17 RX ADMIN — LATANOPROST SCH DROPS: 50 SOLUTION OPHTHALMIC at 20:32

## 2021-06-17 RX ADMIN — Medication SCH MG: at 20:33

## 2021-06-17 RX ADMIN — INSULIN DETEMIR SCH UNIT: 100 INJECTION, SOLUTION SUBCUTANEOUS at 20:32

## 2021-06-17 RX ADMIN — METOPROLOL TARTRATE SCH MG: 25 TABLET, FILM COATED ORAL at 20:32

## 2021-06-17 RX ADMIN — ATORVASTATIN CALCIUM SCH MG: 20 TABLET, FILM COATED ORAL at 20:32

## 2021-06-17 RX ADMIN — POTASSIUM CHLORIDE SCH MEQ: 20 TABLET, EXTENDED RELEASE ORAL at 09:32

## 2021-06-17 RX ADMIN — MIDODRINE HYDROCHLORIDE SCH MG: 5 TABLET ORAL at 17:31

## 2021-06-17 RX ADMIN — PANTOPRAZOLE SODIUM SCH MG: 40 TABLET, DELAYED RELEASE ORAL at 06:39

## 2021-06-17 RX ADMIN — Medication SCH MG: at 09:32

## 2021-06-17 RX ADMIN — MIDODRINE HYDROCHLORIDE SCH MG: 5 TABLET ORAL at 12:32

## 2021-06-17 RX ADMIN — SPIRONOLACTONE SCH MG: 25 TABLET, FILM COATED ORAL at 09:32

## 2021-06-17 RX ADMIN — Medication SCH MG: at 17:31

## 2021-06-17 RX ADMIN — FUROSEMIDE SCH MLS/HR: 10 INJECTION, SOLUTION INTRAMUSCULAR; INTRAVENOUS at 06:39

## 2021-06-17 RX ADMIN — TAMSULOSIN HYDROCHLORIDE SCH MG: 0.4 CAPSULE ORAL at 17:31

## 2021-06-17 RX ADMIN — ISOSORBIDE MONONITRATE SCH MG: 30 TABLET, EXTENDED RELEASE ORAL at 09:31

## 2021-06-17 RX ADMIN — Medication SCH MCG: at 09:31

## 2021-06-17 RX ADMIN — POTASSIUM CHLORIDE SCH MEQ: 20 TABLET, EXTENDED RELEASE ORAL at 20:33

## 2021-06-17 RX ADMIN — LEVOTHYROXINE SODIUM SCH MCG: 0.12 TABLET ORAL at 06:39

## 2021-06-17 RX ADMIN — INSULIN ASPART SCH: 100 INJECTION, SOLUTION INTRAVENOUS; SUBCUTANEOUS at 06:36

## 2021-06-17 RX ADMIN — INSULIN ASPART SCH UNIT: 100 INJECTION, SOLUTION INTRAVENOUS; SUBCUTANEOUS at 12:32

## 2021-06-17 RX ADMIN — INSULIN ASPART SCH UNIT: 100 INJECTION, SOLUTION INTRAVENOUS; SUBCUTANEOUS at 17:32

## 2021-06-17 RX ADMIN — SERTRALINE HYDROCHLORIDE SCH MG: 100 TABLET ORAL at 09:32

## 2021-06-17 NOTE — P.PN
Subjective





HISTORY OF PRESENTING ILLNESS


This is a pleasant 76-year-old  male past medical history significant 

for chronic persistent atrial fibrillation on long-term anticoagulation, mild 

nonobstructive coronary artery disease, chronic systolic heart failure, 

nonischemic cardiomyopathy, hypertension and dyslipidemia.  He follows in the 

office with Dr. Schwartz. We have been asked to see in consultation for heart 

failure.  He presented to the hospital with symptoms of worsening shortness of 

breath, weight gain and lower extremity edema.  He states he has recently been 

hospitalized earlier this month for similar type symptoms.  When he left the 

hospital he didn't feel as though he is back to his baseline fluid status and 

things have gotten worse since.  On last admission he was initiated on 

Aldactone. Post discharge he saw Dr. Schwartz in the office and was also initiated

on Zaroxolyn every other day. He was recommended to come to hospital for 

diuresis however, he declined. He denies symptoms of chest pain, dizziness or 

palpitations.





06/17/2021


Patient seen and examined sitting up in recliner in no acute distress. He had 

10,075 ml of urine output in the last 24 hours. He feels mildly better today 

with still no shortness of breath. Swelling is improving but still significant. 

Blood pressure 138/65 heart rate 89 afebrile and maintaining oxygen saturation 

on nasal cannula. Laboratory data reviewed, WBC 5.9, hgb 9.3, plt 126, INR 1.6, 

sodium 142, potassium 4.9, creatinine 1.7. 





PHYSICAL EXAMINATION


CONSTITUTIONAL: No apparent distress. 


HEENT: Head is normocephalic. Pupils are equal, round. Sclerae anicteric. Mucous

membranes of the mouth are moist.  No JVD. No carotid bruit.


CHEST EXAMINATION: Lungs are clear to auscultation. No chest wall tenderness is 

noted on palpation or with deep breathing. 


HEART EXAMINATION: Irregular rate and rhythm. S1, S2 heard. No murmurs, gallops 

or rub.


EXTREMITIES: 2+ bilateral lower extremity pitting edema and erythema, ACE wraps 

in place and no calf tenderness.





ASSESSMENT


Acute on chronic systolic heart failure


Chronic persistent atrial fibrillation on warfarin


Nonobstructive coronary artery disease


Hypertension


Dyslipidemia


Acute on chronic kidney disease, Stage 2





PLAN


Continue Lasix infusion.


Follow renal function and electrolytes in the morning.


Further recommendations to follow based upon clinical course.





Nurse Practitioner note has been reviewed, I agree with a documented findings 

and plan of care.  Patient was seen and examined.





Objective





- Vital Signs


Vital signs: 


                                   Vital Signs











Temp  97.4 F L  06/17/21 08:00


 


Pulse  89   06/17/21 08:00


 


Resp  18   06/17/21 08:00


 


BP  138/65   06/17/21 08:00


 


Pulse Ox  94 L  06/17/21 08:00








                                 Intake & Output











 06/16/21 06/17/21 06/17/21





 18:59 06:59 18:59


 


Intake Total 1492.833 487.333 240


 


Output Total 4100 5975 


 


Balance -2607.167 -5487.667 240


 


Weight  140.7 kg 


 


Intake:   


 


  Intake, IV Titration 72.833 187.333 





  Amount   


 


    Furosemide 100 mg In 72.833 187.333 





    Sodium Chloride 0.9% 90   





    ml @ 10 MG/HR 10 mls/hr   





    IV .Q10H [facility] Rx#:   





    [id number]   


 


  Oral 1420 300 240


 


Output:   


 


  Urine 4100 5975 


 


    Uretheral (Serna) 2300 2200 


 


Other:   


 


  Voiding Method Indwelling Catheter Indwelling Catheter Indwelling Catheter


 


  # Voids 1  














- Labs


CBC & Chem 7: 


                                 06/17/21 07:17





                                 06/17/21 07:17


Labs: 


                  Abnormal Lab Results - Last 24 Hours (Table)











  06/16/21 06/16/21 06/17/21 Range/Units





  17:14 20:09 06:06 


 


RBC     (4.30-5.90)  m/uL


 


Hgb     (13.0-17.5)  gm/dL


 


Hct     (39.0-53.0)  %


 


MCV     (80.0-100.0)  fL


 


RDW     (11.5-15.5)  %


 


Plt Count     (150-450)  k/uL


 


Lymphocytes #     (1.0-4.8)  k/uL


 


PT     (9.0-12.0)  sec


 


INR     (<1.2)  


 


Chloride     ()  mmol/L


 


Carbon Dioxide     (22-30)  mmol/L


 


BUN     (9-20)  mg/dL


 


Creatinine     (0.66-1.25)  mg/dL


 


POC Glucose (mg/dL)  132 H  191 H  105 H  (75-99)  mg/dL














  06/17/21 06/17/21 06/17/21 Range/Units





  07:17 07:17 07:17 


 


RBC   3.56 L   (4.30-5.90)  m/uL


 


Hgb   9.3 L   (13.0-17.5)  gm/dL


 


Hct   28.4 L   (39.0-53.0)  %


 


MCV   79.7 L   (80.0-100.0)  fL


 


RDW   16.1 H   (11.5-15.5)  %


 


Plt Count   126 L   (150-450)  k/uL


 


Lymphocytes #   0.7 L   (1.0-4.8)  k/uL


 


PT  16.1 H    (9.0-12.0)  sec


 


INR  1.6 H    (<1.2)  


 


Chloride    91 L  ()  mmol/L


 


Carbon Dioxide    38 H  (22-30)  mmol/L


 


BUN    33 H  (9-20)  mg/dL


 


Creatinine    1.70 H  (0.66-1.25)  mg/dL


 


POC Glucose (mg/dL)     (75-99)  mg/dL

## 2021-06-17 NOTE — P.PN
Subjective


Progress Note Date: 06/17/21








HISTORY OF PRESENT ILLNESS


76 years old  male patient of Dr. Rojo for past medical history 

of paroxysmal atrial fibrillation on Coumadin, hyperlipidemia, hypertension, 

coronary artery disease, history of nonischemic cardiomyopathy with EF 40%, 

valvular heart disease, moderate to severe pulmonary hypertension, obstructive 

sleep apnea on CPAP, hypothyroidism, morbid obesity, diabetes mellitus type 2 

with diabetic neuropathy, benign prostatic hypertrophy status post TURP, 

generalized osteoarthritis, recurrent depression, anemia of chronic disease, 

restless leg syndrome, chronic thrombocytopenia, chronic hypoxic respiratory 

failure on home O2.  Patient had recent hospitalization May 14 through May 20 

which time he was treated for acute on chronic systolic heart failure and acute 

kidney injury.  Patient was stabilized and discharged home and continued on Kef

joan for lower extremity cellulitis.  Patient now presents with edema to the legs

and thighs and flank area, lightheadedness, shortness of breath and weight gain 

of 12-15 pounds since he left the hospital.  He had follow-up with Dr. Rojo and with cardiologist last week but worsened over the weekend.





Patient presented to [facility] emergency center for evaluation

and was found to be afebrile, heart rate 72, blood pressure 120/81, pulse ox 96%

on 2 L nasal cannula. EKG was atrial fibrillation controlled rate of 78.  WBC 

5.4, hemoglobin 9.4, platelet count 117.  Sodium 141, potassium 4.7, chloride 

98, CO2 35, BUN 37 creatinine 1.32.  Blood sugar 89.  Liver function tests were 

normal.  Albumin 3.8.  Troponin negative.  ProBNP 9460.


Chest x-ray reveals cardiomegaly.  Improvement of the pulmonary congestion 

compared to old exam.


Patient has been admitted to the cardiac stepdown unit, status post 1 dose of IV

Lasix last evening of 80 mg and started on Lasix drip last evening, cardiology 

consult





6/15: Patient has had urinary retention and Flomax will be ordered.  Most likely

patient will require Serna catheter.  Midodrine will be added and Zaroxolyn to 

start tomorrow.  Patient is still on Lasix drip at 10 mg per hour.  97% on 2 L 

nasal cannula.  Repeat blood work reveals INR 3.0.  Sodium 141, potassium 4.5, 

chloride 96, CO2 38, BUN 34 and creatinine 1.49.  Blood sugars are running 

between 54 and 81.  Levemir decreased to 25 units at bedtime





6/16: Patient's blood sugars are again low this morning of 56 and Levemir 

decreased to 18 units, continue metformin and scale changed to 3 times daily at 

meals only.  He is continued on Lasix drip as well as metolazone 2.5 mg on 

Monday Wednesday Friday and Aldactone 25 mg daily.  He has been afebrile, heart 

rate 84, blood pressure 116/64, pulse ox 94% on 2 L nasal cannula.  At 

controlled rate.  Repeat blood work reveals WBC 6.9, hemoglobin 9.2, platelet 

count 121.  INR is 2.0.  Pharmacy is dosing Coumadin.  Sodium 140, potassium 

4.3, chloride 94, CO2 40, BUN 34 and creatinine 1.64 which is slowly increasing.

 Weight is down 5 kg.





6/17, patient is still diuresing, Serna cath is in place,  doubt acute urine, 1

40 kg today, with  5 kg weight loss over the past 24 hours, urine output of 4.5 

L, creatinine slightly elevated was seen yesterday 1.7 today, still with 

anasarca below the waist, patient attempts to sleep in the bed and said the 

recliner, Ace wrap is not seen today, patient has no chest pain, ambulates 

without any new difficulties, on Aldactone and metolazone, ranitidine, Lasix IV 

drip at 10 mg per hour INR followed by pharmacy, 1.6





REVIEW OF SYSTEMS


Constitutional: No fever, no chills, no night sweats.  Reports weight gain.  No 

weakness, fatigue or lethargy.  No daytime sleepiness.


EENT: No headache.  No blurred vision or double vision, no loss of vision.  No 

loss of Hearing, no ringing in the ears, reports dizziness.  No nasal drainage 

or congestion.  No epistaxis.  No sore throat.


Lungs: Reports shortness of breath, cough, no sputum production.  No wheezing.


Cardiovascular: No chest pain, reports lower extremity edema.  No palpitations. 

No paroxysmal nocturnal dyspnea.  No orthopnea.  reports lightheadedness or 

dizziness.  No syncopal episodes.


Abdominal: No abdominal pain.  No nausea, vomiting.  No diarrhea.  No 

constipation.  No bloody or tarry stools..  No loss of appetite.


Genitourinary: No dysuria, increased frequency, urgency.  No urinary retention.


Musculoskeletal: No myalgias.  Reports muscle weakness, no gait dysfunction, no 

frequent falls.  No back pain.  No neck pain.


Integumentary: Reports wounds, no lesions.  No rash or pruritus.  No unusual 

bruising.  No change in hair or nails.


Neurologic: No aphasia. No facial droop. No change in mentation. No head injury.

No headache. No paralysis. No paresthesia.


Psychiatric: No depression.  No anxiety.  No mood swings.


Endocrine: Reported abnormal blood sugars.





Objective





- Vital Signs


Vital signs: 


                                   Vital Signs











Temp  97.4 F L  06/17/21 08:00


 


Pulse  88   06/17/21 13:29


 


Resp  18   06/17/21 13:29


 


BP  127/71   06/17/21 12:00


 


Pulse Ox  92 L  06/17/21 12:00








                                 Intake & Output











 06/16/21 06/17/21 06/17/21





 18:59 06:59 18:59


 


Intake Total 1492.833 487.333 660


 


Output Total 4100 5975 1800


 


Balance -2607.167 -5487.667 -1140


 


Weight  140.7 kg 


 


Intake:   


 


  Intake, IV Titration 72.833 187.333 





  Amount   


 


    Furosemide 100 mg In 72.833 187.333 





    Sodium Chloride 0.9% 90   





    ml @ 10 MG/HR 10 mls/hr   





    IV .Q10H [facility] Rx#:   





    [id number]   


 


  Oral 1420 300 660


 


Output:   


 


  Urine 4100 5975 1800


 


    Uretheral (Serna) 2300 2200 


 


Other:   


 


  Voiding Method Indwelling Catheter Indwelling Catheter Indwelling Catheter


 


  # Voids 1  














- Labs


CBC & Chem 7: 


                                 06/17/21 07:17





                                 06/17/21 07:17


Labs: 


                  Abnormal Lab Results - Last 24 Hours (Table)











  06/16/21 06/16/21 06/17/21 Range/Units





  17:14 20:09 06:06 


 


RBC     (4.30-5.90)  m/uL


 


Hgb     (13.0-17.5)  gm/dL


 


Hct     (39.0-53.0)  %


 


MCV     (80.0-100.0)  fL


 


RDW     (11.5-15.5)  %


 


Plt Count     (150-450)  k/uL


 


Lymphocytes #     (1.0-4.8)  k/uL


 


PT     (9.0-12.0)  sec


 


INR     (<1.2)  


 


Chloride     ()  mmol/L


 


Carbon Dioxide     (22-30)  mmol/L


 


BUN     (9-20)  mg/dL


 


Creatinine     (0.66-1.25)  mg/dL


 


POC Glucose (mg/dL)  132 H  191 H  105 H  (75-99)  mg/dL














  06/17/21 06/17/21 06/17/21 Range/Units





  07:17 07:17 07:17 


 


RBC   3.56 L   (4.30-5.90)  m/uL


 


Hgb   9.3 L   (13.0-17.5)  gm/dL


 


Hct   28.4 L   (39.0-53.0)  %


 


MCV   79.7 L   (80.0-100.0)  fL


 


RDW   16.1 H   (11.5-15.5)  %


 


Plt Count   126 L   (150-450)  k/uL


 


Lymphocytes #   0.7 L   (1.0-4.8)  k/uL


 


PT  16.1 H    (9.0-12.0)  sec


 


INR  1.6 H    (<1.2)  


 


Chloride    91 L  ()  mmol/L


 


Carbon Dioxide    38 H  (22-30)  mmol/L


 


BUN    33 H  (9-20)  mg/dL


 


Creatinine    1.70 H  (0.66-1.25)  mg/dL


 


POC Glucose (mg/dL)     (75-99)  mg/dL














  06/17/21 Range/Units





  11:42 


 


RBC   (4.30-5.90)  m/uL


 


Hgb   (13.0-17.5)  gm/dL


 


Hct   (39.0-53.0)  %


 


MCV   (80.0-100.0)  fL


 


RDW   (11.5-15.5)  %


 


Plt Count   (150-450)  k/uL


 


Lymphocytes #   (1.0-4.8)  k/uL


 


PT   (9.0-12.0)  sec


 


INR   (<1.2)  


 


Chloride   ()  mmol/L


 


Carbon Dioxide   (22-30)  mmol/L


 


BUN   (9-20)  mg/dL


 


Creatinine   (0.66-1.25)  mg/dL


 


POC Glucose (mg/dL)  146 H  (75-99)  mg/dL














Assessment and Plan


Plan: 








ASSESSMENT AND PLAN


1.  Acute on chronic systolic heart failure with anasarca  Continue Lasix drip, 

monitor I&O and daily weights, monitor electrolytes and renal function.  Cont

inue Aldactone 25 mg daily, Zaroxolyn 2.5 mg on Monday Wednesday Friday started 

today.





2.  Acute kidney injury likely secondary to cardiorenal process.  Continue Lasix

and metolazone, Aldactone, for now, monitor renal function.  No new hypotension,

Serna catheter placed, has urinary retention history, on Flomax





3.  Paroxysmal atrial fibrillation on Coumadin.  Pharmacy is dosing Coumadin.  

INR 2.0.





4.  Chronic anemia.  Iron studies ordered.





5.  Mild chronic cellulitis lower extremities stable.  





6.  Hypertension, hypertensive cardiovascular disease.  Continue metoprolol 

tartrate 12.5 mg oral twice daily, Imdur 15 mg daily, Aldactone 25 mg daily, 

hydralazine 25 mg twice daily





7.  Mild coronary artery disease.  Continue Coumadin, metoprolol, Lasix.





8.  Nonischemic cardiomyopathy.





9.  Hypothyroidism.  Continue levothyroxine 125 g daily.





10.  Diabetes mellitus type 2 with diabetic neuropathy, uncontrolled with 

hypoglycemia.  Continue Levemir decreased to 18 units at bedtime, NovoLog scale 

with meals only and NovoLog scale, gabapentin 300 mg three times daily.





11.  Hyperlipidemia.  Continue simvastatin 40 mg at bedtime.





12.  Obstructive sleep apnea on CPAP.





13.  Benign prostatic hypertrophy status post TURP.





14.  Generalized osteoarthritis.  Continue tramadol for pain.





15.  Recurrent depression.  Continue Zoloft 100 mg daily.





16.  Anemia of chronic disease.  Continue ferrous sulfate.





17.  Restless leg syndrome.  Continue Requip 2.5 mg 3 times daily.





18.  Thrombocytopenia, chronic.





19.  DVT prophylaxis.  Coumadin 





20.  GI prophylaxis.  Protonix 40 mg oral daily.





21.  Chronic hypoxic respiratory failure on home oxygen therapy.





22 BPH with urinary retention, currently on Serna catheter, Flomax, plan for 

follow To be discontinued the next 24-48 hours PVR to follow





23 Lumbar disc disease with retrolisthesis, patient has difficulty in lying down

supine, special in bed.  Refers to be in a recliner, however this also inhibits 

recovery of the anasarca, below the waist





24 Anasarca without proteinuria, treatment as above





DISCHARGE PLAN


Most likely return home.  Patient may benefit from home care.





Impression and plan of care have been directed as dictated by the signing 

physician.  Mehnaz Quezada nurse practitioner acting as scribe for signing 

physician.

## 2021-06-18 LAB
ANION GAP SERPL CALC-SCNC: 9 MMOL/L
BUN SERPL-SCNC: 35 MG/DL (ref 9–20)
CALCIUM SPEC-MCNC: 9.6 MG/DL (ref 8.4–10.2)
CHLORIDE SERPL-SCNC: 91 MMOL/L (ref 98–107)
CO2 SERPL-SCNC: 39 MMOL/L (ref 22–30)
GLUCOSE BLD-MCNC: 110 MG/DL (ref 75–99)
GLUCOSE BLD-MCNC: 132 MG/DL (ref 75–99)
GLUCOSE BLD-MCNC: 203 MG/DL (ref 75–99)
GLUCOSE BLD-MCNC: 79 MG/DL (ref 75–99)
GLUCOSE SERPL-MCNC: 94 MG/DL (ref 74–99)
INR PPP: 1.5 (ref ?–1.2)
POTASSIUM SERPL-SCNC: 4.4 MMOL/L (ref 3.5–5.1)
PT BLD: 15.1 SEC (ref 9–12)
SODIUM SERPL-SCNC: 139 MMOL/L (ref 137–145)

## 2021-06-18 RX ADMIN — MIDODRINE HYDROCHLORIDE SCH MG: 5 TABLET ORAL at 17:56

## 2021-06-18 RX ADMIN — MIDODRINE HYDROCHLORIDE SCH MG: 5 TABLET ORAL at 06:25

## 2021-06-18 RX ADMIN — INSULIN ASPART SCH: 100 INJECTION, SOLUTION INTRAVENOUS; SUBCUTANEOUS at 17:11

## 2021-06-18 RX ADMIN — FUROSEMIDE SCH MG: 10 INJECTION, SOLUTION INTRAMUSCULAR; INTRAVENOUS at 17:55

## 2021-06-18 RX ADMIN — LATANOPROST SCH DROPS: 50 SOLUTION OPHTHALMIC at 20:14

## 2021-06-18 RX ADMIN — Medication SCH MG: at 09:19

## 2021-06-18 RX ADMIN — METOPROLOL TARTRATE SCH MG: 25 TABLET, FILM COATED ORAL at 20:14

## 2021-06-18 RX ADMIN — POTASSIUM CHLORIDE SCH MEQ: 20 TABLET, EXTENDED RELEASE ORAL at 17:56

## 2021-06-18 RX ADMIN — POTASSIUM CHLORIDE SCH MEQ: 20 TABLET, EXTENDED RELEASE ORAL at 09:19

## 2021-06-18 RX ADMIN — FUROSEMIDE SCH MLS/HR: 10 INJECTION, SOLUTION INTRAMUSCULAR; INTRAVENOUS at 01:26

## 2021-06-18 RX ADMIN — SERTRALINE HYDROCHLORIDE SCH MG: 100 TABLET ORAL at 09:19

## 2021-06-18 RX ADMIN — MIDODRINE HYDROCHLORIDE SCH MG: 5 TABLET ORAL at 12:19

## 2021-06-18 RX ADMIN — Medication SCH MG: at 20:14

## 2021-06-18 RX ADMIN — LEVOTHYROXINE SODIUM SCH MCG: 0.12 TABLET ORAL at 06:25

## 2021-06-18 RX ADMIN — METOPROLOL TARTRATE SCH MG: 25 TABLET, FILM COATED ORAL at 09:19

## 2021-06-18 RX ADMIN — ISOSORBIDE MONONITRATE SCH MG: 30 TABLET, EXTENDED RELEASE ORAL at 09:20

## 2021-06-18 RX ADMIN — PANTOPRAZOLE SODIUM SCH MG: 40 TABLET, DELAYED RELEASE ORAL at 06:25

## 2021-06-18 RX ADMIN — Medication SCH MG: at 17:57

## 2021-06-18 RX ADMIN — FUROSEMIDE SCH MG: 10 INJECTION, SOLUTION INTRAMUSCULAR; INTRAVENOUS at 23:09

## 2021-06-18 RX ADMIN — INSULIN DETEMIR SCH UNIT: 100 INJECTION, SOLUTION SUBCUTANEOUS at 20:14

## 2021-06-18 RX ADMIN — INSULIN ASPART SCH: 100 INJECTION, SOLUTION INTRAVENOUS; SUBCUTANEOUS at 06:11

## 2021-06-18 RX ADMIN — Medication SCH MCG: at 09:20

## 2021-06-18 RX ADMIN — TAMSULOSIN HYDROCHLORIDE SCH MG: 0.4 CAPSULE ORAL at 17:57

## 2021-06-18 RX ADMIN — SPIRONOLACTONE SCH MG: 25 TABLET, FILM COATED ORAL at 09:19

## 2021-06-18 RX ADMIN — ATORVASTATIN CALCIUM SCH MG: 20 TABLET, FILM COATED ORAL at 20:13

## 2021-06-18 RX ADMIN — INSULIN ASPART SCH: 100 INJECTION, SOLUTION INTRAVENOUS; SUBCUTANEOUS at 12:17

## 2021-06-18 RX ADMIN — POTASSIUM CHLORIDE SCH MEQ: 20 TABLET, EXTENDED RELEASE ORAL at 20:14

## 2021-06-18 NOTE — P.PN
Subjective





HISTORY OF PRESENTING ILLNESS


This is a pleasant 76-year-old  male past medical history significant 

for chronic persistent atrial fibrillation on long-term anticoagulation, mild 

nonobstructive coronary artery disease, chronic systolic heart failure, 

nonischemic cardiomyopathy, hypertension and dyslipidemia.  He follows in the 

office with Dr. Schwartz. We have been asked to see in consultation for heart 

failure.  He presented to the hospital with symptoms of worsening shortness of 

breath, weight gain and lower extremity edema.  He states he has recently been 

hospitalized earlier this month for similar type symptoms.  When he left the 

hospital he didn't feel as though he is back to his baseline fluid status and 

things have gotten worse since.  On last admission he was initiated on 

Aldactone. Post discharge he saw Dr. Schwartz in the office and was also initiated

on Zaroxolyn every other day. He was recommended to come to hospital for 

diuresis however, he declined. He denies symptoms of chest pain, dizziness or 

palpitations.





06/18/2021


Patient seen and examined sitting up in recliner in no acute distress. He had 

7,700 cc of urine output in the previous 24 hours. His swelling is improving, 

but not completely resolved. Overall he is slowly starting to feel better. He 

denies chest pain, shortness of breath, dizziness or palpitations. He continues 

to maintain afib with controlled ventricular rates.  Laboratory data reviewed, 

INR 1.5, sodium 139, potassium 4.4 and creatinine 1.79.





PHYSICAL EXAMINATION


CONSTITUTIONAL: No apparent distress. 


HEENT: Head is normocephalic. Pupils are equal, round. Sclerae anicteric. Mucous

membranes of the mouth are moist.  No JVD. No carotid bruit.


CHEST EXAMINATION: Lungs are clear to auscultation. No chest wall tenderness is 

noted on palpation or with deep breathing. 


HEART EXAMINATION: Irregular rate and rhythm. S1, S2 heard. No murmurs, gallops 

or rub.


EXTREMITIES: 2+ bilateral lower extremity pitting edema and erythema, ACE wraps 

in place, no swelling in the thighs and no calf tenderness.





ASSESSMENT


Acute on chronic systolic heart failure


Chronic persistent atrial fibrillation on warfarin


Nonobstructive coronary artery disease


Hypertension


Dyslipidemia


Acute on chronic kidney disease, Stage 2





PLAN


Transition to IV push Lasix, 80 mg 3 times a day.  Hopefully can put on by mouth

tomorrow.


Follow renal function and electrolytes in the morning.





Nurse Practitioner note has been reviewed, I agree with a documented findings 

and plan of care.  Patient was seen and examined.





Objective





- Vital Signs


Vital signs: 


                                   Vital Signs











Temp  98.2 F   06/18/21 08:00


 


Pulse  70   06/18/21 08:00


 


Resp  18   06/18/21 08:00


 


BP  135/70   06/18/21 08:00


 


Pulse Ox  95   06/18/21 08:00








                                 Intake & Output











 06/17/21 06/18/21 06/18/21





 18:59 06:59 18:59


 


Intake Total 760 79.5 480


 


Output Total 4000 3700 875


 


Balance -3240 -3620.5 -395


 


Weight  135.3 kg 


 


Intake:   


 


  Intake, IV Titration 100 79.5 





  Amount   


 


    Furosemide 100 mg In 100 79.5 





    Sodium Chloride 0.9% 90   





    ml @ 10 MG/HR 10 mls/hr   





    IV .Q10H [facility] Rx#:   





    [id number]   


 


  Oral 660  480


 


Output:   


 


  Urine 4000 3700 875


 


    Uretheral (Serna) 1400  


 


Other:   


 


  Voiding Method Indwelling Catheter Indwelling Catheter Indwelling Catheter


 


  # Bowel Movements   1














- Labs


CBC & Chem 7: 


                                 06/17/21 07:17





                                 06/18/21 08:05


Labs: 


                  Abnormal Lab Results - Last 24 Hours (Table)











  06/17/21 06/17/21 06/17/21 Range/Units





  11:42 16:40 20:20 


 


PT     (9.0-12.0)  sec


 


INR     (<1.2)  


 


Chloride     ()  mmol/L


 


Carbon Dioxide     (22-30)  mmol/L


 


BUN     (9-20)  mg/dL


 


Creatinine     (0.66-1.25)  mg/dL


 


POC Glucose (mg/dL)  146 H  140 H  161 H  (75-99)  mg/dL














  06/18/21 06/18/21 Range/Units





  08:05 08:05 


 


PT  15.1 H   (9.0-12.0)  sec


 


INR  1.5 H   (<1.2)  


 


Chloride   91 L  ()  mmol/L


 


Carbon Dioxide   39 H  (22-30)  mmol/L


 


BUN   35 H  (9-20)  mg/dL


 


Creatinine   1.79 H  (0.66-1.25)  mg/dL


 


POC Glucose (mg/dL)    (75-99)  mg/dL

## 2021-06-18 NOTE — P.PN
Subjective


Progress Note Date: 06/18/21








HISTORY OF PRESENT ILLNESS


76 years old  male patient of Dr. Rojo for past medical history 

of paroxysmal atrial fibrillation on Coumadin, hyperlipidemia, hypertension, 

coronary artery disease, history of nonischemic cardiomyopathy with EF 40%, 

valvular heart disease, moderate to severe pulmonary hypertension, obstructive 

sleep apnea on CPAP, hypothyroidism, morbid obesity, diabetes mellitus type 2 

with diabetic neuropathy, benign prostatic hypertrophy status post TURP, 

generalized osteoarthritis, recurrent depression, anemia of chronic disease, 

restless leg syndrome, chronic thrombocytopenia, chronic hypoxic respiratory 

failure on home O2.  Patient had recent hospitalization May 14 through May 20 

which time he was treated for acute on chronic systolic heart failure and acute 

kidney injury.  Patient was stabilized and discharged home and continued on Kef

joan for lower extremity cellulitis.  Patient now presents with edema to the legs

and thighs and flank area, lightheadedness, shortness of breath and weight gain 

of 12-15 pounds since he left the hospital.  He had follow-up with Dr. Rojo and with cardiologist last week but worsened over the weekend.





Patient presented to [facility] emergency center for evaluation

and was found to be afebrile, heart rate 72, blood pressure 120/81, pulse ox 96%

on 2 L nasal cannula. EKG was atrial fibrillation controlled rate of 78.  WBC 

5.4, hemoglobin 9.4, platelet count 117.  Sodium 141, potassium 4.7, chloride 

98, CO2 35, BUN 37 creatinine 1.32.  Blood sugar 89.  Liver function tests were 

normal.  Albumin 3.8.  Troponin negative.  ProBNP 9460.


Chest x-ray reveals cardiomegaly.  Improvement of the pulmonary congestion 

compared to old exam.


Patient has been admitted to the cardiac stepdown unit, status post 1 dose of IV

Lasix last evening of 80 mg and started on Lasix drip last evening, cardiology 

consult





6/15: Patient has had urinary retention and Flomax will be ordered.  Most likely

patient will require Serna catheter.  Midodrine will be added and Zaroxolyn to 

start tomorrow.  Patient is still on Lasix drip at 10 mg per hour.  97% on 2 L 

nasal cannula.  Repeat blood work reveals INR 3.0.  Sodium 141, potassium 4.5, 

chloride 96, CO2 38, BUN 34 and creatinine 1.49.  Blood sugars are running 

between 54 and 81.  Levemir decreased to 25 units at bedtime





6/16: Patient's blood sugars are again low this morning of 56 and Levemir 

decreased to 18 units, continue metformin and scale changed to 3 times daily at 

meals only.  He is continued on Lasix drip as well as metolazone 2.5 mg on 

Monday Wednesday Friday and Aldactone 25 mg daily.  He has been afebrile, heart 

rate 84, blood pressure 116/64, pulse ox 94% on 2 L nasal cannula.  At 

controlled rate.  Repeat blood work reveals WBC 6.9, hemoglobin 9.2, platelet 

count 121.  INR is 2.0.  Pharmacy is dosing Coumadin.  Sodium 140, potassium 

4.3, chloride 94, CO2 40, BUN 34 and creatinine 1.64 which is slowly increasing.

 Weight is down 5 kg.





6/17, patient is still diuresing, Serna cath is in place,  doubt acute urine, 1

40 kg today, with  5 kg weight loss over the past 24 hours, urine output of 4.5 

L, creatinine slightly elevated was seen yesterday 1.7 today, still with 

anasarca below the waist, patient attempts to sleep in the bed and said the 

recliner, Ace wrap is not seen today, patient has no chest pain, ambulates 

without any new difficulties, on Aldactone and metolazone, ranitidine, Lasix IV 

drip at 10 mg per hour INR followed by pharmacy, 1.6





6/18: Patient is still diuresing aggressively, has lost 15 kg over the past few 

days, 5 kg over the past 24 hours.  He is on IV Lasix drip which is now switched

to 80 mg every 8 hours by cardiology, patient still diuresing aggressively, 

hence we'll going to leave the Serna In for next 24 hours, Ace wrap was started,

we are going to discontinue his Benadryl for sleep, and start melatonin 6 mg at 

bedtime.  Benadryl can cautionary patient, he shouldn't has urinary retention at

this time.  On Flomax blood pressure stabilized, however creatinine continues to

rise, they should normalize over the next 2-3 days, especially with the 

hypotension related to diuretic use and blood pressure medication use.  Continue

midodrine 10 mg 3 times a day blood pressure ranges between 116-135 systolic 

pulse ox 98% room air





REVIEW OF SYSTEMS


Constitutional: No fever, no chills, no night sweats.  Reports weight gain.  No 

weakness, fatigue or lethargy.  No daytime sleepiness.


EENT: No headache.  No blurred vision or double vision, no loss of vision.  No 

loss of Hearing, no ringing in the ears, reports dizziness.  No nasal drainage 

or congestion.  No epistaxis.  No sore throat.


Lungs: Reports shortness of breath, cough, no sputum production.  No wheezing.


Cardiovascular: No chest pain, reports lower extremity edema.  No palpitations. 

No paroxysmal nocturnal dyspnea.  No orthopnea.  reports lightheadedness or 

dizziness.  No syncopal episodes.


Abdominal: No abdominal pain.  No nausea, vomiting.  No diarrhea.  No 

constipation.  No bloody or tarry stools..  No loss of appetite.


Genitourinary: No dysuria, increased frequency, urgency.  No urinary retention.


Musculoskeletal: No myalgias.  Reports muscle weakness, no gait dysfunction, no 

frequent falls.  No back pain.  No neck pain.


Integumentary: Reports wounds, no lesions.  No rash or pruritus.  No unusual 

bruising.  No change in hair or nails.


Neurologic: No aphasia. No facial droop. No change in mentation. No head injury.

No headache. No paralysis. No paresthesia.


Psychiatric: No depression.  No anxiety.  No mood swings.


Endocrine: Reported abnormal blood sugars.





Objective





- Vital Signs


Vital signs: 


                                   Vital Signs











Temp  98.2 F   06/18/21 08:00


 


Pulse  72   06/18/21 12:55


 


Resp  18   06/18/21 12:55


 


BP  116/68   06/18/21 12:00


 


Pulse Ox  98   06/18/21 12:00








                                 Intake & Output











 06/17/21 06/18/21 06/18/21





 18:59 06:59 18:59


 


Intake Total 760 79.5 1140


 


Output Total 4000 3700 1775


 


Balance -3240 -3620.5 -635


 


Weight  135.3 kg 135.3 kg


 


Intake:   


 


  Intake, IV Titration 100 79.5 





  Amount   


 


    Furosemide 100 mg In 100 79.5 





    Sodium Chloride 0.9% 90   





    ml @ 10 MG/HR 10 mls/hr   





    IV .Q10H [facility] Rx#:   





    [id number]   


 


  Oral 660  1140


 


Output:   


 


  Urine 4000 3700 1775


 


    Uretheral (Serna) 1400  


 


Other:   


 


  Voiding Method Indwelling Catheter Indwelling Catheter Indwelling Catheter


 


  # Bowel Movements   1














- Constitutional


General appearance: Present: no acute distress





- EENT


Eyes: Present: anicteric sclerae, dentition normal


ENT: Present: NA/AT, normal oropharynx





- Neck


Neck: Present: normal ROM





- Respiratory


Respiratory: bilateral: CTA, negative: diminished, dullness, rales, rhonchi





- Cardiovascular


Rhythm: regular


Heart sounds: normal: S1, S2


Abnormal Heart Sounds: Absent: systolic murmur, diastolic murmur, rub, S3 

Gallop, S4 Gallop, click, other





- Peripheral edema


  ** leg


Peripheral Edema: bilateral: 3+, Pitting





- Gastrointestinal


General gastrointestinal: Present: normal bowel sounds, soft





- Integumentary


Integumentary: Present: normal





- Neurologic


Neurologic: Present: CNII-XII intact





- Musculoskeletal


Musculoskeletal: Present: gait normal, strength equal bilaterally





- Psychiatric


Psychiatric: Present: A&O x's 3, appropriate affect, intact judgment & insight





- Labs


CBC & Chem 7: 


                                 06/17/21 07:17





                                 06/18/21 08:05


Labs: 


                  Abnormal Lab Results - Last 24 Hours (Table)











  06/17/21 06/17/21 06/18/21 Range/Units





  16:40 20:20 08:05 


 


PT    15.1 H  (9.0-12.0)  sec


 


INR    1.5 H  (<1.2)  


 


Chloride     ()  mmol/L


 


Carbon Dioxide     (22-30)  mmol/L


 


BUN     (9-20)  mg/dL


 


Creatinine     (0.66-1.25)  mg/dL


 


POC Glucose (mg/dL)  140 H  161 H   (75-99)  mg/dL














  06/18/21 06/18/21 Range/Units





  08:05 11:33 


 


PT    (9.0-12.0)  sec


 


INR    (<1.2)  


 


Chloride  91 L   ()  mmol/L


 


Carbon Dioxide  39 H   (22-30)  mmol/L


 


BUN  35 H   (9-20)  mg/dL


 


Creatinine  1.79 H   (0.66-1.25)  mg/dL


 


POC Glucose (mg/dL)   110 H  (75-99)  mg/dL














Assessment and Plan


Plan: 








ASSESSMENT AND PLAN


1.  Acute on chronic systolic heart failure with anasarca  Continue Lasix drip 

and switch to  IV 80 mg every 8 hours, monitor I&O and daily weights, monitor 

electrolytes and renal function.  Continue Aldactone 25 mg daily, Zaroxolyn 2.5 

mg on Monday Wednesday Friday started today.





2.  Acute kidney injury likely secondary to cardiorenal process.  Continue Lasix

and metolazone, Aldactone, for now, monitor renal function.  No new hypotension,

Serna catheter placed, has urinary retention history, on Flomax discontinue 

Benadryl





3.  Paroxysmal atrial fibrillation on Coumadin.  Pharmacy is dosing Coumadin.   





4.  Chronic anemia.  Iron studies ordered.





5.  Mild chronic cellulitis lower extremities stable improved.  





6.  Hypertension, hypertensive cardiovascular disease.  Continue metoprolol 

tartrate 12.5 mg oral twice daily, Imdur 15 mg daily, Aldactone 25 mg daily, 

hydralazine 25 mg twice daily





7.  Mild coronary artery disease.  Continue Coumadin, metoprolol, Lasix.





8.  Nonischemic cardiomyopathy.





9.  Hypothyroidism.  Continue levothyroxine 125 g daily.





10.  Diabetes mellitus type 2 with diabetic neuropathy, uncontrolled with 

hypoglycemia.  Continue Levemir decreased to 18 units at bedtime, NovoLog scale 

with meals only and NovoLog scale, gabapentin 300 mg three times daily.





11.  Hyperlipidemia.  Continue simvastatin 40 mg at bedtime.





12.  Obstructive sleep apnea on CPAP.





13.  Benign prostatic hypertrophy status post TURP.





14.  Generalized osteoarthritis.  Continue tramadol for pain.





15.  Recurrent depression.  Continue Zoloft 100 mg daily.





16.  Anemia of chronic disease.  Continue ferrous sulfate.





17.  Restless leg syndrome.  Continue Requip 2.5 mg 3 times daily.





18.  Thrombocytopenia, chronic.





19.  DVT prophylaxis.  Coumadin 





20.  GI prophylaxis.  Protonix 40 mg oral daily.





21.  Chronic hypoxic respiratory failure on home oxygen therapy.





22 BPH with urinary retention, currently on Serna catheter, Flomax, plan for 

follow To be discontinued the next 24-48 hours PVR to follow





23 Lumbar disc disease with retrolisthesis, patient has difficulty in lying down

supine, special in bed.  Refers to be in a recliner, however this also inhibits 

recovery of the anasarca, below the waist





24 Anasarca without proteinuria, treatment as above





DISCHARGE PLAN


Most likely return home.  Patient may benefit from home care.





Impression and plan of care have been directed as dictated by the signing 

physician.  Mehnaz Quezada nurse practitioner acting as scribe for signing 

physician.

## 2021-06-19 LAB
ANION GAP SERPL CALC-SCNC: 9 MMOL/L
BUN SERPL-SCNC: 37 MG/DL (ref 9–20)
CALCIUM SPEC-MCNC: 9.4 MG/DL (ref 8.4–10.2)
CHLORIDE SERPL-SCNC: 91 MMOL/L (ref 98–107)
CO2 SERPL-SCNC: 37 MMOL/L (ref 22–30)
GLUCOSE BLD-MCNC: 110 MG/DL (ref 75–99)
GLUCOSE BLD-MCNC: 122 MG/DL (ref 75–99)
GLUCOSE BLD-MCNC: 171 MG/DL (ref 75–99)
GLUCOSE BLD-MCNC: 95 MG/DL (ref 75–99)
GLUCOSE SERPL-MCNC: 121 MG/DL (ref 74–99)
INR PPP: 1.6 (ref ?–1.2)
POTASSIUM SERPL-SCNC: 4.1 MMOL/L (ref 3.5–5.1)
PT BLD: 15.8 SEC (ref 9–12)
SODIUM SERPL-SCNC: 137 MMOL/L (ref 137–145)

## 2021-06-19 RX ADMIN — SPIRONOLACTONE SCH MG: 25 TABLET, FILM COATED ORAL at 09:48

## 2021-06-19 RX ADMIN — FUROSEMIDE SCH MG: 10 INJECTION, SOLUTION INTRAMUSCULAR; INTRAVENOUS at 16:43

## 2021-06-19 RX ADMIN — MIDODRINE HYDROCHLORIDE SCH MG: 5 TABLET ORAL at 12:51

## 2021-06-19 RX ADMIN — Medication SCH MG: at 09:48

## 2021-06-19 RX ADMIN — POTASSIUM CHLORIDE SCH MEQ: 20 TABLET, EXTENDED RELEASE ORAL at 16:42

## 2021-06-19 RX ADMIN — INSULIN ASPART SCH: 100 INJECTION, SOLUTION INTRAVENOUS; SUBCUTANEOUS at 06:01

## 2021-06-19 RX ADMIN — INSULIN DETEMIR SCH UNIT: 100 INJECTION, SOLUTION SUBCUTANEOUS at 20:31

## 2021-06-19 RX ADMIN — Medication SCH MG: at 16:42

## 2021-06-19 RX ADMIN — TAMSULOSIN HYDROCHLORIDE SCH MG: 0.4 CAPSULE ORAL at 18:43

## 2021-06-19 RX ADMIN — METOPROLOL TARTRATE SCH MG: 25 TABLET, FILM COATED ORAL at 20:31

## 2021-06-19 RX ADMIN — FUROSEMIDE SCH MG: 10 INJECTION, SOLUTION INTRAMUSCULAR; INTRAVENOUS at 09:49

## 2021-06-19 RX ADMIN — ATORVASTATIN CALCIUM SCH MG: 20 TABLET, FILM COATED ORAL at 20:31

## 2021-06-19 RX ADMIN — Medication SCH MG: at 20:31

## 2021-06-19 RX ADMIN — LATANOPROST SCH DROPS: 50 SOLUTION OPHTHALMIC at 20:32

## 2021-06-19 RX ADMIN — METOPROLOL TARTRATE SCH MG: 25 TABLET, FILM COATED ORAL at 09:48

## 2021-06-19 RX ADMIN — MIDODRINE HYDROCHLORIDE SCH MG: 5 TABLET ORAL at 06:24

## 2021-06-19 RX ADMIN — LEVOTHYROXINE SODIUM SCH MCG: 0.12 TABLET ORAL at 06:24

## 2021-06-19 RX ADMIN — POTASSIUM CHLORIDE SCH MEQ: 20 TABLET, EXTENDED RELEASE ORAL at 20:31

## 2021-06-19 RX ADMIN — FUROSEMIDE SCH MG: 10 INJECTION, SOLUTION INTRAMUSCULAR; INTRAVENOUS at 22:55

## 2021-06-19 RX ADMIN — Medication SCH MCG: at 09:48

## 2021-06-19 RX ADMIN — MIDODRINE HYDROCHLORIDE SCH MG: 5 TABLET ORAL at 16:43

## 2021-06-19 RX ADMIN — POTASSIUM CHLORIDE SCH MEQ: 20 TABLET, EXTENDED RELEASE ORAL at 09:47

## 2021-06-19 RX ADMIN — INSULIN ASPART SCH: 100 INJECTION, SOLUTION INTRAVENOUS; SUBCUTANEOUS at 17:02

## 2021-06-19 RX ADMIN — PANTOPRAZOLE SODIUM SCH MG: 40 TABLET, DELAYED RELEASE ORAL at 06:24

## 2021-06-19 RX ADMIN — ISOSORBIDE MONONITRATE SCH MG: 30 TABLET, EXTENDED RELEASE ORAL at 10:37

## 2021-06-19 RX ADMIN — INSULIN ASPART SCH: 100 INJECTION, SOLUTION INTRAVENOUS; SUBCUTANEOUS at 12:25

## 2021-06-19 RX ADMIN — SERTRALINE HYDROCHLORIDE SCH MG: 100 TABLET ORAL at 09:48

## 2021-06-19 NOTE — P.PN
Subjective


Progress Note Date: 06/19/21





This is a pleasant 76-year-old  male past medical history significant 

for chronic persistent atrial fibrillation on long-term anticoagulation, mild 

nonobstructive coronary artery disease, chronic systolic heart failure, 

nonischemic cardiomyopathy, hypertension and dyslipidemia.  He follows in the 

office with Dr. Schwartz.  She was admitted to the hospital with congestive 

cardiac failure.  He was seen and examined this morning, sitting up in the chair

at bedside, feeling better overall, continues to have 1+ edema.  Creatinine 

today is 1.79, 1.70 yesterday continues to be in atrial fibrillation.  Blood 

pressure 110/56, heart rate 70s, 96% on room air.  INR today is 1.6, sodium 137,

potassium 4.1, BUN 37, creatinine 1.6.  We will continue current dose of IV 

Lasix.  Continue to monitor the patient's intake and output.





Objective





- Vital Signs


Vital signs: 


                                   Vital Signs











Temp  98.0 F   06/19/21 09:45


 


Pulse  76   06/19/21 09:45


 


Resp  16   06/19/21 09:45


 


BP  111/55   06/19/21 09:45


 


Pulse Ox  96   06/19/21 09:45








                                 Intake & Output











 06/18/21 06/19/21 06/19/21





 18:59 06:59 18:59


 


Intake Total 1380 10 450


 


Output Total 2675 3150 


 


Balance -1295 -3140 450


 


Weight 135.3 kg 131 kg 


 


Intake:   


 


  IV  10 


 


    0.9  10 


 


  Oral 1380  450


 


Output:   


 


  Urine 2675 3150 


 


Other:   


 


  Voiding Method Indwelling Catheter Indwelling Catheter 


 


  # Bowel Movements 1  














- Exam





PHYSICAL EXAMINATION


CONSTITUTIONAL: No apparent distress. 


HEENT: Head is normocephalic. Pupils are equal, round. Sclerae anicteric. Mucous

membranes of the mouth are moist.  No JVD. No carotid bruit.


CHEST EXAMINATION: Lungs are clear with fine crackles to the bases . No chest 

wall tenderness is noted on palpation or with deep breathing. 


HEART EXAMINATION: Irregular rate and rhythm. S1, S2 heard. No murmurs, gallops 

or rub.


EXTREMITIES: 1+ bilateral lower extremity pitting edema and erythema, ACE wraps 

in place, no swelling in the thighs and no calf tenderness.








- Labs


CBC & Chem 7: 


                                 06/17/21 07:17





                                 06/19/21 07:56


Labs: 


                  Abnormal Lab Results - Last 24 Hours (Table)











  06/18/21 06/18/21 06/18/21 Range/Units





  11:33 16:35 20:08 


 


PT     (9.0-12.0)  sec


 


INR     (<1.2)  


 


Chloride     ()  mmol/L


 


Carbon Dioxide     (22-30)  mmol/L


 


BUN     (9-20)  mg/dL


 


Creatinine     (0.66-1.25)  mg/dL


 


Glucose     (74-99)  mg/dL


 


POC Glucose (mg/dL)  110 H  132 H  203 H  (75-99)  mg/dL














  06/19/21 06/19/21 Range/Units





  07:56 07:56 


 


PT   15.8 H  (9.0-12.0)  sec


 


INR   1.6 H  (<1.2)  


 


Chloride  91 L   ()  mmol/L


 


Carbon Dioxide  37 H   (22-30)  mmol/L


 


BUN  37 H   (9-20)  mg/dL


 


Creatinine  1.64 H   (0.66-1.25)  mg/dL


 


Glucose  121 H   (74-99)  mg/dL


 


POC Glucose (mg/dL)    (75-99)  mg/dL














Assessment and Plan


Plan: 





ASSESSMENT and plan


#1 Acute on chronic systolic heart failure


#2 Chronic , persistent atrial fibrillation on warfarin, INR today 1.6


#3 Nonobstructive coronary artery disease


#4 Hypertension


#Dyslipidemia


#6 Acute on chronic kidney disease, Stage 2








Plan





We will continue with current dose of IV Lasix, U Coumadin to obtain an INR in 

the range of 2-2.5.  Continue to monitor the patient's intake and output.








DNP note has been reviewed, I agree with a documented findings and plan of care.

 Patient was seen and examined.

## 2021-06-19 NOTE — P.PN
Subjective








HISTORY OF PRESENT ILLNESS


76 years old  male patient of Dr. Rojo for past medical history 

of paroxysmal atrial fibrillation on Coumadin, hyperlipidemia, hypertension, 

coronary artery disease, history of nonischemic cardiomyopathy with EF 40%, 

valvular heart disease, moderate to severe pulmonary hypertension, obstructive 

sleep apnea on CPAP, hypothyroidism, morbid obesity, diabetes mellitus type 2 

with diabetic neuropathy, benign prostatic hypertrophy status post TURP, 

generalized osteoarthritis, recurrent depression, anemia of chronic disease, 

restless leg syndrome, chronic thrombocytopenia, chronic hypoxic respiratory 

failure on home O2.  Patient had recent hospitalization May 14 through May 20 w

[facility] time he was treated for acute on chronic systolic heart failure and acute 

kidney injury.  Patient was stabilized and discharged home and continued on 

Keflex for lower extremity cellulitis.  Patient now presents with edema to the 

legs and thighs and flank area, lightheadedness, shortness of breath and weight 

gain of 12-15 pounds since he left the hospital.  He had follow-up with Dr. Rojo and with cardiologist last week but worsened over the weekend.





Patient presented to [facility] emergency center for evaluation

and was found to be afebrile, heart rate 72, blood pressure 120/81, pulse ox 96%

on 2 L nasal cannula. EKG was atrial fibrillation controlled rate of 78.  WBC 

5.4, hemoglobin 9.4, platelet count 117.  Sodium 141, potassium 4.7, chloride 

98, CO2 35, BUN 37 creatinine 1.32.  Blood sugar 89.  Liver function tests were 

normal.  Albumin 3.8.  Troponin negative.  ProBNP 9460.


Chest x-ray reveals cardiomegaly.  Improvement of the pulmonary congestion 

compared to old exam.


Patient has been admitted to the cardiac stepdown unit, status post 1 dose of IV

Lasix last evening of 80 mg and started on Lasix drip last evening, cardiology 

consult





6/15: Patient has had urinary retention and Flomax will be ordered.  Most likely

patient will require Serna catheter.  Midodrine will be added and Zaroxolyn to 

start tomorrow.  Patient is still on Lasix drip at 10 mg per hour.  97% on 2 L 

nasal cannula.  Repeat blood work reveals INR 3.0.  Sodium 141, potassium 4.5, 

chloride 96, CO2 38, BUN 34 and creatinine 1.49.  Blood sugars are running 

between 54 and 81.  Levemir decreased to 25 units at bedtime





6/16: Patient's blood sugars are again low this morning of 56 and Levemir 

decreased to 18 units, continue metformin and scale changed to 3 times daily at 

meals only.  He is continued on Lasix drip as well as metolazone 2.5 mg on 

Monday Wednesday Friday and Aldactone 25 mg daily.  He has been afebrile, heart 

rate 84, blood pressure 116/64, pulse ox 94% on 2 L nasal cannula.  At 

controlled rate.  Repeat blood work reveals WBC 6.9, hemoglobin 9.2, platelet co

unt 121.  INR is 2.0.  Pharmacy is dosing Coumadin.  Sodium 140, potassium 4.3, 

chloride 94, CO2 40, BUN 34 and creatinine 1.64 which is slowly increasing.  

Weight is down 5 kg.





6/17, patient is still diuresing, Serna cath is in place,  doubt acute urine, 

140 kg today, with  5 kg weight loss over the past 24 hours, urine output of 4.5

L, creatinine slightly elevated was seen yesterday 1.7 today, still with 

anasarca below the waist, patient attempts to sleep in the bed and said the 

recliner, Ace wrap is not seen today, patient has no chest pain, ambulates 

without any new difficulties, on Aldactone and metolazone, ranitidine, Lasix IV 

drip at 10 mg per hour INR followed by pharmacy, 1.6





6/18: Patient is still diuresing aggressively, has lost 15 kg over the past few 

days, 5 kg over the past 24 hours.  He is on IV Lasix drip which is now switched

to 80 mg every 8 hours by cardiology, patient still diuresing aggressively, 

hence we'll going to leave the Serna In for next 24 hours, Ace wrap was started,

we are going to discontinue his Benadryl for sleep, and start melatonin 6 mg at 

bedtime.  Benadryl can cautionary patient, he shouldn't has urinary retention at

this time.  On Flomax blood pressure stabilized, however creatinine continues to

rise, they should normalize over the next 2-3 days, especially with the 

hypotension related to diuretic use and blood pressure medication use.  Continue

midodrine 10 mg 3 times a day blood pressure ranges between 116-135 systolic 

pulse ox 98% room air





6/19, patient's diuresing still effectively, he has lost a total of 20 kg over 

the past 6 days, still has remaining anasarca, mainly in the legs and the thighs

and buttocks, no Ace wrap was seen today, as the patient is nearing bathroom 

time, still has resistance to sleeping in bed, still prefers this recliner.  IV 

Lasix 80 mg 8 hours, no change in metolazone, creatinine improving, currently 

1.69, no new hypotension noted.  INR 1.6, pharmacy to dose, no current 

hematuria, no hemoptysis, no other bleeding episodes When anticipating the Serna

catheter to be discontinued in the morning, with trial voids, anticipate 

discharge on Monday





REVIEW OF SYSTEMS


Constitutional: No fever, no chills, no night sweats.  Reports weight gain.  No 

weakness, fatigue or lethargy.  No daytime sleepiness.


EENT: No headache.  No blurred vision or double vision, no loss of vision.  No 

loss of Hearing, no ringing in the ears, reports dizziness.  No nasal drainage 

or congestion.  No epistaxis.  No sore throat.


Lungs: Reports shortness of breath, cough, no sputum production.  No wheezing.


Cardiovascular: No chest pain, reports lower extremity edema.  No palpitations. 

No paroxysmal nocturnal dyspnea.  No orthopnea.  reports lightheadedness or 

dizziness.  No syncopal episodes.


Abdominal: No abdominal pain.  No nausea, vomiting.  No diarrhea.  No 

constipation.  No bloody or tarry stools..  No loss of appetite.


Genitourinary: No dysuria, increased frequency, urgency.  No urinary retention.


Musculoskeletal: No myalgias.  Reports muscle weakness, no gait dysfunction, no 

frequent falls.  No back pain.  No neck pain.


Integumentary: Reports wounds, no lesions.  No rash or pruritus.  No unusual 

bruising.  No change in hair or nails.


Neurologic: No aphasia. No facial droop. No change in mentation. No head injury.

No headache. No paralysis. No paresthesia.


Psychiatric: No depression.  No anxiety.  No mood swings.


Endocrine: Reported abnormal blood sugars.





Objective





- Vital Signs


Vital signs: 


                                   Vital Signs











Temp  98.1 F   06/19/21 12:49


 


Pulse  87   06/19/21 12:49


 


Resp  15   06/19/21 12:49


 


BP  110/48   06/19/21 12:49


 


Pulse Ox  98   06/19/21 12:49








                                 Intake & Output











 06/18/21 06/19/21 06/19/21





 18:59 06:59 18:59


 


Intake Total 1380 10 690


 


Output Total 2675 3150 1000


 


Balance -1295 -3140 -310


 


Weight 135.3 kg 131 kg 


 


Intake:   


 


  IV  10 


 


    0.9  10 


 


  Oral 1380  690


 


Output:   


 


  Urine 2675 3150 1000


 


Other:   


 


  Voiding Method Indwelling Catheter Indwelling Catheter Indwelling Catheter


 


  # Bowel Movements 1  














- Constitutional


General appearance: Present: cooperative, no acute distress





- EENT


Eyes: Present: EOMI, PERRLA, normal appearance


ENT: Present: NA/AT, normal oropharynx





- Neck


Neck: Present: normal ROM





- Respiratory


Respiratory: bilateral: CTA, negative: diminished, dullness, rales





- Cardiovascular


Rhythm: regular


Heart sounds: normal: S1, S2


Abnormal Heart Sounds: Absent: systolic murmur, diastolic murmur, rub, S3 

Gallop, S4 Gallop, click, other





- Gastrointestinal


General gastrointestinal: Present: normal bowel sounds, soft





- Integumentary


Integumentary: Present: decreased turgor, normal





- Neurologic


Neurologic: Present: CNII-XII intact





- Musculoskeletal


Musculoskeletal: Present: gait normal, strength equal bilaterally





- Psychiatric


Psychiatric: Present: A&O x's 3, appropriate affect, intact judgment & insight





- Labs


CBC & Chem 7: 


                                 06/17/21 07:17





                                 06/19/21 07:56


Labs: 


                  Abnormal Lab Results - Last 24 Hours (Table)











  06/18/21 06/18/21 06/19/21 Range/Units





  16:35 20:08 07:56 


 


PT     (9.0-12.0)  sec


 


INR     (<1.2)  


 


Chloride    91 L  ()  mmol/L


 


Carbon Dioxide    37 H  (22-30)  mmol/L


 


BUN    37 H  (9-20)  mg/dL


 


Creatinine    1.64 H  (0.66-1.25)  mg/dL


 


Glucose    121 H  (74-99)  mg/dL


 


POC Glucose (mg/dL)  132 H  203 H   (75-99)  mg/dL














  06/19/21 06/19/21 Range/Units





  07:56 11:58 


 


PT  15.8 H   (9.0-12.0)  sec


 


INR  1.6 H   (<1.2)  


 


Chloride    ()  mmol/L


 


Carbon Dioxide    (22-30)  mmol/L


 


BUN    (9-20)  mg/dL


 


Creatinine    (0.66-1.25)  mg/dL


 


Glucose    (74-99)  mg/dL


 


POC Glucose (mg/dL)   110 H  (75-99)  mg/dL














Assessment and Plan


Plan: 








ASSESSMENT AND PLAN


1.  Acute on chronic systolic heart failure with anasarca  Continue Lasix drip 

and switch to  IV 80 mg every 8 hours, monitor I&O and daily weights, monitor 

electrolytes and renal function.  Continue Aldactone 25 mg daily, Zaroxolyn 2.5 

mg on Monday Wednesday Friday started today.  Ongoing aggressive diuretics, with

estimated weight loss of 20 kg equals 44 pounds over the past 6 days





2.  Acute kidney injury likely secondary to cardiorenal process.  Continue Lasix

and metolazone, Aldactone, for now, monitor renal function.  No new hypotension,

Serna catheter placed, has urinary retention history, on Flomax discontinue 

Benadryl





3.  Paroxysmal atrial fibrillation on Coumadin.  Pharmacy is dosing Coumadin.   





4.  Chronic anemia.  Iron studies ordered.





5.  Mild chronic cellulitis lower extremities stable improved.  





6.  Hypertension, hypertensive cardiovascular disease.  Continue metoprolol 

tartrate 12.5 mg oral twice daily, Imdur 15 mg daily, Aldactone 25 mg daily, 

hydralazine 25 mg twice daily





7.  Mild coronary artery disease.  Continue Coumadin, metoprolol, Lasix.





8.  Nonischemic cardiomyopathy.





9.  Hypothyroidism.  Continue levothyroxine 125 g daily.





10.  Diabetes mellitus type 2 with diabetic neuropathy, uncontrolled with 

hypoglycemia.  Continue Levemir decreased to 18 units at bedtime, NovoLog scale 

with meals only and NovoLog scale, gabapentin 300 mg three times daily.





11.  Hyperlipidemia.  Continue simvastatin 40 mg at bedtime.





12.  Obstructive sleep apnea on CPAP.





13.  Benign prostatic hypertrophy status post TURP.





14.  Generalized osteoarthritis.  Continue tramadol for pain.





15.  Recurrent depression.  Continue Zoloft 100 mg daily.





16.  Anemia of chronic disease.  Continue ferrous sulfate.





17.  Restless leg syndrome.  Continue Requip 2.5 mg 3 times daily.





18.  Thrombocytopenia, chronic.





19.  DVT prophylaxis.  Coumadin 





20.  GI prophylaxis.  Protonix 40 mg oral daily.





21.  Chronic hypoxic respiratory failure on home oxygen therapy.





22 BPH with urinary retention, currently on Serna catheter, Flomax, plan for 

follow To be discontinued the next 24-48 hours PVR to follow





23 Lumbar disc disease with retrolisthesis, patient has difficulty in lying down

supine, special in bed.  Refers to be in a recliner, however this also inhibits 

recovery of the anasarca, below the waist





24 Anasarca without proteinuria, treatment as above





DISCHARGE PLAN


Most likely return home.  Patient may benefit from home care.





Impression and plan of care have been directed as dictated by the signing 

physician.  Mehnaz Quezada nurse practitioner acting as scribe for signing 

physician.

## 2021-06-20 LAB
ANION GAP SERPL CALC-SCNC: 10 MMOL/L
BUN SERPL-SCNC: 39 MG/DL (ref 9–20)
CALCIUM SPEC-MCNC: 9.4 MG/DL (ref 8.4–10.2)
CHLORIDE SERPL-SCNC: 90 MMOL/L (ref 98–107)
CO2 SERPL-SCNC: 38 MMOL/L (ref 22–30)
GLUCOSE BLD-MCNC: 133 MG/DL (ref 75–99)
GLUCOSE BLD-MCNC: 143 MG/DL (ref 75–99)
GLUCOSE BLD-MCNC: 144 MG/DL (ref 75–99)
GLUCOSE BLD-MCNC: 90 MG/DL (ref 75–99)
GLUCOSE SERPL-MCNC: 87 MG/DL (ref 74–99)
INR PPP: 1.8 (ref ?–1.2)
POTASSIUM SERPL-SCNC: 4.1 MMOL/L (ref 3.5–5.1)
PT BLD: 17.9 SEC (ref 9–12)
SODIUM SERPL-SCNC: 138 MMOL/L (ref 137–145)

## 2021-06-20 RX ADMIN — FUROSEMIDE SCH MG: 40 TABLET ORAL at 21:02

## 2021-06-20 RX ADMIN — INSULIN ASPART SCH UNIT: 100 INJECTION, SOLUTION INTRAVENOUS; SUBCUTANEOUS at 12:18

## 2021-06-20 RX ADMIN — Medication SCH MG: at 21:03

## 2021-06-20 RX ADMIN — MIDODRINE HYDROCHLORIDE SCH MG: 5 TABLET ORAL at 12:17

## 2021-06-20 RX ADMIN — MIDODRINE HYDROCHLORIDE SCH MG: 5 TABLET ORAL at 17:01

## 2021-06-20 RX ADMIN — METOPROLOL TARTRATE SCH MG: 25 TABLET, FILM COATED ORAL at 21:02

## 2021-06-20 RX ADMIN — POTASSIUM CHLORIDE SCH MEQ: 20 TABLET, EXTENDED RELEASE ORAL at 08:37

## 2021-06-20 RX ADMIN — INSULIN ASPART SCH: 100 INJECTION, SOLUTION INTRAVENOUS; SUBCUTANEOUS at 05:58

## 2021-06-20 RX ADMIN — INSULIN ASPART SCH: 100 INJECTION, SOLUTION INTRAVENOUS; SUBCUTANEOUS at 16:56

## 2021-06-20 RX ADMIN — POTASSIUM CHLORIDE SCH MEQ: 20 TABLET, EXTENDED RELEASE ORAL at 21:01

## 2021-06-20 RX ADMIN — Medication SCH MG: at 17:00

## 2021-06-20 RX ADMIN — FUROSEMIDE SCH MG: 40 TABLET ORAL at 17:00

## 2021-06-20 RX ADMIN — INSULIN DETEMIR SCH UNIT: 100 INJECTION, SOLUTION SUBCUTANEOUS at 21:03

## 2021-06-20 RX ADMIN — SERTRALINE HYDROCHLORIDE SCH MG: 100 TABLET ORAL at 08:38

## 2021-06-20 RX ADMIN — GABAPENTIN PRN MG: 300 CAPSULE ORAL at 21:02

## 2021-06-20 RX ADMIN — SPIRONOLACTONE SCH MG: 25 TABLET, FILM COATED ORAL at 08:38

## 2021-06-20 RX ADMIN — ISOSORBIDE MONONITRATE SCH MG: 30 TABLET, EXTENDED RELEASE ORAL at 08:38

## 2021-06-20 RX ADMIN — LATANOPROST SCH DROPS: 50 SOLUTION OPHTHALMIC at 21:03

## 2021-06-20 RX ADMIN — ATORVASTATIN CALCIUM SCH MG: 20 TABLET, FILM COATED ORAL at 21:03

## 2021-06-20 RX ADMIN — POTASSIUM CHLORIDE SCH MEQ: 20 TABLET, EXTENDED RELEASE ORAL at 17:01

## 2021-06-20 RX ADMIN — LEVOTHYROXINE SODIUM SCH MCG: 0.12 TABLET ORAL at 06:32

## 2021-06-20 RX ADMIN — TAMSULOSIN HYDROCHLORIDE SCH MG: 0.4 CAPSULE ORAL at 17:00

## 2021-06-20 RX ADMIN — FUROSEMIDE SCH MG: 10 INJECTION, SOLUTION INTRAMUSCULAR; INTRAVENOUS at 08:38

## 2021-06-20 RX ADMIN — METOPROLOL TARTRATE SCH MG: 25 TABLET, FILM COATED ORAL at 08:37

## 2021-06-20 RX ADMIN — PANTOPRAZOLE SODIUM SCH MG: 40 TABLET, DELAYED RELEASE ORAL at 06:32

## 2021-06-20 RX ADMIN — Medication SCH MG: at 08:37

## 2021-06-20 RX ADMIN — Medication SCH MCG: at 08:37

## 2021-06-20 RX ADMIN — MIDODRINE HYDROCHLORIDE SCH MG: 5 TABLET ORAL at 06:32

## 2021-06-20 NOTE — P.PN
Subjective


Progress Note Date: 06/20/21








HISTORY OF PRESENT ILLNESS


76 years old  male patient of Dr. Rojo for past medical history 

of paroxysmal atrial fibrillation on Coumadin, hyperlipidemia, hypertension, 

coronary artery disease, history of nonischemic cardiomyopathy with EF 40%, 

valvular heart disease, moderate to severe pulmonary hypertension, obstructive 

sleep apnea on CPAP, hypothyroidism, morbid obesity, diabetes mellitus type 2 

with diabetic neuropathy, benign prostatic hypertrophy status post TURP, 

generalized osteoarthritis, recurrent depression, anemia of chronic disease, 

restless leg syndrome, chronic thrombocytopenia, chronic hypoxic respiratory 

failure on home O2.  Patient had recent hospitalization May 14 through May 20 

which time he was treated for acute on chronic systolic heart failure and acute 

kidney injury.  Patient was stabilized and discharged home and continued on Kef

joan for lower extremity cellulitis.  Patient now presents with edema to the legs

and thighs and flank area, lightheadedness, shortness of breath and weight gain 

of 12-15 pounds since he left the hospital.  He had follow-up with Dr. Rojo and with cardiologist last week but worsened over the weekend.





Patient presented to [facility] emergency center for evaluation

and was found to be afebrile, heart rate 72, blood pressure 120/81, pulse ox 96%

on 2 L nasal cannula. EKG was atrial fibrillation controlled rate of 78.  WBC 

5.4, hemoglobin 9.4, platelet count 117.  Sodium 141, potassium 4.7, chloride 

98, CO2 35, BUN 37 creatinine 1.32.  Blood sugar 89.  Liver function tests were 

normal.  Albumin 3.8.  Troponin negative.  ProBNP 9460.


Chest x-ray reveals cardiomegaly.  Improvement of the pulmonary congestion 

compared to old exam.


Patient has been admitted to the cardiac stepdown unit, status post 1 dose of IV

Lasix last evening of 80 mg and started on Lasix drip last evening, cardiology 

consult





6/15: Patient has had urinary retention and Flomax will be ordered.  Most likely

patient will require Jacobs catheter.  Midodrine will be added and Zaroxolyn to 

start tomorrow.  Patient is still on Lasix drip at 10 mg per hour.  97% on 2 L 

nasal cannula.  Repeat blood work reveals INR 3.0.  Sodium 141, potassium 4.5, 

chloride 96, CO2 38, BUN 34 and creatinine 1.49.  Blood sugars are running 

between 54 and 81.  Levemir decreased to 25 units at bedtime





6/16: Patient's blood sugars are again low this morning of 56 and Levemir 

decreased to 18 units, continue metformin and scale changed to 3 times daily at 

meals only.  He is continued on Lasix drip as well as metolazone 2.5 mg on 

Monday Wednesday Friday and Aldactone 25 mg daily.  He has been afebrile, heart 

rate 84, blood pressure 116/64, pulse ox 94% on 2 L nasal cannula.  At 

controlled rate.  Repeat blood work reveals WBC 6.9, hemoglobin 9.2, platelet 

count 121.  INR is 2.0.  Pharmacy is dosing Coumadin.  Sodium 140, potassium 

4.3, chloride 94, CO2 40, BUN 34 and creatinine 1.64 which is slowly increasing.

 Weight is down 5 kg.





6/17, patient is still diuresing, Jacobs cath is in place,  doubt acute urine, 1

40 kg today, with  5 kg weight loss over the past 24 hours, urine output of 4.5 

L, creatinine slightly elevated was seen yesterday 1.7 today, still with 

anasarca below the waist, patient attempts to sleep in the bed and said the 

recliner, Ace wrap is not seen today, patient has no chest pain, ambulates 

without any new difficulties, on Aldactone and metolazone, ranitidine, Lasix IV 

drip at 10 mg per hour INR followed by pharmacy, 1.6





6/18: Patient is still diuresing aggressively, has lost 15 kg over the past few 

days, 5 kg over the past 24 hours.  He is on IV Lasix drip which is now switched

to 80 mg every 8 hours by cardiology, patient still diuresing aggressively, 

hence we'll going to leave the Jacobs In for next 24 hours, Ace wrap was started,

we are going to discontinue his Benadryl for sleep, and start melatonin 6 mg at 

bedtime.  Benadryl can cautionary patient, he shouldn't has urinary retention at

this time.  On Flomax blood pressure stabilized, however creatinine continues to

rise, they should normalize over the next 2-3 days, especially with the 

hypotension related to diuretic use and blood pressure medication use.  Continue

midodrine 10 mg 3 times a day blood pressure ranges between 116-135 systolic 

pulse ox 98% room air





6/19, patient's diuresing still effectively, he has lost a total of 20 kg over 

the past 6 days, still has remaining anasarca, mainly in the legs and the thighs

and buttocks, no Ace wrap was seen today, as the patient is nearing bathroom 

time, still has resistance to sleeping in bed, still prefers this recliner.  IV 

Lasix 80 mg 8 hours, no change in metolazone, creatinine improving, currently 

1.69, no new hypotension noted.  INR 1.6, pharmacy to dose, no current 

hematuria, no hemoptysis, no other bleeding episodes When anticipating the Jacobs

catheter to be discontinued in the morning, with trial voids, anticipate 

discharge on Monday 6/20: Patient is still losing significant water weight, another 3 kg over the 

past 24 hours, with a total of 24 kg in 7 days and still  tolerating the 100 mg 

oral Lasix 3 times a day, however his creatinine is not coming back to baseline,

we'll going to consult Dr. Theodore nephrology, for refractory chronic anasarca, 

decrease Levemir to 12 units, from current dose of 14 units,  inr 1.8, wife at 

bedside, updated with supine positing at bedtime in bed rather than recliner, 

patient willing to comply, keep jacobs for now, we will discontinue metformin as 

creatinine is above the current safety levels for metformin,





REVIEW OF SYSTEMS


Constitutional: No fever, no chills, no night sweats.  Reports weight gain.  No 

weakness, fatigue or lethargy.  No daytime sleepiness.


EENT: No headache.  No blurred vision or double vision, no loss of vision.  No 

loss of Hearing, no ringing in the ears, reports dizziness.  No nasal drainage 

or congestion.  No epistaxis.  No sore throat.


Lungs: Reports shortness of breath, cough, no sputum production.  No wheezing.


Cardiovascular: No chest pain, reports lower extremity edema.  No palpitations. 

No paroxysmal nocturnal dyspnea.  No orthopnea.  reports lightheadedness or 

dizziness.  No syncopal episodes.


Abdominal: No abdominal pain.  No nausea, vomiting.  No diarrhea.  No 

constipation.  No bloody or tarry stools..  No loss of appetite.


Genitourinary: No dysuria, increased frequency, urgency.  No urinary retention.


Musculoskeletal: No myalgias.  Reports muscle weakness, no gait dysfunction, no 

frequent falls.  No back pain.  No neck pain.


Integumentary: Reports wounds, no lesions.  No rash or pruritus.  No unusual 

bruising.  No change in hair or nails.


Neurologic: No aphasia. No facial droop. No change in mentation. No head injury.

No headache. No paralysis. No paresthesia.


Psychiatric: No depression.  No anxiety.  No mood swings.


Endocrine: Reported abnormal blood sugars.





Objective





- Vital Signs


Vital signs: 


                                   Vital Signs











Temp  98.0 F   06/20/21 08:00


 


Pulse  76   06/20/21 12:14


 


Resp  16   06/20/21 12:14


 


BP  108/64   06/20/21 12:14


 


Pulse Ox  93 L  06/20/21 12:14








                                 Intake & Output











 06/19/21 06/20/21 06/20/21





 18:59 06:59 18:59


 


Intake Total 930 10 480


 


Output Total 1000 4100 1500


 


Balance -70 -4090 -1020


 


Weight  127.5 kg 


 


Intake:   


 


  IV  10 


 


    0.9  10 


 


  Oral 930  480


 


Output:   


 


  Urine 1000 4100 1500


 


Other:   


 


  Voiding Method Indwelling Catheter Indwelling Catheter Indwelling Catheter


 


  # Bowel Movements  2 














- Constitutional


General appearance: Present: cooperative, no acute distress





- EENT


Eyes: Present: EOMI, PERRLA


ENT: Present: NA/AT, normal oropharynx





- Neck


Neck: Present: normal ROM





- Respiratory


Respiratory: bilateral: CTA, negative: diminished, dullness





- Cardiovascular


Rhythm: regular


Heart sounds: normal: S1, S2





- Gastrointestinal


General gastrointestinal: Present: normal bowel sounds, soft





- Integumentary


Integumentary: Present: decreased turgor, normal





- Neurologic


Neurologic: Present: CNII-XII intact





- Musculoskeletal


Musculoskeletal: Present: gait normal, strength equal bilaterally





- Psychiatric


Psychiatric: Present: A&O x's 3, appropriate affect, intact judgment & insight





- Labs


CBC & Chem 7: 


                                 06/17/21 07:17





                                 06/20/21 06:00


Labs: 


                  Abnormal Lab Results - Last 24 Hours (Table)











  06/19/21 06/19/21 06/20/21 Range/Units





  16:51 20:15 06:00 


 


PT    17.9 H  (9.0-12.0)  sec


 


INR    1.8 H  (<1.2)  


 


Chloride     ()  mmol/L


 


Carbon Dioxide     (22-30)  mmol/L


 


BUN     (9-20)  mg/dL


 


Creatinine     (0.66-1.25)  mg/dL


 


POC Glucose (mg/dL)  122 H  171 H   (75-99)  mg/dL














  06/20/21 06/20/21 Range/Units





  06:00 11:58 


 


PT    (9.0-12.0)  sec


 


INR    (<1.2)  


 


Chloride  90 L   ()  mmol/L


 


Carbon Dioxide  38 H   (22-30)  mmol/L


 


BUN  39 H   (9-20)  mg/dL


 


Creatinine  1.72 H   (0.66-1.25)  mg/dL


 


POC Glucose (mg/dL)   144 H  (75-99)  mg/dL














Assessment and Plan


Plan: 








ASSESSMENT AND PLAN


1.  Acute on chronic systolic heart failure with anasarca  Continue Lasix drip 

and switch to  IV 80 mg every 8 hours, monitor I&O and daily weights, monitor 

electrolytes and renal function.  Continue Aldactone 25 mg daily, Zaroxolyn 2.5 

mg on Monday Wednesday Friday started today.  Ongoing aggressive diuretics, with

estimated weight loss of 20 kg equals 44 pounds over the past 6 days





2.  Acute kidney injury likely secondary to cardiorenal process.  Continue Lasix

and metolazone, Aldactone, for now, monitor renal function.  No new hypotension,

Jacobs catheter placed, has urinary retention history, on Flomax discontinue 

Benadryl





3.  Paroxysmal atrial fibrillation on Coumadin.  Pharmacy is dosing Coumadin.   





4.  Chronic anemia.  Iron studies ordered.





5.  Mild chronic cellulitis lower extremities stable improved.  





6.  Hypertension, hypertensive cardiovascular disease.  Continue metoprolol 

tartrate 12.5 mg oral twice daily, Imdur 15 mg daily, Aldactone 25 mg daily, 

hydralazine 25 mg twice daily





7.  Mild coronary artery disease.  Continue Coumadin, metoprolol, Lasix.





8.  Nonischemic cardiomyopathy.





9.  Hypothyroidism.  Continue levothyroxine 125 g daily.





10.  Diabetes mellitus type 2 with diabetic neuropathy, uncontrolled with 

hypoglycemia.  Continue Levemir decreased to 12 units at bedtime, discontinue 

metformin secondary to worsening creatinine NovoLog scale with meals only and 

NovoLog scale, gabapentin 300 mg three times daily.





11.  Hyperlipidemia.  Continue simvastatin 40 mg at bedtime.





12.  Obstructive sleep apnea on CPAP.





13.  Benign prostatic hypertrophy status post TURP.





14.  Generalized osteoarthritis.  Continue tramadol for pain.





15.  Recurrent depression.  Continue Zoloft 100 mg daily.





16.  Anemia of chronic disease.  Continue ferrous sulfate.





17.  Restless leg syndrome.  Continue Requip 2.5 mg 3 times daily.





18.  Thrombocytopenia, chronic.





19.  DVT prophylaxis.  Coumadin 





20.  GI prophylaxis.  Protonix 40 mg oral daily.





21.  Chronic hypoxic respiratory failure on home oxygen therapy.





22 BPH with urinary retention, currently on Jacobs catheter, Flomax, plan for 

follow To be discontinued the next 24-48 hours PVR to follow





23 Lumbar disc disease with retrolisthesis, patient has difficulty in lying down

supine, special in bed.  Refers to be in a recliner, however this also inhibits 

recovery of the anasarca, below the waist





24 Anasarca without proteinuria, treatment as above





DISCHARGE PLAN


Most likely return home.  Patient may benefit from home care.





Impression and plan of care have been directed as dictated by the signing 

physician.  Mehnaz Quezada nurse practitioner acting as scribe for signing 

physician.

## 2021-06-20 NOTE — P.PN
Subjective


Progress Note Date: 06/20/21





HISTORY OF PRESENT ILLNESS: 





This is a pleasant 76-year-old  male past medical history significant 

for chronic persistent atrial fibrillation on long-term anticoagulation, mild 

nonobstructive coronary artery disease, chronic systolic heart failure, 

nonischemic cardiomyopathy, hypertension and dyslipidemia.  He follows in the 

office with Dr. Schwartz.  She was admitted to the hospital with congestive 

cardiac failure.  He was seen and examined this morning, sitting up in the chair

at bedside, feeling better overall, continues to have 1+ edema.  Creatinine 

today is 1.79, 1.70 yesterday continues to be in atrial fibrillation.  Blood 

pressure 110/56, heart rate 70s, 96% on room air.  INR today is 1.6, sodium 137,

potassium 4.1, BUN 37, creatinine 1.6.  We will continue current dose of IV 

Lasix.  Continue to monitor the patient's intake and output.





6/20/2021


Patient examined this morning at the bedside.  Patient denies chest pain or 

pressure.  He denies short of breath.  Patient states his lower extremity edema 

has improved.  INR today 1.8.  Blood pressure 108/64.





PHYSICAL EXAM: 


VITAL SIGNS: Reviewed.


GENERAL: Well-developed in no acute distress. 


NECK: Supple. No JVD or thyromegaly


LUNGS: Respirations even and unlabored. Lungs diminished bilaterally.


HEART: Irregular rate and rhythm.  S1 and S2 heard.


EXTREMITIES: Normal range of motion.  No clubbing or cyanosis.  Peripheral 

pulses intact.  Trace bilateral lower extremity edema





ASSESSMENT: 


Acute exacerbation of chronic systolic heart failure


Chronic persistent atrial fibrillation


Nonobstructive coronary artery disease


Hypertension


Hyperlipidemia


Acute on chronic kidney disease





PLAN: 


Continue Coumadin.  Monitor INR


Discontinue IV Lasix.  Transition to oral Lasix 100 mg 3 times a day


Continue additional cardiac medications


Further recognitions when patient course








Nurse practitioner note has been reviewed by physician. Signing provider agrees 

with the documented findings, assessment, and plan of care. 








Objective





- Vital Signs


Vital signs: 


                                   Vital Signs











Temp  98.0 F   06/20/21 08:00


 


Pulse  76   06/20/21 12:14


 


Resp  16   06/20/21 12:14


 


BP  108/64   06/20/21 12:14


 


Pulse Ox  93 L  06/20/21 12:14








                                 Intake & Output











 06/19/21 06/20/21 06/20/21





 18:59 06:59 18:59


 


Intake Total 930 10 240


 


Output Total 1000 4100 1500


 


Balance -70 -8354 -1265


 


Weight  127.5 kg 


 


Intake:   


 


  IV  10 


 


    0.9  10 


 


  Oral 930  240


 


Output:   


 


  Urine 1000 4100 1500


 


Other:   


 


  Voiding Method Indwelling Catheter Indwelling Catheter Indwelling Catheter


 


  # Bowel Movements  2 














- Labs


CBC & Chem 7: 


                                 06/17/21 07:17





                                 06/20/21 06:00


Labs: 


                  Abnormal Lab Results - Last 24 Hours (Table)











  06/19/21 06/19/21 06/20/21 Range/Units





  16:51 20:15 06:00 


 


PT    17.9 H  (9.0-12.0)  sec


 


INR    1.8 H  (<1.2)  


 


Chloride     ()  mmol/L


 


Carbon Dioxide     (22-30)  mmol/L


 


BUN     (9-20)  mg/dL


 


Creatinine     (0.66-1.25)  mg/dL


 


POC Glucose (mg/dL)  122 H  171 H   (75-99)  mg/dL














  06/20/21 06/20/21 Range/Units





  06:00 11:58 


 


PT    (9.0-12.0)  sec


 


INR    (<1.2)  


 


Chloride  90 L   ()  mmol/L


 


Carbon Dioxide  38 H   (22-30)  mmol/L


 


BUN  39 H   (9-20)  mg/dL


 


Creatinine  1.72 H   (0.66-1.25)  mg/dL


 


POC Glucose (mg/dL)   144 H  (75-99)  mg/dL

## 2021-06-21 LAB
ALBUMIN SERPL-MCNC: 4 G/DL (ref 3.5–5)
ANION GAP SERPL CALC-SCNC: 9 MMOL/L
BASOPHILS # BLD AUTO: 0 K/UL (ref 0–0.2)
BASOPHILS NFR BLD AUTO: 0 %
BUN SERPL-SCNC: 37 MG/DL (ref 9–20)
CALCIUM SPEC-MCNC: 9.3 MG/DL (ref 8.4–10.2)
CHLORIDE SERPL-SCNC: 91 MMOL/L (ref 98–107)
CO2 SERPL-SCNC: 38 MMOL/L (ref 22–30)
EOSINOPHIL # BLD AUTO: 0.2 K/UL (ref 0–0.7)
EOSINOPHIL NFR BLD AUTO: 4 %
ERYTHROCYTE [DISTWIDTH] IN BLOOD BY AUTOMATED COUNT: 3.63 M/UL (ref 4.3–5.9)
ERYTHROCYTE [DISTWIDTH] IN BLOOD: 15.8 % (ref 11.5–15.5)
GLUCOSE BLD-MCNC: 122 MG/DL (ref 75–99)
GLUCOSE BLD-MCNC: 127 MG/DL (ref 75–99)
GLUCOSE BLD-MCNC: 196 MG/DL (ref 75–99)
GLUCOSE BLD-MCNC: 90 MG/DL (ref 75–99)
GLUCOSE SERPL-MCNC: 121 MG/DL (ref 74–99)
HCT VFR BLD AUTO: 28.2 % (ref 39–53)
HGB BLD-MCNC: 9.4 GM/DL (ref 13–17.5)
INR PPP: 2.1 (ref ?–1.2)
LYMPHOCYTES # SPEC AUTO: 0.7 K/UL (ref 1–4.8)
LYMPHOCYTES NFR SPEC AUTO: 14 %
MCH RBC QN AUTO: 25.9 PG (ref 25–35)
MCHC RBC AUTO-ENTMCNC: 33.4 G/DL (ref 31–37)
MCV RBC AUTO: 77.7 FL (ref 80–100)
MONOCYTES # BLD AUTO: 0.3 K/UL (ref 0–1)
MONOCYTES NFR BLD AUTO: 6 %
NEUTROPHILS # BLD AUTO: 3.6 K/UL (ref 1.3–7.7)
NEUTROPHILS NFR BLD AUTO: 73 %
PLATELET # BLD AUTO: 133 K/UL (ref 150–450)
POTASSIUM SERPL-SCNC: 3.9 MMOL/L (ref 3.5–5.1)
PT BLD: 21 SEC (ref 9–12)
SODIUM SERPL-SCNC: 138 MMOL/L (ref 137–145)
WBC # BLD AUTO: 5 K/UL (ref 3.8–10.6)

## 2021-06-21 RX ADMIN — POTASSIUM CHLORIDE SCH MEQ: 20 TABLET, EXTENDED RELEASE ORAL at 17:20

## 2021-06-21 RX ADMIN — INSULIN ASPART SCH: 100 INJECTION, SOLUTION INTRAVENOUS; SUBCUTANEOUS at 17:21

## 2021-06-21 RX ADMIN — POTASSIUM CHLORIDE SCH MEQ: 20 TABLET, EXTENDED RELEASE ORAL at 20:15

## 2021-06-21 RX ADMIN — LEVOTHYROXINE SODIUM SCH MCG: 0.12 TABLET ORAL at 06:11

## 2021-06-21 RX ADMIN — Medication SCH MG: at 17:20

## 2021-06-21 RX ADMIN — INSULIN ASPART SCH: 100 INJECTION, SOLUTION INTRAVENOUS; SUBCUTANEOUS at 13:22

## 2021-06-21 RX ADMIN — TAMSULOSIN HYDROCHLORIDE SCH MG: 0.4 CAPSULE ORAL at 17:20

## 2021-06-21 RX ADMIN — LATANOPROST SCH DROPS: 50 SOLUTION OPHTHALMIC at 20:15

## 2021-06-21 RX ADMIN — Medication SCH MCG: at 08:16

## 2021-06-21 RX ADMIN — MIDODRINE HYDROCHLORIDE SCH MG: 5 TABLET ORAL at 06:11

## 2021-06-21 RX ADMIN — ATORVASTATIN CALCIUM SCH MG: 20 TABLET, FILM COATED ORAL at 20:15

## 2021-06-21 RX ADMIN — FUROSEMIDE SCH MG: 40 TABLET ORAL at 08:18

## 2021-06-21 RX ADMIN — FUROSEMIDE SCH MG: 40 TABLET ORAL at 17:24

## 2021-06-21 RX ADMIN — SPIRONOLACTONE SCH MG: 25 TABLET, FILM COATED ORAL at 08:17

## 2021-06-21 RX ADMIN — PANTOPRAZOLE SODIUM SCH MG: 40 TABLET, DELAYED RELEASE ORAL at 06:11

## 2021-06-21 RX ADMIN — METOPROLOL TARTRATE SCH MG: 25 TABLET, FILM COATED ORAL at 08:18

## 2021-06-21 RX ADMIN — Medication SCH MG: at 20:14

## 2021-06-21 RX ADMIN — MIDODRINE HYDROCHLORIDE SCH MG: 5 TABLET ORAL at 17:18

## 2021-06-21 RX ADMIN — INSULIN DETEMIR SCH UNIT: 100 INJECTION, SOLUTION SUBCUTANEOUS at 20:15

## 2021-06-21 RX ADMIN — MIDODRINE HYDROCHLORIDE SCH MG: 5 TABLET ORAL at 08:17

## 2021-06-21 RX ADMIN — SERTRALINE HYDROCHLORIDE SCH MG: 100 TABLET ORAL at 08:17

## 2021-06-21 RX ADMIN — ISOSORBIDE MONONITRATE SCH: 30 TABLET, EXTENDED RELEASE ORAL at 08:19

## 2021-06-21 RX ADMIN — Medication SCH MG: at 08:17

## 2021-06-21 RX ADMIN — INSULIN ASPART SCH: 100 INJECTION, SOLUTION INTRAVENOUS; SUBCUTANEOUS at 06:09

## 2021-06-21 RX ADMIN — POTASSIUM CHLORIDE SCH MEQ: 20 TABLET, EXTENDED RELEASE ORAL at 08:17

## 2021-06-21 RX ADMIN — METOPROLOL TARTRATE SCH MG: 25 TABLET, FILM COATED ORAL at 20:14

## 2021-06-21 NOTE — P.PN
Subjective





HISTORY OF PRESENTING ILLNESS


This is a pleasant 76-year-old  male past medical history significant 

for chronic persistent atrial fibrillation on long-term anticoagulation, mild 

nonobstructive coronary artery disease, chronic systolic heart failure, 

nonischemic cardiomyopathy, hypertension and dyslipidemia.  He follows in the 

office with Dr. Schwartz. We have been asked to see in consultation for heart 

failure.  He presented to the hospital with symptoms of worsening shortness of 

breath, weight gain and lower extremity edema.  He states he has recently been 

hospitalized earlier this month for similar type symptoms.  When he left the 

hospital he didn't feel as though he is back to his baseline fluid status and 

things have gotten worse since.  On last admission he was initiated on 

Aldactone. Post discharge he saw Dr. Schwartz in the office and was also initiated

on Zaroxolyn every other day. He was recommended to come to hospital for 

diuresis however, he declined. He denies symptoms of chest pain, dizziness or 

palpitations.





06/21/2021


Patient seen and examined sitting up in recliner in no acute distress. He is on 

oral diuretics. Blood pressure 96/45 heart rate 72 afebrile and maintaining 

oxygen saturation on room air.  Laboratory data reviewed, WBC 5, hemoglobin 9.4,

platelets 133, INR 2.1, sodium 138, potassium 3.9 and creatinine 1.76. Blood 

noted in jacobs catheter tubing. 24-hr urine output 5.4 L. 





PHYSICAL EXAMINATION


CONSTITUTIONAL: No apparent distress. 


HEENT: Head is normocephalic. Pupils are equal, round. Sclerae anicteric. Mucous

membranes of the mouth are moist.  No JVD. No carotid bruit.


CHEST EXAMINATION: Lungs are clear to auscultation. No chest wall tenderness is 

noted on palpation or with deep breathing. 


HEART EXAMINATION: Irregular rate and rhythm. S1, S2 heard. No murmurs, gallops 

or rub.


EXTREMITIES: 2+ bilateral lower extremity pitting edema and erythema, ACE wraps 

in place, no swelling in the thighs and no calf tenderness.





ASSESSMENT


Acute on chronic systolic heart failure


Chronic persistent atrial fibrillation on warfarin


Nonobstructive coronary artery disease


Hypertension


Dyslipidemia


Acute on chronic kidney disease, Stage 2





PLAN


Discontinue Imdur and hydralazine.


Add losartan 25 mg at bedtime.


Decrease PO lasix to 100 BID and increase aldactone to 50 mg daily. 


Continue to monitor renal function and electrolytes daily. 


Flush jacobs catheter and follow CBC in the morning. 





Nurse Practitioner note has been reviewed, I agree with a documented findings 

and plan of care.  Patient was seen and examined.





Objective





- Vital Signs


Vital signs: 


                                   Vital Signs











Temp  98.1 F   06/21/21 03:30


 


Pulse  72   06/21/21 03:30


 


Resp  17   06/21/21 03:30


 


BP  96/45   06/21/21 03:30


 


Pulse Ox  94 L  06/21/21 03:30








                                 Intake & Output











 06/20/21 06/21/21 06/21/21





 18:59 06:59 18:59


 


Intake Total 1040  240


 


Output Total 1500 3975 


 


Balance -460 -3975 240


 


Weight  123.9 kg 


 


Intake:   


 


  Oral 1040  240


 


Output:   


 


  Urine 1500 3975 


 


    Uretheral (Jacobs)  1550 


 


Other:   


 


  Voiding Method Indwelling Catheter Indwelling Catheter 














- Labs


CBC & Chem 7: 


                                 06/21/21 07:50





                                 06/21/21 07:50


Labs: 


                  Abnormal Lab Results - Last 24 Hours (Table)











  06/20/21 06/20/21 06/20/21 Range/Units





  06:00 11:58 16:52 


 


RBC     (4.30-5.90)  m/uL


 


Hgb     (13.0-17.5)  gm/dL


 


Hct     (39.0-53.0)  %


 


MCV     (80.0-100.0)  fL


 


RDW     (11.5-15.5)  %


 


Plt Count     (150-450)  k/uL


 


Lymphocytes #     (1.0-4.8)  k/uL


 


PT     (9.0-12.0)  sec


 


INR     (<1.2)  


 


Chloride  90 L    ()  mmol/L


 


Carbon Dioxide  38 H    (22-30)  mmol/L


 


BUN  39 H    (9-20)  mg/dL


 


Creatinine  1.72 H    (0.66-1.25)  mg/dL


 


Glucose     (74-99)  mg/dL


 


POC Glucose (mg/dL)   144 H  133 H  (75-99)  mg/dL














  06/20/21 06/21/21 06/21/21 Range/Units





  20:33 07:50 07:50 


 


RBC    3.63 L  (4.30-5.90)  m/uL


 


Hgb    9.4 L  (13.0-17.5)  gm/dL


 


Hct    28.2 L  (39.0-53.0)  %


 


MCV    77.7 L  (80.0-100.0)  fL


 


RDW    15.8 H  (11.5-15.5)  %


 


Plt Count    133 L  (150-450)  k/uL


 


Lymphocytes #    0.7 L  (1.0-4.8)  k/uL


 


PT   21.0 H   (9.0-12.0)  sec


 


INR   2.1 H   (<1.2)  


 


Chloride     ()  mmol/L


 


Carbon Dioxide     (22-30)  mmol/L


 


BUN     (9-20)  mg/dL


 


Creatinine     (0.66-1.25)  mg/dL


 


Glucose     (74-99)  mg/dL


 


POC Glucose (mg/dL)  143 H    (75-99)  mg/dL














  06/21/21 Range/Units





  07:50 


 


RBC   (4.30-5.90)  m/uL


 


Hgb   (13.0-17.5)  gm/dL


 


Hct   (39.0-53.0)  %


 


MCV   (80.0-100.0)  fL


 


RDW   (11.5-15.5)  %


 


Plt Count   (150-450)  k/uL


 


Lymphocytes #   (1.0-4.8)  k/uL


 


PT   (9.0-12.0)  sec


 


INR   (<1.2)  


 


Chloride  91 L  ()  mmol/L


 


Carbon Dioxide  38 H  (22-30)  mmol/L


 


BUN  37 H  (9-20)  mg/dL


 


Creatinine  1.76 H  (0.66-1.25)  mg/dL


 


Glucose  121 H  (74-99)  mg/dL


 


POC Glucose (mg/dL)   (75-99)  mg/dL

## 2021-06-21 NOTE — P.NPCON
History of Present Illness





- Reason for Consult


acute renal failure





- History of Present Illness





Reason for consultation: Acute kidney injury





History of present illness:


Patient is a 76-year-old male seen in consultation for acute kidney injury.  

Patient's creatinine in May 2021 was in the range of 1.2-1.4.  Prior to that in 

January 2021 and was near 0.8-0.9.  This admission was 1.3 to and is up to 1.76 

today.  Patient presented to the hospital for shortness of breath and lower 

extremity edema which is progressively worsening over a few days prior to 

admission.  Patient has systolic CHF ejection fraction of 40-45%.  He was 

initiated on Lasix drip and then transitioned over to IV push Lasix and now is 

on oral Lasix 100 mg 3 times daily.  He does a long-standing history of diabetes

mellitus.  He denies use of nonsteroidals.  No vomiting or diarrhea.  Oral 

intake is fair.  Nonoliguric.  Urine output over 5 L in the last 24 hours.  He 

has a Serna catheter.  Denies any chest pain or shortness of breath at this 

time.  Hemodynamically stable.  Denies family history of renal disease.





Vital signs are stable.


General: The patient appeared well nourished and normally developed. 


HEENT: Head exam is unremarkable. Neck is without jugular venous distension.


LUNGS: Breath sounds decreased.


HEART: Rate and Rhythm are regular. 


ABDOMEN: Soft, no distention.


EXTREMITITES: Lower extremities wrapped.





Past Medical History


Past Medical History: Atrial Fibrillation, Heart Failure, Diabetes Mellitus, Eye

Disorder, Hearing Disorder / Deafness, Hyperlipidemia, Hypertension, 

Osteoarthritis (OA), Pneumonia, Prostate Disorder, Sleep Apnea/CPAP/BIPAP, 

Thyroid Disorder


Additional Past Medical History / Comment(s): GLAUCOMA, covid


History of Any Multi-Drug Resistant Organisms: None Reported


Past Surgical History: Back Surgery, Cholecystectomy, Heart Catheterization, 

Joint Replacement, Prostate Surgery


Additional Past Surgical History / Comment(s): greer knee replacement, 

cardioverion, bilc cataracts, TURP


Past Anesthesia/Blood Transfusion Reactions: No Reported Reaction


Past Psychological History: No Psychological Hx Reported


Smoking Status: Never smoker


Past Alcohol Use History: None Reported


Past Drug Use History: None Reported





- Past Family History


  ** Father


History Unknown: Yes





  ** Mother


Family Medical History: No Reported History





  ** Brother(s)


Family Medical History: Cancer





Medications and Allergies


                                Home Medications











 Medication  Instructions  Recorded  Confirmed  Type


 


Ferrous Sulfate [Feosol] 325 mg PO TID 03/31/17 06/13/21 History


 


Gabapentin [Neurontin] 300 mg PO TID PRN 03/31/17 06/13/21 History


 


Insulin Detemir (Levemir) [Levemir] 40 unit SQ HS 03/31/17 06/13/21 History


 


Latanoprost Ophth [Xalatan 0.005%] 1 drops BOTH EYES HS 03/31/17 06/13/21 

History


 


Sertraline [Zoloft] 100 mg PO DAILY 03/31/17 06/13/21 History


 


Simvastatin [Zocor] 40 mg PO HS 03/31/17 06/13/21 History


 


traMADol HCL [Ultram] 50 mg PO BID PRN 05/16/17 06/13/21 History


 


Metoprolol Tartrate [Lopressor] 12.5 mg PO BID 03/13/18 06/13/21 History


 


Cholecalciferol (Vitamin D3) 125 mcg PO DAILY 05/13/21 06/13/21 History





[Vitamin D3 (5000 Iu)]    


 


Fluocinolone Acetonide Body Oil 1 applic TOPICAL HS 05/13/21 06/13/21 History





0.01%    


 


Levothyroxine Sodium [Synthroid] 125 mcg PO DAILY 05/13/21 06/13/21 History


 


Nystatin 100,000Unit/gm Cream 1 applic TOPICAL BID PRN 05/13/21 06/13/21 History





[Mycostatin Cream]    


 


Triamcinolone 0.1% Cream [Kenalog 1 applicatio TOPICAL BID PRN 05/13/21 06/13/21

 History





0.1% Cream]    


 


Warfarin [Coumadin] 2.5 mg PO TUFR 05/13/21 06/14/21 History


 


Warfarin [Coumadin] 5 mg PO SUTUWETHSA 05/13/21 06/14/21 History


 


metFORMIN HCL [Glucophage] 500 mg PO BID 05/13/21 06/13/21 History


 


rOPINIRole HCL [Requip XL] 8 mg PO HS 05/13/21 06/13/21 History


 


Potassium Chloride ER [K-Dur 20] 20 meq PO TID #90 tab.er.prt 05/20/21 06/13/21 

Rx


 


Furosemide [Lasix] 80 mg PO BID 06/13/21 06/13/21 History


 


SILVER sulfADIAZINE CREAM 1 applic TOPICAL BID PRN 06/13/21 06/13/21 History





[Silvadene Cream]    


 


Spironolactone 25 mg PO DAILY 06/13/21 06/13/21 History


 


diphenhydrAMINE [Benadryl] 25 mg PO HS 06/13/21 06/13/21 History


 


metOLazone 2.5 mg PO Q48H 06/13/21 06/13/21 History








                                    Allergies











Allergy/AdvReac Type Severity Reaction Status Date / Time


 


Sulfa (Sulfonamide Allergy  Rash/Hives Verified 06/13/21 20:35





Antibiotics)     


 


hydromorphone [From Dilaudid] AdvReac  SEVERE Verified 06/13/21 20:35





   VOMITING  


 


meperidine [From Demerol] AdvReac  Nausea & Verified 06/13/21 20:35





   Vomiting &  





   Diarrhea  














Physical Exam


Vitals: 


                                   Vital Signs











  Temp Pulse Resp BP BP Pulse Ox


 


 06/21/21 03:30  98.1 F  72  17   96/45  94 L


 


 06/20/21 23:38  98.3 F  78  18  108/55   94 L


 


 06/20/21 20:00  98.2 F  79  17  114/61   93 L


 


 06/20/21 16:58  98.1 F  74  16   110/56  95


 


 06/20/21 12:14   76  16   108/64  93 L








                                Intake and Output











 06/20/21 06/21/21 06/21/21





 22:59 06:59 14:59


 


Intake Total 560  


 


Output Total 1550 2425 


 


Balance -990 -2425 


 


Intake:   


 


  Oral 560  


 


Output:   


 


  Urine 1550 2425 


 


    Uretheral (Serna) 1550  


 


Other:   


 


  Voiding Method Indwelling Catheter Indwelling Catheter 


 


  Weight  123.9 kg 














Results





- Lab Results


                             Most recent lab results











Calcium  9.3 mg/dL (8.4-10.2)   06/21/21  07:50    


 


Magnesium  1.7 mg/dL (1.6-2.3)   06/13/21  16:24    














                                 06/21/21 07:50





                                 06/21/21 07:50





Assessment and Plan


Plan: 





Assessment:


1.  Acute kidney injury secondary to ATN secondary to cardiorenal syndrome.  

Creatinine 1.76 today.  UA benign.


2.  Acute on chronic systolic CHF with ejection fraction of 40-45%.


3.  Volume overload.  Improved with diuresis.


4.  Diabetes mellitus.


5.  Urinary retention.  Has a Serna catheter.  On Flomax.


6.  Hypotension due to underlying cardiac status maintained on midodrine.





Plan:


Maintain oral diuretics.


Check renal ultrasound.


Avoid nephrotoxins.


Continue to monitor renal function and urine output.


Low-salt diet.





Thank you for the consultation.  I will continue to follow the patient with you 

during his hospital stay.

## 2021-06-21 NOTE — US
EXAMINATION TYPE: US kidneys/renal and bladder

 

DATE OF EXAM: 6/21/2021

 

COMPARISON: 3/31/2017

 

CLINICAL HISTORY: dinesh.

 

EXAM MEASUREMENTS:

 

Right Kidney:  12.6 x 5.5 x 5.4 cm

Left Kidney: 11.6 x 5.8 x 5.5 cm

 

 

 

 

Right Kidney: No hydronephrosis or masses seen

Left Kidney: No hydronephrosis or masses seen  

Bladder: no seen, Serna

 

There is no evidence for hydronephrosis at this point in time.  No nephrolithiasis is seen.  No jaciel
s are identified.  The urinary bladder is anechoic.  Bilateral ureteral jets are seen.

 

 

 

IMPRESSION:

 

 

No hydronephrosis. No significant change since the prior exam.

## 2021-06-22 VITALS — DIASTOLIC BLOOD PRESSURE: 56 MMHG | SYSTOLIC BLOOD PRESSURE: 114 MMHG | TEMPERATURE: 97.7 F | HEART RATE: 70 BPM

## 2021-06-22 VITALS — RESPIRATION RATE: 16 BRPM

## 2021-06-22 LAB
ANION GAP SERPL CALC-SCNC: 8 MMOL/L
BUN SERPL-SCNC: 41 MG/DL (ref 9–20)
CALCIUM SPEC-MCNC: 9.6 MG/DL (ref 8.4–10.2)
CHLORIDE SERPL-SCNC: 91 MMOL/L (ref 98–107)
CO2 SERPL-SCNC: 39 MMOL/L (ref 22–30)
ERYTHROCYTE [DISTWIDTH] IN BLOOD BY AUTOMATED COUNT: 3.81 M/UL (ref 4.3–5.9)
ERYTHROCYTE [DISTWIDTH] IN BLOOD: 15.8 % (ref 11.5–15.5)
GLUCOSE BLD-MCNC: 105 MG/DL (ref 75–99)
GLUCOSE BLD-MCNC: 109 MG/DL (ref 75–99)
GLUCOSE SERPL-MCNC: 91 MG/DL (ref 74–99)
HCT VFR BLD AUTO: 29.9 % (ref 39–53)
HGB BLD-MCNC: 9.9 GM/DL (ref 13–17.5)
INR PPP: 2.6 (ref ?–1.2)
MAGNESIUM SPEC-SCNC: 1.9 MG/DL (ref 1.6–2.3)
MCH RBC QN AUTO: 26 PG (ref 25–35)
MCHC RBC AUTO-ENTMCNC: 33.1 G/DL (ref 31–37)
MCV RBC AUTO: 78.6 FL (ref 80–100)
PLATELET # BLD AUTO: 156 K/UL (ref 150–450)
POTASSIUM SERPL-SCNC: 3.9 MMOL/L (ref 3.5–5.1)
PT BLD: 25.2 SEC (ref 9–12)
SODIUM SERPL-SCNC: 138 MMOL/L (ref 137–145)
WBC # BLD AUTO: 6.1 K/UL (ref 3.8–10.6)

## 2021-06-22 RX ADMIN — METOPROLOL TARTRATE SCH MG: 25 TABLET, FILM COATED ORAL at 08:42

## 2021-06-22 RX ADMIN — FUROSEMIDE SCH MG: 40 TABLET ORAL at 08:42

## 2021-06-22 RX ADMIN — MIDODRINE HYDROCHLORIDE SCH MG: 5 TABLET ORAL at 11:19

## 2021-06-22 RX ADMIN — PANTOPRAZOLE SODIUM SCH MG: 40 TABLET, DELAYED RELEASE ORAL at 06:02

## 2021-06-22 RX ADMIN — INSULIN ASPART SCH: 100 INJECTION, SOLUTION INTRAVENOUS; SUBCUTANEOUS at 06:00

## 2021-06-22 RX ADMIN — Medication SCH MCG: at 08:43

## 2021-06-22 RX ADMIN — LEVOTHYROXINE SODIUM SCH MCG: 0.12 TABLET ORAL at 06:02

## 2021-06-22 RX ADMIN — SERTRALINE HYDROCHLORIDE SCH MG: 100 TABLET ORAL at 08:42

## 2021-06-22 RX ADMIN — INSULIN ASPART SCH: 100 INJECTION, SOLUTION INTRAVENOUS; SUBCUTANEOUS at 12:07

## 2021-06-22 RX ADMIN — MIDODRINE HYDROCHLORIDE SCH MG: 5 TABLET ORAL at 06:02

## 2021-06-22 RX ADMIN — Medication SCH MG: at 08:43

## 2021-06-22 RX ADMIN — POTASSIUM CHLORIDE SCH MEQ: 20 TABLET, EXTENDED RELEASE ORAL at 08:41

## 2021-06-22 NOTE — P.DS
Providers


Date of admission: 


06/13/21 17:59





Expected date of discharge: 06/22/21


Attending physician: 


Stephani Rainey MD





Consults: 





                                        





06/13/21 18:00


Consult Physician Routine 


   Consulting Provider: [facility]


   Consult Reason/Comments: CHF


   Do you want consulting provider notified?: Yes





06/20/21 14:38


Consult Physician Routine 


   Consulting Provider: Daniel Theodore


   Consult Reason/Comments: ANASARCA WORSENING KIDNEY FAILURE


   Do you want consulting provider notified?: Yes











Primary care physician: 


Mike Rojo





[facility] Course: 








HISTORY OF PRESENT ILLNESS


76 years old  male patient of Dr. Rojo for past medical history 

of paroxysmal atrial fibrillation on Coumadin, hyperlipidemia, hypertension, 

coronary artery disease, history of nonischemic cardiomyopathy with EF 40%, 

valvular heart disease, moderate to severe pulmonary hypertension, obstructive 

sleep apnea on CPAP, hypothyroidism, morbid obesity, diabetes mellitus type 2 

with diabetic neuropathy, benign prostatic hypertrophy status post TURP, 

generalized osteoarthritis, recurrent depression, anemia of chronic disease, 

restless leg syndrome, chronic thrombocytopenia, chronic hypoxic respiratory 

failure on home O2.  Patient had recent hospitalization May 14 through May 20 

which time he was treated for acute on chronic systolic heart failure and acute 

kidney injury.  Patient was stabilized and discharged home and continued on 

Keflex for lower extremity cellulitis.  Patient now presents with edema to the 

legs and thighs and flank area, lightheadedness, shortness of breath and weight 

gain of 12-15 pounds since he left the hospital.  He had follow-up with Dr. Rojo and with cardiologist last week but worsened over the weekend.





Patient presented to [facility] emergency center for evaluation

and was found to be afebrile, heart rate 72, blood pressure 120/81, pulse ox 96%

on 2 L nasal cannula. EKG was atrial fibrillation controlled rate of 78.  WBC 

5.4, hemoglobin 9.4, platelet count 117.  Sodium 141, potassium 4.7, chloride 

98, CO2 35, BUN 37 creatinine 1.32.  Blood sugar 89.  Liver function tests were 

normal.  Albumin 3.8.  Troponin negative.  ProBNP 9460.


Chest x-ray reveals cardiomegaly.  Improvement of the pulmonary congestion 

compared to old exam.


Patient has been admitted to the cardiac stepdown unit, status post 1 dose of IV

Lasix last evening of 80 mg and started on Lasix drip last evening, cardiology 

consult





6/15: Patient has had urinary retention and Flomax will be ordered.  Most likely

patient will require Jacobs catheter.  Midodrine will be added and Zaroxolyn to 

start tomorrow.  Patient is still on Lasix drip at 10 mg per hour.  97% on 2 L 

nasal cannula.  Repeat blood work reveals INR 3.0.  Sodium 141, potassium 4.5, 

chloride 96, CO2 38, BUN 34 and creatinine 1.49.  Blood sugars are running 

between 54 and 81.  Levemir decreased to 25 units at bedtime





6/16: Patient's blood sugars are again low this morning of 56 and Levemir 

decreased to 18 units, continue metformin and scale changed to 3 times daily at 

meals only.  He is continued on Lasix drip as well as metolazone 2.5 mg on 

Monday Wednesday Friday and Aldactone 25 mg daily.  He has been afebrile, heart 

rate 84, blood pressure 116/64, pulse ox 94% on 2 L nasal cannula.  At 

controlled rate.  Repeat blood work reveals WBC 6.9, hemoglobin 9.2, platelet 

count 121.  INR is 2.0.  Pharmacy is dosing Coumadin.  Sodium 140, potassium 

4.3, chloride 94, CO2 40, BUN 34 and creatinine 1.64 which is slowly increasing.

 Weight is down 5 kg.





6/17, patient is still diuresing, Jacobs cath is in place,  doubt acute urine, 

140 kg today, with  5 kg weight loss over the past 24 hours, urine output of 4.5

L, creatinine slightly elevated was seen yesterday 1.7 today, still with 

anasarca below the waist, patient attempts to sleep in the bed and said the 

recliner, Ace wrap is not seen today, patient has no chest pain, ambulates 

without any new difficulties, on Aldactone and metolazone, ranitidine, Lasix IV 

drip at 10 mg per hour INR followed by pharmacy, 1.6





6/18: Patient is still diuresing aggressively, has lost 15 kg over the past few 

days, 5 kg over the past 24 hours.  He is on IV Lasix drip which is now switched

to 80 mg every 8 hours by cardiology, patient still diuresing aggressively, 

hence we'll going to leave the Jacobs In for next 24 hours, Ace wrap was started,

we are going to discontinue his Benadryl for sleep, and start melatonin 6 mg at 

bedtime.  Benadryl can cautionary patient, he shouldn't has urinary retention at

this time.  On Flomax blood pressure stabilized, however creatinine continues to

rise, they should normalize over the next 2-3 days, especially with the 

hypotension related to diuretic use and blood pressure medication use.  Continue

midodrine 10 mg 3 times a day blood pressure ranges between 116-135 systolic 

pulse ox 98% room air





6/19, patient's diuresing still effectively, he has lost a total of 20 kg over 

the past 6 days, still has remaining anasarca, mainly in the legs and the thighs

and buttocks, no Ace wrap was seen today, as the patient is nearing bathroom 

time, still has resistance to sleeping in bed, still prefers this recliner.  IV 

Lasix 80 mg 8 hours, no change in metolazone, creatinine improving, currently 

1.69, no new hypotension noted.  INR 1.6, pharmacy to dose, no current 

hematuria, no hemoptysis, no other bleeding episodes When anticipating the Jacobs

catheter to be discontinued in the morning, with trial voids, anticipate 

discharge on Monday 6/20: Patient is still losing significant water weight, another 3 kg over the 

past 24 hours, with a total of 24 kg in 7 days and still  tolerating the 100 mg 

oral Lasix 3 times a day, however his creatinine is not coming back to baseline,

we'll going to consult Dr. Theodore nephrology, for refractory chronic anasarca, 

decrease Levemir to 12 units, from current dose of 14 units,  inr 1.8, wife at 

bedside, updated with supine positing at bedtime in bed rather than recliner, 

patient willing to comply, keep jacobs for now, we will discontinue metformin as 

creatinine is above the current safety levels for metformin,





6/21: Patient is seen today resting and recliner.  He has been transitioned to 

oral Lasix which has been decreased by cardiology today to 100 mg twice daily 

and Aldactone increased to 50 mg daily.  Imdur and hydralazine were 

discontinued.  Losartan was added.  WBC 5.0, hemoglobin 9.4, platelet count 133.

 INR 2.1.  Chloride 91, CO2 38, BUN 37 creatinine 1.76.  Blood sugars are 

running between 90 and 143.  Nephrology is on consult and renal ultrasound has 

been ordered.





6/22: Patient's breathing status seems to be stable.  He denies having chest 

pain or shortness of breath.  He still has 1+ pedal edema but appears to be 

stable for this patient.  He has been afebrile, heart rate 72, blood pressure 

95/52, pulse ox 96% on room air.  Repeat blood work reveals WBC 6.1, hemoglobin 

9.9, platelet count 156.  INR 2.6.  Sodium 138, potassium 3.9, chloride 91, CO2 

39, BUN 41 and creatinine 1.74.  Blood sugars running between 91 and 196.  

Magnesium 1.9.  Patient has been evaluated by cardiology and cleared for 

discharge home today.  Patient will be discharged in stable condition.








ASSESSMENT AND PLAN


1.  Acute on chronic systolic heart failure. 


2.  Acute kidney injury likely secondary to cardiorenal process.  


3.  Paroxysmal atrial fibrillation on Coumadin. 


4.  Chronic anemia of chronic disease. 


5.  Mild chronic cellulitis lower extremities stable.  


6.  Hypertension, hypertensive cardiovascular disease.  


7.  Mild coronary artery disease.  


8.  Nonischemic cardiomyopathy.


9.  Hypothyroidism.  


10.  Diabetes mellitus type 2 with diabetic neuropathy, uncontrolled with 

hypoglycemia.  


11.  Hyperlipidemia. 


12.  Obstructive sleep apnea on CPAP.


13.  Benign prostatic hypertrophy status post TURP.


14.  Generalized osteoarthritis. 


15.  Recurrent depression.  


16.  Anemia of chronic disease.  


17.  Restless leg syndrome.  


18.  Thrombocytopenia, chronic.


19.  DVT prophylaxis. 


20.  GI prophylaxis. 


21.  Chronic hypoxic respiratory failure on home oxygen therapy.


22.  BPH with urinary retention.


23.  Lumbar disc disease with retrolisthesis.


24.  Anasarca without proteinuria.





DISCHARGE PLAN


Home





Impression and plan of care have been directed as dictated by the signing 

physician.  Mhenaz Quezada nurse practitioner acting as scribe for signing 

physician.





Patient Condition at Discharge: Stable





Plan - Discharge Summary


New Discharge Prescriptions: 


New


   Losartan [Cozaar] 25 mg PO HS #90 tab


   Spironolactone [Aldactone] 50 mg PO DAILY #90 tab


   Furosemide [Lasix] 100 mg PO BID@0900,1600 #180 tab


   metOLazone [Zaroxolyn] 2.5 mg PO MOWEFR  tab


   Tamsulosin [Flomax] 0.4 mg PO PC-SUPPER #30 cap.er.24h


   Midodrine [ProAmatine] 10 mg PO AC-TID #90 tab





Continue


   Ferrous Sulfate [Feosol] 325 mg PO TID


   Latanoprost Ophth [Xalatan 0.005%] 1 drops BOTH EYES HS


   Sertraline [Zoloft] 100 mg PO DAILY


   Simvastatin [Zocor] 40 mg PO HS


   Gabapentin [Neurontin] 300 mg PO TID PRN


     PRN Reason: Pain


   traMADol HCL [Ultram] 50 mg PO BID PRN


     PRN Reason: Pain


   Metoprolol Tartrate [Lopressor] 12.5 mg PO BID


   Fluocinolone Acetonide Body Oil 0.01% 1 applic TOPICAL HS


   Cholecalciferol (Vitamin D3) [Vitamin D3 (5000 Iu)] 125 mcg PO DAILY


   Triamcinolone 0.1% Cream [Kenalog 0.1% Cream] 1 applicatio TOPICAL BID PRN


     PRN Reason: Rash


   rOPINIRole HCL [Requip XL] 8 mg PO HS


   Warfarin [Coumadin] 2.5 mg PO TUFR


   Spironolactone 25 mg PO DAILY


   diphenhydrAMINE [Benadryl] 25 mg PO HS


   metFORMIN HCL [Glucophage] 500 mg PO BID


   Warfarin [Coumadin] 5 mg PO SUTUWETHSA


   Levothyroxine Sodium [Synthroid] 125 mcg PO DAILY


   metOLazone 2.5 mg PO Q48H


   SILVER sulfADIAZINE CREAM [Silvadene Cream] 1 applic TOPICAL BID PRN


     PRN Reason: LEGS





Changed


   Insulin Detemir (Levemir) [Levemir] 20 unit SQ HS #0


   Potassium Chloride ER [K-Dur 20] 20 meq PO BID #90 tab.er.prt





Discontinued


   Nystatin 100,000Unit/gm Cream [Mycostatin Cream] 1 applic TOPICAL BID PRN


     PRN Reason: Rash


   Furosemide [Lasix] 80 mg PO BID


Discharge Medication List





Ferrous Sulfate [Feosol] 325 mg PO TID 03/31/17 [History]


Gabapentin [Neurontin] 300 mg PO TID PRN 03/31/17 [History]


Latanoprost Ophth [Xalatan 0.005%] 1 drops BOTH EYES HS 03/31/17 [History]


Sertraline [Zoloft] 100 mg PO DAILY 03/31/17 [History]


Simvastatin [Zocor] 40 mg PO HS 03/31/17 [History]


traMADol HCL [Ultram] 50 mg PO BID PRN 05/16/17 [History]


Metoprolol Tartrate [Lopressor] 12.5 mg PO BID 03/13/18 [History]


Cholecalciferol (Vitamin D3) [Vitamin D3 (5000 Iu)] 125 mcg PO DAILY 05/13/21 

[History]


Fluocinolone Acetonide Body Oil 0.01% 1 applic TOPICAL HS 05/13/21 [History]


Levothyroxine Sodium [Synthroid] 125 mcg PO DAILY 05/13/21 [History]


Triamcinolone 0.1% Cream [Kenalog 0.1% Cream] 1 applicatio TOPICAL BID PRN 

05/13/21 [History]


Warfarin [Coumadin] 2.5 mg PO TUFR 05/13/21 [History]


Warfarin [Coumadin] 5 mg PO SUTUWETHSA 05/13/21 [History]


metFORMIN HCL [Glucophage] 500 mg PO BID 05/13/21 [History]


rOPINIRole HCL [Requip XL] 8 mg PO HS 05/13/21 [History]


SILVER sulfADIAZINE CREAM [Silvadene Cream] 1 applic TOPICAL BID PRN 06/13/21 

[History]


Spironolactone 25 mg PO DAILY 06/13/21 [History]


diphenhydrAMINE [Benadryl] 25 mg PO HS 06/13/21 [History]


metOLazone 2.5 mg PO Q48H 06/13/21 [History]


Furosemide [Lasix] 100 mg PO BID@0900,1600 #180 tab 06/22/21 [Rx]


Insulin Detemir (Levemir) [Levemir] 20 unit SQ HS #0 06/22/21 [Rx]


Losartan [Cozaar] 25 mg PO HS #90 tab 06/22/21 [Rx]


Midodrine [ProAmatine] 10 mg PO AC-TID #90 tab 06/22/21 [Rx]


Potassium Chloride ER [K-Dur 20] 20 meq PO BID #90 tab.er.prt 06/22/21 [Rx]


Spironolactone [Aldactone] 50 mg PO DAILY #90 tab 06/22/21 [Rx]


Tamsulosin [Flomax] 0.4 mg PO PC-SUPPER #30 cap.er.24h 06/22/21 [Rx]


metOLazone [Zaroxolyn] 2.5 mg PO MOWEFR  tab 06/22/21 [Rx]








Follow up Appointment(s)/Referral(s): 


Sky Schwartz MD [STAFF PHYSICIAN] - 07/06/21 3:45 pm


Mike Rojo DO [Primary Care Provider] - 1 Week (office earl call you with 

follow up appointment)


Daniel Theodore DO [STAFF PHYSICIAN] - 07/23/21 1:00 pm


Patient Instructions/Handouts:  Heart Failure (DC), Chronic Kidney Disease (DC)


Discharge Disposition: HOME SELF-CARE

## 2021-06-22 NOTE — P.PN
Subjective


Progress Note Date: 06/21/21





HISTORY OF PRESENT ILLNESS


76 years old  male patient of Dr. Rojo for past medical history 

of paroxysmal atrial fibrillation on Coumadin, hyperlipidemia, hypertension, 

coronary artery disease, history of nonischemic cardiomyopathy with EF 40%, 

valvular heart disease, moderate to severe pulmonary hypertension, obstructive 

sleep apnea on CPAP, hypothyroidism, morbid obesity, diabetes mellitus type 2 

with diabetic neuropathy, benign prostatic hypertrophy status post TURP, 

generalized osteoarthritis, recurrent depression, anemia of chronic disease, 

restless leg syndrome, chronic thrombocytopenia, chronic hypoxic respiratory 

failure on home O2.  Patient had recent hospitalization May 14 through May 20 

which time he was treated for acute on chronic systolic heart failure and acute 

kidney injury.  Patient was stabilized and discharged home and continued on Kefl

ex for lower extremity cellulitis.  Patient now presents with edema to the legs 

and thighs and flank area, lightheadedness, shortness of breath and weight gain 

of 12-15 pounds since he left the hospital.  He had follow-up with Dr. Rojo and with cardiologist last week but worsened over the weekend.





Patient presented to [facility] emergency center for evaluation

and was found to be afebrile, heart rate 72, blood pressure 120/81, pulse ox 96%

on 2 L nasal cannula. EKG was atrial fibrillation controlled rate of 78.  WBC 

5.4, hemoglobin 9.4, platelet count 117.  Sodium 141, potassium 4.7, chloride 

98, CO2 35, BUN 37 creatinine 1.32.  Blood sugar 89.  Liver function tests were 

normal.  Albumin 3.8.  Troponin negative.  ProBNP 9460.


Chest x-ray reveals cardiomegaly.  Improvement of the pulmonary congestion 

compared to old exam.


Patient has been admitted to the cardiac stepdown unit, status post 1 dose of IV

Lasix last evening of 80 mg and started on Lasix drip last evening, cardiology 

consult





6/15: Patient has had urinary retention and Flomax will be ordered.  Most likely

patient will require Jacobs catheter.  Midodrine will be added and Zaroxolyn to 

start tomorrow.  Patient is still on Lasix drip at 10 mg per hour.  97% on 2 L 

nasal cannula.  Repeat blood work reveals INR 3.0.  Sodium 141, potassium 4.5, 

chloride 96, CO2 38, BUN 34 and creatinine 1.49.  Blood sugars are running 

between 54 and 81.  Levemir decreased to 25 units at bedtime





6/16: Patient's blood sugars are again low this morning of 56 and Levemir 

decreased to 18 units, continue metformin and scale changed to 3 times daily at 

meals only.  He is continued on Lasix drip as well as metolazone 2.5 mg on 

Monday Wednesday Friday and Aldactone 25 mg daily.  He has been afebrile, heart 

rate 84, blood pressure 116/64, pulse ox 94% on 2 L nasal cannula.  At 

controlled rate.  Repeat blood work reveals WBC 6.9, hemoglobin 9.2, platelet 

count 121.  INR is 2.0.  Pharmacy is dosing Coumadin.  Sodium 140, potassium 

4.3, chloride 94, CO2 40, BUN 34 and creatinine 1.64 which is slowly increasing.

 Weight is down 5 kg.





6/17, patient is still diuresing, Jacobs cath is in place,  doubt acute urine, 14

0 kg today, with  5 kg weight loss over the past 24 hours, urine output of 4.5 

L, creatinine slightly elevated was seen yesterday 1.7 today, still with 

anasarca below the waist, patient attempts to sleep in the bed and said the 

recliner, Ace wrap is not seen today, patient has no chest pain, ambulates 

without any new difficulties, on Aldactone and metolazone, ranitidine, Lasix IV 

drip at 10 mg per hour INR followed by pharmacy, 1.6





6/18: Patient is still diuresing aggressively, has lost 15 kg over the past few 

days, 5 kg over the past 24 hours.  He is on IV Lasix drip which is now switched

to 80 mg every 8 hours by cardiology, patient still diuresing aggressively, 

hence we'll going to leave the Jacobs In for next 24 hours, Ace wrap was started,

we are going to discontinue his Benadryl for sleep, and start melatonin 6 mg at 

bedtime.  Benadryl can cautionary patient, he shouldn't has urinary retention at

this time.  On Flomax blood pressure stabilized, however creatinine continues to

rise, they should normalize over the next 2-3 days, especially with the 

hypotension related to diuretic use and blood pressure medication use.  Continue

midodrine 10 mg 3 times a day blood pressure ranges between 116-135 systolic 

pulse ox 98% room air





6/19, patient's diuresing still effectively, he has lost a total of 20 kg over 

the past 6 days, still has remaining anasarca, mainly in the legs and the thighs

and buttocks, no Ace wrap was seen today, as the patient is nearing bathroom 

time, still has resistance to sleeping in bed, still prefers this recliner.  IV 

Lasix 80 mg 8 hours, no change in metolazone, creatinine improving, currently 

1.69, no new hypotension noted.  INR 1.6, pharmacy to dose, no current 

hematuria, no hemoptysis, no other bleeding episodes When anticipating the Jacobs

catheter to be discontinued in the morning, with trial voids, anticipate 

discharge on Monday 6/20: Patient is still losing significant water weight, another 3 kg over the 

past 24 hours, with a total of 24 kg in 7 days and still  tolerating the 100 mg 

oral Lasix 3 times a day, however his creatinine is not coming back to baseline,

we'll going to consult Dr. Theodore nephrology, for refractory chronic anasarca, 

decrease Levemir to 12 units, from current dose of 14 units,  inr 1.8, wife at 

bedside, updated with supine positing at bedtime in bed rather than recliner, 

patient willing to comply, keep jacobs for now, we will discontinue metformin as 

creatinine is above the current safety levels for metformin,





6/21: Patient is seen today resting and recliner.  He has been transitioned to 

oral Lasix which has been decreased by cardiology today to 100 mg twice daily 

and Aldactone increased to 50 mg daily.  Imdur and hydralazine were 

discontinued.  Losartan was added.  WBC 5.0, hemoglobin 9.4, platelet count 133.

 INR 2.1.  Chloride 91, CO2 38, BUN 37 creatinine 1.76.  Blood sugars are 

running between 90 and 143.  Nephrology is on consult and renal ultrasound has 

been ordered.





REVIEW OF SYSTEMS


Constitutional: No fever, no chills, no night sweats.  Reports weight gain-

improved.  No weakness, fatigue or lethargy.  No daytime sleepiness.


EENT: No headache.  No blurred vision or double vision, no loss of vision.  No 

loss of Hearing, no ringing in the ears, reports dizziness.  No nasal drainage 

or congestion.  No epistaxis.  No sore throat.


Lungs: Reports shortness of breath, cough, no sputum production.  No wheezing.


Cardiovascular: No chest pain, reports lower extremity edema.  No palpitations. 

No paroxysmal nocturnal dyspnea.  No orthopnea.  reports lightheadedness or 

dizziness.  No syncopal episodes.


Abdominal: No abdominal pain.  No nausea, vomiting.  No diarrhea.  No 

constipation.  No bloody or tarry stools..  No loss of appetite.


Genitourinary: No dysuria, increased frequency, urgency.  No urinary retention.


Musculoskeletal: No myalgias.  Reports muscle weakness, no gait dysfunction, no 

frequent falls.  No back pain.  No neck pain.


Integumentary: Reports wounds, no lesions.  No rash or pruritus.  No unusual 

bruising.  No change in hair or nails.


Neurologic: No aphasia. No facial droop. No change in mentation. No head injury.

No headache. No paralysis. No paresthesia.


Psychiatric: No depression.  No anxiety.  No mood swings.


Endocrine: Reported abnormal blood sugars.





PHYSICAL EXAMINATION


Gen: This is a morbidly obese 76-year-old  male.  He is resting in a 

recliner and appears to be comfortable and in no acute distress.


HEENT: Head is atraumatic, normocephalic. Pupils equal, round. Sclerae is 

anicteric. 


NECK: Supple. No JVD. No lymphadenopathy. No thyromegaly. 


LUNGS: Diminished in the bilateral bases.  No intercostal retractions.


HEART: Irregular rate and rhythm.  3/6 systolic murmur.  No rub, no gallop, no 

click.   


ABDOMEN: Soft. Bowel sounds are present. No masses.  No tenderness.


EXTREMITIES: 2+ bilateral edema to both lower extremities and flank area.  No 

calf tenderness.


NEUROLOGICAL: Patient is awake, alert and oriented x3. Cranial nerves 2 through 

12 are grossly intact. 





ASSESSMENT AND PLAN


1.  Acute on chronic systolic heart failure.  Continue Lasix 100 mg twice daily,

Zaroxolyn 2.5 mg Monday Wednesday Friday, Lopressor 12.5 mg twice daily, 

midodrine 10 mg 3 times daily, Aldactone 50 mg daily.  Continue to monitor I&O 

and daily weights, monitor electrolytes and renal function.  





2.  Acute kidney injury likely secondary to cardiorenal process.  Continue Lasix

and Aldactone.





3.  Paroxysmal atrial fibrillation on Coumadin.  Pharmacy is dosing Coumadin.  

INR 2.0.





4.  Chronic anemia.  Iron studies ordered.





5.  Mild chronic cellulitis lower extremities stable.  





6.  Hypertension, hypertensive cardiovascular disease.  Continue metoprolol 

tartrate 12.5 mg oral twice daily, Imdur 15 mg daily, Aldactone 25 mg daily, 

hydralazine 25 mg twice daily





7.  Mild coronary artery disease.  Continue Coumadin, metoprolol, Lasix.





8.  Nonischemic cardiomyopathy.





9.  Hypothyroidism.  Continue levothyroxine 125 g daily.





10.  Diabetes mellitus type 2 with diabetic neuropathy, uncontrolled with 

hypoglycemia.  Continue Levemir decreased to 12 units at bedtime, metformin 500 

mg twice daily NovoLog scale with meals only and NovoLog scale, gabapentin 300 

mg three times daily.





11.  Hyperlipidemia.  Continue simvastatin 40 mg at bedtime.





12.  Obstructive sleep apnea on CPAP.





13.  Benign prostatic hypertrophy status post TURP.





14.  Generalized osteoarthritis.  Continue tramadol for pain.





15.  Recurrent depression.  Continue Zoloft 100 mg daily.





16.  Anemia of chronic disease.  Continue ferrous sulfate.





17.  Restless leg syndrome.  Continue Requip 2.5 mg 3 times daily.





18.  Thrombocytopenia, chronic.





19.  DVT prophylaxis.  Coumadin 





20.  GI prophylaxis.  Protonix 40 mg oral daily.





21.  Chronic hypoxic respiratory failure on home oxygen therapy.





22.  BPH with urinary retention, currently on Jacobs catheter, Flomax, plan for 

follow To be discontinued the next 24-48 hours PVR to follow





23.  Lumbar disc disease with retrolisthesis, patient has difficulty in lying 

down supine, special in bed.  Refers to be in a recliner, however this also 

inhibits recovery of the anasarca, below the waist





24.  Anasarca without proteinuria, treatment as above





DISCHARGE PLAN


Most likely return home.  Patient may benefit from home care.





Impression and plan of care have been directed as dictated by the signing 

physician.  Mehnaz Quezada nurse practitioner acting as scribe for signing 

physician.





Objective





- Vital Signs


Vital signs: 


                                   Vital Signs











Temp  98.2 F   06/21/21 08:05


 


Pulse  76   06/21/21 08:05


 


Resp  17   06/21/21 08:05


 


BP  99/58   06/21/21 08:05


 


Pulse Ox  95   06/21/21 08:05








                                 Intake & Output











 06/20/21 06/21/21 06/21/21





 18:59 06:59 18:59


 


Intake Total 1040  240


 


Output Total 1500 3975 400


 


Balance -460 -3975 -160


 


Weight  123.9 kg 


 


Intake:   


 


  Oral 1040  240


 


Output:   


 


  Urine 1500 3975 400


 


    Uretheral (Jacobs)  1550 


 


Other:   


 


  Voiding Method Indwelling Catheter Indwelling Catheter Indwelling Catheter














- Labs


CBC & Chem 7: 


                                 06/22/21 06:46





                                 06/21/21 07:50


Labs: 


                  Abnormal Lab Results - Last 24 Hours (Table)











  06/20/21 06/20/21 06/20/21 Range/Units





  06:00 11:58 16:52 


 


RBC     (4.30-5.90)  m/uL


 


Hgb     (13.0-17.5)  gm/dL


 


Hct     (39.0-53.0)  %


 


MCV     (80.0-100.0)  fL


 


RDW     (11.5-15.5)  %


 


Plt Count     (150-450)  k/uL


 


Lymphocytes #     (1.0-4.8)  k/uL


 


PT     (9.0-12.0)  sec


 


INR     (<1.2)  


 


Chloride  90 L    ()  mmol/L


 


Carbon Dioxide  38 H    (22-30)  mmol/L


 


BUN  39 H    (9-20)  mg/dL


 


Creatinine  1.72 H    (0.66-1.25)  mg/dL


 


Glucose     (74-99)  mg/dL


 


POC Glucose (mg/dL)   144 H  133 H  (75-99)  mg/dL














  06/20/21 06/21/21 06/21/21 Range/Units





  20:33 07:50 07:50 


 


RBC    3.63 L  (4.30-5.90)  m/uL


 


Hgb    9.4 L  (13.0-17.5)  gm/dL


 


Hct    28.2 L  (39.0-53.0)  %


 


MCV    77.7 L  (80.0-100.0)  fL


 


RDW    15.8 H  (11.5-15.5)  %


 


Plt Count    133 L  (150-450)  k/uL


 


Lymphocytes #    0.7 L  (1.0-4.8)  k/uL


 


PT   21.0 H   (9.0-12.0)  sec


 


INR   2.1 H   (<1.2)  


 


Chloride     ()  mmol/L


 


Carbon Dioxide     (22-30)  mmol/L


 


BUN     (9-20)  mg/dL


 


Creatinine     (0.66-1.25)  mg/dL


 


Glucose     (74-99)  mg/dL


 


POC Glucose (mg/dL)  143 H    (75-99)  mg/dL














  06/21/21 Range/Units





  07:50 


 


RBC   (4.30-5.90)  m/uL


 


Hgb   (13.0-17.5)  gm/dL


 


Hct   (39.0-53.0)  %


 


MCV   (80.0-100.0)  fL


 


RDW   (11.5-15.5)  %


 


Plt Count   (150-450)  k/uL


 


Lymphocytes #   (1.0-4.8)  k/uL


 


PT   (9.0-12.0)  sec


 


INR   (<1.2)  


 


Chloride  91 L  ()  mmol/L


 


Carbon Dioxide  38 H  (22-30)  mmol/L


 


BUN  37 H  (9-20)  mg/dL


 


Creatinine  1.76 H  (0.66-1.25)  mg/dL


 


Glucose  121 H  (74-99)  mg/dL


 


POC Glucose (mg/dL)   (75-99)  mg/dL

## 2021-06-22 NOTE — P.PN
Subjective





Patient is seen in follow-up for acute kidney injury on chronic kidney disease. 

Renal function stable.  Good urine output.  Serna catheter removed.  No chest 

pain or shortness of breath.





Vital signs are stable.


General: The patient appeared well nourished and normally developed. 


HEENT: Head exam is unremarkable. Neck is without jugular venous distension.


LUNGS: Breath sounds decreased.


HEART: Rate and Rhythm are regular. 


ABDOMEN: Soft, no distention.


EXTREMITITES: Trace edema.





Objective





- Vital Signs


Vital signs: 


                                   Vital Signs











Temp  97.9 F   06/21/21 20:00


 


Pulse  64   06/22/21 04:00


 


Resp  17   06/22/21 04:00


 


BP  95/52   06/22/21 04:00


 


Pulse Ox  95   06/22/21 04:00








                                 Intake & Output











 06/21/21 06/22/21 06/22/21





 18:59 06:59 18:59


 


Intake Total 960 222 480


 


Output Total 400 200 0


 


Balance 560 22 480


 


Weight  119.1 kg 


 


Intake:   


 


  Oral 960 222 480


 


Output:   


 


  Urine 400 200 0


 


  Stool   0


 


Other:   


 


  Voiding Method Indwelling Catheter Indwelling Catheter 














- Labs


CBC & Chem 7: 


                                 06/22/21 06:46





                                 06/22/21 06:46


Labs: 


                  Abnormal Lab Results - Last 24 Hours (Table)











  06/21/21 06/21/21 06/21/21 Range/Units





  11:47 16:57 19:53 


 


RBC     (4.30-5.90)  m/uL


 


Hgb     (13.0-17.5)  gm/dL


 


Hct     (39.0-53.0)  %


 


MCV     (80.0-100.0)  fL


 


RDW     (11.5-15.5)  %


 


PT     (9.0-12.0)  sec


 


INR     (<1.2)  


 


Chloride     ()  mmol/L


 


Carbon Dioxide     (22-30)  mmol/L


 


BUN     (9-20)  mg/dL


 


Creatinine     (0.66-1.25)  mg/dL


 


POC Glucose (mg/dL)  122 H  127 H  196 H  (75-99)  mg/dL














  06/22/21 06/22/21 06/22/21 Range/Units





  06:00 06:46 06:46 


 


RBC     (4.30-5.90)  m/uL


 


Hgb     (13.0-17.5)  gm/dL


 


Hct     (39.0-53.0)  %


 


MCV     (80.0-100.0)  fL


 


RDW     (11.5-15.5)  %


 


PT   25.2 H   (9.0-12.0)  sec


 


INR   2.6 H   (<1.2)  


 


Chloride    91 L  ()  mmol/L


 


Carbon Dioxide    39 H  (22-30)  mmol/L


 


BUN    41 H  (9-20)  mg/dL


 


Creatinine    1.74 H  (0.66-1.25)  mg/dL


 


POC Glucose (mg/dL)  109 H    (75-99)  mg/dL














  06/22/21 Range/Units





  06:46 


 


RBC  3.81 L  (4.30-5.90)  m/uL


 


Hgb  9.9 L  (13.0-17.5)  gm/dL


 


Hct  29.9 L  (39.0-53.0)  %


 


MCV  78.6 L  (80.0-100.0)  fL


 


RDW  15.8 H  (11.5-15.5)  %


 


PT   (9.0-12.0)  sec


 


INR   (<1.2)  


 


Chloride   ()  mmol/L


 


Carbon Dioxide   (22-30)  mmol/L


 


BUN   (9-20)  mg/dL


 


Creatinine   (0.66-1.25)  mg/dL


 


POC Glucose (mg/dL)   (75-99)  mg/dL














Assessment and Plan


Plan: 





Assessment:


1.  Acute kidney injury secondary to ATN secondary to cardiorenal syndrome.  

Creatinine stable at 1.74 today.  UA benign.  No hydronephrosis noted on kidney 

ultrasound.


2.  Acute on chronic systolic CHF with ejection fraction of 40-45%.


3.  Volume overload.  Improved with diuresis.


4.  Diabetes mellitus.


5.  Urinary retention.  On Flomax.  Serna catheter removed.


6.  Hypotension due to underlying cardiac status maintained on midodrine.





Plan:


Maintain oral diuretics.


Avoid nephrotoxins.


Continue to monitor renal function and urine output.


Low-salt diet.

## 2021-06-22 NOTE — P.PN
Subjective





HISTORY OF PRESENTING ILLNESS


This is a pleasant 76-year-old  male past medical history significant 

for chronic persistent atrial fibrillation on long-term anticoagulation, mild 

nonobstructive coronary artery disease, chronic systolic heart failure, 

nonischemic cardiomyopathy, hypertension and dyslipidemia.  He follows in the 

office with Dr. Schwartz. We have been asked to see in consultation for heart 

failure.  He presented to the hospital with symptoms of worsening shortness of 

breath, weight gain and lower extremity edema.  He states he has recently been 

hospitalized earlier this month for similar type symptoms.  When he left the 

hospital he didn't feel as though he is back to his baseline fluid status and 

things have gotten worse since.  On last admission he was initiated on 

Aldactone. Post discharge he saw Dr. Schwartz in the office and was also initiated

on Zaroxolyn every other day. He was recommended to come to hospital for 

diuresis however, he declined. He denies symptoms of chest pain, dizziness or 

palpitations.





06/22/2021


Pt seen and examined sitting up in recliner in no acute distress. He denies 

chest pain or shortness of breath. 24-hour urine output was 5,475 ml. His weight

is down 32 kg since admission. His leg swelling is greatly improved and he feels

better overall.  Laboratory data reviewed, WBC 6.1, hemoglobin 9.9, INR 2.6, 

sodium 138, potassium 3.9, creatinine 1.74 and magnesium 1.9.  Currently maint

ained on warfarin, Aldactone 50 mg daily, Lopressor 12.5 mg twice a day, 

Zaroxolyn 2.5 mg Monday, Wednesday, Friday, Lasix 100 mg twice a day, losartan 

25 mg daily and atorvastatin 20 mg daily.





PHYSICAL EXAMINATION


CONSTITUTIONAL: No apparent distress. 


HEENT: Head is normocephalic. Pupils are equal, round. Sclerae anicteric. Mucous

membranes of the mouth are moist.  No JVD. No carotid bruit.


CHEST EXAMINATION: Lungs are clear to auscultation. No chest wall tenderness is 

noted on palpation or with deep breathing. 


HEART EXAMINATION: Irregular rate and rhythm. S1, S2 heard. No murmurs, gallops 

or rub.


EXTREMITIES: 2+ bilateral lower extremity pitting edema and erythema, ACE wraps 

in place, no swelling in the thighs and no calf tenderness.





ASSESSMENT


Acute on chronic systolic heart failure


Chronic persistent atrial fibrillation on warfarin


Nonobstructive coronary artery disease


Hypertension


Dyslipidemia


Acute on chronic kidney disease, Stage 2





PLAN


Stable for discharge on current medical regimen. 


Follow up with Dr. Schwartz in the office in 1-2 weeks. 





Nurse Practitioner note has been reviewed, I agree with a documented findings 

and plan of care.  Patient was seen and examined.





Objective





- Vital Signs


Vital signs: 


                                   Vital Signs











Temp  97.9 F   06/21/21 20:00


 


Pulse  64   06/22/21 04:00


 


Resp  17   06/22/21 04:00


 


BP  95/52   06/22/21 04:00


 


Pulse Ox  95   06/22/21 04:00








                                 Intake & Output











 06/21/21 06/22/21 06/22/21





 18:59 06:59 18:59


 


Intake Total 960 222 480


 


Output Total 400 200 0


 


Balance 560 22 480


 


Weight  119.1 kg 


 


Intake:   


 


  Oral 960 222 480


 


Output:   


 


  Urine 400 200 0


 


  Stool   0


 


Other:   


 


  Voiding Method Indwelling Catheter Indwelling Catheter 














- Labs


CBC & Chem 7: 


                                 06/22/21 06:46





                                 06/22/21 06:46


Labs: 


                  Abnormal Lab Results - Last 24 Hours (Table)











  06/21/21 06/21/21 06/21/21 Range/Units





  11:47 16:57 19:53 


 


RBC     (4.30-5.90)  m/uL


 


Hgb     (13.0-17.5)  gm/dL


 


Hct     (39.0-53.0)  %


 


MCV     (80.0-100.0)  fL


 


RDW     (11.5-15.5)  %


 


PT     (9.0-12.0)  sec


 


INR     (<1.2)  


 


Chloride     ()  mmol/L


 


Carbon Dioxide     (22-30)  mmol/L


 


BUN     (9-20)  mg/dL


 


Creatinine     (0.66-1.25)  mg/dL


 


POC Glucose (mg/dL)  122 H  127 H  196 H  (75-99)  mg/dL














  06/22/21 06/22/21 06/22/21 Range/Units





  06:00 06:46 06:46 


 


RBC     (4.30-5.90)  m/uL


 


Hgb     (13.0-17.5)  gm/dL


 


Hct     (39.0-53.0)  %


 


MCV     (80.0-100.0)  fL


 


RDW     (11.5-15.5)  %


 


PT   25.2 H   (9.0-12.0)  sec


 


INR   2.6 H   (<1.2)  


 


Chloride    91 L  ()  mmol/L


 


Carbon Dioxide    39 H  (22-30)  mmol/L


 


BUN    41 H  (9-20)  mg/dL


 


Creatinine    1.74 H  (0.66-1.25)  mg/dL


 


POC Glucose (mg/dL)  109 H    (75-99)  mg/dL














  06/22/21 Range/Units





  06:46 


 


RBC  3.81 L  (4.30-5.90)  m/uL


 


Hgb  9.9 L  (13.0-17.5)  gm/dL


 


Hct  29.9 L  (39.0-53.0)  %


 


MCV  78.6 L  (80.0-100.0)  fL


 


RDW  15.8 H  (11.5-15.5)  %


 


PT   (9.0-12.0)  sec


 


INR   (<1.2)  


 


Chloride   ()  mmol/L


 


Carbon Dioxide   (22-30)  mmol/L


 


BUN   (9-20)  mg/dL


 


Creatinine   (0.66-1.25)  mg/dL


 


POC Glucose (mg/dL)   (75-99)  mg/dL

## 2021-07-05 ENCOUNTER — HOSPITAL ENCOUNTER (INPATIENT)
Dept: HOSPITAL 47 - EC | Age: 76
LOS: 5 days | DRG: 308 | End: 2021-07-10
Attending: FAMILY MEDICINE | Admitting: FAMILY MEDICINE
Payer: MEDICARE

## 2021-07-05 VITALS — BODY MASS INDEX: 36.3 KG/M2

## 2021-07-05 DIAGNOSIS — Z96.653: ICD-10-CM

## 2021-07-05 DIAGNOSIS — I27.20: ICD-10-CM

## 2021-07-05 DIAGNOSIS — Z80.9: ICD-10-CM

## 2021-07-05 DIAGNOSIS — R33.8: ICD-10-CM

## 2021-07-05 DIAGNOSIS — Z88.5: ICD-10-CM

## 2021-07-05 DIAGNOSIS — E11.42: ICD-10-CM

## 2021-07-05 DIAGNOSIS — G25.81: ICD-10-CM

## 2021-07-05 DIAGNOSIS — Z51.5: ICD-10-CM

## 2021-07-05 DIAGNOSIS — Z79.890: ICD-10-CM

## 2021-07-05 DIAGNOSIS — F33.9: ICD-10-CM

## 2021-07-05 DIAGNOSIS — N40.1: ICD-10-CM

## 2021-07-05 DIAGNOSIS — I42.8: ICD-10-CM

## 2021-07-05 DIAGNOSIS — Z98.42: ICD-10-CM

## 2021-07-05 DIAGNOSIS — J69.0: ICD-10-CM

## 2021-07-05 DIAGNOSIS — Z79.899: ICD-10-CM

## 2021-07-05 DIAGNOSIS — N17.9: ICD-10-CM

## 2021-07-05 DIAGNOSIS — E66.01: ICD-10-CM

## 2021-07-05 DIAGNOSIS — E11.39: ICD-10-CM

## 2021-07-05 DIAGNOSIS — G93.1: ICD-10-CM

## 2021-07-05 DIAGNOSIS — I08.3: ICD-10-CM

## 2021-07-05 DIAGNOSIS — Z98.890: ICD-10-CM

## 2021-07-05 DIAGNOSIS — G31.9: ICD-10-CM

## 2021-07-05 DIAGNOSIS — J96.21: ICD-10-CM

## 2021-07-05 DIAGNOSIS — Z99.81: ICD-10-CM

## 2021-07-05 DIAGNOSIS — E78.5: ICD-10-CM

## 2021-07-05 DIAGNOSIS — G93.41: ICD-10-CM

## 2021-07-05 DIAGNOSIS — R40.20: ICD-10-CM

## 2021-07-05 DIAGNOSIS — G47.33: ICD-10-CM

## 2021-07-05 DIAGNOSIS — Z79.01: ICD-10-CM

## 2021-07-05 DIAGNOSIS — Z87.19: ICD-10-CM

## 2021-07-05 DIAGNOSIS — T41.295A: ICD-10-CM

## 2021-07-05 DIAGNOSIS — E11.65: ICD-10-CM

## 2021-07-05 DIAGNOSIS — Z98.41: ICD-10-CM

## 2021-07-05 DIAGNOSIS — I44.4: ICD-10-CM

## 2021-07-05 DIAGNOSIS — E03.9: ICD-10-CM

## 2021-07-05 DIAGNOSIS — I48.21: ICD-10-CM

## 2021-07-05 DIAGNOSIS — D63.1: ICD-10-CM

## 2021-07-05 DIAGNOSIS — M51.9: ICD-10-CM

## 2021-07-05 DIAGNOSIS — D69.6: ICD-10-CM

## 2021-07-05 DIAGNOSIS — I49.01: Primary | ICD-10-CM

## 2021-07-05 DIAGNOSIS — Z90.79: ICD-10-CM

## 2021-07-05 DIAGNOSIS — Z88.2: ICD-10-CM

## 2021-07-05 DIAGNOSIS — I13.0: ICD-10-CM

## 2021-07-05 DIAGNOSIS — Z71.3: ICD-10-CM

## 2021-07-05 DIAGNOSIS — N18.9: ICD-10-CM

## 2021-07-05 DIAGNOSIS — E11.22: ICD-10-CM

## 2021-07-05 DIAGNOSIS — I50.23: ICD-10-CM

## 2021-07-05 DIAGNOSIS — H42: ICD-10-CM

## 2021-07-05 DIAGNOSIS — M15.9: ICD-10-CM

## 2021-07-05 DIAGNOSIS — Z79.4: ICD-10-CM

## 2021-07-05 DIAGNOSIS — Z90.49: ICD-10-CM

## 2021-07-05 DIAGNOSIS — Z86.16: ICD-10-CM

## 2021-07-05 DIAGNOSIS — Z66: ICD-10-CM

## 2021-07-05 DIAGNOSIS — M43.16: ICD-10-CM

## 2021-07-05 DIAGNOSIS — H40.9: ICD-10-CM

## 2021-07-05 DIAGNOSIS — I46.2: ICD-10-CM

## 2021-07-05 DIAGNOSIS — H91.90: ICD-10-CM

## 2021-07-05 DIAGNOSIS — R79.1: ICD-10-CM

## 2021-07-05 DIAGNOSIS — I25.10: ICD-10-CM

## 2021-07-05 LAB
ALBUMIN SERPL-MCNC: 4.4 G/DL (ref 3.5–5)
ALP SERPL-CCNC: 137 U/L (ref 38–126)
ALT SERPL-CCNC: 33 U/L (ref 4–49)
ANION GAP SERPL CALC-SCNC: 19 MMOL/L
APTT BLD: 24.7 SEC (ref 22–30)
AST SERPL-CCNC: 50 U/L (ref 17–59)
BASOPHILS # BLD AUTO: 0.1 K/UL (ref 0–0.2)
BASOPHILS NFR BLD AUTO: 1 %
BUN SERPL-SCNC: 67 MG/DL (ref 9–20)
CALCIUM SPEC-MCNC: 9.2 MG/DL (ref 8.4–10.2)
CHLORIDE SERPL-SCNC: 97 MMOL/L (ref 98–107)
CO2 BLDA-SCNC: 30 MMOL/L (ref 19–24)
CO2 SERPL-SCNC: 23 MMOL/L (ref 22–30)
EOSINOPHIL # BLD AUTO: 0.3 K/UL (ref 0–0.7)
EOSINOPHIL NFR BLD AUTO: 3 %
ERYTHROCYTE [DISTWIDTH] IN BLOOD BY AUTOMATED COUNT: 3.96 M/UL (ref 4.3–5.9)
ERYTHROCYTE [DISTWIDTH] IN BLOOD: 16 % (ref 11.5–15.5)
GLUCOSE BLD-MCNC: 260 MG/DL (ref 75–99)
GLUCOSE BLD-MCNC: 291 MG/DL (ref 75–99)
GLUCOSE BLD-MCNC: 309 MG/DL (ref 75–99)
GLUCOSE SERPL-MCNC: 309 MG/DL (ref 74–99)
HCO3 BLDA-SCNC: 29 MMOL/L (ref 21–25)
HCO3 BLDV-SCNC: 21 MMOL/L (ref 24–28)
HCT VFR BLD AUTO: 31.9 % (ref 39–53)
HGB BLD-MCNC: 10.5 GM/DL (ref 13–17.5)
INR PPP: 1.5 (ref ?–1.2)
LYMPHOCYTES # SPEC AUTO: 2.8 K/UL (ref 1–4.8)
LYMPHOCYTES NFR SPEC AUTO: 29 %
MAGNESIUM SPEC-SCNC: 1.9 MG/DL (ref 1.6–2.3)
MCH RBC QN AUTO: 26.5 PG (ref 25–35)
MCHC RBC AUTO-ENTMCNC: 32.9 G/DL (ref 31–37)
MCV RBC AUTO: 80.5 FL (ref 80–100)
MONOCYTES # BLD AUTO: 0.3 K/UL (ref 0–1)
MONOCYTES NFR BLD AUTO: 3 %
NEUTROPHILS # BLD AUTO: 6.1 K/UL (ref 1.3–7.7)
NEUTROPHILS NFR BLD AUTO: 62 %
PCO2 BLDA: 52 MMHG (ref 35–45)
PCO2 BLDV: 40 MMHG (ref 37–51)
PH BLDA: 7.36 [PH] (ref 7.35–7.45)
PH BLDV: 7.34 [PH] (ref 7.31–7.41)
PH UR: 6 [PH] (ref 5–8)
PLATELET # BLD AUTO: 130 K/UL (ref 150–450)
PO2 BLDA: 282 MMHG (ref 83–108)
POTASSIUM SERPL-SCNC: 4.7 MMOL/L (ref 3.5–5.1)
PROT SERPL-MCNC: 7.6 G/DL (ref 6.3–8.2)
PROT UR QL: (no result)
PT BLD: 15.1 SEC (ref 9–12)
RBC UR QL: 17 /HPF (ref 0–5)
SODIUM SERPL-SCNC: 139 MMOL/L (ref 137–145)
SP GR UR: 1.01 (ref 1–1.03)
SQUAMOUS UR QL AUTO: 2 /HPF (ref 0–4)
UROBILINOGEN UR QL STRIP: <2 MG/DL (ref ?–2)
WBC # BLD AUTO: 9.7 K/UL (ref 3.8–10.6)
WBC # UR AUTO: 99 /HPF (ref 0–5)

## 2021-07-05 PROCEDURE — 82805 BLOOD GASES W/O2 SATURATION: CPT

## 2021-07-05 PROCEDURE — 99291 CRITICAL CARE FIRST HOUR: CPT

## 2021-07-05 PROCEDURE — 83735 ASSAY OF MAGNESIUM: CPT

## 2021-07-05 PROCEDURE — 85610 PROTHROMBIN TIME: CPT

## 2021-07-05 PROCEDURE — 87070 CULTURE OTHR SPECIMN AEROBIC: CPT

## 2021-07-05 PROCEDURE — 80061 LIPID PANEL: CPT

## 2021-07-05 PROCEDURE — 94002 VENT MGMT INPAT INIT DAY: CPT

## 2021-07-05 PROCEDURE — 36415 COLL VENOUS BLD VENIPUNCTURE: CPT

## 2021-07-05 PROCEDURE — 0D9670Z DRAINAGE OF STOMACH WITH DRAINAGE DEVICE, VIA NATURAL OR ARTIFICIAL OPENING: ICD-10-PCS

## 2021-07-05 PROCEDURE — 70450 CT HEAD/BRAIN W/O DYE: CPT

## 2021-07-05 PROCEDURE — 83036 HEMOGLOBIN GLYCOSYLATED A1C: CPT

## 2021-07-05 PROCEDURE — 36600 WITHDRAWAL OF ARTERIAL BLOOD: CPT

## 2021-07-05 PROCEDURE — 5A1945Z RESPIRATORY VENTILATION, 24-96 CONSECUTIVE HOURS: ICD-10-PCS

## 2021-07-05 PROCEDURE — 85025 COMPLETE CBC W/AUTO DIFF WBC: CPT

## 2021-07-05 PROCEDURE — 82803 BLOOD GASES ANY COMBINATION: CPT

## 2021-07-05 PROCEDURE — 80306 DRUG TEST PRSMV INSTRMNT: CPT

## 2021-07-05 PROCEDURE — 87205 SMEAR GRAM STAIN: CPT

## 2021-07-05 PROCEDURE — 84484 ASSAY OF TROPONIN QUANT: CPT

## 2021-07-05 PROCEDURE — 84145 PROCALCITONIN (PCT): CPT

## 2021-07-05 PROCEDURE — 71045 X-RAY EXAM CHEST 1 VIEW: CPT

## 2021-07-05 PROCEDURE — 96365 THER/PROPH/DIAG IV INF INIT: CPT

## 2021-07-05 PROCEDURE — 93306 TTE W/DOPPLER COMPLETE: CPT

## 2021-07-05 PROCEDURE — 87086 URINE CULTURE/COLONY COUNT: CPT

## 2021-07-05 PROCEDURE — 80053 COMPREHEN METABOLIC PANEL: CPT

## 2021-07-05 PROCEDURE — 83605 ASSAY OF LACTIC ACID: CPT

## 2021-07-05 PROCEDURE — 81001 URINALYSIS AUTO W/SCOPE: CPT

## 2021-07-05 PROCEDURE — 85730 THROMBOPLASTIN TIME PARTIAL: CPT

## 2021-07-05 PROCEDURE — 93005 ELECTROCARDIOGRAM TRACING: CPT

## 2021-07-05 PROCEDURE — 80048 BASIC METABOLIC PNL TOTAL CA: CPT

## 2021-07-05 PROCEDURE — 95712 VEEG 2-12 HR INTMT MNTR: CPT

## 2021-07-05 PROCEDURE — 94003 VENT MGMT INPAT SUBQ DAY: CPT

## 2021-07-05 RX ADMIN — CHLORHEXIDINE GLUCONATE SCH ML: 1.2 RINSE ORAL at 22:07

## 2021-07-05 RX ADMIN — INSULIN ASPART SCH UNIT: 100 INJECTION, SOLUTION INTRAVENOUS; SUBCUTANEOUS at 23:33

## 2021-07-05 RX ADMIN — LATANOPROST SCH DROPS: 50 SOLUTION OPHTHALMIC at 22:08

## 2021-07-05 RX ADMIN — PIPERACILLIN AND TAZOBACTAM SCH MLS/HR: 3; .375 INJECTION, POWDER, FOR SOLUTION INTRAVENOUS at 23:34

## 2021-07-05 NOTE — XR
EXAMINATION TYPE: XR chest 1V portable

 

DATE OF EXAM: 7/5/2021

 

COMPARISON: 6/13/2021

 

HISTORY: Cardiac arrest.

 

TECHNIQUE: 2 views supine

 

FINDINGS: There is endotracheal tube 5.8 cm from the stewart. There is nasogastric tube in the stomach
. There are chest leads. There is some pulmonary interstitial edema. Costophrenic angles are clear. B
tato thorax is intact. There is no pneumothorax.

 

IMPRESSION: There is mild pulmonary interstitial edema. This appears new compared to last exam.

## 2021-07-05 NOTE — P.HPIM
History of Present Illness


H&P Date: 07/05/21


Chief Complaint: Cardiac arrest ASHLEY nunez with RVR





76 years old  male patient of Dr. Rojo for past medical history 

of paroxysmal atrial fibrillation on Coumadin, hyperlipidemia, hypertension, 

coronary artery disease, history of nonischemic cardiomyopathy with EF 40%, 

valvular heart disease, moderate to severe pulmonary hypertension, obstructive 

sleep apnea on CPAP, hypothyroidism, morbid obesity, diabetes mellitus type 2 

with diabetic neuropathy, benign prostatic hypertrophy status post TURP, 

generalized osteoarthritis, recurrent depression, anemia of chronic disease, 

restless leg syndrome, chronic thrombocytopenia, chronic hypoxic respiratory 

failure on home O2.  Patient had recent hospitalization May 14 through May 20 

which time he was treated for acute on chronic systolic heart failure and acute 

kidney injury.  Patient was stabilized and discharged home  ,  readmitted again 

June 14, till June 22, for anasarca, presents with edema to the legs and thighs 

and flank area, lightheadedness, shortness of breath and weight gain of 12-15 

pounds since he left the hospital.  He had follow-up with Dr. Rojo and 

with cardiologist during his last admission, insulin 14 told June 22, he was 

diuresed, approximately losing 28 kg, was seen by multiple specialists, for 

anasarca, acute kidney injury, diastolic CHF, with cardiorenal process.  he had 

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

however Patient now presents to the emergency room, secondary to cardiac arrest 

related to atrial fibrillation, this happened at home, at 1705, CPR was provided

by the wife at 1709, EMS has a right, 1736, and was defibrillated once.  There  

is return of rhythm, atrial fibrillation, approximately down time was 35-40 

minutes.  Per emergency room physician.  Patient currently is mechanically 

ventilated, and will be transferred to ICU, consult will be made with pulmonary,

Dr. Linares labs are currently pending,  





In the emergency room, patient already is mechanically vented, hemoglobin 10.5, 

double basic count 9.7, INR 1.5, potassium 4.7 creatinine of 1.98, from a 

previous of 1.74, alkaline phosphatase elevated, ALT AST  pending labs, NP 

proBNP pending labs, chest x-ray pending, unsure what the CT of the brain was 

performed or not, possibly secondary to instability of current cardiac secretion

venous blood gas, pCO2 of 40, pH of 7.34, bicarb of 21 patient will proceed to 

ICU from the emergency room.  Critical care medicine sorry the aware, 

cardiologist with aware.  The decision is to hold off IV heparin at this time, 

as the patient has gerard CPR device, which could cause more hemorrhagic 

problems, while on IV heparin.  Further recommendations to follow regarding IV 

heparinization, depending on stability.  Patient has twitches when seen, patient

will be sedated with Versed, discussed with wife at detail, EEG of the brain to 

be done, for anoxic encephalopathy,





Review of Systems


ROS unobtainable: due to mental status





Past Medical History


Past Medical History: Atrial Fibrillation, Heart Failure, Diabetes Mellitus, Eye

Disorder, Hearing Disorder / Deafness, Hyperlipidemia, Hypertension, 

Osteoarthritis (OA), Pneumonia, Prostate Disorder, Sleep Apnea/CPAP/BIPAP, 

Thyroid Disorder


Additional Past Medical History / Comment(s): GLAUCOMA, covid


History of Any Multi-Drug Resistant Organisms: None Reported


Past Surgical History: Back Surgery, Cholecystectomy, Heart Catheterization, 

Joint Replacement, Prostate Surgery


Additional Past Surgical History / Comment(s): greer knee replacement, 

cardioverion, bilc cataracts, TURP


Past Anesthesia/Blood Transfusion Reactions: No Reported Reaction


Past Psychological History: No Psychological Hx Reported


Smoking Status: Never smoker


Past Alcohol Use History: None Reported


Past Drug Use History: None Reported





- Past Family History


  ** Father


History Unknown: Yes





  ** Mother


Family Medical History: No Reported History





  ** Brother(s)


Family Medical History: Cancer





Medications and Allergies


                                Home Medications











 Medication  Instructions  Recorded  Confirmed  Type


 


Ferrous Sulfate [Feosol] 325 mg PO TID 03/31/17 07/05/21 History


 


Gabapentin [Neurontin] 300 mg PO TID PRN 03/31/17 07/05/21 History


 


Latanoprost Ophth [Xalatan 0.005%] 1 drops BOTH EYES HS 03/31/17 07/05/21 

History


 


Sertraline [Zoloft] 100 mg PO DAILY 03/31/17 07/05/21 History


 


Simvastatin [Zocor] 40 mg PO HS 03/31/17 07/05/21 History


 


traMADol HCL [Ultram] 50 mg PO BID PRN 05/16/17 07/05/21 History


 


Metoprolol Tartrate [Lopressor] 12.5 mg PO BID 03/13/18 07/05/21 History


 


Cholecalciferol (Vitamin D3) 125 mcg PO DAILY 05/13/21 07/05/21 History





[Vitamin D3 (5000 Iu)]    


 


Fluocinolone Acetonide Body Oil 1 applic TOPICAL HS 05/13/21 07/05/21 History





0.01%    


 


Levothyroxine Sodium [Synthroid] 125 mcg PO DAILY 05/13/21 07/05/21 History


 


Triamcinolone 0.1% Cream [Kenalog 1 applicatio TOPICAL BID PRN 05/13/21 07/05/21

 History





0.1% Cream]    


 


Warfarin [Coumadin] 2.5 mg PO TUFR 05/13/21 07/05/21 History


 


Warfarin [Coumadin] 5 mg PO SUTUWETHSA 05/13/21 07/05/21 History


 


metFORMIN HCL [Glucophage] 500 mg PO BID 05/13/21 07/05/21 History


 


rOPINIRole HCL [Requip XL] 8 mg PO HS 05/13/21 07/05/21 History


 


SILVER sulfADIAZINE CREAM 1 applic TOPICAL BID PRN 06/13/21 07/05/21 History





[Silvadene Cream]    


 


Spironolactone 25 mg PO DAILY 06/13/21 07/05/21 History


 


diphenhydrAMINE [Benadryl] 25 mg PO HS 06/13/21 07/05/21 History


 


Furosemide [Lasix] 100 mg PO BID@0900,1600 #180 tab 06/22/21 07/05/21 Rx


 


Insulin Detemir (Levemir) [Levemir] 20 unit SQ HS #0 06/22/21 07/05/21 Rx


 


Losartan [Cozaar] 25 mg PO HS #90 tab 06/22/21 07/05/21 Rx


 


Midodrine [ProAmatine] 10 mg PO AC-TID #90 tab 06/22/21 07/05/21 Rx


 


Potassium Chloride ER [K-Dur 20] 20 meq PO BID #90 tab.er.prt 06/22/21 07/05/21 

Rx


 


Spironolactone [Aldactone] 50 mg PO DAILY #90 tab 06/22/21 07/05/21 Rx


 


Tamsulosin [Flomax] 0.4 mg PO PC-SUPPER #30 cap.er.24h 06/22/21 07/05/21 Rx


 


metOLazone [Zaroxolyn] 2.5 mg PO MOWEFR  tab 06/22/21 07/05/21 Rx








                                    Allergies











Allergy/AdvReac Type Severity Reaction Status Date / Time


 


Sulfa (Sulfonamide Allergy  Rash/Hives Verified 07/05/21 18:49





Antibiotics)     


 


hydromorphone [From Dilaudid] AdvReac  SEVERE Verified 07/05/21 18:49





   VOMITING  


 


meperidine [From Demerol] AdvReac  Nausea & Verified 07/05/21 18:49





   Vomiting &  





   Diarrhea  














Physical Exam


Vitals: 


                                   Vital Signs











  Temp Pulse Resp BP Pulse Ox


 


 07/05/21 18:01  97.4 F L  121 H  12  125/93  99








                                Intake and Output











 07/05/21 07/05/21 07/05/21





 06:59 14:59 22:59


 


Other:   


 


  Weight   118.841 kg














- Constitutional


General appearance: obese





- EENT


Eyes: anicteric sclerae, EOMI, PERRLA





- Neck


Neck: normal ROM





- Respiratory


Respiratory: bilateral: CTA, negative: diminished, dullness, rales





- Cardiovascular


Rhythm: regular


Heart sounds: normal: S1, S2


Abnormal Heart Sounds: no systolic murmur, no diastolic murmur, no rub, no S3 

Gallop, no S4 Gallop, no click, no other





- Gastrointestinal


General gastrointestinal: normal bowel sounds, soft





- Integumentary


Integumentary: decreased turgor, normal





Results


CBC & Chem 7: 


                                 07/05/21 18:09





                                 07/05/21 18:09


Labs: 


                  Abnormal Lab Results - Last 24 Hours (Table)











  07/05/21 07/05/21 07/05/21 Range/Units





  18:00 18:09 18:09 


 


RBC   3.96 L   (4.30-5.90)  m/uL


 


Hgb   10.5 L   (13.0-17.5)  gm/dL


 


Hct   31.9 L   (39.0-53.0)  %


 


RDW   16.0 H   (11.5-15.5)  %


 


Plt Count   130 L   (150-450)  k/uL


 


VBG HCO3     (24-28)  mmol/L


 


Chloride    97 L  ()  mmol/L


 


BUN    67 H  (9-20)  mg/dL


 


Creatinine    1.98 H  (0.66-1.25)  mg/dL


 


Glucose    309 H  (74-99)  mg/dL


 


POC Glucose (mg/dL)  309 H    (75-99)  mg/dL


 


Alkaline Phosphatase    137 H  ()  U/L














  07/05/21 Range/Units





  18:09 


 


RBC   (4.30-5.90)  m/uL


 


Hgb   (13.0-17.5)  gm/dL


 


Hct   (39.0-53.0)  %


 


RDW   (11.5-15.5)  %


 


Plt Count   (150-450)  k/uL


 


VBG HCO3  21 L  (24-28)  mmol/L


 


Chloride   ()  mmol/L


 


BUN   (9-20)  mg/dL


 


Creatinine   (0.66-1.25)  mg/dL


 


Glucose   (74-99)  mg/dL


 


POC Glucose (mg/dL)   (75-99)  mg/dL


 


Alkaline Phosphatase   ()  U/L








                               Laboratory Results











WBC  9.7 k/uL (3.8-10.6)   07/05/21  18:09    


 


RBC  3.96 m/uL (4.30-5.90)  L  07/05/21  18:09    


 


Hgb  10.5 gm/dL (13.0-17.5)  L  07/05/21  18:09    


 


Hct  31.9 % (39.0-53.0)  L  07/05/21  18:09    


 


MCV  80.5 fL (80.0-100.0)   07/05/21  18:09    


 


MCH  26.5 pg (25.0-35.0)   07/05/21  18:09    


 


MCHC  32.9 g/dL (31.0-37.0)   07/05/21  18:09    


 


RDW  16.0 % (11.5-15.5)  H  07/05/21  18:09    


 


Plt Count  130 k/uL (150-450)  L  07/05/21  18:09    


 


MPV  9.1   07/05/21  18:09    


 


Neutrophils %  62 %  07/05/21  18:09    


 


Lymphocytes %  29 %  07/05/21  18:09    


 


Monocytes %  3 %  07/05/21  18:09    


 


Eosinophils %  3 %  07/05/21  18:09    


 


Basophils %  1 %  07/05/21  18:09    


 


Neutrophils #  6.1 k/uL (1.3-7.7)   07/05/21  18:09    


 


Lymphocytes #  2.8 k/uL (1.0-4.8)   07/05/21  18:09    


 


Monocytes #  0.3 k/uL (0-1.0)   07/05/21  18:09    


 


Eosinophils #  0.3 k/uL (0-0.7)   07/05/21  18:09    


 


Basophils #  0.1 k/uL (0-0.2)   07/05/21  18:09    


 


Anisocytosis  Slight   07/05/21  18:09    


 


PT  15.1 sec (9.0-12.0)  H  07/05/21  18:09    


 


INR  1.5  (<1.2)  H  07/05/21  18:09    


 


APTT  24.7 sec (22.0-30.0)   07/05/21  18:09    


 


VBG pH  7.34  (7.31-7.41)   07/05/21  18:09    


 


VBG pCO2  40 mmHg (37-51)   07/05/21  18:09    


 


VBG HCO3  21 mmol/L (24-28)  L  07/05/21  18:09    


 


Sodium  139 mmol/L (137-145)   07/05/21  18:09    


 


Potassium  4.7 mmol/L (3.5-5.1)   07/05/21  18:09    


 


Chloride  97 mmol/L ()  L  07/05/21  18:09    


 


Carbon Dioxide  23 mmol/L (22-30)   07/05/21  18:09    


 


Anion Gap  19 mmol/L  07/05/21  18:09    


 


BUN  67 mg/dL (9-20)  H  07/05/21  18:09    


 


Creatinine  1.98 mg/dL (0.66-1.25)  H  07/05/21  18:09    


 


Est GFR (CKD-EPI)AfAm  37  (>60 ml/min/1.73 sqM)   07/05/21  18:09    


 


Est GFR (CKD-EPI)NonAf  32  (>60 ml/min/1.73 sqM)   07/05/21  18:09    


 


Glucose  309 mg/dL (74-99)  H  07/05/21  18:09    


 


POC Glucose (mg/dL)  309 mg/dL (75-99)  H  07/05/21  18:00    


 


POC Glu Operater ID  Zuri Del Castillo   07/05/21  18:00    


 


Calcium  9.2 mg/dL (8.4-10.2)   07/05/21  18:09    


 


Magnesium  1.9 mg/dL (1.6-2.3)   07/05/21  18:09    


 


Total Bilirubin  0.8 mg/dL (0.2-1.3)   07/05/21  18:09    


 


AST  50 U/L (17-59)   07/05/21  18:09    


 


ALT  33 U/L (4-49)   07/05/21  18:09    


 


Alkaline Phosphatase  137 U/L ()  H  07/05/21  18:09    


 


Total Protein  7.6 g/dL (6.3-8.2)   07/05/21  18:09    


 


Albumin  4.4 g/dL (3.5-5.0)   07/05/21  18:09    














Assessment and Plan


Plan: 





1.  Cardiac arrest, with initial resuscitation at home by the wife, 

approximately starting at 1705, was defibrillated by EMS at 1736, approximate 

downtime 35-40 minutes, patient currently is mechanically ventilated, and would 

be transferred to ICU, Dr. Linares critical care medicine, consult with 

cardiology.  Will need access for feeding device like an NG tube, for 

medications





2.  Acute hypoxemic respiratory failure, currently mechanically ventilated, 

related to cardiac event, and most likely would have secondary aspiration 

pneumonia, IV Zosyn to be started,





2.  A. fib paroxysmal with rapid ventricular rate, subtherapeutic INR, was on 

Coumadin, as the patient is sedated on vent, unsure about CVA event will discuss

with cardiology whether IV heparin would be appropriate,,





3  Acute on chronic systolic heart failure.  was  Lasix 100 mg twice daily will 

transition to iv, Zaroxolyn 2.5 mg Monday Wednesday Friday, Lopressor 12.5 mg 

twice daily, midodrine 10 mg 3 times daily, Aldactone 50 mg daily.  Continue to 

monitor I&O and daily weights, monitor electrolytes and renal function.  





4.  Acute kidney injury likely secondary to cardiorenal process.  Continue Lasix

and Aldactone.





5  Paroxysmal atrial fibrillation on Coumadin.  Pharmacy is dosing Coumadin.  

INR 2.0.





6  Chronic anemia.  Iron studies ordered.





 7.  Hypertension, hypertensive cardiovascular disease.  Continue metoprolol 

tartrate 12.5 mg oral twice daily, Imdur 15 mg daily, Aldactone 25 mg daily, 

hydralazine 25 mg twice daily





7.  Mild coronary artery disease.  Continue Coumadin, metoprolol, Lasix.





8.  Nonischemic cardiomyopathy.





9.  Hypothyroidism.  Continue levothyroxine 125 g daily.





10.  Diabetes mellitus type 2 with diabetic neuropathy, uncontrolled with 

hypoglycemia.  We will restart Levemir decreased to 12 units at bedtime, hold 

metformin 500 mg twice daily NovoLog scale with meals only and NovoLog scale, 

gabapentin 300 mg three times daily.





11.  Hyperlipidemia.  Continue simvastatin 40 mg at bedtime.





12.  Obstructive sleep apnea on CPAP.





13.  Benign prostatic hypertrophy status post TURP.





14.  Generalized osteoarthritis.  Continue tramadol for pain.





15.  Recurrent depression.  Continue Zoloft 100 mg daily.





16.  Anemia of chronic disease.  Continue ferrous sulfate.





17.  Restless leg syndrome.  Continue Requip 2.5 mg 3 times daily.





18.  Thrombocytopenia, chronic.





19.  DVT prophylaxis.  Coumadin 





20.  GI prophylaxis.  Protonix 40 mg oral daily.





21.  Chronic hypoxic respiratory failure on home oxygen therapy.





22.  BPH with urinary retention, currently on Serna catheter, Flomax, plan for 

follow To be discontinued the next 24-48 hours PVR to follow





23.  Lumbar disc disease with retrolisthesis, patient has difficulty in lying 

down supine, special in bed.  Refers to be in a recliner, however this also 

inhibits recovery of the anasarca, below the waist





24.  Anasarca without proteinuria, treatment as above

## 2021-07-05 NOTE — ED
General Adult HPI





- General


Chief complaint: Cardiac Arrest/CPR


Stated complaint: cardiac arrest


Time Seen by Provider: 07/05/21 18:04


Source: family, EMS, RN notes reviewed, old records reviewed


Mode of arrival: EMS


Limitations: altered mental status





- History of Present Illness


Initial comments: 





76-year-old male presents with out of Hospital cardiac arrest.  Initial call was

at 1509, wife had found her  unresponsive and began CPR.  Upon arrival 

paramedics did defibrillate one time for presumed ventricular fibrillation.  

During CPR efforts the patient had a moment of asystole and ultimately had 

return of spontaneous circulation after approximately 30 minutes.  Total down 

time somewhere between 35 and 45 minutes.  Patient had been intubated by EMS on 

scene and was transported in a narrow complex rhythm, atrial fibrillation.  

Patient has history of a nonischemic cardiomyopathy, CHF, atrial fibrillation.





- Related Data


                                Home Medications











 Medication  Instructions  Recorded  Confirmed


 


Ferrous Sulfate [Feosol] 325 mg PO TID 03/31/17 06/13/21


 


Gabapentin [Neurontin] 300 mg PO TID PRN 03/31/17 06/13/21


 


Latanoprost Ophth [Xalatan 0.005%] 1 drops BOTH EYES HS 03/31/17 06/13/21


 


Sertraline [Zoloft] 100 mg PO DAILY 03/31/17 06/13/21


 


Simvastatin [Zocor] 40 mg PO HS 03/31/17 06/13/21


 


traMADol HCL [Ultram] 50 mg PO BID PRN 05/16/17 06/13/21


 


Metoprolol Tartrate [Lopressor] 12.5 mg PO BID 03/13/18 06/13/21


 


Cholecalciferol (Vitamin D3) 125 mcg PO DAILY 05/13/21 06/13/21





[Vitamin D3 (5000 Iu)]   


 


Fluocinolone Acetonide Body Oil 1 applic TOPICAL HS 05/13/21 06/13/21





0.01%   


 


Levothyroxine Sodium [Synthroid] 125 mcg PO DAILY 05/13/21 06/13/21


 


Triamcinolone 0.1% Cream [Kenalog 1 applicatio TOPICAL BID PRN 05/13/21 06/13/21





0.1% Cream]   


 


Warfarin [Coumadin] 2.5 mg PO TUFR 05/13/21 06/14/21


 


Warfarin [Coumadin] 5 mg PO SUTUWETHSA 05/13/21 06/14/21


 


metFORMIN HCL [Glucophage] 500 mg PO BID 05/13/21 06/13/21


 


rOPINIRole HCL [Requip XL] 8 mg PO HS 05/13/21 06/13/21


 


SILVER sulfADIAZINE CREAM 1 applic TOPICAL BID PRN 06/13/21 06/13/21





[Silvadene Cream]   


 


Spironolactone 25 mg PO DAILY 06/13/21 06/13/21


 


diphenhydrAMINE [Benadryl] 25 mg PO HS 06/13/21 06/13/21








                                  Previous Rx's











 Medication  Instructions  Recorded


 


Furosemide [Lasix] 100 mg PO BID@0900,1600 #180 tab 06/22/21


 


Insulin Detemir (Levemir) [Levemir] 20 unit SQ HS #0 06/22/21


 


Losartan [Cozaar] 25 mg PO HS #90 tab 06/22/21


 


Midodrine [ProAmatine] 10 mg PO AC-TID #90 tab 06/22/21


 


Potassium Chloride ER [K-Dur 20] 20 meq PO BID #90 tab.er.prt 06/22/21


 


Spironolactone [Aldactone] 50 mg PO DAILY #90 tab 06/22/21


 


Tamsulosin [Flomax] 0.4 mg PO PC-SUPPER #30 cap.er.24h 06/22/21


 


metOLazone [Zaroxolyn] 2.5 mg PO MOWEFR  tab 06/22/21











                                    Allergies











Allergy/AdvReac Type Severity Reaction Status Date / Time


 


Sulfa (Sulfonamide Allergy  Rash/Hives Verified 07/05/21 18:49





Antibiotics)     


 


hydromorphone [From Dilaudid] AdvReac  SEVERE Verified 07/05/21 18:49





   VOMITING  


 


meperidine [From Demerol] AdvReac  Nausea & Verified 07/05/21 18:49





   Vomiting &  





   Diarrhea  














Review of Systems


ROS Statement: 


Those systems with pertinent positive or pertinent negative responses have been 

documented in the HPI.





ROS Other: All systems not noted in ROS Statement are negative.





Past Medical History


Past Medical History: Atrial Fibrillation, Heart Failure, Diabetes Mellitus, Eye

Disorder, Hearing Disorder / Deafness, Hyperlipidemia, Hypertension, 

Osteoarthritis (OA), Pneumonia, Prostate Disorder, Sleep Apnea/CPAP/BIPAP, 

Thyroid Disorder


Additional Past Medical History / Comment(s): GLAUCOMA, covid


History of Any Multi-Drug Resistant Organisms: None Reported


Past Surgical History: Back Surgery, Cholecystectomy, Heart Catheterization, 

Joint Replacement, Prostate Surgery


Additional Past Surgical History / Comment(s): greer knee replacement, 

cardioverion, bilc cataracts, TURP


Past Anesthesia/Blood Transfusion Reactions: No Reported Reaction


Past Psychological History: No Psychological Hx Reported


Smoking Status: Never smoker


Past Alcohol Use History: None Reported


Past Drug Use History: None Reported





- Past Family History


  ** Father


History Unknown: Yes





  ** Mother


Family Medical History: No Reported History





  ** Brother(s)


Family Medical History: Cancer





General Exam


Limitations: altered mental status


General appearance: obtunded


Head exam: Present: atraumatic, normocephalic


Eye exam: Absent: PERRL (Right pupil is irregular and nonreactive, left pupil is

4 mm, nonreactive.)


ENT exam: Present: other (ET tube in place)


Respiratory exam: Present: respiratory distress, rales, decreased breath sounds 

(Decreased on the left)


Cardiovascular Exam: Present: tachycardia, irregular rhythm


GI/Abdominal exam: Present: soft.  Absent: distended, tenderness


Extremities exam: Present: pedal edema


Skin exam: Present: warm, dry, intact, normal color





Course


                                   Vital Signs











  07/05/21





  18:01


 


Temperature 97.4 F L


 


Pulse Rate 121 H


 


Respiratory 12





Rate 


 


Blood Pressure 125/93


 


O2 Sat by Pulse 99





Oximetry 














EKG Findings





- EKG Comments:


EKG Findings:: EKG: Atrial fibrillation with RVR, left anterior fascicular 

block, diffuse ST segment depression rate of 122, QRS duration 112, 





Medical Decision Making





- Medical Decision Making





76-year-old male with out of Hospital cardiac arrest, initial rhythm was 

presumed to be ventricular fibrillation as the patient was defibrillated.  He 

had a period of asystole and ultimately had return of spontaneous circulation wi

th a A. fib with RVR.  Upon arrival to the emergency department he had been 

intubated he had good oxygenation, and a stable blood pressure.  I did discuss 

case with Dr. Lana skaggs for cardiology, agrees with amiodarone infusion.  

I discussed case with Dr. Milagros skaggs for the end ICU and with Dr. Soliz who 

will admit.  Dr. Soliz is available in the emergency department for evaluation 

and will take over the care of this patient.











Laboratory testing reveals normal white blood cell count, stable hemoglobin, 

subtherapeutic INR at 1.5.  His creatinine is 1.98 which is baseline.  He has a 

lactic acid 6.7 which I suspect is from prolonged down time.  Minimally elevated

troponin 0.059.  Chest x-ray shows a patchy interstitial edema.








Patient's remains stable while the emergency department.





- Lab Data


Result diagrams: 


                                 07/05/21 18:09





                                 07/05/21 18:09


                                   Lab Results











  07/05/21 07/05/21 07/05/21 Range/Units





  18:00 18:09 18:09 


 


WBC   9.7   (3.8-10.6)  k/uL


 


RBC   3.96 L   (4.30-5.90)  m/uL


 


Hgb   10.5 L   (13.0-17.5)  gm/dL


 


Hct   31.9 L   (39.0-53.0)  %


 


MCV   80.5   (80.0-100.0)  fL


 


MCH   26.5   (25.0-35.0)  pg


 


MCHC   32.9   (31.0-37.0)  g/dL


 


RDW   16.0 H   (11.5-15.5)  %


 


Plt Count   130 L   (150-450)  k/uL


 


MPV   9.1   


 


Neutrophils %   62   %


 


Lymphocytes %   29   %


 


Monocytes %   3   %


 


Eosinophils %   3   %


 


Basophils %   1   %


 


Neutrophils #   6.1   (1.3-7.7)  k/uL


 


Lymphocytes #   2.8   (1.0-4.8)  k/uL


 


Monocytes #   0.3   (0-1.0)  k/uL


 


Eosinophils #   0.3   (0-0.7)  k/uL


 


Basophils #   0.1   (0-0.2)  k/uL


 


Anisocytosis   Slight   


 


PT    15.1 H  (9.0-12.0)  sec


 


INR    1.5 H  (<1.2)  


 


APTT    24.7  (22.0-30.0)  sec


 


VBG pH     (7.31-7.41)  


 


VBG pCO2     (37-51)  mmHg


 


VBG HCO3     (24-28)  mmol/L


 


Sodium     (137-145)  mmol/L


 


Potassium     (3.5-5.1)  mmol/L


 


Chloride     ()  mmol/L


 


Carbon Dioxide     (22-30)  mmol/L


 


Anion Gap     mmol/L


 


BUN     (9-20)  mg/dL


 


Creatinine     (0.66-1.25)  mg/dL


 


Est GFR (CKD-EPI)AfAm     (>60 ml/min/1.73 sqM)  


 


Est GFR (CKD-EPI)NonAf     (>60 ml/min/1.73 sqM)  


 


Glucose     (74-99)  mg/dL


 


POC Glucose (mg/dL)  309 H    (75-99)  mg/dL


 


POC Glu Operater ID  Zuri Del Castillo    


 


Plasma Lactic Acid Walter     (0.7-2.0)  mmol/L


 


Calcium     (8.4-10.2)  mg/dL


 


Magnesium     (1.6-2.3)  mg/dL


 


Total Bilirubin     (0.2-1.3)  mg/dL


 


AST     (17-59)  U/L


 


ALT     (4-49)  U/L


 


Alkaline Phosphatase     ()  U/L


 


Troponin I     (0.000-0.034)  ng/mL


 


Total Protein     (6.3-8.2)  g/dL


 


Albumin     (3.5-5.0)  g/dL


 


Urine Color     


 


Urine Appearance     (Clear)  


 


Urine pH     (5.0-8.0)  


 


Ur Specific Gravity     (1.001-1.035)  


 


Urine Protein     (Negative)  


 


Urine Glucose (UA)     (Negative)  


 


Urine Ketones     (Negative)  


 


Urine Blood     (Negative)  


 


Urine Nitrite     (Negative)  


 


Urine Bilirubin     (Negative)  


 


Urine Urobilinogen     (<2.0)  mg/dL


 


Ur Leukocyte Esterase     (Negative)  


 


Urine RBC     (0-5)  /hpf


 


Urine WBC     (0-5)  /hpf


 


Urine WBC Clumps     (None)  /hpf


 


Ur Squamous Epith Cells     (0-4)  /hpf


 


Amorphous Sediment     (None)  /hpf


 


Urine Bacteria     (None)  /hpf


 


Urine Mucus     (None)  /hpf


 


Urine Opiates Screen     (NotDetected)  


 


Ur Oxycodone Screen     (NotDetected)  


 


Urine Methadone Screen     (NotDetected)  


 


Ur Propoxyphene Screen     (NotDetected)  


 


Ur Barbiturates Screen     (NotDetected)  


 


U Tricyclic Antidepress     (NotDetected)  


 


Ur Phencyclidine Scrn     (NotDetected)  


 


Ur Amphetamines Screen     (NotDetected)  


 


U Methamphetamines Scrn     (NotDetected)  


 


U Benzodiazepines Scrn     (NotDetected)  


 


Urine Cocaine Screen     (NotDetected)  


 


U Marijuana (THC) Screen     (NotDetected)  














  07/05/21 07/05/21 07/05/21 Range/Units





  18:09 18:09 18:09 


 


WBC     (3.8-10.6)  k/uL


 


RBC     (4.30-5.90)  m/uL


 


Hgb     (13.0-17.5)  gm/dL


 


Hct     (39.0-53.0)  %


 


MCV     (80.0-100.0)  fL


 


MCH     (25.0-35.0)  pg


 


MCHC     (31.0-37.0)  g/dL


 


RDW     (11.5-15.5)  %


 


Plt Count     (150-450)  k/uL


 


MPV     


 


Neutrophils %     %


 


Lymphocytes %     %


 


Monocytes %     %


 


Eosinophils %     %


 


Basophils %     %


 


Neutrophils #     (1.3-7.7)  k/uL


 


Lymphocytes #     (1.0-4.8)  k/uL


 


Monocytes #     (0-1.0)  k/uL


 


Eosinophils #     (0-0.7)  k/uL


 


Basophils #     (0-0.2)  k/uL


 


Anisocytosis     


 


PT     (9.0-12.0)  sec


 


INR     (<1.2)  


 


APTT     (22.0-30.0)  sec


 


VBG pH     (7.31-7.41)  


 


VBG pCO2     (37-51)  mmHg


 


VBG HCO3     (24-28)  mmol/L


 


Sodium   139   (137-145)  mmol/L


 


Potassium   4.7   (3.5-5.1)  mmol/L


 


Chloride   97 L   ()  mmol/L


 


Carbon Dioxide   23   (22-30)  mmol/L


 


Anion Gap   19   mmol/L


 


BUN   67 H   (9-20)  mg/dL


 


Creatinine   1.98 H   (0.66-1.25)  mg/dL


 


Est GFR (CKD-EPI)AfAm   37   (>60 ml/min/1.73 sqM)  


 


Est GFR (CKD-EPI)NonAf   32   (>60 ml/min/1.73 sqM)  


 


Glucose   309 H   (74-99)  mg/dL


 


POC Glucose (mg/dL)     (75-99)  mg/dL


 


POC Glu Operater ID     


 


Plasma Lactic Acid Walter    6.7 H*  (0.7-2.0)  mmol/L


 


Calcium   9.2   (8.4-10.2)  mg/dL


 


Magnesium   1.9   (1.6-2.3)  mg/dL


 


Total Bilirubin   0.8   (0.2-1.3)  mg/dL


 


AST   50   (17-59)  U/L


 


ALT   33   (4-49)  U/L


 


Alkaline Phosphatase   137 H   ()  U/L


 


Troponin I     (0.000-0.034)  ng/mL


 


Total Protein   7.6   (6.3-8.2)  g/dL


 


Albumin   4.4   (3.5-5.0)  g/dL


 


Urine Color  Yellow    


 


Urine Appearance  Cloudy    (Clear)  


 


Urine pH  6.0    (5.0-8.0)  


 


Ur Specific Gravity  1.012    (1.001-1.035)  


 


Urine Protein  2+ H    (Negative)  


 


Urine Glucose (UA)  Negative    (Negative)  


 


Urine Ketones  Negative    (Negative)  


 


Urine Blood  Trace H    (Negative)  


 


Urine Nitrite  Negative    (Negative)  


 


Urine Bilirubin  Negative    (Negative)  


 


Urine Urobilinogen  <2.0    (<2.0)  mg/dL


 


Ur Leukocyte Esterase  Large H    (Negative)  


 


Urine RBC  17 H    (0-5)  /hpf


 


Urine WBC  99 H    (0-5)  /hpf


 


Urine WBC Clumps  Rare H    (None)  /hpf


 


Ur Squamous Epith Cells  2    (0-4)  /hpf


 


Amorphous Sediment  Rare H    (None)  /hpf


 


Urine Bacteria  Occasional H    (None)  /hpf


 


Urine Mucus  Rare H    (None)  /hpf


 


Urine Opiates Screen  Not Detected    (NotDetected)  


 


Ur Oxycodone Screen  Not Detected    (NotDetected)  


 


Urine Methadone Screen  Not Detected    (NotDetected)  


 


Ur Propoxyphene Screen  Not Detected    (NotDetected)  


 


Ur Barbiturates Screen  Not Detected    (NotDetected)  


 


U Tricyclic Antidepress  Not Detected    (NotDetected)  


 


Ur Phencyclidine Scrn  Not Detected    (NotDetected)  


 


Ur Amphetamines Screen  Not Detected    (NotDetected)  


 


U Methamphetamines Scrn  Not Detected    (NotDetected)  


 


U Benzodiazepines Scrn  Not Detected    (NotDetected)  


 


Urine Cocaine Screen  Not Detected    (NotDetected)  


 


U Marijuana (THC) Screen  Not Detected    (NotDetected)  














  07/05/21 07/05/21 Range/Units





  18:09 18:09 


 


WBC    (3.8-10.6)  k/uL


 


RBC    (4.30-5.90)  m/uL


 


Hgb    (13.0-17.5)  gm/dL


 


Hct    (39.0-53.0)  %


 


MCV    (80.0-100.0)  fL


 


MCH    (25.0-35.0)  pg


 


MCHC    (31.0-37.0)  g/dL


 


RDW    (11.5-15.5)  %


 


Plt Count    (150-450)  k/uL


 


MPV    


 


Neutrophils %    %


 


Lymphocytes %    %


 


Monocytes %    %


 


Eosinophils %    %


 


Basophils %    %


 


Neutrophils #    (1.3-7.7)  k/uL


 


Lymphocytes #    (1.0-4.8)  k/uL


 


Monocytes #    (0-1.0)  k/uL


 


Eosinophils #    (0-0.7)  k/uL


 


Basophils #    (0-0.2)  k/uL


 


Anisocytosis    


 


PT    (9.0-12.0)  sec


 


INR    (<1.2)  


 


APTT    (22.0-30.0)  sec


 


VBG pH   7.34  (7.31-7.41)  


 


VBG pCO2   40  (37-51)  mmHg


 


VBG HCO3   21 L  (24-28)  mmol/L


 


Sodium    (137-145)  mmol/L


 


Potassium    (3.5-5.1)  mmol/L


 


Chloride    ()  mmol/L


 


Carbon Dioxide    (22-30)  mmol/L


 


Anion Gap    mmol/L


 


BUN    (9-20)  mg/dL


 


Creatinine    (0.66-1.25)  mg/dL


 


Est GFR (CKD-EPI)AfAm    (>60 ml/min/1.73 sqM)  


 


Est GFR (CKD-EPI)NonAf    (>60 ml/min/1.73 sqM)  


 


Glucose    (74-99)  mg/dL


 


POC Glucose (mg/dL)    (75-99)  mg/dL


 


POC Glu Operater ID    


 


Plasma Lactic Acid Walter    (0.7-2.0)  mmol/L


 


Calcium    (8.4-10.2)  mg/dL


 


Magnesium    (1.6-2.3)  mg/dL


 


Total Bilirubin    (0.2-1.3)  mg/dL


 


AST    (17-59)  U/L


 


ALT    (4-49)  U/L


 


Alkaline Phosphatase    ()  U/L


 


Troponin I  0.059 H*   (0.000-0.034)  ng/mL


 


Total Protein    (6.3-8.2)  g/dL


 


Albumin    (3.5-5.0)  g/dL


 


Urine Color    


 


Urine Appearance    (Clear)  


 


Urine pH    (5.0-8.0)  


 


Ur Specific Gravity    (1.001-1.035)  


 


Urine Protein    (Negative)  


 


Urine Glucose (UA)    (Negative)  


 


Urine Ketones    (Negative)  


 


Urine Blood    (Negative)  


 


Urine Nitrite    (Negative)  


 


Urine Bilirubin    (Negative)  


 


Urine Urobilinogen    (<2.0)  mg/dL


 


Ur Leukocyte Esterase    (Negative)  


 


Urine RBC    (0-5)  /hpf


 


Urine WBC    (0-5)  /hpf


 


Urine WBC Clumps    (None)  /hpf


 


Ur Squamous Epith Cells    (0-4)  /hpf


 


Amorphous Sediment    (None)  /hpf


 


Urine Bacteria    (None)  /hpf


 


Urine Mucus    (None)  /hpf


 


Urine Opiates Screen    (NotDetected)  


 


Ur Oxycodone Screen    (NotDetected)  


 


Urine Methadone Screen    (NotDetected)  


 


Ur Propoxyphene Screen    (NotDetected)  


 


Ur Barbiturates Screen    (NotDetected)  


 


U Tricyclic Antidepress    (NotDetected)  


 


Ur Phencyclidine Scrn    (NotDetected)  


 


Ur Amphetamines Screen    (NotDetected)  


 


U Methamphetamines Scrn    (NotDetected)  


 


U Benzodiazepines Scrn    (NotDetected)  


 


Urine Cocaine Screen    (NotDetected)  


 


U Marijuana (THC) Screen    (NotDetected)  














Critical Care Time


Critical Care Time: Yes


Total Critical Care Time: 35





Disposition


Clinical Impression: 


 Cardiac arrest, Signs of return of spontaneous circulation, Atrial fibrillation

with rapid ventricular response, CHF (congestive heart failure)





Disposition: ADMITTED AS IP TO THIS [facility]


Condition: Serious


Is patient prescribed a controlled substance at d/c from ED?: No


Referrals: 


Mike Rojo DO [Primary Care Provider] - 1-2 days


Decision to Admit Reason: Admit from EC


Decision Date: 07/05/21


Decision Time: 19:06

## 2021-07-06 LAB
ANION GAP SERPL CALC-SCNC: 17 MMOL/L
APTT BLD: 30 SEC (ref 22–30)
BASOPHILS # BLD AUTO: 0 K/UL (ref 0–0.2)
BASOPHILS # BLD AUTO: 0 K/UL (ref 0–0.2)
BASOPHILS NFR BLD AUTO: 0 %
BASOPHILS NFR BLD AUTO: 0 %
BUN SERPL-SCNC: 69 MG/DL (ref 9–20)
CALCIUM SPEC-MCNC: 8 MG/DL (ref 8.4–10.2)
CHLORIDE SERPL-SCNC: 102 MMOL/L (ref 98–107)
CHOLEST SERPL-MCNC: 86 MG/DL (ref 0–200)
CO2 BLDA-SCNC: 28 MMOL/L (ref 19–24)
CO2 SERPL-SCNC: 24 MMOL/L (ref 22–30)
EOSINOPHIL # BLD AUTO: 0.1 K/UL (ref 0–0.7)
EOSINOPHIL # BLD AUTO: 0.3 K/UL (ref 0–0.7)
EOSINOPHIL NFR BLD AUTO: 0 %
EOSINOPHIL NFR BLD AUTO: 2 %
ERYTHROCYTE [DISTWIDTH] IN BLOOD BY AUTOMATED COUNT: 3.78 M/UL (ref 4.3–5.9)
ERYTHROCYTE [DISTWIDTH] IN BLOOD BY AUTOMATED COUNT: 4.14 M/UL (ref 4.3–5.9)
ERYTHROCYTE [DISTWIDTH] IN BLOOD: 16 % (ref 11.5–15.5)
ERYTHROCYTE [DISTWIDTH] IN BLOOD: 16 % (ref 11.5–15.5)
GLUCOSE BLD-MCNC: 161 MG/DL (ref 75–99)
GLUCOSE BLD-MCNC: 178 MG/DL (ref 75–99)
GLUCOSE BLD-MCNC: 227 MG/DL (ref 75–99)
GLUCOSE BLD-MCNC: 228 MG/DL (ref 75–99)
GLUCOSE SERPL-MCNC: 189 MG/DL (ref 74–99)
HBA1C MFR BLD: 6.5 % (ref 4–6)
HCO3 BLDA-SCNC: 27 MMOL/L (ref 21–25)
HCT VFR BLD AUTO: 30.3 % (ref 39–53)
HCT VFR BLD AUTO: 32.5 % (ref 39–53)
HDLC SERPL-MCNC: 25 MG/DL (ref 40–60)
HGB BLD-MCNC: 10.7 GM/DL (ref 13–17.5)
HGB BLD-MCNC: 9.6 GM/DL (ref 13–17.5)
INR PPP: 1.8 (ref ?–1.2)
LDLC SERPL CALC-MCNC: 39 MG/DL (ref 0–131)
LYMPHOCYTES # SPEC AUTO: 0.6 K/UL (ref 1–4.8)
LYMPHOCYTES # SPEC AUTO: 0.8 K/UL (ref 1–4.8)
LYMPHOCYTES NFR SPEC AUTO: 4 %
LYMPHOCYTES NFR SPEC AUTO: 5 %
MCH RBC QN AUTO: 25.5 PG (ref 25–35)
MCH RBC QN AUTO: 26 PG (ref 25–35)
MCHC RBC AUTO-ENTMCNC: 31.8 G/DL (ref 31–37)
MCHC RBC AUTO-ENTMCNC: 33.1 G/DL (ref 31–37)
MCV RBC AUTO: 78.5 FL (ref 80–100)
MCV RBC AUTO: 80.2 FL (ref 80–100)
MONOCYTES # BLD AUTO: 0.3 K/UL (ref 0–1)
MONOCYTES # BLD AUTO: 0.4 K/UL (ref 0–1)
MONOCYTES NFR BLD AUTO: 3 %
MONOCYTES NFR BLD AUTO: 3 %
NEUTROPHILS # BLD AUTO: 11.8 K/UL (ref 1.3–7.7)
NEUTROPHILS # BLD AUTO: 14.5 K/UL (ref 1.3–7.7)
NEUTROPHILS NFR BLD AUTO: 90 %
NEUTROPHILS NFR BLD AUTO: 92 %
PCO2 BLDA: 44 MMHG (ref 35–45)
PH BLDA: 7.4 [PH] (ref 7.35–7.45)
PLATELET # BLD AUTO: 129 K/UL (ref 150–450)
PLATELET # BLD AUTO: 164 K/UL (ref 150–450)
PO2 BLDA: 188 MMHG (ref 83–108)
POTASSIUM SERPL-SCNC: 4.5 MMOL/L (ref 3.5–5.1)
PT BLD: 17.6 SEC (ref 9–12)
SODIUM SERPL-SCNC: 143 MMOL/L (ref 137–145)
TRIGL SERPL-MCNC: 110 MG/DL (ref 0–149)
VLDLC SERPL CALC-MCNC: 22 MG/DL (ref 5–40)
WBC # BLD AUTO: 12.9 K/UL (ref 3.8–10.6)
WBC # BLD AUTO: 16.1 K/UL (ref 3.8–10.6)

## 2021-07-06 PROCEDURE — 3E0G76Z INTRODUCTION OF NUTRITIONAL SUBSTANCE INTO UPPER GI, VIA NATURAL OR ARTIFICIAL OPENING: ICD-10-PCS

## 2021-07-06 PROCEDURE — 03HY32Z INSERTION OF MONITORING DEVICE INTO UPPER ARTERY, PERCUTANEOUS APPROACH: ICD-10-PCS

## 2021-07-06 PROCEDURE — 4A133J1 MONITORING OF ARTERIAL PULSE, PERIPHERAL, PERCUTANEOUS APPROACH: ICD-10-PCS

## 2021-07-06 PROCEDURE — 02HV33Z INSERTION OF INFUSION DEVICE INTO SUPERIOR VENA CAVA, PERCUTANEOUS APPROACH: ICD-10-PCS

## 2021-07-06 PROCEDURE — 4A133B1 MONITORING OF ARTERIAL PRESSURE, PERIPHERAL, PERCUTANEOUS APPROACH: ICD-10-PCS

## 2021-07-06 PROCEDURE — 3E043XZ INTRODUCTION OF VASOPRESSOR INTO CENTRAL VEIN, PERCUTANEOUS APPROACH: ICD-10-PCS

## 2021-07-06 RX ADMIN — AMIODARONE HYDROCHLORIDE SCH MLS/HR: 50 INJECTION, SOLUTION INTRAVENOUS at 00:03

## 2021-07-06 RX ADMIN — PIPERACILLIN AND TAZOBACTAM SCH MLS/HR: 3; .375 INJECTION, POWDER, FOR SOLUTION INTRAVENOUS at 23:48

## 2021-07-06 RX ADMIN — AMIODARONE HYDROCHLORIDE SCH MLS/HR: 50 INJECTION, SOLUTION INTRAVENOUS at 15:59

## 2021-07-06 RX ADMIN — NOREPINEPHRINE BITARTRATE SCH MLS/HR: 1 INJECTION, SOLUTION, CONCENTRATE INTRAVENOUS at 02:36

## 2021-07-06 RX ADMIN — LEVETIRACETAM SCH MLS/HR: 15 INJECTION INTRAVENOUS at 21:13

## 2021-07-06 RX ADMIN — INSULIN ASPART SCH UNIT: 100 INJECTION, SOLUTION INTRAVENOUS; SUBCUTANEOUS at 05:55

## 2021-07-06 RX ADMIN — PANTOPRAZOLE SODIUM SCH MG: 40 INJECTION, POWDER, FOR SOLUTION INTRAVENOUS at 09:50

## 2021-07-06 RX ADMIN — MIDODRINE HYDROCHLORIDE SCH MG: 5 TABLET ORAL at 18:19

## 2021-07-06 RX ADMIN — MIDODRINE HYDROCHLORIDE SCH MG: 5 TABLET ORAL at 13:26

## 2021-07-06 RX ADMIN — MIDODRINE HYDROCHLORIDE SCH MG: 5 TABLET ORAL at 06:25

## 2021-07-06 RX ADMIN — METOPROLOL TARTRATE SCH MG: 25 TABLET, FILM COATED ORAL at 09:51

## 2021-07-06 RX ADMIN — NOREPINEPHRINE BITARTRATE SCH: 1 INJECTION, SOLUTION, CONCENTRATE INTRAVENOUS at 13:28

## 2021-07-06 RX ADMIN — CHLORHEXIDINE GLUCONATE SCH ML: 1.2 RINSE ORAL at 21:11

## 2021-07-06 RX ADMIN — HEPARIN SODIUM SCH MLS/HR: 10000 INJECTION, SOLUTION INTRAVENOUS at 09:11

## 2021-07-06 RX ADMIN — LATANOPROST SCH DROPS: 50 SOLUTION OPHTHALMIC at 21:14

## 2021-07-06 RX ADMIN — INSULIN DETEMIR SCH UNIT: 100 INJECTION, SOLUTION SUBCUTANEOUS at 09:51

## 2021-07-06 RX ADMIN — INSULIN ASPART SCH UNIT: 100 INJECTION, SOLUTION INTRAVENOUS; SUBCUTANEOUS at 18:18

## 2021-07-06 RX ADMIN — PIPERACILLIN AND TAZOBACTAM SCH MLS/HR: 3; .375 INJECTION, POWDER, FOR SOLUTION INTRAVENOUS at 08:04

## 2021-07-06 RX ADMIN — PIPERACILLIN AND TAZOBACTAM SCH MLS/HR: 3; .375 INJECTION, POWDER, FOR SOLUTION INTRAVENOUS at 16:09

## 2021-07-06 RX ADMIN — INSULIN ASPART SCH UNIT: 100 INJECTION, SOLUTION INTRAVENOUS; SUBCUTANEOUS at 13:26

## 2021-07-06 RX ADMIN — CHLORHEXIDINE GLUCONATE SCH ML: 1.2 RINSE ORAL at 09:51

## 2021-07-06 RX ADMIN — INSULIN ASPART SCH UNIT: 100 INJECTION, SOLUTION INTRAVENOUS; SUBCUTANEOUS at 23:48

## 2021-07-06 RX ADMIN — LEVOTHYROXINE SODIUM ANHYDROUS SCH MCG: 100 INJECTION, POWDER, LYOPHILIZED, FOR SOLUTION INTRAVENOUS at 08:46

## 2021-07-06 RX ADMIN — ATORVASTATIN CALCIUM SCH MG: 40 TABLET, FILM COATED ORAL at 09:51

## 2021-07-06 RX ADMIN — TAMSULOSIN HYDROCHLORIDE SCH: 0.4 CAPSULE ORAL at 17:39

## 2021-07-06 RX ADMIN — NOREPINEPHRINE BITARTRATE SCH: 1 INJECTION, SOLUTION, CONCENTRATE INTRAVENOUS at 23:34

## 2021-07-06 RX ADMIN — METOPROLOL TARTRATE SCH MG: 25 TABLET, FILM COATED ORAL at 21:11

## 2021-07-06 NOTE — CT
EXAMINATION TYPE: CT brain wo con

 

DATE OF EXAM: 7/6/2021

 

HISTORY: altered mental status

 

CT DLP: 1098.4 mGycm.  Automated Exposure Control for Dose Reduction was Utilized.

 

TECHNIQUE: CT scan of the head is performed without contrast.

 

COMPARISON: CT brain September 21, 2020.

 

FINDINGS:   There is no acute intracranial hemorrhage or midline shift identified. There is mild-to-m
oderate diffuse ventricular and sulcal prominence consistent with diffuse cerebral atrophy redemonstr
ated.  There is mild low-attenuation in the periventricular white matter consistent with chronic smal
l vessel ischemic change.  The globes are intact and the visualized sinuses are clear.   Vascular wli
cifications distal internal carotid arteries bilaterally.

 

IMPRESSION:  No acute intracranial hemorrhage or midline shift.  There is mild to moderate diffuse ag
e-related cerebral atrophy and mild chronic small vessel ischemic change redemonstrated.  No signific
ant change from prior.

## 2021-07-06 NOTE — P.PCN
Date of Procedure: 07/06/21


Preoperative Diagnosis: 


Acute cardiac arrest


Postoperative Diagnosis: 


Acute cardiac arrest


Procedure(s) Performed: 


Insertion of a triple-lumen catheter, insertion of an arterial line catheter


Anesthesia: local


Surgeon: Dion Holman


Estimated Blood Loss (ml): 0


Condition: critical


Disposition: ICU


Operative Findings: 











CENTRAL LINE








Indication:  Hemodynamic monitoring/Intravenous access.





A time-out was completed verifying correct patient, procedure, site, p

ositioning, and implant(s) or special equipment if applicable.





The patient was placed in a dependent position appropriate for central line 

placement based on the vein to be cannulated.  The patients left neck was 

prepped and draped in sterile fashion.  1% Lidocaine was used to anesthetize the

surrounding skin area.  A triple lumen 9F Cordis catheter was introduced into 

the  LT internal jugular  vein using Seldinger technique.  The catheter was 

threaded smoothly over the guide wire and appropriate blood return was obtained.

 Each lumen of the catheter was evacuated of air and flushed with sterile 

saline.  The catheter was then sutured in place to the skin and a sterile 

dressing applied.  Perfusion to the extremity distal to the point of catheter 

insertion was checked and found to be adequate.





The patient tolerated the procedure well and there were no complications.














ARTERIAL LINE








Indication:  Hemodynamic monitoring.





A time-out was completed verifying correct patient, procedure, site, 

positioning, and implant(s) or special equipment if applicable.





Allens test was performed to ensure adequate perfusion.  The patients left 

wrist was prepped and draped in sterile fashion.  1% Lidocaine was used to 

anesthetize the area.  An 18G Arrow arterial line was introduced into the 

[radial/femoral] artery.  The catheter was threaded over the guide wire and the 

needle was removed with appropriate pulsatile blood return.  Blood loss was 

minimal.  The catheter was then sutured in place to the skin and a sterile 

dressing applied.  Perfusion to the extremity distal to the point of catheter 

insertion was checked and found to be adequate.





The patient tolerated the procedure well and there were no complications.

## 2021-07-06 NOTE — P.PN
Subjective


Progress Note Date: 07/06/21





HISTORY OF PRESENT ILLNESS


76 years old  male patient of Dr. Rojo for past medical history 

of paroxysmal atrial fibrillation on Coumadin, hyperlipidemia, hypertension, 

coronary artery disease, history of nonischemic cardiomyopathy with EF 40%, 

valvular heart disease, moderate to severe pulmonary hypertension, obstructive 

sleep apnea on CPAP, hypothyroidism, morbid obesity, diabetes mellitus type 2 

with diabetic neuropathy, benign prostatic hypertrophy status post TURP, 

generalized osteoarthritis, recurrent depression, anemia of chronic disease, 

restless leg syndrome, chronic thrombocytopenia, chronic hypoxic respiratory 

failure on home O2.  Patient had recent hospitalization May 14 through May 20 

which time he was treated for acute on chronic systolic heart failure and acute 

kidney injury.  Patient was stabilized and discharged home  ,  readmitted again 

June 14, till June 22, for anasarca, presents with edema to the legs and thighs 

and flank area, lightheadedness, shortness of breath and weight gain of 12-15 

pounds since he left the hospital.  He had follow-up with Dr. Rojo and 

with cardiologist during his last admission, insulin 14 told June 22, he was 

diuresed, approximately losing 28 kg, was seen by multiple specialists, for 

anasarca, acute kidney injury, diastolic CHF, with cardiorenal process.  he had 

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

however Patient now presents to the emergency room, secondary to cardiac arrest 

related to atrial fibrillation, this happened at home, at 1705, CPR was provided

by the wife at 1709, EMS has a right, 1736, and was defibrillated once.  There  

is return of rhythm, atrial fibrillation, approximately down time was 35-40 

minutes.  Per emergency room physician.  Patient currently is mechanically 

ventilated, and will be transferred to ICU, consult will be made with pulmonary,

Dr. Linares labs are currently pending,  





In the emergency room, patient already is mechanically vented, hemoglobin 10.5, 

double basic count 9.7, INR 1.5, potassium 4.7 creatinine of 1.98, from a 

previous of 1.74, alkaline phosphatase elevated, ALT AST  pending labs, NP 

proBNP pending labs, chest x-ray pending, unsure what the CT of the brain was 

performed or not, possibly secondary to instability of current cardiac secretion

venous blood gas, pCO2 of 40, pH of 7.34, bicarb of 21 patient will proceed to 

ICU from the emergency room.  Critical care medicine sorry the aware, 

cardiologist with aware.  The decision is to hold off IV heparin at this time, 

as the patient has gerard CPR device, which could cause more hemorrhagic 

problems, while on IV heparin.  Further recommendations to follow regarding IV 

heparinization, depending on stability.  Patient has twitches when seen, patient

will be sedated with Versed, discussed with wife at detail, EEG of the brain to 

be done, for anoxic encephalopathy,





7/6: She remains in intensive care unit and followed by multiple consultants.  

He remains on mechanical ventilation with tidal volume 500, FiO2 40, PEEP of 5. 

Patient has no withdrawal to pain, no corneal reflex.  He has minimal cough and 

gag reflex.  He is off sedation and is unresponsive.  He is on heparin drip, 

amiodarone drip, IV Keppra. Family did make him no code last evening.  Patient i

s managed by pulmonary medicine/intensivist.  Patient has been afebrile, heart 

rate in the 70s, blood pressure 87/53, pulse ox 98%.  Repeat blood work reveals 

WBC 16.1, hemoglobin 10.7, platelet count 164.  INR 1.8.  Dr. erazo are normal, 

BUN 69 and creatinine 2.18.  Blood sugars run between 189 and 228.  Pro-

calcitonin 0.82.  Echocardiogram reveals EF of 25-30% with global hypokinesia, 

moderate concentric left ventricular hypertrophy, mild aortic regurgitation, 

mild mitral regurgitation, mild tricuspid regurgitation, mild to moderate 

pulmonary hypertension.  Patient is also followed by cardiology for atrial 

fibrillation controlled ventricular rate on IV heparin and IV amiodarone.  

Patient has been seen by neurology and an urgent CAT scan of the brain has been 

ordered which revealed no acute intracranial hemorrhage or midline shift.  Mild 

to moderate diffuse age-related cerebral atrophy and mild chronic small vessel 

ischemic changes redemonstrated.  No significant change. EEG is pending, 

continue Keppra 500 mg twice daily.





Review of Systems


ROS unobtainable: due to mental status





PHYSICAL EXAMINATION


Gen: This is a 76-year-old  male, intubated and on mechanical 

ventilation, appears to be in no acute distress.


HEENT: Head is atraumatic, normocephalic. Pupils equal, round. Sclerae is 

anicteric. 


NECK: Supple. No JVD. No lymphadenopathy. No thyromegaly. 


LUNGS: Clear to auscultation. No wheezes or rhonchi.  No intercostal 

retractions.


HEART: Irregular rate and rhythm.  Systolic murmur at the apex. 


ABDOMEN: Soft. Bowel sounds are present. No masses.  No tenderness.


EXTREMITIES: No pedal edema.  No calf tenderness.


NEUROLOGICAL: Patient is unresponsive. 





ASSESSMENT AND PLAN


1.  Cardiac arrest, with initial resuscitation at home by the wife, 

approximately starting at 1705, was defibrillated by EMS at 1736, approximate 

downtime 35-40 minutes, patient currently is mechanically ventilated, maintain 

in ICU, Dr. Linares critical care medicine, consult with cardiology appreciated, 

neurology consult appreciated..  Will need access for feeding device like an NG 

tube, for medications





2.  Acute hypoxemic respiratory failure, currently mechanically ventilated, 

related to cardiac event, and most likely would have secondary aspiration 

pneumonia, IV Zosyn to be started,





2.  A. fib paroxysmal with rapid ventricular rate, subtherapeutic INR, was on 

Coumadin, as the patient is sedated on vent, unsure about CVA event will discuss

with cardiology whether IV heparin would be appropriate,,





3  Acute on chronic systolic heart failure.  Discontinue Lasix, Zaroxolyn, cont

inue Lopressor 12.5 mg twice daily. Continue to monitor I&O and daily weights, 

monitor electrolytes and renal function.  





4.  Acute kidney injury likely secondary to cardiorenal process.  Discontinued 

Lasix and Aldactone.





5  Paroxysmal atrial fibrillation on Coumadin.  Patient is on heparin drip





6  Chronic anemia.  





 7.  Hypertension, hypertensive cardiovascular disease.  Continue metoprolol t

artrate 12.5 mg oral twice daily, hydralazine 25 mg twice daily





7.  Mild coronary artery disease.  Continue metoprolol.





8.  Nonischemic cardiomyopathy.





9.  Hypothyroidism.  Continue levothyroxine 125 g daily.





10.  Diabetes mellitus type 2 with diabetic neuropathy, uncontrolled with 

hypoglycemia.  Continue Levemir decreased to 10 units at bedtime and NovoLog 

scale every 6 hours.  Discontinued gabapentin 300 mg three times daily.





11.  Hyperlipidemia.  Discontinue statin.





12.  Obstructive sleep apnea on CPAP.





13.  Benign prostatic hypertrophy status post TURP.





14.  Generalized osteoarthritis.  Discontinue tramadol for pain.





15.  Recurrent depression.  Discontinue Zoloft 100 mg daily.





16.  Anemia of chronic disease.  Discontinue ferrous sulfate.





17.  Restless leg syndrome.  Discontinue Requip 2.5 mg 3 times daily.





18.  Thrombocytopenia, chronic.





19.  DVT prophylaxis.  Heparin drip





20.  GI prophylaxis.  Protonix 40 mg IV daily.





21.  Chronic hypoxic respiratory failure on home oxygen therapy.





22.  BPH with urinary retention, currently on Serna catheter, Flomax.





23.  Lumbar disc disease with retrolisthesis, patient has difficulty in lying 

down supine, special in bed.  Refers to be in a recliner, however this also 

inhibits recovery of the anasarca, below the waist





24.  Anasarca without proteinuria, treatment as above





Prognoses poor.





Patient will be admitted to the hospital for a minimum of 2 night stay.





DISCHARGE PLAN


TBD.





Impression and plan of care have been directed as dictated by the signing 

physician.  Mehnaz Quezada nurse practitioner acting as scribe for signing 

physician.





Objective





- Vital Signs


Vital signs: 


                                   Vital Signs











Temp  99.1 F   07/06/21 04:00


 


Pulse  77   07/06/21 10:15


 


Resp  17   07/06/21 10:15


 


BP  103/66   07/06/21 10:15


 


Pulse Ox  98   07/06/21 10:15








                                 Intake & Output











 07/05/21 07/06/21 07/06/21





 18:59 06:59 18:59


 


Intake Total  1115.305 318.082


 


Output Total  250 15


 


Balance  865.305 303.082


 


Weight 118.841 kg 121 kg 121 kg


 


Intake:   


 


  IV  1040 140


 


    Sodium Chloride 0.9% 1,  1040 40





    000 ml @ 999 mls/hr IV .   





    Q1H1M ONE Rx#:[id number]   


 


    levETIRAcetam IV 1,000 mg   100





    In Saline 1 100ml.bag @   





    400 mls/hr IVPB ONCE STA   





    Rx#:[id number]   


 


  Intake, IV Titration  75.305 178.082





  Amount   


 


    Midazolam HCl 50 mg In  1.567 





    Sodium Chloride 0.9% 40   





    ml @ 1 MG/HR 1 mls/hr IV   





    .Q24H [facility] Rx#:[id number]   


 


    Norepinephrine 4 mg In   125.628





    Sodium Chloride 0.9% 250   





    ml @ 0.05 MCG/KG/MIN 23.   





    051 mls/hr IV .Q11H2M [facility]   





    Rx#:[id number]   


 


    propofoL 1,000 mg In  73.738 52.454





    Empty Bag 1 bag @ Titrate   





    IV .Q0M [facility] Rx#:   





    [id number]   


 


Output:   


 


  Gastric Drainage  250 


 


  Urine  0 15


 


Other:   


 


  Voiding Method  Indwelling Catheter Indwelling Catheter


 


  # Bowel Movements  1 














- Labs


CBC & Chem 7: 


                                 07/06/21 08:58





                                 07/06/21 02:59


Labs: 


                  Abnormal Lab Results - Last 24 Hours (Table)











  07/05/21 07/05/21 07/05/21 Range/Units





  18:00 18:09 18:09 


 


WBC     (3.8-10.6)  k/uL


 


RBC   3.96 L   (4.30-5.90)  m/uL


 


Hgb   10.5 L   (13.0-17.5)  gm/dL


 


Hct   31.9 L   (39.0-53.0)  %


 


MCV     (80.0-100.0)  fL


 


RDW   16.0 H   (11.5-15.5)  %


 


Plt Count   130 L   (150-450)  k/uL


 


Neutrophils #     (1.3-7.7)  k/uL


 


Lymphocytes #     (1.0-4.8)  k/uL


 


PT    15.1 H  (9.0-12.0)  sec


 


INR    1.5 H  (<1.2)  


 


ABG pCO2     (35-45)  mmHg


 


ABG pO2     ()  mmHg


 


ABG HCO3     (21-25)  mmol/L


 


ABG Total CO2     (19-24)  mmol/L


 


ABG O2 Saturation     (94-97)  %


 


VBG HCO3     (24-28)  mmol/L


 


Chloride     ()  mmol/L


 


BUN     (9-20)  mg/dL


 


Creatinine     (0.66-1.25)  mg/dL


 


Glucose     (74-99)  mg/dL


 


POC Glucose (mg/dL)  309 H    (75-99)  mg/dL


 


Plasma Lactic Acid Walter     (0.7-2.0)  mmol/L


 


Calcium     (8.4-10.2)  mg/dL


 


Alkaline Phosphatase     ()  U/L


 


Troponin I     (0.000-0.034)  ng/mL


 


Urine Protein     (Negative)  


 


Urine Blood     (Negative)  


 


Ur Leukocyte Esterase     (Negative)  


 


Urine RBC     (0-5)  /hpf


 


Urine WBC     (0-5)  /hpf


 


Urine WBC Clumps     (None)  /hpf


 


Amorphous Sediment     (None)  /hpf


 


Urine Bacteria     (None)  /hpf


 


Urine Mucus     (None)  /hpf














  07/05/21 07/05/21 07/05/21 Range/Units





  18:09 18:09 18:09 


 


WBC     (3.8-10.6)  k/uL


 


RBC     (4.30-5.90)  m/uL


 


Hgb     (13.0-17.5)  gm/dL


 


Hct     (39.0-53.0)  %


 


MCV     (80.0-100.0)  fL


 


RDW     (11.5-15.5)  %


 


Plt Count     (150-450)  k/uL


 


Neutrophils #     (1.3-7.7)  k/uL


 


Lymphocytes #     (1.0-4.8)  k/uL


 


PT     (9.0-12.0)  sec


 


INR     (<1.2)  


 


ABG pCO2     (35-45)  mmHg


 


ABG pO2     ()  mmHg


 


ABG HCO3     (21-25)  mmol/L


 


ABG Total CO2     (19-24)  mmol/L


 


ABG O2 Saturation     (94-97)  %


 


VBG HCO3     (24-28)  mmol/L


 


Chloride   97 L   ()  mmol/L


 


BUN   67 H   (9-20)  mg/dL


 


Creatinine   1.98 H   (0.66-1.25)  mg/dL


 


Glucose   309 H   (74-99)  mg/dL


 


POC Glucose (mg/dL)     (75-99)  mg/dL


 


Plasma Lactic Acid Walter    6.7 H*  (0.7-2.0)  mmol/L


 


Calcium     (8.4-10.2)  mg/dL


 


Alkaline Phosphatase   137 H   ()  U/L


 


Troponin I     (0.000-0.034)  ng/mL


 


Urine Protein  2+ H    (Negative)  


 


Urine Blood  Trace H    (Negative)  


 


Ur Leukocyte Esterase  Large H    (Negative)  


 


Urine RBC  17 H    (0-5)  /hpf


 


Urine WBC  99 H    (0-5)  /hpf


 


Urine WBC Clumps  Rare H    (None)  /hpf


 


Amorphous Sediment  Rare H    (None)  /hpf


 


Urine Bacteria  Occasional H    (None)  /hpf


 


Urine Mucus  Rare H    (None)  /hpf














  07/05/21 07/05/21 07/05/21 Range/Units





  18:09 18:09 20:30 


 


WBC     (3.8-10.6)  k/uL


 


RBC     (4.30-5.90)  m/uL


 


Hgb     (13.0-17.5)  gm/dL


 


Hct     (39.0-53.0)  %


 


MCV     (80.0-100.0)  fL


 


RDW     (11.5-15.5)  %


 


Plt Count     (150-450)  k/uL


 


Neutrophils #     (1.3-7.7)  k/uL


 


Lymphocytes #     (1.0-4.8)  k/uL


 


PT     (9.0-12.0)  sec


 


INR     (<1.2)  


 


ABG pCO2     (35-45)  mmHg


 


ABG pO2     ()  mmHg


 


ABG HCO3     (21-25)  mmol/L


 


ABG Total CO2     (19-24)  mmol/L


 


ABG O2 Saturation     (94-97)  %


 


VBG HCO3   21 L   (24-28)  mmol/L


 


Chloride     ()  mmol/L


 


BUN     (9-20)  mg/dL


 


Creatinine     (0.66-1.25)  mg/dL


 


Glucose     (74-99)  mg/dL


 


POC Glucose (mg/dL)    291 H  (75-99)  mg/dL


 


Plasma Lactic Acid Walter     (0.7-2.0)  mmol/L


 


Calcium     (8.4-10.2)  mg/dL


 


Alkaline Phosphatase     ()  U/L


 


Troponin I  0.059 H*    (0.000-0.034)  ng/mL


 


Urine Protein     (Negative)  


 


Urine Blood     (Negative)  


 


Ur Leukocyte Esterase     (Negative)  


 


Urine RBC     (0-5)  /hpf


 


Urine WBC     (0-5)  /hpf


 


Urine WBC Clumps     (None)  /hpf


 


Amorphous Sediment     (None)  /hpf


 


Urine Bacteria     (None)  /hpf


 


Urine Mucus     (None)  /hpf














  07/05/21 07/05/21 07/06/21 Range/Units





  20:45 23:22 02:59 


 


WBC    12.9 H  (3.8-10.6)  k/uL


 


RBC    3.78 L  (4.30-5.90)  m/uL


 


Hgb    9.6 L  (13.0-17.5)  gm/dL


 


Hct    30.3 L  (39.0-53.0)  %


 


MCV     (80.0-100.0)  fL


 


RDW    16.0 H  (11.5-15.5)  %


 


Plt Count    129 L  (150-450)  k/uL


 


Neutrophils #    11.8 H  (1.3-7.7)  k/uL


 


Lymphocytes #    0.6 L  (1.0-4.8)  k/uL


 


PT     (9.0-12.0)  sec


 


INR     (<1.2)  


 


ABG pCO2  52 H    (35-45)  mmHg


 


ABG pO2  282 H    ()  mmHg


 


ABG HCO3  29 H    (21-25)  mmol/L


 


ABG Total CO2  30 H    (19-24)  mmol/L


 


ABG O2 Saturation  100.0 H    (94-97)  %


 


VBG HCO3     (24-28)  mmol/L


 


Chloride     ()  mmol/L


 


BUN     (9-20)  mg/dL


 


Creatinine     (0.66-1.25)  mg/dL


 


Glucose     (74-99)  mg/dL


 


POC Glucose (mg/dL)   260 H   (75-99)  mg/dL


 


Plasma Lactic Acid Walter     (0.7-2.0)  mmol/L


 


Calcium     (8.4-10.2)  mg/dL


 


Alkaline Phosphatase     ()  U/L


 


Troponin I     (0.000-0.034)  ng/mL


 


Urine Protein     (Negative)  


 


Urine Blood     (Negative)  


 


Ur Leukocyte Esterase     (Negative)  


 


Urine RBC     (0-5)  /hpf


 


Urine WBC     (0-5)  /hpf


 


Urine WBC Clumps     (None)  /hpf


 


Amorphous Sediment     (None)  /hpf


 


Urine Bacteria     (None)  /hpf


 


Urine Mucus     (None)  /hpf














  07/06/21 07/06/21 07/06/21 Range/Units





  02:59 04:50 05:51 


 


WBC     (3.8-10.6)  k/uL


 


RBC     (4.30-5.90)  m/uL


 


Hgb     (13.0-17.5)  gm/dL


 


Hct     (39.0-53.0)  %


 


MCV     (80.0-100.0)  fL


 


RDW     (11.5-15.5)  %


 


Plt Count     (150-450)  k/uL


 


Neutrophils #     (1.3-7.7)  k/uL


 


Lymphocytes #     (1.0-4.8)  k/uL


 


PT     (9.0-12.0)  sec


 


INR     (<1.2)  


 


ABG pCO2     (35-45)  mmHg


 


ABG pO2   188 H   ()  mmHg


 


ABG HCO3   27 H   (21-25)  mmol/L


 


ABG Total CO2   28 H   (19-24)  mmol/L


 


ABG O2 Saturation   99.8 H   (94-97)  %


 


VBG HCO3     (24-28)  mmol/L


 


Chloride     ()  mmol/L


 


BUN  69 H    (9-20)  mg/dL


 


Creatinine  2.18 H    (0.66-1.25)  mg/dL


 


Glucose  189 H    (74-99)  mg/dL


 


POC Glucose (mg/dL)    228 H  (75-99)  mg/dL


 


Plasma Lactic Acid Walter     (0.7-2.0)  mmol/L


 


Calcium  8.0 L    (8.4-10.2)  mg/dL


 


Alkaline Phosphatase     ()  U/L


 


Troponin I     (0.000-0.034)  ng/mL


 


Urine Protein     (Negative)  


 


Urine Blood     (Negative)  


 


Ur Leukocyte Esterase     (Negative)  


 


Urine RBC     (0-5)  /hpf


 


Urine WBC     (0-5)  /hpf


 


Urine WBC Clumps     (None)  /hpf


 


Amorphous Sediment     (None)  /hpf


 


Urine Bacteria     (None)  /hpf


 


Urine Mucus     (None)  /hpf














  07/06/21 07/06/21 Range/Units





  08:58 08:58 


 


WBC  16.1 H   (3.8-10.6)  k/uL


 


RBC  4.14 L   (4.30-5.90)  m/uL


 


Hgb  10.7 L   (13.0-17.5)  gm/dL


 


Hct  32.5 L   (39.0-53.0)  %


 


MCV  78.5 L   (80.0-100.0)  fL


 


RDW  16.0 H   (11.5-15.5)  %


 


Plt Count    (150-450)  k/uL


 


Neutrophils #  14.5 H   (1.3-7.7)  k/uL


 


Lymphocytes #  0.8 L   (1.0-4.8)  k/uL


 


PT   17.6 H  (9.0-12.0)  sec


 


INR   1.8 H  (<1.2)  


 


ABG pCO2    (35-45)  mmHg


 


ABG pO2    ()  mmHg


 


ABG HCO3    (21-25)  mmol/L


 


ABG Total CO2    (19-24)  mmol/L


 


ABG O2 Saturation    (94-97)  %


 


VBG HCO3    (24-28)  mmol/L


 


Chloride    ()  mmol/L


 


BUN    (9-20)  mg/dL


 


Creatinine    (0.66-1.25)  mg/dL


 


Glucose    (74-99)  mg/dL


 


POC Glucose (mg/dL)    (75-99)  mg/dL


 


Plasma Lactic Acid Walter    (0.7-2.0)  mmol/L


 


Calcium    (8.4-10.2)  mg/dL


 


Alkaline Phosphatase    ()  U/L


 


Troponin I    (0.000-0.034)  ng/mL


 


Urine Protein    (Negative)  


 


Urine Blood    (Negative)  


 


Ur Leukocyte Esterase    (Negative)  


 


Urine RBC    (0-5)  /hpf


 


Urine WBC    (0-5)  /hpf


 


Urine WBC Clumps    (None)  /hpf


 


Amorphous Sediment    (None)  /hpf


 


Urine Bacteria    (None)  /hpf


 


Urine Mucus    (None)  /hpf








                      Microbiology - Last 24 Hours (Table)











 07/05/21 21:00 Gram Stain - Preliminary





 Sputum Sputum Culture - Preliminary


 


 07/05/21 18:09 Urine Culture - Preliminary





 Urine,Catheterized

## 2021-07-06 NOTE — XR
EXAMINATION TYPE: XR chest 1V confirm line [facility]

 

DATE OF EXAM: 7/6/2021

 

COMPARISON: Today

 

HISTORY: Line placement

 

TECHNIQUE:

 

FINDINGS: There is left jugular catheter with tip in the superior vena cava. There is nasogastric tub
e in the stomach. The endotracheal tube is 6 cm from the stewart. There is poor inspiration. There is 
some interstitial infiltrates in both lung fields.

 

IMPRESSION: Interstitial pulmonary infiltrates not significantly different than exam 12 hours ago.

## 2021-07-06 NOTE — P.CNPUL
History of Present Illness


Consult date: 07/06/21


Chief complaint: Acute respiratory failure, acute cardiac arrest


History of present illness: 





76-year-old male patient, known history of CHF with a component of systolic 

heart failure and ejection fraction of 40% in addition to a combination of m

edical problems and comorbidities who was recently hospitalized for CHF and 

fluid overload.  The patient was treated after being diuresed he was discharged 

home and he came in to the emergency department yesterday following a cardiac 

arrests.  The patient was having a normal day and he was not having any chest 

pain or shortness of breath.  He came into the hospital after he had a cardiac 

arrest that occurred at home it's 1705 and CPR was provided by the wife and EMS 

arrived to the scene and he was defibrillated twice and there was resuscitation 

per ACLS protocol and the patient had a return obstructive circulation following

that.  The downtime was estimated to be around 35-40 minutes.  His according to 

the emergency department physicians.  For now, the patient is in the intensive 

care unit.  He is currently intubated on a mechanical ventilator.  He is in a 

assist-control mode at the rate of 10 with a tidal volume of 500 and FiO2 of 40%

with a PEEP of 5.  The chest x-ray showing central pulmonary vessel congestion. 

There is mild pulmonary edema.  ET tube is in a good location.  Orogastric tube 

is also in good location.  Blood gases from today showing a pH of 7.4 with a 

pCO2 of 44 and pO2 of 188 and this was on FiO2 of 60%.  He is receiving support 

with levo fed at 1 g per minute.  He was in nature fibrillation.  He was given 

amiodarone and currently amiodarone is running at a maintenance of 0.5 mg per 

minute.  Urine output is 0 at this point in time.  He was on propofol low dose 

which is currently off.  Neurologically is unresponsive.  He is very sluggish in

terms of his tubular response.  Corneal reflexes absent.  Positive breathing 

reflex as the patient is breathing above the mechanical ventilator.  Completely 

unresponsive to any verbal or painful stimulation.  He is known to have chronic 

atrial fibrillation.  He is also known to have hypertension and hyperlipidemia 

and nonocclusive coronary artery disease.  He is also known to have obstructive 

sleep apnea maintained on CPAP therapy, diabetes mellitus and diabetic 

peripheral neuropathy and hypothyroidism and BPH post TURP.  He has chronic 

anemia, are less, restless leg syndrome, chronic thrombocytopenia and chronic 

hypoxic respiratory failure maintained on oxygen at 2 L per minute nasal 

cannula.  The patient currently is in acute kidney injury.  Creatinine is at 

2.1.  Note that he was having some prerenal for cardiorenal insufficiency during

her hospitalizations.  Current urine output is 0.





Review of Systems


ROS unobtainable: due to endotracheal tube, due to mental status





Past Medical History


Past Medical History: Atrial Fibrillation, Coronary Artery Disease (CAD), Heart 

Failure, Diabetes Mellitus, Eye Disorder, Hearing Disorder / Deafness, 

Hyperlipidemia, Hypertension, Osteoarthritis (OA), Pneumonia, Prostate Disorder,

Sleep Apnea/CPAP/BIPAP, Thyroid Disorder


Additional Past Medical History / Comment(s): GLAUCOMA, covid


History of Any Multi-Drug Resistant Organisms: None Reported


Past Surgical History: Back Surgery, Cholecystectomy, Heart Catheterization, 

Joint Replacement, Prostate Surgery


Additional Past Surgical History / Comment(s): greer knee replacement, 

cardioverion, bilc cataracts, TURP


Past Anesthesia/Blood Transfusion Reactions: No Reported Reaction


Past Psychological History: No Psychological Hx Reported


Smoking Status: Never smoker


Past Alcohol Use History: None Reported


Past Drug Use History: None Reported





- Past Family History


  ** Father


History Unknown: Yes





  ** Mother


Family Medical History: No Reported History





  ** Brother(s)


Family Medical History: Cancer





Medications and Allergies


                                Home Medications











 Medication  Instructions  Recorded  Confirmed  Type


 


Ferrous Sulfate [Feosol] 325 mg PO TID 03/31/17 07/05/21 History


 


Gabapentin [Neurontin] 300 mg PO TID PRN 03/31/17 07/05/21 History


 


Latanoprost Ophth [Xalatan 0.005%] 1 drops BOTH EYES HS 03/31/17 07/05/21 

History


 


Sertraline [Zoloft] 100 mg PO DAILY 03/31/17 07/05/21 History


 


Simvastatin [Zocor] 40 mg PO HS 03/31/17 07/05/21 History


 


traMADol HCL [Ultram] 50 mg PO BID PRN 05/16/17 07/05/21 History


 


Metoprolol Tartrate [Lopressor] 12.5 mg PO BID 03/13/18 07/05/21 History


 


Cholecalciferol (Vitamin D3) 125 mcg PO DAILY 05/13/21 07/05/21 History





[Vitamin D3 (5000 Iu)]    


 


Fluocinolone Acetonide Body Oil 1 applic TOPICAL  05/13/21 07/05/21 History





0.01%    


 


Levothyroxine Sodium [Synthroid] 125 mcg PO DAILY 05/13/21 07/05/21 History


 


Triamcinolone 0.1% Cream [Kenalog 1 applicatio TOPICAL BID PRN 05/13/21 07/05/21

 History





0.1% Cream]    


 


Warfarin [Coumadin] 2.5 mg PO TUFR 05/13/21 07/05/21 History


 


Warfarin [Coumadin] 5 mg PO SUTUWETHSA 05/13/21 07/05/21 History


 


metFORMIN HCL [Glucophage] 500 mg PO BID 05/13/21 07/05/21 History


 


rOPINIRole HCL [Requip XL] 8 mg PO HS 05/13/21 07/05/21 History


 


SILVER sulfADIAZINE CREAM 1 applic TOPICAL BID PRN 06/13/21 07/05/21 History





[Silvadene Cream]    


 


Spironolactone 25 mg PO DAILY 06/13/21 07/05/21 History


 


diphenhydrAMINE [Benadryl] 25 mg PO HS 06/13/21 07/05/21 History


 


Furosemide [Lasix] 100 mg PO BID@0900,1600 #180 tab 06/22/21 07/05/21 Rx


 


Insulin Detemir (Levemir) [Levemir] 20 unit SQ HS #0 06/22/21 07/05/21 Rx


 


Losartan [Cozaar] 25 mg PO HS #90 tab 06/22/21 07/05/21 Rx


 


Midodrine [ProAmatine] 10 mg PO AC-TID #90 tab 06/22/21 07/05/21 Rx


 


Potassium Chloride ER [K-Dur 20] 20 meq PO BID #90 tab.er.prt 06/22/21 07/05/21 

Rx


 


Spironolactone [Aldactone] 50 mg PO DAILY #90 tab 06/22/21 07/05/21 Rx


 


Tamsulosin [Flomax] 0.4 mg PO PC-SUPPER #30 cap.er.24h 06/22/21 07/05/21 Rx


 


metOLazone [Zaroxolyn] 2.5 mg PO MOWEFR  tab 06/22/21 07/05/21 Rx








                                    Allergies











Allergy/AdvReac Type Severity Reaction Status Date / Time


 


Sulfa (Sulfonamide Allergy  Rash/Hives Verified 07/05/21 18:49





Antibiotics)     


 


hydromorphone [From Dilaudid] AdvReac  SEVERE Verified 07/05/21 18:49





   VOMITING  


 


meperidine [From Demerol] AdvReac  Nausea & Verified 07/05/21 18:49





   Vomiting &  





   Diarrhea  














Physical Exam


Vitals: 


                                   Vital Signs











  Temp Pulse Pulse Resp BP BP Pulse Ox


 


 07/06/21 10:15   77   17  103/66   98


 


 07/06/21 10:00   85   23  99/61   98


 


 07/06/21 09:45   84   19  98/59   95


 


 07/06/21 09:30   84   16  99/61   95


 


 07/06/21 09:15   84   19  102/65   96


 


 07/06/21 09:00   89   32 H  109/73   97


 


 07/06/21 08:45   97   21  100/69   96


 


 07/06/21 08:30   84   14  109/70   96


 


 07/06/21 08:15   96   18  105/74   95


 


 07/06/21 08:00   77   21  107/67   97


 


 07/06/21 07:45   79   15  106/62   97


 


 07/06/21 07:30   71   19  106/69   97


 


 07/06/21 07:15   85   13  104/65   97


 


 07/06/21 07:00   79   18  106/81   97


 


 07/06/21 06:45   71    99/78   97


 


 07/06/21 06:30   82    110/73   97


 


 07/06/21 06:15   81   15  104/69   97


 


 07/06/21 06:00   79   15  105/68   97


 


 07/06/21 05:45   80    92/75   97


 


 07/06/21 05:30   75   17  95/67   98


 


 07/06/21 05:15   73    102/64   99


 


 07/06/21 05:00   78   16  93/59   99


 


 07/06/21 04:45   71    102/58   99


 


 07/06/21 04:30   72    98/57   100


 


 07/06/21 04:15   79   12  96/71   100


 


 07/06/21 04:00  99.1 F  79   12  95/65   100


 


 07/06/21 03:45   79    95/55   100


 


 07/06/21 03:38    105 H  20   


 


 07/06/21 03:30   70    87/66   100


 


 07/06/21 03:15   68    92/64   100


 


 07/06/21 03:00   69   20  76/49   100


 


 07/06/21 02:45   74    80/58   100


 


 07/06/21 02:30   74    79/59   100


 


 07/06/21 02:15   76    86/63   100


 


 07/06/21 02:00   76   15  80/55   100


 


 07/06/21 01:45   73    82/58   100


 


 07/06/21 01:30   77    82/64   100


 


 07/06/21 01:15   75    88/59   100


 


 07/06/21 01:00   79   17  93/64   100


 


 07/06/21 00:45   81   15  88/59   100


 


 07/06/21 00:30   83   13  89/64   100


 


 07/06/21 00:15   86   21  73/47   99


 


 07/06/21 00:00  99.2 F  85  105 H  20  71/50   98


 


 07/05/21 23:45     11 L  79/59   99


 


 07/05/21 23:30   84   21  82/60   98


 


 07/05/21 23:15   91    80/48   97


 


 07/05/21 23:00   91    82/54   97


 


 07/05/21 22:45   92   21  79/56   97


 


 07/05/21 22:30   92   12  88/63   98


 


 07/05/21 22:15   98   17  89/59   98


 


 07/05/21 22:00   96   12  92/60   97


 


 07/05/21 21:45   101 H   12  97/60   98


 


 07/05/21 21:30   91   18  94/72   98


 


 07/05/21 21:15   96   20  103/73   97


 


 07/05/21 21:00   98   20  106/66   97


 


 07/05/21 20:45  98.9 F  107 H   10 L  114/75   98


 


 07/05/21 20:15  98.0 F   105 H    114/78  99


 


 07/05/21 20:00    105 H  12   112/63  100


 


 07/05/21 19:50    112 H    


 


 07/05/21 19:45    101 H    113/82  99


 


 07/05/21 19:41  98.9 F      


 


 07/05/21 19:30    91    114/87  100


 


 07/05/21 19:15    113 H    112/70  99


 


 07/05/21 19:00    108 H    107/72  99


 


 07/05/21 18:45    112 H    104/66  99


 


 07/05/21 18:30    103 H    106/65  100


 


 07/05/21 18:15    106 H    106/76  99


 


 07/05/21 18:05    105 H  22   97/71  99


 


 07/05/21 18:01  97.4 F L  121 H   12  125/93   99








                                Intake and Output











 07/05/21 07/06/21 07/06/21





 22:59 06:59 14:59


 


Intake Total 1.567 1113.738 318.082


 


Output Total 0 250 15


 


Balance 1.567 863.738 303.082


 


Intake:   


 


  IV  1040 140


 


    Sodium Chloride 0.9% 1,  1040 40





    000 ml @ 999 mls/hr IV .   





    Q1H1M ONE Rx#:[id number]   


 


    levETIRAcetam IV 1,000 mg   100





    In Saline 1 100ml.bag @   





    400 mls/hr IVPB ONCE STA   





    Rx#:[id number]   


 


  Intake, IV Titration 1.567 73.738 178.082





  Amount   


 


    Midazolam HCl 50 mg In 1.567  





    Sodium Chloride 0.9% 40   





    ml @ 1 MG/HR 1 mls/hr IV   





    .Q24H [facility] Rx#:[id number]   


 


    Norepinephrine 4 mg In   125.628





    Sodium Chloride 0.9% 250   





    ml @ 0.05 MCG/KG/MIN 23.   





    051 mls/hr IV .Q11H2M [facility]   





    Rx#:[id number]   


 


    propofoL 1,000 mg In  73.738 52.454





    Empty Bag 1 bag @ Titrate   





    IV .Q0M [facility] Rx#:   





    [id number]   


 


Output:   


 


  Gastric Drainage  250 


 


  Urine 0 0 15


 


Other:   


 


  Voiding Method Indwelling Catheter Indwelling Catheter Indwelling Catheter


 


  # Bowel Movements 1 1 


 


  Weight 121 kg 121 kg 121 kg














Results





- Laboratory Findings


CBC and BMP: 


                                 07/06/21 08:58





                                 07/06/21 02:59


ABG











ABG pH  7.40  (7.35-7.45)   07/06/21  04:50    


 


ABG pCO2  44 mmHg (35-45)   07/06/21  04:50    


 


ABG pO2  188 mmHg ()  H  07/06/21  04:50    


 


ABG O2 Saturation  99.8 % (94-97)  H  07/06/21  04:50    





PT/INR, D-dimer











PT  17.6 sec (9.0-12.0)  H  07/06/21  08:58    


 


INR  1.8  (<1.2)  H  07/06/21  08:58    








Abnormal lab findings: 


                                  Abnormal Labs











  07/05/21 07/05/21 07/05/21





  18:00 18:09 18:09


 


WBC   


 


RBC   3.96 L 


 


Hgb   10.5 L 


 


Hct   31.9 L 


 


MCV   


 


RDW   16.0 H 


 


Plt Count   130 L 


 


Neutrophils #   


 


Lymphocytes #   


 


PT    15.1 H


 


INR    1.5 H


 


ABG pCO2   


 


ABG pO2   


 


ABG HCO3   


 


ABG Total CO2   


 


ABG O2 Saturation   


 


VBG HCO3   


 


Chloride   


 


BUN   


 


Creatinine   


 


Glucose   


 


POC Glucose (mg/dL)  309 H  


 


Plasma Lactic Acid Walter   


 


Calcium   


 


Alkaline Phosphatase   


 


Troponin I   


 


Urine Protein   


 


Urine Blood   


 


Ur Leukocyte Esterase   


 


Urine RBC   


 


Urine WBC   


 


Urine WBC Clumps   


 


Amorphous Sediment   


 


Urine Bacteria   


 


Urine Mucus   














  07/05/21 07/05/21 07/05/21





  18:09 18:09 18:09


 


WBC   


 


RBC   


 


Hgb   


 


Hct   


 


MCV   


 


RDW   


 


Plt Count   


 


Neutrophils #   


 


Lymphocytes #   


 


PT   


 


INR   


 


ABG pCO2   


 


ABG pO2   


 


ABG HCO3   


 


ABG Total CO2   


 


ABG O2 Saturation   


 


VBG HCO3   


 


Chloride   97 L 


 


BUN   67 H 


 


Creatinine   1.98 H 


 


Glucose   309 H 


 


POC Glucose (mg/dL)   


 


Plasma Lactic Acid Walter    6.7 H*


 


Calcium   


 


Alkaline Phosphatase   137 H 


 


Troponin I   


 


Urine Protein  2+ H  


 


Urine Blood  Trace H  


 


Ur Leukocyte Esterase  Large H  


 


Urine RBC  17 H  


 


Urine WBC  99 H  


 


Urine WBC Clumps  Rare H  


 


Amorphous Sediment  Rare H  


 


Urine Bacteria  Occasional H  


 


Urine Mucus  Rare H  














  07/05/21 07/05/21 07/05/21





  18:09 18:09 20:30


 


WBC   


 


RBC   


 


Hgb   


 


Hct   


 


MCV   


 


RDW   


 


Plt Count   


 


Neutrophils #   


 


Lymphocytes #   


 


PT   


 


INR   


 


ABG pCO2   


 


ABG pO2   


 


ABG HCO3   


 


ABG Total CO2   


 


ABG O2 Saturation   


 


VBG HCO3   21 L 


 


Chloride   


 


BUN   


 


Creatinine   


 


Glucose   


 


POC Glucose (mg/dL)    291 H


 


Plasma Lactic Acid Walter   


 


Calcium   


 


Alkaline Phosphatase   


 


Troponin I  0.059 H*  


 


Urine Protein   


 


Urine Blood   


 


Ur Leukocyte Esterase   


 


Urine RBC   


 


Urine WBC   


 


Urine WBC Clumps   


 


Amorphous Sediment   


 


Urine Bacteria   


 


Urine Mucus   














  07/05/21 07/05/21 07/06/21





  20:45 23:22 02:59


 


WBC    12.9 H


 


RBC    3.78 L


 


Hgb    9.6 L


 


Hct    30.3 L


 


MCV   


 


RDW    16.0 H


 


Plt Count    129 L


 


Neutrophils #    11.8 H


 


Lymphocytes #    0.6 L


 


PT   


 


INR   


 


ABG pCO2  52 H  


 


ABG pO2  282 H  


 


ABG HCO3  29 H  


 


ABG Total CO2  30 H  


 


ABG O2 Saturation  100.0 H  


 


VBG HCO3   


 


Chloride   


 


BUN   


 


Creatinine   


 


Glucose   


 


POC Glucose (mg/dL)   260 H 


 


Plasma Lactic Acid Walter   


 


Calcium   


 


Alkaline Phosphatase   


 


Troponin I   


 


Urine Protein   


 


Urine Blood   


 


Ur Leukocyte Esterase   


 


Urine RBC   


 


Urine WBC   


 


Urine WBC Clumps   


 


Amorphous Sediment   


 


Urine Bacteria   


 


Urine Mucus   














  07/06/21 07/06/21 07/06/21





  02:59 04:50 05:51


 


WBC   


 


RBC   


 


Hgb   


 


Hct   


 


MCV   


 


RDW   


 


Plt Count   


 


Neutrophils #   


 


Lymphocytes #   


 


PT   


 


INR   


 


ABG pCO2   


 


ABG pO2   188 H 


 


ABG HCO3   27 H 


 


ABG Total CO2   28 H 


 


ABG O2 Saturation   99.8 H 


 


VBG HCO3   


 


Chloride   


 


BUN  69 H  


 


Creatinine  2.18 H  


 


Glucose  189 H  


 


POC Glucose (mg/dL)    228 H


 


Plasma Lactic Acid Walter   


 


Calcium  8.0 L  


 


Alkaline Phosphatase   


 


Troponin I   


 


Urine Protein   


 


Urine Blood   


 


Ur Leukocyte Esterase   


 


Urine RBC   


 


Urine WBC   


 


Urine WBC Clumps   


 


Amorphous Sediment   


 


Urine Bacteria   


 


Urine Mucus   














  07/06/21 07/06/21





  08:58 08:58


 


WBC  16.1 H 


 


RBC  4.14 L 


 


Hgb  10.7 L 


 


Hct  32.5 L 


 


MCV  78.5 L 


 


RDW  16.0 H 


 


Plt Count  


 


Neutrophils #  14.5 H 


 


Lymphocytes #  0.8 L 


 


PT   17.6 H


 


INR   1.8 H


 


ABG pCO2  


 


ABG pO2  


 


ABG HCO3  


 


ABG Total CO2  


 


ABG O2 Saturation  


 


VBG HCO3  


 


Chloride  


 


BUN  


 


Creatinine  


 


Glucose  


 


POC Glucose (mg/dL)  


 


Plasma Lactic Acid Walter  


 


Calcium  


 


Alkaline Phosphatase  


 


Troponin I  


 


Urine Protein  


 


Urine Blood  


 


Ur Leukocyte Esterase  


 


Urine RBC  


 


Urine WBC  


 


Urine WBC Clumps  


 


Amorphous Sediment  


 


Urine Bacteria  


 


Urine Mucus  














Assessment and Plan


Plan: 





1 V. fib cardiac arrest with prolonged downtime of around 35-40 minutes.  The 

patient was resuscitated initially at home and later on by EMS.  The patient was

intubated and placed on mechanical ventilator.  Currently hemodynamically stable

on minimal dose of norepinephrine infusion.  Cardiac rhythm is A. fib for now 

and norepinephrine is running at 1 g per minute.  This is most likely an 

ischemic event as the patient has ischemic changes on EKG with ST segment 

depressions involving the anterolateral leads and no significant troponin e

levation.





2 acute hypoxic/anoxic encephalopathy secondary to above mentioned cardiac 

arrest, and the patient was having some extensive posturing, currently loaded 

with Keppra 





3 chronic systolic heart failure with an ejection fraction of around 40% based 

on previous echocardiograms and previous hospitalization for volume overload and

decompensated heart failure





4 chronic atrial fibrillation





5 diabetes mellitus type 2 along with history of diabetic peripheral neuropathy





6 hypertension





7 obstructive sleep apnea





8 acute kidney injury with oligoria/.  The patient was having some issues with 

renal dysfunction related to diuretics and cardiac renal syndrome and the 

patient's current creatinine is at 2.1.  Serna cath is in place.





9 Chronic nonocclusive CAD, current troponin is a 0.05 without any acute 

ischemic changes on EKG as the patient has anterolateral ischemia with ST 

segment depressions





10 history of depression





11 BPH





12 osteoarthritis





13 hyperlipidemia





14 anemia of chronic disease





Plan





Monitor mental status.  There is a concern for significant anoxic encephalopathy

at this point in time due to the prolonged downtime.  The patient will be worked

up accordingly.  A CAT scan of the brain will be ordered.  EEG will be ordered. 

Neurology consultation is in progress.  The patient was given loading dose of 

Keppra 1 g along with a maintenance of 500 mg every 12 hours


Wean off pressors and discontinue levo fed


Continue amiodarone maintenance for another dose of 0.5 mg per minute


Repeat echocardiogram


With the patient IV heparin regards to his chronic atrial fibrillation.  Noted 

the patient was receiving warfarin on outpatient basis with his PT/INR was 

subtherapeutic


Repeat another set of troponins


Restart low-dose metoprolol 12.5 mg by mouth twice a day


Restart Levemir half a dose of 10 units every in addition to his vascular 

coverage


With the patient on high-dose statins with Lipitor 40 mg by mouth daily


Monitor renal function


Avoid nephrotoxic agents


IV Zosyn as empiric antibiotic coverage for aspiration


We'll continue to follow.  Condition is critical.  Outcome is poor baseline 

above-mentioned comorbidities.  This evaluation was done more than 30 minutes.  

We'll establish lines.








Time with Patient: Greater than 30

## 2021-07-06 NOTE — EEG
ELECTROENCEPHALOGRAM REPORT



DATE OF SERVICE:

07/06/2021.



ELECTROENCEPHALOGRAM (EEG) REPORT:



TECHNIQUE:

This is a report from a continuous/2.5 hour inpatient digital EEG performed using the

10/20 electrode placement system.



HISTORY:

Cardiac arrest.



CURRENT MEDICATIONS:

Unknown.



FINDINGS:

Recording start time:  07/06/2021 at 11:43 am.

Recording end time: 07/06/2021 at 2:33 pm.



EVENTS:

During this segment of the recording, no clinical or electrographic seizures were

recorded.



BACKGROUND:

A sustained posterior dominant rhythm was not seen.



ACTIVATION:

Hyperventilation:  Not performed.

Photic stimulation:  No driving seen.

Sleep:  Distinctive sleep stages not seen.



ABNORMALITIES:

This EEG demonstrates a burst/suppression pattern.  The bursts consisted of a mixture

of generalized triphasic waves and generalized frontally predominant sharps.  The

frequency of the burst was 4-6 hertz.  The duration of the burst ranged from 1-3

seconds.  Near the 2nd half of the recording, the duration of the bursts shortened to

approximately 2 seconds.  The duration of suppression ranged from 8 seconds up to

approximately 15 seconds.



IMPRESSION:

Abnormal 2.5 hour continuous EEG.  No clinical or electrographic seizures were

recorded.  This EEG demonstrated a burst/suppression pattern.  A burst suppression

pattern indicates severe diffuse cerebral dysfunction as may be seen in anoxic or

hypoxic encephalopathy.  The bursts consisted of some generalized frontally predominant

sharps, which were epileptiform in nature.  The burst also consisted of triphasic

waves, which are not epileptiform in nature.  Triphasic waves can be seen in the

setting of a metabolic or anoxic encephalopathy.  No definitive clinical symptoms were

noted, subtle clinical symptoms cannot necessarily be excluded.  These findings were

called to the neurologist taking care of the patient at 5:00 pm on 07/06/2021.





MMODL / RYANN: [id number] / Job#: [id number]

## 2021-07-06 NOTE — ECHOF
Referral Reason:cardiac arrest



MEASUREMENTS

--------

HEIGHT: 182.9 cm

WEIGHT: 120.7 kg

BP: 103/66

RVIDd:   4.2 cm     (< 3.3)

IVSd:   1.6 cm     (0.6 - 1.1)

LVIDd:   5.4 cm     (3.9 - 5.3)

LVPWd:   1.6 cm     (0.6 - 1.1)

IVSs:   2.4 cm

LVIDs:   4.1 cm

LVPWs:   1.7 cm

LA Diam:   5.2 cm     (2.7 - 3.8)

Ao Diam:   4.2 cm     (2.0 - 3.7)

AV Cusp:   2.2 cm     (1.5 - 2.6)

MV EXCURSION:   28.850 mm     (> 18.000)

MV EF SLOPE:   93 mm/s     (70 - 150)

EPSS:   1.8 cm

AR PHT:   1075 ms

RAP:   15.00 mmHg

RVSP:   46.60 mmHg







FINDINGS

--------

Atrial fibrillation.

This was a technically difficult study with suboptimal apical views.

The left ventricular size is normal.   There is moderate concentric left ventricular hypertrophy.   O
verall left ventricular systolic function is severely impaired with, an EF between 25 - 30 %.   Globa
l hypokinesis

The right ventricle is severely enlarged.

The left atrium is moderately dilated.

The right atrium is normal in size.

5 ml of Lumason was utilized for enhancement of images.

Interatrial and interventricular septum intact.

There is mild aortic valve sclerosis.   Trace to mild aortic regurgitation.

The mitral valve leaflets are mildly thickened.   Mild mitral annular calcification present.   Mild m
itral regurgitation is present.

Mild tricuspid regurgitation present.   There is mild to moderate pulmonary hypertension.   The right
 ventricular systolic pressure, as measured by Doppler, is 46.60mmHg.

Trace/mild (physiologic)  pulmonic regurgitation.

The aortic root is dilated measuring 4.2cm.

The inferior vena cava is dilated with no significant inspiratory collapse which is consistent estima
christophe right atrial pressure of >15 mmHg.

There is no pericardial effusion.



CONCLUSIONS

--------

1. The left ventricular size is normal.

2. There is moderate concentric left ventricular hypertrophy.

3. Overall left ventricular systolic function is severely impaired with, an EF between 25 - 30 %.

4. Global hypokinesis

5. The right ventricle is severely enlarged.

6. The left atrium is moderately dilated.

7. 5 ml of Lumason was utilized for enhancement of images.

8. There is mild aortic valve sclerosis.

9. Trace to mild aortic regurgitation.

10. The mitral valve leaflets are mildly thickened.

11. Mild mitral annular calcification present.

12. Mild mitral regurgitation is present.

13. Mild tricuspid regurgitation present.

14. There is mild to moderate pulmonary hypertension.

15. The right ventricular systolic pressure, as measured by Doppler, is 46.60mmHg.

16. Trace/mild (physiologic)  pulmonic regurgitation.

17. The aortic root is dilated measuring 4.2cm.

18. The inferior vena cava is dilated with no significant inspiratory collapse which is consistent es
timated right atrial pressure of >15 mmHg.

19. There is no pericardial effusion.





SONOGRAPHER: Morena Costa RDCS

## 2021-07-06 NOTE — XR
EXAMINATION TYPE: XR chest 1V portable

 

DATE OF EXAM: 7/6/2021

 

COMPARISON: 7/5/2021

 

HISTORY: Tube placement

 

TECHNIQUE: Single frontal view of the chest is obtained.

 

FINDINGS:  Endotracheal tube and enteric catheter are again visualized. Low lung volumes. Heart size 
is enlarged. Central pulmonary vascular prominence and mild pulmonary interstitial prominence. No ple
ural effusion or pneumothorax.

 

IMPRESSION:  

1. Central pulmonary vascular congestion and mild pulmonary edema. This has decreased since prior exa
m.

2. Endotracheal tube and enteric catheter are stable.

## 2021-07-06 NOTE — P.CNNES
History of Present Illness


Consult date: 07/06/21


Requesting physician: Frankie Arroyo


Reason for Consult: post cardiac arrest.  assess neurologically


History of Present Illness: 


This is a 76-year-old gentleman with history of diabetes mellitus, nonischemic 

cardiomyopathy, congestive heart failure, atrial fibrillation status post 

cardioversion and is on Coumadin, hyperlipidemia, hypothyroidism, sleep apnea, 

glaucoma presented to the emergency department on 07/05/2021 because of a 

cardiac arrest.  The history is obtained from medical record.  Seems that the 

patient had a cardiac arrest outside the hospital.  According to the ED note the

initial call was at 1509 on 07/05/2021 in which the wife found her  

unresponsive and began CPR.  Patient was shocked once because of the ventricular

fibrillation.  Seems that the also the patient had asystole and CPR continued 

and the patient had return of spontaneous circulation.  Total downtime is 

between 35 and 45 minutes.  Per the primary team's note is seems that the 

patient's initial re-station home by wife was approximately started at 1705 and 

had a defibrillator by EMS at 1736 and possibly approximately downtime was 35-40

minutes.  As a result the patient was intubated and is on mechanical ventilatio

n.  


Some of the other home medication includes Requip, metformin, spironolactone, 

simvastatin 40 mg daily at bedtime, Zoloft, midrange 10 mg 1 tablet 3 times a 

day, metoprolol, losartan, Synthroid, gabapentin 300 mg 1 tablet 3 times a day, 

Lasix, vitamin D3.





Some of the work-up in the hospital:


Patient initial vitals: Blood pressure of 125/93 foot during the his hospital 

stay his blood pressure was as low as 70s over 40s, heart rate of 121, 

temperature of 97.4 Fahrenheit axillary, respiratory of 12, pulse ox of 99% on 

BVM.


EKG is reported as age are fibrillation with rapid ventricular response.  Left 

anterior fascicular block.  ST and T-wave abnormality, consider anterolateral 

ischemia.  Abnormal EKG.


Chest x-rays reported as there is mild pulmonary interstitial edema.  This 

appears new compared to last exam.


Initial white blood cell is 9.7 which is within normal limits but repeated is 

12.9 which is elevated.  The hemoglobin initially 7.5 hematocrit is 31.9 

platelet is 130K.


Initial POC glucose is 309 which is elevated, creatinine is 1.98 and the BUN is 

67 which are also elevated.  Most recent creatinine is 2.18 which is trending 

up.  The plasma lactic vein is 6.7 elevated.


The sodium was 139, calcium is 9.2, magnesium is 1.9, AST of 50 and ALT of 33 

which are within normal limits.


Troponin is 0.059 which is a elevated.


Patient was given 2 mg of Versed in the ED.  Patient was started on amiodarone 

drip as well as propofol IV drip.  He was on Propofol 10mcg/kg/min in the AM.


Per the patient's nurse his wife had made him DNR.








Review of Systems


Review of system is limited but the prone positive and negative as per HPI.








Past Medical History


Past Medical History: Atrial Fibrillation, Heart Failure, Diabetes Mellitus, Eye

Disorder, Hearing Disorder / Deafness, Hyperlipidemia, Hypertension, 

Osteoarthritis (OA), Pneumonia, Prostate Disorder, Sleep Apnea/CPAP/BIPAP, 

Thyroid Disorder


Additional Past Medical History / Comment(s): GLAUCOMA, covid


History of Any Multi-Drug Resistant Organisms: None Reported


Past Surgical History: Back Surgery, Cholecystectomy, Heart Catheterization, 

Joint Replacement, Prostate Surgery


Additional Past Surgical History / Comment(s): greer knee replacement, 

cardioverion, bilc cataracts, TURP


Past Anesthesia/Blood Transfusion Reactions: No Reported Reaction


Past Psychological History: No Psychological Hx Reported


Smoking Status: Never smoker


Past Alcohol Use History: None Reported


Past Drug Use History: None Reported





- Past Family History


  ** Father


History Unknown: Yes





  ** Mother


Family Medical History: No Reported History





  ** Brother(s)


Family Medical History: Cancer





Medications and Allergies


                                Home Medications











 Medication  Instructions  Recorded  Confirmed  Type


 


Ferrous Sulfate [Feosol] 325 mg PO TID 03/31/17 07/05/21 History


 


Gabapentin [Neurontin] 300 mg PO TID PRN 03/31/17 07/05/21 History


 


Latanoprost Ophth [Xalatan 0.005%] 1 drops BOTH EYES HS 03/31/17 07/05/21 

History


 


Sertraline [Zoloft] 100 mg PO DAILY 03/31/17 07/05/21 History


 


Simvastatin [Zocor] 40 mg PO HS 03/31/17 07/05/21 History


 


traMADol HCL [Ultram] 50 mg PO BID PRN 05/16/17 07/05/21 History


 


Metoprolol Tartrate [Lopressor] 12.5 mg PO BID 03/13/18 07/05/21 History


 


Cholecalciferol (Vitamin D3) 125 mcg PO DAILY 05/13/21 07/05/21 History





[Vitamin D3 (5000 Iu)]    


 


Fluocinolone Acetonide Body Oil 1 applic TOPICAL HS 05/13/21 07/05/21 History





0.01%    


 


Levothyroxine Sodium [Synthroid] 125 mcg PO DAILY 05/13/21 07/05/21 History


 


Triamcinolone 0.1% Cream [Kenalog 1 applicatio TOPICAL BID PRN 05/13/21 07/05/21

 History





0.1% Cream]    


 


Warfarin [Coumadin] 2.5 mg PO TUFR 05/13/21 07/05/21 History


 


Warfarin [Coumadin] 5 mg PO SUTUWETHSA 05/13/21 07/05/21 History


 


metFORMIN HCL [Glucophage] 500 mg PO BID 05/13/21 07/05/21 History


 


rOPINIRole HCL [Requip XL] 8 mg PO HS 05/13/21 07/05/21 History


 


SILVER sulfADIAZINE CREAM 1 applic TOPICAL BID PRN 06/13/21 07/05/21 History





[Silvadene Cream]    


 


Spironolactone 25 mg PO DAILY 06/13/21 07/05/21 History


 


diphenhydrAMINE [Benadryl] 25 mg PO HS 06/13/21 07/05/21 History


 


Furosemide [Lasix] 100 mg PO BID@0900,1600 #180 tab 06/22/21 07/05/21 Rx


 


Insulin Detemir (Levemir) [Levemir] 20 unit SQ HS #0 06/22/21 07/05/21 Rx


 


Losartan [Cozaar] 25 mg PO HS #90 tab 06/22/21 07/05/21 Rx


 


Midodrine [ProAmatine] 10 mg PO AC-TID #90 tab 06/22/21 07/05/21 Rx


 


Potassium Chloride ER [K-Dur 20] 20 meq PO BID #90 tab.er.prt 06/22/21 07/05/21 

Rx


 


Spironolactone [Aldactone] 50 mg PO DAILY #90 tab 06/22/21 07/05/21 Rx


 


Tamsulosin [Flomax] 0.4 mg PO PC-SUPPER #30 cap.er.24h 06/22/21 07/05/21 Rx


 


metOLazone [Zaroxolyn] 2.5 mg PO MOWEFR  tab 06/22/21 07/05/21 Rx








                                    Allergies











Allergy/AdvReac Type Severity Reaction Status Date / Time


 


Sulfa (Sulfonamide Allergy  Rash/Hives Verified 07/05/21 18:49





Antibiotics)     


 


hydromorphone [From Dilaudid] AdvReac  SEVERE Verified 07/05/21 18:49





   VOMITING  


 


meperidine [From Demerol] AdvReac  Nausea & Verified 07/05/21 18:49





   Vomiting &  





   Diarrhea  














Physical Examination





- Vital Signs


Vital Signs: 


                                   Vital Signs











  Temp Pulse Pulse Resp BP BP Pulse Ox


 


 07/06/21 07:00   79   18  106/81   97


 


 07/06/21 06:45   71    99/78   97


 


 07/06/21 06:30   82    110/73   97


 


 07/06/21 06:15   81   15  104/69   97


 


 07/06/21 06:00   79   15  105/68   97


 


 07/06/21 05:45   80    92/75   97


 


 07/06/21 05:30   75   17  95/67   98


 


 07/06/21 05:15   73    102/64   99


 


 07/06/21 05:00   78   16  93/59   99


 


 07/06/21 04:45   71    102/58   99


 


 07/06/21 04:30   72    98/57   100


 


 07/06/21 04:15   79   12  96/71   100


 


 07/06/21 04:00  99.1 F  79   12  95/65   100


 


 07/06/21 03:45   79    95/55   100


 


 07/06/21 03:38    105 H  20   


 


 07/06/21 03:30   70    87/66   100


 


 07/06/21 03:15   68    92/64   100


 


 07/06/21 03:00   69   20  76/49   100


 


 07/06/21 02:45   74    80/58   100


 


 07/06/21 02:30   74    79/59   100


 


 07/06/21 02:15   76    86/63   100


 


 07/06/21 02:00   76   15  80/55   100


 


 07/06/21 01:45   73    82/58   100


 


 07/06/21 01:30   77    82/64   100


 


 07/06/21 01:15   75    88/59   100


 


 07/06/21 01:00   79   17  93/64   100


 


 07/06/21 00:45   81   15  88/59   100


 


 07/06/21 00:30   83   13  89/64   100


 


 07/06/21 00:15   86   21  73/47   99


 


 07/06/21 00:00  99.2 F  85  105 H  20  71/50   98


 


 07/05/21 23:45     11 L  79/59   99


 


 07/05/21 23:30   84   21  82/60   98


 


 07/05/21 23:15   91    80/48   97


 


 07/05/21 23:00   91    82/54   97


 


 07/05/21 22:45   92   21  79/56   97


 


 07/05/21 22:30   92   12  88/63   98


 


 07/05/21 22:15   98   17  89/59   98


 


 07/05/21 22:00   96   12  92/60   97


 


 07/05/21 21:45   101 H   12  97/60   98


 


 07/05/21 21:30   91   18  94/72   98


 


 07/05/21 21:15   96   20  103/73   97


 


 07/05/21 21:00   98   20  106/66   97


 


 07/05/21 20:45  98.9 F  107 H   10 L  114/75   98


 


 07/05/21 20:15  98.0 F   105 H    114/78  99


 


 07/05/21 20:00    105 H  12   112/63  100


 


 07/05/21 19:50    112 H    


 


 07/05/21 19:45    101 H    113/82  99


 


 07/05/21 19:41  98.9 F      


 


 07/05/21 19:30    91    114/87  100


 


 07/05/21 19:15    113 H    112/70  99


 


 07/05/21 19:00    108 H    107/72  99


 


 07/05/21 18:45    112 H    104/66  99


 


 07/05/21 18:30    103 H    106/65  100


 


 07/05/21 18:15    106 H    106/76  99


 


 07/05/21 18:05    105 H  22   97/71  99


 


 07/05/21 18:01  97.4 F L  121 H   12  125/93   99








                                Intake and Output











 07/05/21 07/06/21 07/06/21





 22:59 06:59 14:59


 


Intake Total 1.567 1113.738 10


 


Output Total 0 250 


 


Balance 1.567 863.738 10


 


Intake:   


 


  IV  1040 10


 


    Sodium Chloride 0.9% 1,  1040 10





    000 ml @ 999 mls/hr IV .   





    Q1H1M [facility] Rx#:[id number]   


 


  Intake, IV Titration 1.567 73.738 





  Amount   


 


    Midazolam HCl 50 mg In 1.567  





    Sodium Chloride 0.9% 40   





    ml @ 1 MG/HR 1 mls/hr IV   





    .Q24H [facility] Rx#:[id number]   


 


    propofoL 1,000 mg In  73.738 





    Empty Bag 1 bag @ Titrate   





    IV .Q0M [facility] Rx#:   





    [id number]   


 


Output:   


 


  Gastric Drainage  250 


 


  Urine 0 0 


 


Other:   


 


  Voiding Method Indwelling Catheter Indwelling Catheter 


 


  # Bowel Movements 1 1 


 


  Weight 121 kg 121 kg 











GENERAL: The patient is lying in bed and does not seem in acute distress.  


CHEST: The heart rate is regular rate rhythm.   No murmurs to auscultation.  No 

carotid bruit bilaterally.


LUNG: Clear to auscultation bilaterally no wheezing noted throughout.  Not 

labored breathing.  He is intubated on ventilator and breathing over the Vent 

(AC set at 10 and breathing at 20).


ABDOMEN/GI: Bowel sounds present in all 4 quadrants. No tenderness to palpation 

throughout.





NEUROLOGICAL:


Limited because of his condition.  Sedation (IV propofol 10mcg/kg/min was held 

for about 20 minutes prior to examination).


Higher mental function: GCS 4 (E1, VT1, M2).  The patient is comatose.  

Nonresponsive and not following commands.


Cranial nerves: I had to manually open his eyes.  The pupils are round, right is

3mm and left 2mm and felt slugghishly reactive to light.  Negative corneal 

reflex bilaterally.  Primary gaze are midline to slightly looking up. Negative 

occulocephalic reflex.  Is breathing over the vent.  +ve weak gag reflex.  Rest 

of cranial nerves could not be assessed because of his condition.


Motor: The strength is 0/5 throughout even with painful stimuli no withdrawl to 

pain.  Patient has rare to occasional episode of extensor posturing of upper 

extremities.  Normal bulk.  Slightly decrease tone throughout.  


Cerebellum: Could not assess.


Sensation: Not withdrawing to any of extremities to painful stimuli.  He had 

extensor posturing on the left upper extremity to painful stimuli.  


Reflexes (right/left): 1+


Plantars are mute bilaterally. 








Results


Urinalysis: The urine appears cloudy, leukocyte esterase large, urine white 

blood cell is 99, urine bacteria was occasional was seen suggestive of urinary 

tract infection.


Coagulation of study: PT of 15.1, INR 1.5 and PTT of 24.7


Urine toxicology screen is not detected.








- Laboratory Findings


CBC and BMP: 


                                 07/06/21 08:58





                                 07/06/21 02:59


Abnormal Lab Findings: 


                                  Abnormal Labs











  07/05/21 07/05/21 07/05/21





  18:00 18:09 18:09


 


WBC   


 


RBC   3.96 L 


 


Hgb   10.5 L 


 


Hct   31.9 L 


 


RDW   16.0 H 


 


Plt Count   130 L 


 


Neutrophils #   


 


Lymphocytes #   


 


PT    15.1 H


 


INR    1.5 H


 


ABG pCO2   


 


ABG pO2   


 


ABG HCO3   


 


ABG Total CO2   


 


ABG O2 Saturation   


 


VBG HCO3   


 


Chloride   


 


BUN   


 


Creatinine   


 


Glucose   


 


POC Glucose (mg/dL)  309 H  


 


Plasma Lactic Acid Walter   


 


Calcium   


 


Alkaline Phosphatase   


 


Troponin I   


 


Urine Protein   


 


Urine Blood   


 


Ur Leukocyte Esterase   


 


Urine RBC   


 


Urine WBC   


 


Urine WBC Clumps   


 


Amorphous Sediment   


 


Urine Bacteria   


 


Urine Mucus   














  07/05/21 07/05/21 07/05/21





  18:09 18:09 18:09


 


WBC   


 


RBC   


 


Hgb   


 


Hct   


 


RDW   


 


Plt Count   


 


Neutrophils #   


 


Lymphocytes #   


 


PT   


 


INR   


 


ABG pCO2   


 


ABG pO2   


 


ABG HCO3   


 


ABG Total CO2   


 


ABG O2 Saturation   


 


VBG HCO3   


 


Chloride   97 L 


 


BUN   67 H 


 


Creatinine   1.98 H 


 


Glucose   309 H 


 


POC Glucose (mg/dL)   


 


Plasma Lactic Acid Walter    6.7 H*


 


Calcium   


 


Alkaline Phosphatase   137 H 


 


Troponin I   


 


Urine Protein  2+ H  


 


Urine Blood  Trace H  


 


Ur Leukocyte Esterase  Large H  


 


Urine RBC  17 H  


 


Urine WBC  99 H  


 


Urine WBC Clumps  Rare H  


 


Amorphous Sediment  Rare H  


 


Urine Bacteria  Occasional H  


 


Urine Mucus  Rare H  














  07/05/21 07/05/21 07/05/21





  18:09 18:09 20:30


 


WBC   


 


RBC   


 


Hgb   


 


Hct   


 


RDW   


 


Plt Count   


 


Neutrophils #   


 


Lymphocytes #   


 


PT   


 


INR   


 


ABG pCO2   


 


ABG pO2   


 


ABG HCO3   


 


ABG Total CO2   


 


ABG O2 Saturation   


 


VBG HCO3   21 L 


 


Chloride   


 


BUN   


 


Creatinine   


 


Glucose   


 


POC Glucose (mg/dL)    291 H


 


Plasma Lactic Acid Walter   


 


Calcium   


 


Alkaline Phosphatase   


 


Troponin I  0.059 H*  


 


Urine Protein   


 


Urine Blood   


 


Ur Leukocyte Esterase   


 


Urine RBC   


 


Urine WBC   


 


Urine WBC Clumps   


 


Amorphous Sediment   


 


Urine Bacteria   


 


Urine Mucus   














  07/05/21 07/05/21 07/06/21





  20:45 23:22 02:59


 


WBC    12.9 H


 


RBC    3.78 L


 


Hgb    9.6 L


 


Hct    30.3 L


 


RDW    16.0 H


 


Plt Count    129 L


 


Neutrophils #    11.8 H


 


Lymphocytes #    0.6 L


 


PT   


 


INR   


 


ABG pCO2  52 H  


 


ABG pO2  282 H  


 


ABG HCO3  29 H  


 


ABG Total CO2  30 H  


 


ABG O2 Saturation  100.0 H  


 


VBG HCO3   


 


Chloride   


 


BUN   


 


Creatinine   


 


Glucose   


 


POC Glucose (mg/dL)   260 H 


 


Plasma Lactic Acid Walter   


 


Calcium   


 


Alkaline Phosphatase   


 


Troponin I   


 


Urine Protein   


 


Urine Blood   


 


Ur Leukocyte Esterase   


 


Urine RBC   


 


Urine WBC   


 


Urine WBC Clumps   


 


Amorphous Sediment   


 


Urine Bacteria   


 


Urine Mucus   














  07/06/21 07/06/21 07/06/21





  02:59 04:50 05:51


 


WBC   


 


RBC   


 


Hgb   


 


Hct   


 


RDW   


 


Plt Count   


 


Neutrophils #   


 


Lymphocytes #   


 


PT   


 


INR   


 


ABG pCO2   


 


ABG pO2   188 H 


 


ABG HCO3   27 H 


 


ABG Total CO2   28 H 


 


ABG O2 Saturation   99.8 H 


 


VBG HCO3   


 


Chloride   


 


BUN  69 H  


 


Creatinine  2.18 H  


 


Glucose  189 H  


 


POC Glucose (mg/dL)    228 H


 


Plasma Lactic Acid Walter   


 


Calcium  8.0 L  


 


Alkaline Phosphatase   


 


Troponin I   


 


Urine Protein   


 


Urine Blood   


 


Ur Leukocyte Esterase   


 


Urine RBC   


 


Urine WBC   


 


Urine WBC Clumps   


 


Amorphous Sediment   


 


Urine Bacteria   


 


Urine Mucus   














Assessment and Plan


Assessment: 








* Altered mental status: Hypoxic-anoxic brain injury due to prolonged cardiac 

  arrest (downtime 35-45 minutes).  Also some component of metabolic 

  encephalopathy (Acute kidney injury, hyperglycemia) and medication effect (IV 

  Propofol and received Versed)


* Cardiogenic shock on norepinephrine


* Atrial fibrillation with rapid ventricular rate (history of cardioversion and 

  is on Coumadin (INR is subtherapeutic 1.5) On Amiodarone 


* Acute on chronic kidney insufficiency


* Acute hypoxemic respiratory failure on mechanical ventilation


* Diabetes mellitus type 2


* History of nonischemic cardiomyopathy


* Congestive heart failure


* Hyperlipidemia


* History of hypertension


* Sleep apneaHistory of glaucoma and cataract





Plan: 





* I ordered an urgent CT of the head.  Patient is to get the CT once the patient

  is stable.  


* An EEG is ordered by the primary team and is pending to be done.  I loaded the

  patient on Keppra 1000mg once and maintenance of 500mg every 12 hours since 

  having extensor posturing for seizure prophylaxis.


* Ordered Every hour neuro checks


* Cardiology is on board


* We'll defer the rest of the medical management to the ICU and the primary team





* The condition is very guarded.  At this current time, I feel the prognosis 

  seems poor from the prolonged cardiac arrest and limited brain function (has 

  only some brainstem reflexes) but will see if there is any improvement by 

  tomorrow and will follow-up the test ordered above.





The plan is discussed with the ICU attending and nurse. 


Thank you for the consultation.





UPDATE:


Prolonged EEG preliminary shows burst suppression and the patient was not on any

sedation.  No clinical or electrographic seizures were recorded.


CT of the head is reported as no acute intracranial hemorrhage or midline shift.

 There is mild to moderate diffuse age-related cerebral atrophy and mild chronic

small vessel ischemic changes redemonstrated.  No significant change from prior.

 I personally reviewed the CT of the head and I felt there is some poor gray-

white matter differentiation but I didn't appreciate any midline shift or 

hydrocephalus/





I spoke in details with the patient's wife Savanna and the his two boys about his 

condition.  Patient wife wants to give the patient 24 hours and if he does not 

show any improvement possibly will consider hospice/comfort care management.


He is currently DNR per wife wishes.





Josh Linares M.D.


Neuro-hospitalist








Time with Patient: Greater than 30

## 2021-07-06 NOTE — CONS
CONSULTATION



CHIEF COMPLAINT:

Status post cardiac arrest.



HISTORY OF PRESENT ILLNESS:

This is a 76-year-old gentleman with history of permanent atrial fibrillation on long-

term Coumadin, hypertension, dyslipidemia, coronary artery disease, cardiomyopathy,

sleep apnea, moderate to severe pulmonary hypertension, hypothyroidism, morbid obesity,

and type 2 diabetes who is admitted to the hospital following a cardiac arrest at home.

He had sudden loss of consciousness, was down for almost 35 minutes.  When the EMS

arrived, he was found to be in VFib and he was resuscitated.  He has been intubated and

on vent since.  He currently has renal failure and remains in atrial fibrillation with

somewhat poorly controlled ventricular rate and it appears the patient suffered hypoxic

encephalopathy, which is currently being worked up by the neurologist.  At the time of

my evaluation this morning, patient is in atrial fibrillation with controlled

ventricular rate.  He has had the first set of troponin that was mildly elevated.  Has

not been rechecked.  EKG shows atrial fibrillation with nonspecific ST-T wave changes

suggestive of subendocardial ischemia.



PAST MEDICAL HISTORY:

Significant for atrial fibrillation, cardiomyopathy, pulmonary hypertension, sleep

apnea, diabetes.



MEDICATIONS:

Medications at home include Ultram, _____, Zaroxolyn, Benadryl, Glucophage, Coumadin,

Aldactone, Zocor, Zoloft, K-Dur, Lopressor, Cozaar, Synthroid and Xalatan, insulin,

Neurontin, Lasix and iron.



ALLERGIC:

TO SULFA, DILAUDID AND DEMEROL.



FAMILY HISTORY:

Family history and social history unable to obtain.



REVIEW OF SYSTEMS:

I am unable to obtain from the patient who intubated on vent.



PHYSICAL EXAMINATION:

On exam, heart rate is 84 beats per minute, regular.  Blood pressure is 98/59.

Respiratory rate is 18, O2 saturation is 95%.

CHEST exam reveals good air entry bilaterally.

HEART exam reveals first and second heart sounds, irregular rhythm and a systolic

murmur at the apex.

ABDOMEN:  Soft.

Exam of EXTREMITIES did not reveal any edema.  Peripheral pulses are palpable.



LABS:

Labs show that the white cell count is elevated at 16, hemoglobin is 10.7.  INR is 1.8.

BUN is 69, creatinine is 2.  Potassium is 4.5.



ASSESSMENT:

1. Status post cardiac arrest.

2. Permanent atrial fibrillation with controlled ventricular rate.

3. Respiratory failure.

4. Renal failure.



PLAN:

The patient is currently on IV heparin and on IV amiodarone which I am going to

continue.  His prognosis is guarded.  Neurology is going to evaluate him for hypoxic

encephalopathy.  We will check an echocardiogram on him.





ARACELI / IJN: [id number] / Job#: [id number]

## 2021-07-07 LAB
ANION GAP SERPL CALC-SCNC: 15 MMOL/L
APTT BLD: 50.2 SEC (ref 22–30)
BASOPHILS # BLD AUTO: 0 K/UL (ref 0–0.2)
BASOPHILS NFR BLD AUTO: 0 %
BUN SERPL-SCNC: 94 MG/DL (ref 9–20)
CALCIUM SPEC-MCNC: 8.9 MG/DL (ref 8.4–10.2)
CHLORIDE SERPL-SCNC: 99 MMOL/L (ref 98–107)
CO2 BLDA-SCNC: 26 MMOL/L (ref 19–24)
CO2 SERPL-SCNC: 24 MMOL/L (ref 22–30)
EOSINOPHIL # BLD AUTO: 0 K/UL (ref 0–0.7)
EOSINOPHIL NFR BLD AUTO: 0 %
ERYTHROCYTE [DISTWIDTH] IN BLOOD BY AUTOMATED COUNT: 3.62 M/UL (ref 4.3–5.9)
ERYTHROCYTE [DISTWIDTH] IN BLOOD: 16.2 % (ref 11.5–15.5)
GLUCOSE BLD-MCNC: 148 MG/DL (ref 75–99)
GLUCOSE BLD-MCNC: 159 MG/DL (ref 75–99)
GLUCOSE SERPL-MCNC: 147 MG/DL (ref 74–99)
HCO3 BLDA-SCNC: 25 MMOL/L (ref 21–25)
HCT VFR BLD AUTO: 27.8 % (ref 39–53)
HGB BLD-MCNC: 9.6 GM/DL (ref 13–17.5)
INR PPP: 2.3 (ref ?–1.2)
LYMPHOCYTES # SPEC AUTO: 1 K/UL (ref 1–4.8)
LYMPHOCYTES NFR SPEC AUTO: 7 %
MAGNESIUM SPEC-SCNC: 1.8 MG/DL (ref 1.6–2.3)
MCH RBC QN AUTO: 26.5 PG (ref 25–35)
MCHC RBC AUTO-ENTMCNC: 34.4 G/DL (ref 31–37)
MCV RBC AUTO: 77 FL (ref 80–100)
MONOCYTES # BLD AUTO: 0.6 K/UL (ref 0–1)
MONOCYTES NFR BLD AUTO: 5 %
NEUTROPHILS # BLD AUTO: 11.7 K/UL (ref 1.3–7.7)
NEUTROPHILS NFR BLD AUTO: 87 %
PCO2 BLDA: 36 MMHG (ref 35–45)
PH BLDA: 7.44 [PH] (ref 7.35–7.45)
PLATELET # BLD AUTO: 115 K/UL (ref 150–450)
PO2 BLDA: 119 MMHG (ref 83–108)
POTASSIUM SERPL-SCNC: 5.1 MMOL/L (ref 3.5–5.1)
PT BLD: 22.4 SEC (ref 9–12)
SODIUM SERPL-SCNC: 138 MMOL/L (ref 137–145)
WBC # BLD AUTO: 13.5 K/UL (ref 3.8–10.6)

## 2021-07-07 RX ADMIN — INSULIN ASPART SCH UNIT: 100 INJECTION, SOLUTION INTRAVENOUS; SUBCUTANEOUS at 12:08

## 2021-07-07 RX ADMIN — METOPROLOL TARTRATE SCH MG: 25 TABLET, FILM COATED ORAL at 08:31

## 2021-07-07 RX ADMIN — INSULIN DETEMIR SCH UNIT: 100 INJECTION, SOLUTION SUBCUTANEOUS at 06:20

## 2021-07-07 RX ADMIN — HEPARIN SODIUM SCH MLS/HR: 10000 INJECTION, SOLUTION INTRAVENOUS at 06:18

## 2021-07-07 RX ADMIN — TAMSULOSIN HYDROCHLORIDE SCH: 0.4 CAPSULE ORAL at 14:15

## 2021-07-07 RX ADMIN — ATROPINE SULFATE PRN DROPS: 10 SOLUTION/ DROPS OPHTHALMIC at 19:54

## 2021-07-07 RX ADMIN — INSULIN ASPART SCH UNIT: 100 INJECTION, SOLUTION INTRAVENOUS; SUBCUTANEOUS at 06:19

## 2021-07-07 RX ADMIN — MORPHINE SULFATE SCH MLS/HR: 50 INJECTION, SOLUTION, CONCENTRATE INTRAVENOUS at 21:55

## 2021-07-07 RX ADMIN — PIPERACILLIN AND TAZOBACTAM SCH MLS/HR: 3; .375 INJECTION, POWDER, FOR SOLUTION INTRAVENOUS at 08:30

## 2021-07-07 RX ADMIN — CHLORHEXIDINE GLUCONATE SCH ML: 1.2 RINSE ORAL at 08:30

## 2021-07-07 RX ADMIN — MIDODRINE HYDROCHLORIDE SCH MG: 5 TABLET ORAL at 06:18

## 2021-07-07 RX ADMIN — PANTOPRAZOLE SODIUM SCH MG: 40 INJECTION, POWDER, FOR SOLUTION INTRAVENOUS at 08:31

## 2021-07-07 RX ADMIN — ATORVASTATIN CALCIUM SCH MG: 40 TABLET, FILM COATED ORAL at 08:30

## 2021-07-07 RX ADMIN — LEVETIRACETAM SCH MLS/HR: 15 INJECTION INTRAVENOUS at 08:30

## 2021-07-07 RX ADMIN — MORPHINE SULFATE PRN MG: 2 INJECTION, SOLUTION INTRAMUSCULAR; INTRAVENOUS at 19:49

## 2021-07-07 RX ADMIN — MORPHINE SULFATE PRN MG: 2 INJECTION, SOLUTION INTRAMUSCULAR; INTRAVENOUS at 15:06

## 2021-07-07 RX ADMIN — LEVOTHYROXINE SODIUM ANHYDROUS SCH MCG: 100 INJECTION, POWDER, LYOPHILIZED, FOR SOLUTION INTRAVENOUS at 08:31

## 2021-07-07 NOTE — P.PN
Subjective


Progress Note Date: 07/07/21








76-year-old male patient, known history of CHF with a component of systolic 

heart failure and ejection fraction of 40% in addition to a combination of 

medical problems and comorbidities who was recently hospitalized for CHF and 

fluid overload.  The patient was treated after being diuresed he was discharged 

home and he came in to the emergency department yesterday following a cardiac 

arrests.  The patient was having a normal day and he was not having any chest 

pain or shortness of breath.  He came into the hospital after he had a cardiac 

arrest that occurred at home it's 1705 and CPR was provided by the wife and EMS 

arrived to the scene and he was defibrillated twice and there was resuscitation 

per ACLS protocol and the patient had a return obstructive circulation following

that.  The downtime was estimated to be around 35-40 minutes.  His according to 

the emergency department physicians.  For now, the patient is in the intensive 

care unit.  He is currently intubated on a mechanical ventilator.  He is in a 

assist-control mode at the rate of 10 with a tidal volume of 500 and FiO2 of 40%

with a PEEP of 5.  The chest x-ray showing central pulmonary vessel congestion. 

There is mild pulmonary edema.  ET tube is in a good location.  Orogastric tube 

is also in good location.  Blood gases from today showing a pH of 7.4 with a 

pCO2 of 44 and pO2 of 188 and this was on FiO2 of 60%.  He is receiving support 

with levo fed at 1 g per minute.  He was in nature fibrillation.  He was given 

amiodarone and currently amiodarone is running at a maintenance of 0.5 mg per 

minute.  Urine output is 0 at this point in time.  He was on propofol low dose 

which is currently off.  Neurologically is unresponsive.  He is very sluggish in

terms of his tubular response.  Corneal reflexes absent.  Positive breathing 

reflex as the patient is breathing above the mechanical ventilator.  Completely 

unresponsive to any verbal or painful stimulation.  He is known to have chronic 

atrial fibrillation.  He is also known to have hypertension and hyperlipidemia 

and nonocclusive coronary artery disease.  He is also known to have obstructive 

sleep apnea maintained on CPAP therapy, diabetes mellitus and diabetic 

peripheral neuropathy and hypothyroidism and BPH post TURP.  He has chronic 

anemia, are less, restless leg syndrome, chronic thrombocytopenia and chronic 

hypoxic respiratory failure maintained on oxygen at 2 L per minute nasal 

cannula.  The patient currently is in acute kidney injury.  Creatinine is at 

2.1.  Note that he was having some prerenal for cardiorenal insufficiency during

her hospitalizations.  Current urine output is 0.





On today's evaluation of 07/07/2021, seeing this patient for a follow-up.  The 

patient remains completely unresponsive while off the sedation.  He is a bit 

more active compared to yesterday.  He is moving his legs spontaneously.  No 

purposeful movement and the patient is not following any commands or answering 

any questions and he does not grimace to deep painful stimulation.  He has an 

upward gaze and pupils are sluggishly reactive to light.  Corneas are still 

absent.  He has a breathing reflex.  He had a CAT scan of the brain yesterday 

that showed no acute abnormalities.  His EEG showed burst suppression.  No 

epileptic focus and the patient was kept on Keppra.  He is being monitored in 

terms of his neurologic functions.  There are signs of severe hypoxic 

encephalopathy in this patient.  Meanwhile, the patient remains intubated on a 

mechanical ventilator.  The patient is currently on assist control mode at a 

rate of 10 with a tidal volume of 5 and FiO2 of 40% with a PEEP of 5.  The 

patient's chest x-ray shows no major interval change and there is no vessel 

congestion.  ET tube is in a good location.  Some infiltration of the lung bases

are seen bilaterally.  The patient's blood gas showed a pH of 7.44 with a pCO2 

of 36 and pO2 of 119 and this was done above-mentioned ventilator setting.  No 

significant orotracheal secretions.  He seems to be quite comfortable and 

synchronous mechanical ventilator.  Hemodynamically, the patient is still on 

amiodarone drip at the maintenance of 0.5 mg per minute.  His cardiac rhythm is 

sinus.  His echocardiogram showed an LV function which was impaired with an 

ejection fraction of 25-30% along with global hypokinesis and the patient also 

had RV dilatation and dysfunction, moderate concentric LVH, PA pressure 

estimated to be 46.  The patient remains on IV heparin.  PTT is therapeutic at 

this point in time.  Hemoglobin stable at 9.6.  He is on IV Zosyn as an empiric 

antibiotic coverage for possible aspiration pneumonia.  With a causative 13.5.  

The patient is afebrile.  The patient was also started on enteral feeding for 

nutritional support.  The patient is currently on vital high protein at the rate

of 20 mL an hour.  The patient is on IV fluids at KVO.  An ultrasound the 

kidneys that was done during an earlier admission showed no evidence of 

hydronephrosis.  Nevertheless the patient is going into an acute kidney injury. 

Creatinine is up to 3.66 and the patient's urine output is quite diminished in 

the neck fluid balance over the past 24 hours has been +1.4 L.  Urine output 

over the past 24 hours has been approximately 500.








Objective





- Vital Signs


Vital signs: 


                                   Vital Signs











Temp  99.0 F   07/07/21 08:00


 


Pulse  111 H  07/07/21 11:00


 


Resp  26 H  07/07/21 11:00


 


BP  132/73   07/07/21 11:00


 


Pulse Ox  98   07/07/21 11:00








                                 Intake & Output











 07/06/21 07/07/21 07/07/21





 18:59 06:59 18:59


 


Intake Total 914.171 872.428 195


 


Output Total 90 265 135


 


Balance 824.171 607.428 60


 


Weight 121 kg 121.8 kg 


 


Intake:   


 


   356 65


 


    0.9 Pressure bag 6 36 15


 


    KVO  110 50


 


    Piperacillin-Tazobactam 3  100 





    .375 gm In Sodium   





    Chloride 0.9% 100 ml @ 25   





    mls/hr IVPB Q8HR [facility] Rx#   





    :[id number]   


 


    Sodium Chloride 0.9% 1, 110 10 





    000 ml @ 999 mls/hr IV .   





    Q1H1M ONE Rx#:[id number]   


 


    levETIRAcetam IV 1,000 mg 210 100 





    In Saline 1 100ml.bag @   





    400 mls/hr IVPB ONCE STA   





    Rx#:[id number]   


 


  Intake, IV Titration 528.171 166.428 





  Amount   


 


    Amiodarone 450 mg In 250  





    Dextrose 5% in Water 250   





    ml @ 0.5 MG/MIN 16.667   





    mls/hr IV .Q15H [facility] Rx#:   





    [id number]   


 


    Heparin Sod,Pork in 0.45% 77.167 166.428 





    NaCl 25,000 unit In 0.45   





    % NaCl 1 250ml.bag @ 8.   





    2644 UNITS/KG/HR 10 mls/   





    hr IV .Q24H [facility] Rx#:   





    [id number]   


 


    Norepinephrine 4 mg In 131.852  





    Sodium Chloride 0.9% 250   





    ml @ 0.05 MCG/KG/MIN 23.   





    051 mls/hr IV .Q11H2M [facility]   





    Rx#:[id number]   


 


    propofoL 1,000 mg In 69.152  





    Empty Bag 1 bag @ Titrate   





    IV .Q0M [facility] Rx#:   





    [id number]   


 


  Tube Feeding 30 170 100


 


  Other 30 180 30


 


Output:   


 


  Urine 90 265 135


 


Other:   


 


  Voiding Method Indwelling Catheter Indwelling Catheter 








                       ABP, PAP, CO, CI - Last Documented











Arterial Blood Pressure        149/63

















- Exam








Gen. appearance, comfortable, unresponsive, not responding to any verbal or 

painful fibrillation.  Intubated on a mechanical ventilator.  Orogastric and 

orotracheal tube are both in place.


Head exam was generally normal. There was no scleral icterus or corneal arcus. 

Mucous membranes were moist.


Neck was supple and without jugular venous distension, thyromegaly, or carotid 

bruits. Carotids were easily palpable bilaterally. There was no adenopathy.


Lungs were clear to auscultation and percussion, and with normal diaphragmatic 

excursion. No wheezes or rales were noted. 


Cardiac exam revealed the PMI to be normally situated and sized. The rhythm was 

regular and no extrasystoles were noted during several minutes of auscultation. 

The first and second heart sounds were normal and physiologic splitting of the 

second heart sound was noted. There were no murmurs, rubs, clicks, or gallops.


Abdominal exam revealed normal bowel sounds. The abdomen was soft, non-tender, 

and without masses, organomegaly, or appreciable enlargement of the abdominal 

aorta.


Examination of the extremities revealed easily palpable radial, femoral and 

pedal pulses. There was no cyanosis, clubbing or edema.


Examination of the skin revealed no evidence of significant rashes, suspicious 

appearing nevi or other concerning lesions.


Neurologically the patient is currently off sedation.  The patient does not 

follow any commands.  Does not respond to any painful stimulation.  Pupils are 

sluggishly reactive to light and the patient has an upward gaze.  No corneal 

reflexes.  No nystagmus.  Motor function is 0 out of 5.  Reflexes are +1 and 

symmetrical.  Plantars are probably going out versus mute.





- Labs


CBC & Chem 7: 


                                 07/07/21 04:22





                                 07/07/21 04:22


Labs: 


                  Abnormal Lab Results - Last 24 Hours (Table)











  07/06/21 07/06/21 07/06/21 Range/Units





  02:59 02:59 08:58 


 


WBC     (3.8-10.6)  k/uL


 


RBC     (4.30-5.90)  m/uL


 


Hgb     (13.0-17.5)  gm/dL


 


Hct     (39.0-53.0)  %


 


MCV     (80.0-100.0)  fL


 


RDW     (11.5-15.5)  %


 


Plt Count     (150-450)  k/uL


 


Neutrophils #     (1.3-7.7)  k/uL


 


PT     (9.0-12.0)  sec


 


INR     (<1.2)  


 


APTT     (22.0-30.0)  sec


 


ABG pO2     ()  mmHg


 


ABG Total CO2     (19-24)  mmol/L


 


ABG O2 Saturation     (94-97)  %


 


BUN     (9-20)  mg/dL


 


Creatinine     (0.66-1.25)  mg/dL


 


Glucose     (74-99)  mg/dL


 


POC Glucose (mg/dL)     (75-99)  mg/dL


 


Hemoglobin A1c  6.5 H    (4.0-6.0)  %


 


HDL Cholesterol    25.0 L  (40.0-60.0)  mg/dL


 


Procalcitonin   0.82 H   (0.02-0.09)  ng/mL














  07/06/21 07/06/21 07/06/21 Range/Units





  11:41 14:41 18:09 


 


WBC     (3.8-10.6)  k/uL


 


RBC     (4.30-5.90)  m/uL


 


Hgb     (13.0-17.5)  gm/dL


 


Hct     (39.0-53.0)  %


 


MCV     (80.0-100.0)  fL


 


RDW     (11.5-15.5)  %


 


Plt Count     (150-450)  k/uL


 


Neutrophils #     (1.3-7.7)  k/uL


 


PT     (9.0-12.0)  sec


 


INR     (<1.2)  


 


APTT   34.7 H   (22.0-30.0)  sec


 


ABG pO2     ()  mmHg


 


ABG Total CO2     (19-24)  mmol/L


 


ABG O2 Saturation     (94-97)  %


 


BUN     (9-20)  mg/dL


 


Creatinine     (0.66-1.25)  mg/dL


 


Glucose     (74-99)  mg/dL


 


POC Glucose (mg/dL)  227 H   178 H  (75-99)  mg/dL


 


Hemoglobin A1c     (4.0-6.0)  %


 


HDL Cholesterol     (40.0-60.0)  mg/dL


 


Procalcitonin     (0.02-0.09)  ng/mL














  07/06/21 07/06/21 07/07/21 Range/Units





  22:22 23:44 04:20 


 


WBC     (3.8-10.6)  k/uL


 


RBC     (4.30-5.90)  m/uL


 


Hgb     (13.0-17.5)  gm/dL


 


Hct     (39.0-53.0)  %


 


MCV     (80.0-100.0)  fL


 


RDW     (11.5-15.5)  %


 


Plt Count     (150-450)  k/uL


 


Neutrophils #     (1.3-7.7)  k/uL


 


PT     (9.0-12.0)  sec


 


INR     (<1.2)  


 


APTT  47.3 H    (22.0-30.0)  sec


 


ABG pO2    119 H  ()  mmHg


 


ABG Total CO2    26 H  (19-24)  mmol/L


 


ABG O2 Saturation    99.3 H  (94-97)  %


 


BUN     (9-20)  mg/dL


 


Creatinine     (0.66-1.25)  mg/dL


 


Glucose     (74-99)  mg/dL


 


POC Glucose (mg/dL)   161 H   (75-99)  mg/dL


 


Hemoglobin A1c     (4.0-6.0)  %


 


HDL Cholesterol     (40.0-60.0)  mg/dL


 


Procalcitonin     (0.02-0.09)  ng/mL














  07/07/21 07/07/21 07/07/21 Range/Units





  04:22 04:22 04:22 


 


WBC  13.5 H    (3.8-10.6)  k/uL


 


RBC  3.62 L    (4.30-5.90)  m/uL


 


Hgb  9.6 L    (13.0-17.5)  gm/dL


 


Hct  27.8 L    (39.0-53.0)  %


 


MCV  77.0 L    (80.0-100.0)  fL


 


RDW  16.2 H    (11.5-15.5)  %


 


Plt Count  115 L    (150-450)  k/uL


 


Neutrophils #  11.7 H    (1.3-7.7)  k/uL


 


PT    22.4 H  (9.0-12.0)  sec


 


INR    2.3 H  (<1.2)  


 


APTT    50.2 H  (22.0-30.0)  sec


 


ABG pO2     ()  mmHg


 


ABG Total CO2     (19-24)  mmol/L


 


ABG O2 Saturation     (94-97)  %


 


BUN   94 H   (9-20)  mg/dL


 


Creatinine   3.66 H   (0.66-1.25)  mg/dL


 


Glucose   147 H   (74-99)  mg/dL


 


POC Glucose (mg/dL)     (75-99)  mg/dL


 


Hemoglobin A1c     (4.0-6.0)  %


 


HDL Cholesterol     (40.0-60.0)  mg/dL


 


Procalcitonin     (0.02-0.09)  ng/mL














  07/07/21 Range/Units





  06:01 


 


WBC   (3.8-10.6)  k/uL


 


RBC   (4.30-5.90)  m/uL


 


Hgb   (13.0-17.5)  gm/dL


 


Hct   (39.0-53.0)  %


 


MCV   (80.0-100.0)  fL


 


RDW   (11.5-15.5)  %


 


Plt Count   (150-450)  k/uL


 


Neutrophils #   (1.3-7.7)  k/uL


 


PT   (9.0-12.0)  sec


 


INR   (<1.2)  


 


APTT   (22.0-30.0)  sec


 


ABG pO2   ()  mmHg


 


ABG Total CO2   (19-24)  mmol/L


 


ABG O2 Saturation   (94-97)  %


 


BUN   (9-20)  mg/dL


 


Creatinine   (0.66-1.25)  mg/dL


 


Glucose   (74-99)  mg/dL


 


POC Glucose (mg/dL)  148 H  (75-99)  mg/dL


 


Hemoglobin A1c   (4.0-6.0)  %


 


HDL Cholesterol   (40.0-60.0)  mg/dL


 


Procalcitonin   (0.02-0.09)  ng/mL








                      Microbiology - Last 24 Hours (Table)











 07/05/21 18:09 Urine Culture - Final





 Urine,Catheterized 


 


 07/05/21 21:00 Gram Stain - Preliminary





 Sputum Sputum Culture - Preliminary














Assessment and Plan


Plan: 





1 V. fib cardiac arrest with prolonged downtime of around 35-40 minutes.  The 

patient was resuscitated initially at home and later on by EMS.  The patient was

intubated and placed on mechanical ventilator.  Currently hemodynamically stable

.  The patient is currently off pressors.  The patient continues to be in atrial

fibrillation.  He remains on IV heparin.  Hemodynamically stable on no pressors.

 However, there are signs of severe hypoxic encephalopathy and there is also 

evolution of an acute kidney injury secondary to cardiac arrest.





2 acute hypoxic/anoxic encephalopathy secondary to above mentioned cardiac 

arrest, and the patient had a CAT scan of the brain that showed no acute 

abnormalities and the EEG showed some burst suppression.  The patient has no 

seizure activity.  The patient remains on Keppra.  The patient is unresponsive 

while off sedation.  Neurologist on the case.  There are obvious signs of 

hypoxic encephalopathy.





3 chronic systolic heart failure with an ejection fraction of around 25-30% 

based on previous echocardiograms and previous hospitalization for volume 

overload and decompensated heart failure





4 chronic atrial fibrillation, rate is controlled for now





5 diabetes mellitus type 2 along with history of diabetic peripheral neuropathy





6 hypertension





7 obstructive sleep apnea





8 acute kidney injury with oligoria/.  The patient was having some issues with 

renal dysfunction related to diuretics and cardiac renal syndrome and patient 

probably developed an acute kidney injury   following cardiac arrest in the 

creatinine is on the rise and the patient continues to be oliguric at this point

in time.





9 Chronic nonocclusive CAD, current troponin is a 0.05 without any acute 

ischemic changes on EKG as the patient has anterolateral ischemia with ST 

segment depressions





10 history of depression





11 BPH





12 osteoarthritis





13 hyperlipidemia





14 anemia of chronic disease





Plan





Monitor mental status.  The patient continues to have signs of anoxic 

encephalopathy.  EEG was noted.  CAT scan of the brain was noted.


Some the case.  Continue Keppra.  


Continue amiodarone maintenance and switch amiodarone to oral 400 mg by mouth 

twice a day.  Current rhythm is atrial fibrillation.


Repeat echocardiogram was noted and the patient is an ejection fraction of 25-

30%


With the patient IV heparin regards to his chronic atrial fibrillation.  The 

patient's PTT is therapeutic for now


Repeat another set of troponins


Restart low-dose metoprolol 12.5 mg by mouth twice a day


Restart Levemir half a dose of 10 units every in addition to his vascular 

coverage


With the patient on high-dose statins with Lipitor 40 mg by mouth daily


Monitor renal function, creatinine is on the rise


Avoid nephrotoxic agents


IV Zosyn as empiric antibiotic coverage for aspiration 


enteral feeding for nutritional support


Family is considering comfort care measures





We'll continue to follow.  Condition is critical.  Outcome is poor baseline 

above-mentioned comorbidities.  This evaluation was done more than 30 minutes.  

We'll establish lines.








Time with Patient: Greater than 30

## 2021-07-07 NOTE — XR
EXAMINATION TYPE: XR chest 1V portable

 

DATE OF EXAM: 7/7/2021

 

COMPARISON: 7/6/2021

 

HISTORY: Tube placement

 

TECHNIQUE: Single frontal view of the chest is obtained.

 

FINDINGS:  Enteric catheter with its tip projecting over the region of the stomach. Left IJ with its 
tip at the SVC. Low lung volumes. Multiple overlying leads. Endotracheal tube is seen with its tip ap
proximately 6 cm above the stewart. Heart size is stable. Prominence of the central pulmonary vasculat
ure. Patchy airspace opacities throughout the right lung have increased. Mild interstitial prominence
 throughout the lungs. No pneumothorax or pleural effusion.

 

IMPRESSION:  

1. Lines and tubes as described above.

2. Slightly increased airspace opacities within the right lung. Prominence of the central pulmonary v
asculature appears slightly increased. Mild interstitial airspace opacities are again visualized.

## 2021-07-07 NOTE — P.PN
Subjective


Progress Note Date: 07/07/21


The patient was seen at bedside and per the patient nurse she stated that the 

patient has been off sedation since yesterday morning.  There is no improvement 

in the patient's condition.








Objective





- Vital Signs


Vital signs: 


                                   Vital Signs











Temp  99.0 F   07/07/21 08:00


 


Pulse  110 H  07/07/21 15:00


 


Resp  24   07/07/21 15:00


 


BP  124/75   07/07/21 15:00


 


Pulse Ox  97   07/07/21 15:00








                                 Intake & Output











 07/06/21 07/07/21 07/07/21





 18:59 06:59 18:59


 


Intake Total 914.171 872.428 647


 


Output Total 90 265 330


 


Balance 824.171 607.428 317


 


Weight 121 kg 121.8 kg 


 


Intake:   


 


   356 387


 


    0.9 Pressure bag 6 36 27


 


    KVO  110 60


 


    Piperacillin-Tazobactam 3  100 





    .375 gm In Sodium   





    Chloride 0.9% 100 ml @ 25   





    mls/hr IVPB Q8HR [facility] Rx#   





    :[id number]   


 


    Sodium Chloride 0.9% 1,   300





    000 ml @ 100 mls/hr IV .   





    Q10H [facility] Rx#:[id number]   


 


    Sodium Chloride 0.9% 1, 110 10 





    000 ml @ 999 mls/hr IV .   





    Q1H1M [facility] Rx#:[id number]   


 


    levETIRAcetam IV 1,000 mg 210 100 





    In Saline 1 100ml.bag @   





    400 mls/hr IVPB ONCE STA   





    Rx#:[id number]   


 


  Intake, IV Titration 528.171 166.428 





  Amount   


 


    Amiodarone 450 mg In 250  





    Dextrose 5% in Water 250   





    ml @ 0.5 MG/MIN 16.667   





    mls/hr IV .Q15H [facility] Rx#:   





    [id number]   


 


    Heparin Sod,Pork in 0.45% 77.167 166.428 





    NaCl 25,000 unit In 0.45   





    % NaCl 1 250ml.bag @ 8.   





    2644 UNITS/KG/HR 10 mls/   





    hr IV .Q24H [facility] Rx#:   





    [id number]   


 


    Norepinephrine 4 mg In 131.852  





    Sodium Chloride 0.9% 250   





    ml @ 0.05 MCG/KG/MIN 23.   





    051 mls/hr IV .Q11H2M [facility]   





    Rx#:[id number]   


 


    propofoL 1,000 mg In 69.152  





    Empty Bag 1 bag @ Titrate   





    IV .Q0M [facility] Rx#:   





    [id number]   


 


  Tube Feeding 30 170 210


 


  Other 30 180 50


 


Output:   


 


  Urine 90 265 330


 


Other:   


 


  Voiding Method Indwelling Catheter Indwelling Catheter Indwelling Catheter








                       ABP, PAP, CO, CI - Last Documented











Arterial Blood Pressure        127/52

















- Exam


GENERAL: The patient is lying in bed and does not seem in acute distress.  





NEUROLOGICAL:


Not on any sedation and been off for at least 20 hours prior to examination.


Higher mental function: GCS 4 (E1, VT1, M2).  The patient is comatose.  

Nonresponsive and not following commands.


Cranial nerves: I had to manually open his eyes.  The pupils are round, right is

3mm and left 2mm andreactive to light.  Negative corneal reflex bilaterally.  

Primary gaze are midline to slightly looking up. Negative occulocephalic reflex.

 Is breathing over the vent.  +ve weak gag reflex.  Rest of cranial nerves could

not be assessed because of his condition.


Motor: The strength is 0/5 throughout even with painful stimuli no withdrawl to 

pain.  Patient has rare episode of extensor posturing of upper extremities and 

was seen only to painful stimuli.  Normal bulk.  Slightly decrease tone 

throughout.  


Cerebellum: Could not assess.


Sensation: Not withdrawing to any of extremities to painful stimuli.  He had 

extensor posturing on the left upper extremity to painful stimuli.  


Reflexes (right/left): 1+


Plantars are mute bilaterally. 





LABS/IMAGING/OTHER TESTS:


Prolonged EEG (2.5 hours) on 07/06/2021: It is reported as abnormal 2-1/2 hour 

continuous EEG.  No clinical or electrographic seizures were recorded.  The EEG 

demonstrated a burst/suppression pattern.  A burst suppression pattern 

indicating severe diffuse cerebral dysfunction as may be seen in anoxic or 

hypoxic encephalopathy.  The bursts consisted of some generalized frontally 

predominant sharps which were epileptiform in nature.  The bursts also consist 

of triphasic waves which are not epileptiform in nature.  Triphasic waves can be

seen in the setting of metabolic or anoxic encephalopathy.  No definite clinical

symptoms were noted, septal clinical symptoms cannot necessarily be excluded.


CT of the head is reported as no acute intracranial hemorrhage or midline shift.

 There is mild to moderate diffuse age-related cerebral atrophy and mild chronic

small vessel ischemic changes redemonstrated.  No significant change from prior.

 I personally reviewed the CT of the head and I felt there is some poor gray-

white matter differentiation but I didn't appreciate any midline shift or 

hydrocephalus.








- Labs


CBC & Chem 7: 


                                 07/07/21 04:22





                                 07/07/21 04:22


Labs: 


                  Abnormal Lab Results - Last 24 Hours (Table)











  07/06/21 07/06/21 07/06/21 Range/Units





  02:59 08:58 18:09 


 


WBC     (3.8-10.6)  k/uL


 


RBC     (4.30-5.90)  m/uL


 


Hgb     (13.0-17.5)  gm/dL


 


Hct     (39.0-53.0)  %


 


MCV     (80.0-100.0)  fL


 


RDW     (11.5-15.5)  %


 


Plt Count     (150-450)  k/uL


 


Neutrophils #     (1.3-7.7)  k/uL


 


PT     (9.0-12.0)  sec


 


INR     (<1.2)  


 


APTT     (22.0-30.0)  sec


 


ABG pO2     ()  mmHg


 


ABG Total CO2     (19-24)  mmol/L


 


ABG O2 Saturation     (94-97)  %


 


BUN     (9-20)  mg/dL


 


Creatinine     (0.66-1.25)  mg/dL


 


Glucose     (74-99)  mg/dL


 


POC Glucose (mg/dL)    178 H  (75-99)  mg/dL


 


Hemoglobin A1c  6.5 H    (4.0-6.0)  %


 


HDL Cholesterol   25.0 L   (40.0-60.0)  mg/dL














  07/06/21 07/06/21 07/07/21 Range/Units





  22:22 23:44 04:20 


 


WBC     (3.8-10.6)  k/uL


 


RBC     (4.30-5.90)  m/uL


 


Hgb     (13.0-17.5)  gm/dL


 


Hct     (39.0-53.0)  %


 


MCV     (80.0-100.0)  fL


 


RDW     (11.5-15.5)  %


 


Plt Count     (150-450)  k/uL


 


Neutrophils #     (1.3-7.7)  k/uL


 


PT     (9.0-12.0)  sec


 


INR     (<1.2)  


 


APTT  47.3 H    (22.0-30.0)  sec


 


ABG pO2    119 H  ()  mmHg


 


ABG Total CO2    26 H  (19-24)  mmol/L


 


ABG O2 Saturation    99.3 H  (94-97)  %


 


BUN     (9-20)  mg/dL


 


Creatinine     (0.66-1.25)  mg/dL


 


Glucose     (74-99)  mg/dL


 


POC Glucose (mg/dL)   161 H   (75-99)  mg/dL


 


Hemoglobin A1c     (4.0-6.0)  %


 


HDL Cholesterol     (40.0-60.0)  mg/dL














  07/07/21 07/07/21 07/07/21 Range/Units





  04:22 04:22 04:22 


 


WBC  13.5 H    (3.8-10.6)  k/uL


 


RBC  3.62 L    (4.30-5.90)  m/uL


 


Hgb  9.6 L    (13.0-17.5)  gm/dL


 


Hct  27.8 L    (39.0-53.0)  %


 


MCV  77.0 L    (80.0-100.0)  fL


 


RDW  16.2 H    (11.5-15.5)  %


 


Plt Count  115 L    (150-450)  k/uL


 


Neutrophils #  11.7 H    (1.3-7.7)  k/uL


 


PT    22.4 H  (9.0-12.0)  sec


 


INR    2.3 H  (<1.2)  


 


APTT    50.2 H  (22.0-30.0)  sec


 


ABG pO2     ()  mmHg


 


ABG Total CO2     (19-24)  mmol/L


 


ABG O2 Saturation     (94-97)  %


 


BUN   94 H   (9-20)  mg/dL


 


Creatinine   3.66 H   (0.66-1.25)  mg/dL


 


Glucose   147 H   (74-99)  mg/dL


 


POC Glucose (mg/dL)     (75-99)  mg/dL


 


Hemoglobin A1c     (4.0-6.0)  %


 


HDL Cholesterol     (40.0-60.0)  mg/dL














  07/07/21 07/07/21 Range/Units





  06:01 11:57 


 


WBC    (3.8-10.6)  k/uL


 


RBC    (4.30-5.90)  m/uL


 


Hgb    (13.0-17.5)  gm/dL


 


Hct    (39.0-53.0)  %


 


MCV    (80.0-100.0)  fL


 


RDW    (11.5-15.5)  %


 


Plt Count    (150-450)  k/uL


 


Neutrophils #    (1.3-7.7)  k/uL


 


PT    (9.0-12.0)  sec


 


INR    (<1.2)  


 


APTT    (22.0-30.0)  sec


 


ABG pO2    ()  mmHg


 


ABG Total CO2    (19-24)  mmol/L


 


ABG O2 Saturation    (94-97)  %


 


BUN    (9-20)  mg/dL


 


Creatinine    (0.66-1.25)  mg/dL


 


Glucose    (74-99)  mg/dL


 


POC Glucose (mg/dL)  148 H  159 H  (75-99)  mg/dL


 


Hemoglobin A1c    (4.0-6.0)  %


 


HDL Cholesterol    (40.0-60.0)  mg/dL








                      Microbiology - Last 24 Hours (Table)











 07/05/21 18:09 Urine Culture - Final





 Urine,Catheterized 














Assessment and Plan


Assessment: 








* Altered mental status: Hypoxic-anoxic brain injury due to prolonged cardiac 

  arrest (downtime 35-45 minutes).  


* Cardiogenic shock on norepinephrine


* Atrial fibrillation with rapid ventricular rate (history of cardioversion and 

  is on Coumadin (INR is subtherapeutic 1.5) On Amiodarone 


* Acute on chronic kidney insufficiency


* Acute hypoxemic respiratory failure on mechanical ventilation


* Diabetes mellitus type 2


* History of nonischemic cardiomyopathy


* Congestive heart failure


* Hyperlipidemia


* History of hypertension


* Sleep apneaHistory of glaucoma and cataract





Plan: 





* Patient is on Keppra 1500 mg every 12 hours


* On Every hour neuro checks


* Cardiology is on board


* We'll defer the rest of the medical management to the ICU and the primary team


* The prognosis seems poor from the prolonged cardiac arrest, limited brain 

  function (has only some brainstem reflexes) and his EEG. 


* Code status: DNR





The plan is discussed with his nurse. 





UPDATE:


I was notified by the patient's nurse that patient was made comfort care by his 

wife.








Josh Linares M.D.


Neuro-hospitalist








Time with Patient: Less than 30

## 2021-07-07 NOTE — P.PN
Subjective


Progress Note Date: 07/07/21





HISTORY OF PRESENT ILLNESS


76 years old  male patient of Dr. Rojo for past medical history 

of paroxysmal atrial fibrillation on Coumadin, hyperlipidemia, hypertension, 

coronary artery disease, history of nonischemic cardiomyopathy with EF 40%, 

valvular heart disease, moderate to severe pulmonary hypertension, obstructive 

sleep apnea on CPAP, hypothyroidism, morbid obesity, diabetes mellitus type 2 

with diabetic neuropathy, benign prostatic hypertrophy status post TURP, 

generalized osteoarthritis, recurrent depression, anemia of chronic disease, 

restless leg syndrome, chronic thrombocytopenia, chronic hypoxic respiratory 

failure on home O2.  Patient had recent hospitalization May 14 through May 20 

which time he was treated for acute on chronic systolic heart failure and acute 

kidney injury.  Patient was stabilized and discharged home  ,  readmitted again 

June 14, till June 22, for anasarca, presents with edema to the legs and thighs 

and flank area, lightheadedness, shortness of breath and weight gain of 12-15 

pounds since he left the hospital.  He had follow-up with Dr. Rojo and 

with cardiologist during his last admission, insulin 14 told June 22, he was 

diuresed, approximately losing 28 kg, was seen by multiple specialists, for 

anasarca, acute kidney injury, diastolic CHF, with cardiorenal process.  he had 

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

however Patient now presents to the emergency room, secondary to cardiac arrest 

related to atrial fibrillation, this happened at home, at 1705, CPR was provided

by the wife at 1709, EMS has a right, 1736, and was defibrillated once.  There  

is return of rhythm, atrial fibrillation, approximately down time was 35-40 

minutes.  Per emergency room physician.  Patient currently is mechanically 

ventilated, and will be transferred to ICU, consult will be made with pulmonary,

Dr. Linares labs are currently pending,  





In the emergency room, patient already is mechanically vented, hemoglobin 10.5, 

double basic count 9.7, INR 1.5, potassium 4.7 creatinine of 1.98, from a 

previous of 1.74, alkaline phosphatase elevated, ALT AST  pending labs, NP 

proBNP pending labs, chest x-ray pending, unsure what the CT of the brain was 

performed or not, possibly secondary to instability of current cardiac secretion

venous blood gas, pCO2 of 40, pH of 7.34, bicarb of 21 patient will proceed to 

ICU from the emergency room.  Critical care medicine sorry the aware, 

cardiologist with aware.  The decision is to hold off IV heparin at this time, 

as the patient has gerard CPR device, which could cause more hemorrhagic 

problems, while on IV heparin.  Further recommendations to follow regarding IV 

heparinization, depending on stability.  Patient has twitches when seen, patient

will be sedated with Versed, discussed with wife at detail, EEG of the brain to 

be done, for anoxic encephalopathy,





7/6: She remains in intensive care unit and followed by multiple consultants.  

He remains on mechanical ventilation with tidal volume 500, FiO2 40, PEEP of 5. 

Patient has no withdrawal to pain, no corneal reflex.  He has minimal cough and 

gag reflex.  He is off sedation and is unresponsive.  He is on heparin drip, 

amiodarone drip, IV Keppra. Family did make him no code last evening.  Patient i

s managed by pulmonary medicine/intensivist.  Patient has been afebrile, heart 

rate in the 70s, blood pressure 87/53, pulse ox 98%.  Repeat blood work reveals 

WBC 16.1, hemoglobin 10.7, platelet count 164.  INR 1.8.  Dr. erazo are normal, 

BUN 69 and creatinine 2.18.  Blood sugars run between 189 and 228.  Pro-

calcitonin 0.82.  Echocardiogram reveals EF of 25-30% with global hypokinesia, 

moderate concentric left ventricular hypertrophy, mild aortic regurgitation, 

mild mitral regurgitation, mild tricuspid regurgitation, mild to moderate 

pulmonary hypertension.  Patient is also followed by cardiology for atrial 

fibrillation controlled ventricular rate on IV heparin and IV amiodarone.  

Patient has been seen by neurology and an urgent CAT scan of the brain has been 

ordered which revealed no acute intracranial hemorrhage or midline shift.  Mild 

to moderate diffuse age-related cerebral atrophy and mild chronic small vessel 

ischemic changes redemonstrated.  No significant change. EEG is pending, 

continue Keppra 500 mg twice daily.





7/7: Patient remains off sedation and has been unresponsive..  He is intubated 

and on mechanical ventilation with tidal volume 500, FiO2 40, PEEP 5.  

Granddaughter is at bedside.  EEG was completed yesterday with no epileptiform 

focus.  Patient is maintained on IV Keppra and followed by neurology.  Patient 

has been started on tube feedings.  Patient has been afebrile, heart rate 92, 

blood pressure 121/73, pulse ox 100%.  Repeat blood work reveals WBC 13.5, 

hemoglobin 9.6, platelet count 115.  INR 2.3 and heparin drip will be 

discontinued.  Electrolytes normal.  BUN 94 and creatinine 3.66.  Sugars running

between 147 and 161.  Family may look for comfort care today.   is 

following if needed.





Review of Systems


ROS unobtainable: due to mental status





PHYSICAL EXAMINATION


Gen: This is a 76-year-old  male, intubated and on mechanical 

ventilation, appears to be in no acute distress.


HEENT: Head is atraumatic, normocephalic. Pupils equal, round. Sclerae is 

anicteric. 


NECK: Supple. No JVD. No lymphadenopathy. No thyromegaly. 


LUNGS: Clear to auscultation. No wheezes or rhonchi.  No intercostal 

retractions.


HEART: Irregular rate and rhythm.  Systolic murmur at the apex. 


ABDOMEN: Soft. Bowel sounds are present. No masses.  No tenderness.


EXTREMITIES: No pedal edema.  No calf tenderness.


NEUROLOGICAL: Patient is unresponsive. 





ASSESSMENT AND PLAN


1.  Cardiac arrest, with initial resuscitation at home by the wife, gavin dean starting at 1705, was defibrillated by EMS at 1736, approximate downtime 

35-40 minutes, patient currently is mechanically ventilated, maintain in ICU, 

Dr. Linares critical care medicine, consult with cardiology appreciated, neurology

consult appreciated patient has been started on tube feedings





2.  Acute hypoxemic respiratory failure, currently mechanically ventilated, 

related to cardiac event, and most likely would have secondary aspiration 

pneumonia, IV Zosyn to be started,





3.  A. fib paroxysmal with rapid ventricular rate, subtherapeutic INR, was on 

Coumadin, as the patient is sedated on vent, unsure about CVA event will discuss

with cardiology whether IV heparin would be appropriate,,





4  Acute on chronic systolic heart failure.  Discontinue Lasix, Zaroxolyn, 

continue Lopressor 12.5 mg twice daily. Continue to monitor I&O and daily 

weights, monitor electrolytes and renal function.  





5.  Acute kidney injury likely secondary to cardiorenal process.  Discontinued 

Lasix and Aldactone.





6  Paroxysmal atrial fibrillation on Coumadin.  Patient is on heparin drip





7  Chronic anemia.  





8.  Hypertension, hypertensive cardiovascular disease.  Continue metoprolol 

tartrate 12.5 mg oral twice daily, hydralazine 25 mg twice daily





9.  Mild coronary artery disease.  Continue metoprolol.





10.  Nonischemic cardiomyopathy.





11.  Hypothyroidism.  Continue levothyroxine 125 g daily.





12.  Diabetes mellitus type 2 with diabetic neuropathy, uncontrolled with 

hypoglycemia.  Continue Levemir decreased to 10 units at bedtime and NovoLog 

scale every 6 hours.  Discontinued gabapentin 300 mg three times daily.





13.  Hyperlipidemia.  Discontinue statin.





14.  Obstructive sleep apnea on CPAP.





15.  Benign prostatic hypertrophy status post TURP.





16.  Generalized osteoarthritis.  Discontinue tramadol for pain.





17.  Recurrent depression.  Discontinue Zoloft 100 mg daily.





18.  Anemia of chronic disease.  Discontinue ferrous sulfate.





19.  Restless leg syndrome.  Discontinue Requip 2.5 mg 3 times daily.





20.  Thrombocytopenia, chronic.





21.  DVT prophylaxis.  Heparin drip





22.  GI prophylaxis.  Protonix 40 mg IV daily.





23.  Chronic hypoxic respiratory failure on home oxygen therapy.





24.  BPH with urinary retention, currently on Serna catheter, Flomax.





25.  Lumbar disc disease with retrolisthesis, patient has difficulty in lying 

down supine, special in bed.  Refers to be in a recliner, however this also 

inhibits recovery of the anasarca, below the waist





26.  Anasarca without proteinuria, treatment as above





Prognoses poor.








DISCHARGE PLAN


TBD.





Impression and plan of care have been directed as dictated by the signing 

physician.  Mehnaz Quezada nurse practitioner acting as scribe for signing 

physician.





Objective





- Vital Signs


Vital signs: 


                                   Vital Signs











Temp  99.0 F   07/07/21 08:00


 


Pulse  111 H  07/07/21 11:00


 


Resp  26 H  07/07/21 11:00


 


BP  132/73   07/07/21 11:00


 


Pulse Ox  98   07/07/21 11:00








                                 Intake & Output











 07/06/21 07/07/21 07/07/21





 18:59 06:59 18:59


 


Intake Total 914.171 872.428 195


 


Output Total 90 265 135


 


Balance 824.171 607.428 60


 


Weight 121 kg 121.8 kg 


 


Intake:   


 


   356 65


 


    0.9 Pressure bag 6 36 15


 


    KVO  110 50


 


    Piperacillin-Tazobactam 3  100 





    .375 gm In Sodium   





    Chloride 0.9% 100 ml @ 25   





    mls/hr IVPB Q8HR [facility] Rx#   





    :[id number]   


 


    Sodium Chloride 0.9% 1, 110 10 





    000 ml @ 999 mls/hr IV .   





    Q1H1M ONE Rx#:[id number]   


 


    levETIRAcetam IV 1,000 mg 210 100 





    In Saline 1 100ml.bag @   





    400 mls/hr IVPB ONCE STA   





    Rx#:[id number]   


 


  Intake, IV Titration 528.171 166.428 





  Amount   


 


    Amiodarone 450 mg In 250  





    Dextrose 5% in Water 250   





    ml @ 0.5 MG/MIN 16.667   





    mls/hr IV .Q15H [facility] Rx#:   





    [id number]   


 


    Heparin Sod,Pork in 0.45% 77.167 166.428 





    NaCl 25,000 unit In 0.45   





    % NaCl 1 250ml.bag @ 8.   





    2644 UNITS/KG/HR 10 mls/   





    hr IV .Q24H [facility] Rx#:   





    [id number]   


 


    Norepinephrine 4 mg In 131.852  





    Sodium Chloride 0.9% 250   





    ml @ 0.05 MCG/KG/MIN 23.   





    051 mls/hr IV .Q11H2M [facility]   





    Rx#:[id number]   


 


    propofoL 1,000 mg In 69.152  





    Empty Bag 1 bag @ Titrate   





    IV .Q0M [facility] Rx#:   





    [id number]   


 


  Tube Feeding 30 170 100


 


  Other 30 180 30


 


Output:   


 


  Urine 90 265 135


 


Other:   


 


  Voiding Method Indwelling Catheter Indwelling Catheter 








                       ABP, PAP, CO, CI - Last Documented











Arterial Blood Pressure        149/63

















- Labs


CBC & Chem 7: 


                                 07/07/21 04:22





                                 07/07/21 04:22


Labs: 


                  Abnormal Lab Results - Last 24 Hours (Table)











  07/06/21 07/06/21 07/06/21 Range/Units





  02:59 02:59 08:58 


 


WBC     (3.8-10.6)  k/uL


 


RBC     (4.30-5.90)  m/uL


 


Hgb     (13.0-17.5)  gm/dL


 


Hct     (39.0-53.0)  %


 


MCV     (80.0-100.0)  fL


 


RDW     (11.5-15.5)  %


 


Plt Count     (150-450)  k/uL


 


Neutrophils #     (1.3-7.7)  k/uL


 


PT     (9.0-12.0)  sec


 


INR     (<1.2)  


 


APTT     (22.0-30.0)  sec


 


ABG pO2     ()  mmHg


 


ABG Total CO2     (19-24)  mmol/L


 


ABG O2 Saturation     (94-97)  %


 


BUN     (9-20)  mg/dL


 


Creatinine     (0.66-1.25)  mg/dL


 


Glucose     (74-99)  mg/dL


 


POC Glucose (mg/dL)     (75-99)  mg/dL


 


Hemoglobin A1c  6.5 H    (4.0-6.0)  %


 


HDL Cholesterol    25.0 L  (40.0-60.0)  mg/dL


 


Procalcitonin   0.82 H   (0.02-0.09)  ng/mL














  07/06/21 07/06/21 07/06/21 Range/Units





  11:41 14:41 18:09 


 


WBC     (3.8-10.6)  k/uL


 


RBC     (4.30-5.90)  m/uL


 


Hgb     (13.0-17.5)  gm/dL


 


Hct     (39.0-53.0)  %


 


MCV     (80.0-100.0)  fL


 


RDW     (11.5-15.5)  %


 


Plt Count     (150-450)  k/uL


 


Neutrophils #     (1.3-7.7)  k/uL


 


PT     (9.0-12.0)  sec


 


INR     (<1.2)  


 


APTT   34.7 H   (22.0-30.0)  sec


 


ABG pO2     ()  mmHg


 


ABG Total CO2     (19-24)  mmol/L


 


ABG O2 Saturation     (94-97)  %


 


BUN     (9-20)  mg/dL


 


Creatinine     (0.66-1.25)  mg/dL


 


Glucose     (74-99)  mg/dL


 


POC Glucose (mg/dL)  227 H   178 H  (75-99)  mg/dL


 


Hemoglobin A1c     (4.0-6.0)  %


 


HDL Cholesterol     (40.0-60.0)  mg/dL


 


Procalcitonin     (0.02-0.09)  ng/mL














  07/06/21 07/06/21 07/07/21 Range/Units





  22:22 23:44 04:20 


 


WBC     (3.8-10.6)  k/uL


 


RBC     (4.30-5.90)  m/uL


 


Hgb     (13.0-17.5)  gm/dL


 


Hct     (39.0-53.0)  %


 


MCV     (80.0-100.0)  fL


 


RDW     (11.5-15.5)  %


 


Plt Count     (150-450)  k/uL


 


Neutrophils #     (1.3-7.7)  k/uL


 


PT     (9.0-12.0)  sec


 


INR     (<1.2)  


 


APTT  47.3 H    (22.0-30.0)  sec


 


ABG pO2    119 H  ()  mmHg


 


ABG Total CO2    26 H  (19-24)  mmol/L


 


ABG O2 Saturation    99.3 H  (94-97)  %


 


BUN     (9-20)  mg/dL


 


Creatinine     (0.66-1.25)  mg/dL


 


Glucose     (74-99)  mg/dL


 


POC Glucose (mg/dL)   161 H   (75-99)  mg/dL


 


Hemoglobin A1c     (4.0-6.0)  %


 


HDL Cholesterol     (40.0-60.0)  mg/dL


 


Procalcitonin     (0.02-0.09)  ng/mL














  07/07/21 07/07/21 07/07/21 Range/Units





  04:22 04:22 04:22 


 


WBC  13.5 H    (3.8-10.6)  k/uL


 


RBC  3.62 L    (4.30-5.90)  m/uL


 


Hgb  9.6 L    (13.0-17.5)  gm/dL


 


Hct  27.8 L    (39.0-53.0)  %


 


MCV  77.0 L    (80.0-100.0)  fL


 


RDW  16.2 H    (11.5-15.5)  %


 


Plt Count  115 L    (150-450)  k/uL


 


Neutrophils #  11.7 H    (1.3-7.7)  k/uL


 


PT    22.4 H  (9.0-12.0)  sec


 


INR    2.3 H  (<1.2)  


 


APTT    50.2 H  (22.0-30.0)  sec


 


ABG pO2     ()  mmHg


 


ABG Total CO2     (19-24)  mmol/L


 


ABG O2 Saturation     (94-97)  %


 


BUN   94 H   (9-20)  mg/dL


 


Creatinine   3.66 H   (0.66-1.25)  mg/dL


 


Glucose   147 H   (74-99)  mg/dL


 


POC Glucose (mg/dL)     (75-99)  mg/dL


 


Hemoglobin A1c     (4.0-6.0)  %


 


HDL Cholesterol     (40.0-60.0)  mg/dL


 


Procalcitonin     (0.02-0.09)  ng/mL














  07/07/21 Range/Units





  06:01 


 


WBC   (3.8-10.6)  k/uL


 


RBC   (4.30-5.90)  m/uL


 


Hgb   (13.0-17.5)  gm/dL


 


Hct   (39.0-53.0)  %


 


MCV   (80.0-100.0)  fL


 


RDW   (11.5-15.5)  %


 


Plt Count   (150-450)  k/uL


 


Neutrophils #   (1.3-7.7)  k/uL


 


PT   (9.0-12.0)  sec


 


INR   (<1.2)  


 


APTT   (22.0-30.0)  sec


 


ABG pO2   ()  mmHg


 


ABG Total CO2   (19-24)  mmol/L


 


ABG O2 Saturation   (94-97)  %


 


BUN   (9-20)  mg/dL


 


Creatinine   (0.66-1.25)  mg/dL


 


Glucose   (74-99)  mg/dL


 


POC Glucose (mg/dL)  148 H  (75-99)  mg/dL


 


Hemoglobin A1c   (4.0-6.0)  %


 


HDL Cholesterol   (40.0-60.0)  mg/dL


 


Procalcitonin   (0.02-0.09)  ng/mL








                      Microbiology - Last 24 Hours (Table)











 07/05/21 18:09 Urine Culture - Final





 Urine,Catheterized 


 


 07/05/21 21:00 Gram Stain - Preliminary





 Sputum Sputum Culture - Preliminary

## 2021-07-07 NOTE — PN
PROGRESS NOTE



Garry is a 76-year-old gentleman that is admitted to hospital with cardiac arrest at

home.  He has permanent atrial fibrillation, currently on IV heparin for the same.  He

does not have any spontaneous activity.  There is a concern for hypoxic encephalopathy

and he may undergo terminal wean.



On exam heart rate is 90 beats per minute.  Blood pressure is 146/60 respiratory is 18.

Chest exam reveals diminished air entry at the bases. Heart exam reveals first and

second heart sounds, irregular rhythm and a systolic murmur at the left lower sternal

border.

ABDOMEN:  Soft.

Exam of extremities reveals mild edema.  Peripheral pulses are felt.



Lab show that the INR is 2.3.



ASSESSMENT:

1. Status post cardiac arrest.

2. Permanent atrial fibrillation.

3. Hypoxic encephalopathy.



PLAN:

1. INR is around 2.3.  I am going to switch his amiodarone to p.o.  Prognosis is

    guarded.





MMODL / IJN: [id number] / Job#: [id number]

## 2021-07-08 RX ADMIN — ATROPINE SULFATE PRN DROPS: 10 SOLUTION/ DROPS OPHTHALMIC at 04:28

## 2021-07-08 NOTE — P.PN
Subjective


Progress Note Date: 07/08/21








76-year-old male patient, known history of CHF with a component of systolic 

heart failure and ejection fraction of 40% in addition to a combination of 

medical problems and comorbidities who was recently hospitalized for CHF and 

fluid overload.  The patient was treated after being diuresed he was discharged 

home and he came in to the emergency department yesterday following a cardiac 

arrests.  The patient was having a normal day and he was not having any chest 

pain or shortness of breath.  He came into the hospital after he had a cardiac 

arrest that occurred at home it's 1705 and CPR was provided by the wife and EMS 

arrived to the scene and he was defibrillated twice and there was resuscitation 

per ACLS protocol and the patient had a return obstructive circulation following

that.  The downtime was estimated to be around 35-40 minutes.  His according to 

the emergency department physicians.  For now, the patient is in the intensive 

care unit.  He is currently intubated on a mechanical ventilator.  He is in a 

assist-control mode at the rate of 10 with a tidal volume of 500 and FiO2 of 40%

with a PEEP of 5.  The chest x-ray showing central pulmonary vessel congestion. 

There is mild pulmonary edema.  ET tube is in a good location.  Orogastric tube 

is also in good location.  Blood gases from today showing a pH of 7.4 with a 

pCO2 of 44 and pO2 of 188 and this was on FiO2 of 60%.  He is receiving support 

with levo fed at 1 g per minute.  He was in nature fibrillation.  He was given 

amiodarone and currently amiodarone is running at a maintenance of 0.5 mg per 

minute.  Urine output is 0 at this point in time.  He was on propofol low dose 

which is currently off.  Neurologically is unresponsive.  He is very sluggish in

terms of his tubular response.  Corneal reflexes absent.  Positive breathing 

reflex as the patient is breathing above the mechanical ventilator.  Completely 

unresponsive to any verbal or painful stimulation.  He is known to have chronic 

atrial fibrillation.  He is also known to have hypertension and hyperlipidemia 

and nonocclusive coronary artery disease.  He is also known to have obstructive 

sleep apnea maintained on CPAP therapy, diabetes mellitus and diabetic 

peripheral neuropathy and hypothyroidism and BPH post TURP.  He has chronic 

anemia, are less, restless leg syndrome, chronic thrombocytopenia and chronic 

hypoxic respiratory failure maintained on oxygen at 2 L per minute nasal 

cannula.  The patient currently is in acute kidney injury.  Creatinine is at 

2.1.  Note that he was having some prerenal for cardiorenal insufficiency during

her hospitalizations.  Current urine output is 0.





On today's evaluation of 07/07/2021, seeing this patient for a follow-up.  The 

patient remains completely unresponsive while off the sedation.  He is a bit 

more active compared to yesterday.  He is moving his legs spontaneously.  No 

purposeful movement and the patient is not following any commands or answering 

any questions and he does not grimace to deep painful stimulation.  He has an 

upward gaze and pupils are sluggishly reactive to light.  Corneas are still 

absent.  He has a breathing reflex.  He had a CAT scan of the brain yesterday 

that showed no acute abnormalities.  His EEG showed burst suppression.  No 

epileptic focus and the patient was kept on Keppra.  He is being monitored in 

terms of his neurologic functions.  There are signs of severe hypoxic 

encephalopathy in this patient.  Meanwhile, the patient remains intubated on a 

mechanical ventilator.  The patient is currently on assist control mode at a 

rate of 10 with a tidal volume of 5 and FiO2 of 40% with a PEEP of 5.  The 

patient's chest x-ray shows no major interval change and there is no vessel 

congestion.  ET tube is in a good location.  Some infiltration of the lung bases

are seen bilaterally.  The patient's blood gas showed a pH of 7.44 with a pCO2 

of 36 and pO2 of 119 and this was done above-mentioned ventilator setting.  No 

significant orotracheal secretions.  He seems to be quite comfortable and 

synchronous mechanical ventilator.  Hemodynamically, the patient is still on 

amiodarone drip at the maintenance of 0.5 mg per minute.  His cardiac rhythm is 

sinus.  His echocardiogram showed an LV function which was impaired with an 

ejection fraction of 25-30% along with global hypokinesis and the patient also 

had RV dilatation and dysfunction, moderate concentric LVH, PA pressure 

estimated to be 46.  The patient remains on IV heparin.  PTT is therapeutic at 

this point in time.  Hemoglobin stable at 9.6.  He is on IV Zosyn as an empiric 

antibiotic coverage for possible aspiration pneumonia.  With a causative 13.5.  

The patient is afebrile.  The patient was also started on enteral feeding for 

nutritional support.  The patient is currently on vital high protein at the rate

of 20 mL an hour.  The patient is on IV fluids at KVO.  An ultrasound the 

kidneys that was done during an earlier admission showed no evidence of 

hydronephrosis.  Nevertheless the patient is going into an acute kidney injury. 

Creatinine is up to 3.66 and the patient's urine output is quite diminished in 

the neck fluid balance over the past 24 hours has been +1.4 L.  Urine output 

over the past 24 hours has been approximately 500.








7/8/2021, the patient is extubated, currently undergoing comfort care measures. 

Family arrived to the hospital yesterday.  Upon their wishes, comfort care kevin

sures were initiated.  The family understood the poor prognosis 90 the patient 

has had prolonged downtime following cardiac arrest and severe hypoxic 

encephalopathy.  Clinically, the patient remains unresponsive.  For now, his 

blood pressure 130/70 with a normal sinus rhythm at the rate of 100.  Pulse ox 

is 94% at 2 L of oxygen by nasal cannula.  Breathing is comfortable at the rate 

of 16.  He is not using accessory muscles of breathing.  No labs are available 

from today.  He is on a morphine drip at 4 mg an hour.





Objective





- Vital Signs


Vital signs: 


                                   Vital Signs











Temp  99.4 F   07/08/21 04:58


 


Pulse  103 H  07/08/21 04:58


 


Resp  26 H  07/08/21 04:58


 


BP  125/84   07/08/21 04:58


 


Pulse Ox  91 L  07/08/21 04:58








                                 Intake & Output











 07/07/21 07/08/21 07/08/21





 18:59 06:59 18:59


 


Intake Total 660 121.343 


 


Output Total 530 1650 


 


Balance 130 -1528.657 


 


Intake:   


 


   120 


 


    0.9 Pressure bag 30  


 


    KVO 60  


 


    Sodium Chloride 0.9% 1, 310 120 





    000 ml @ 100 mls/hr IV .   





    Q10H [facility] Rx#:[id number]   


 


  Intake, IV Titration  1.343 





  Amount   


 


    Morphine Sulfate (100 mg/  1.343 





    2 ml) 100 mg In Sodium   





    Chloride 0.9% 100 ml @ 1   





    MG/HR 1.02 mls/hr IV .   





    Q24H [facility] Rx#:[id number]   


 


  Tube Feeding 210  


 


  Other 50  


 


Output:   


 


  Urine 530 1650 


 


Other:   


 


  Voiding Method Indwelling Catheter Indwelling Catheter 








                       ABP, PAP, CO, CI - Last Documented











Arterial Blood Pressure        127/52

















- Exam








Gen. appearance, comfortable, unresponsive, not responding to any verbal or 

painful fibrillation.  His extubated.  The patient on a morphine drip at 4 mg an

hour.


Head exam was generally normal. There was no scleral icterus or corneal arcus. 

Mucous membranes were moist.


Neck was supple and without jugular venous distension, thyromegaly, or carotid 

bruits. Carotids were easily palpable bilaterally. There was no adenopathy.


Lungs were clear to auscultation and percussion, and with normal diaphragmatic 

excursion. No wheezes or rales were noted. 


Cardiac exam revealed the PMI to be normally situated and sized. The rhythm was 

regular and no extrasystoles were noted during several minutes of auscultation. 

The first and second heart sounds were normal and physiologic splitting of the 

second heart sound was noted. There were no murmurs, rubs, clicks, or gallops.


Abdominal exam revealed normal bowel sounds. The abdomen was soft, non-tender, 

and without masses, organomegaly, or appreciable enlargement of the abdominal 

aorta.


Examination of the extremities revealed easily palpable radial, femoral and 

pedal pulses. There was no cyanosis, clubbing or edema.


Examination of the skin revealed no evidence of significant rashes, suspicious 

appearing nevi or other concerning lesions.


Neurologically the patient is currently morphine drip at 4 mg an hour..  The 

patient does not follow any commands.  Does not respond to any painful stimula

tion.  Pupils are sluggishly reactive to light and the patient has an upward 

gaze.  No corneal reflexes.  No nystagmus.  Motor function is 0 out of 5.  

Reflexes are +1 and symmetrical.  Plantars are probably going out versus mute.





- Labs


CBC & Chem 7: 


                                 07/07/21 04:22





                                 07/07/21 04:22


Labs: 


                  Abnormal Lab Results - Last 24 Hours (Table)











  07/07/21 Range/Units





  11:57 


 


POC Glucose (mg/dL)  159 H  (75-99)  mg/dL














Assessment and Plan


Plan: 





1 V. fib cardiac arrest with prolonged downtime of around 35-40 minutes.  The 

patient was resuscitated initially at home and later on by EMS.  Sustained 

severe anoxic encephalopathy.  The patient is undergoing comfort care measures 

at this point in time.  Extubated yesterday afternoon.  Currently on a morphine 

drip at 4 mg an hour.  Breathing is nonlabored.  Hemodynamically stable.





2 acute hypoxic/anoxic encephalopathy secondary to above mentioned cardiac 

arrest,  





3 chronic systolic heart failure with an ejection fraction of around 25-30% 

based on previous echocardiograms and previous hospitalization for volume 

overload and decompensated heart failure





4 chronic atrial fibrillation, rate is controlled for now





5 diabetes mellitus type 2 along with history of diabetic peripheral neuropathy





6 hypertension





7 obstructive sleep apnea





8 acute kidney injury with oligoria/.  The patient was having some issues with 

renal dysfunction related to diuretics and cardiac renal syndrome and patient 

probably developed an acute kidney injury   following cardiac arrest in the 

creatinine is on the rise and the patient continues to be oliguric at this point

in time.





9 Chronic nonocclusive CAD, current troponin is a 0.05 without any acute i

schemic changes on EKG as the patient has anterolateral ischemia with ST segment

depressions





10 history of depression





11 BPH





12 osteoarthritis





13 hyperlipidemia





14 anemia of chronic disease





Plan





Continue comfort care measures


Morphine drip for comfort currently running at 4 mg an hour and this will be 

titrated


Scopolamine patch


We'll transfer this patient to medical floor for further care.  No need for ICU 

stay at this point in time.  Family is aware.  Family was at the bedside all 

last night.


Pulmonary critical care services we'll sign off the case.

## 2021-07-08 NOTE — CDI
Documentation Clarification Form



Date: 07/08/2021 01:13:48 PM

From: Gloria Li CCS, CCDS

Phone: [phone]

MRN: [id number]

Admit Date: 07/05/2021 06:55:00 PM

Patient Name: Garry Mercado

Visit Number: [id number]

Discharge Date:  





ATTENTION: The Clinical Documentation Specialists (CDI) and [facility] Coding Staff 
appreciate your assistance in clarifying documentation. Please respond to the 
clarification below the line at the bottom and electronically sign. The CDI & 
[facility] Coding staff will review the response and follow-up if needed. Please note: 
Queries are made part of the Legal Health Record. If you have any questions, 
please contact the author of this message via ITS.



Dr. Josh Linares:



Unspecified CKD is documented in the 7/6 Neurology Consult and in a subsequent 
Neurology Progress Note.

Nephrology is not consulted, CKD is not documented elsewhere in the record.



Additional clarification regarding the stage of CKD is requested.



History/Risk Factors per 7/5 H/, Past Medical History: Paroxysmal Atrial 
Fibrillation on Coumadin, CAD, Chronic Systolic CHF, Nonischemic Cardiomyopathy,
Valvular Heart Disease, Moderate to Severe Pulmonary Hypertension, DM, [place], 
Hyperlipidemia, Hypertension, Osteoarthritis, Pneumonia, BPH status post TURP, 
Chronic Thrombocytopenia, Chronic Hypoxic Respiratory Failure on Home O2, 
Obstructive Sleep Apnea, Hypothyroid, COVID. 



7/5 BUN: 67, Creatinine 1.98

7/5 GFR 32.  7/6: 29.  7/7: 15.

Historical GFR (1/10/2018): >60



Clinical Indicators: Presented to the ED on 7/5 via EMS in Cardiac Arrest, CPR 
started by the patient's wife. Patient was in presumed Ventricular Fibrillation.




Treatment:  Intubated, remains in ICU w/poor prognosis, DNR & made Comfort Care 
on 7/6 by family. IV Amiodarone in Dextrose/Water, IV Midazolam, IV Propofol 
(off sedation one day), IV Apresoline, IV fl bolus Na Cl 1,000 mls @ 99 mls/hr 
q1H, IV Zosyn, IV Levophed, IV Keppra, IV Heparin. 

   

Please clarify the stage of the CKD, if known:



[  ] CKD Stage 2 (GFR 60-89)

[  ] CKD Stage 3 (GFR 30-59)

[  ] CKD Stage 3a (GFR 45-59)

[  ] CKD Stage 3b (GFR 30-44)

[  ] CKD Stage 4 (GFR 15-29)

[  ] Other, please specify ___________

[  ] Unable to determine



(Template Last revised: February 2021)

___________________________________________________________________________



Unable to determine

MTDD

## 2021-07-09 VITALS
TEMPERATURE: 99 F | DIASTOLIC BLOOD PRESSURE: 68 MMHG | RESPIRATION RATE: 9 BRPM | SYSTOLIC BLOOD PRESSURE: 118 MMHG | HEART RATE: 115 BPM

## 2021-07-09 RX ADMIN — MORPHINE SULFATE SCH MLS/HR: 50 INJECTION, SOLUTION, CONCENTRATE INTRAVENOUS at 19:07

## 2021-07-09 RX ADMIN — MORPHINE SULFATE SCH MLS/HR: 50 INJECTION, SOLUTION, CONCENTRATE INTRAVENOUS at 04:16

## 2021-07-09 NOTE — P.PN
Subjective


Progress Note Date: 07/09/21





HISTORY OF PRESENT ILLNESS


76 years old  male patient of Dr. Rojo for past medical history 

of paroxysmal atrial fibrillation on Coumadin, hyperlipidemia, hypertension, 

coronary artery disease, history of nonischemic cardiomyopathy with EF 40%, 

valvular heart disease, moderate to severe pulmonary hypertension, obstructive 

sleep apnea on CPAP, hypothyroidism, morbid obesity, diabetes mellitus type 2 

with diabetic neuropathy, benign prostatic hypertrophy status post TURP, 

generalized osteoarthritis, recurrent depression, anemia of chronic disease, 

restless leg syndrome, chronic thrombocytopenia, chronic hypoxic respiratory 

failure on home O2.  Patient had recent hospitalization May 14 through May 20 

which time he was treated for acute on chronic systolic heart failure and acute 

kidney injury.  Patient was stabilized and discharged home  ,  readmitted again 

June 14, till June 22, for anasarca, presents with edema to the legs and thighs 

and flank area, lightheadedness, shortness of breath and weight gain of 12-15 

pounds since he left the hospital.  He had follow-up with Dr. Rojo and 

with cardiologist during his last admission, insulin 14 told June 22, he was 

diuresed, approximately losing 28 kg, was seen by multiple specialists, for 

anasarca, acute kidney injury, diastolic CHF, with cardiorenal process.  he had 

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

however Patient now presents to the emergency room, secondary to cardiac arrest 

related to atrial fibrillation, this happened at home, at 1705, CPR was provided

by the wife at 1709, EMS has a right, 1736, and was defibrillated once.  There  

is return of rhythm, atrial fibrillation, approximately down time was 35-40 

minutes.  Per emergency room physician.  Patient currently is mechanically 

ventilated, and will be transferred to ICU, consult will be made with pulmonary,

Dr. Linares labs are currently pending,  





In the emergency room, patient already is mechanically vented, hemoglobin 10.5, 

double basic count 9.7, INR 1.5, potassium 4.7 creatinine of 1.98, from a 

previous of 1.74, alkaline phosphatase elevated, ALT AST  pending labs, NP 

proBNP pending labs, chest x-ray pending, unsure what the CT of the brain was 

performed or not, possibly secondary to instability of current cardiac secretion

venous blood gas, pCO2 of 40, pH of 7.34, bicarb of 21 patient will proceed to 

ICU from the emergency room.  Critical care medicine sorry the aware, 

cardiologist with aware.  The decision is to hold off IV heparin at this time, 

as the patient has gerard CPR device, which could cause more hemorrhagic 

problems, while on IV heparin.  Further recommendations to follow regarding IV 

heparinization, depending on stability.  Patient has twitches when seen, patient

will be sedated with Versed, discussed with wife at detail, EEG of the brain to 

be done, for anoxic encephalopathy,





7/6: She remains in intensive care unit and followed by multiple consultants.  

He remains on mechanical ventilation with tidal volume 500, FiO2 40, PEEP of 5. 

Patient has no withdrawal to pain, no corneal reflex.  He has minimal cough and 

gag reflex.  He is off sedation and is unresponsive.  He is on heparin drip, 

amiodarone drip, IV Keppra. Family did make him no code last evening.  Patient i

s managed by pulmonary medicine/intensivist.  Patient has been afebrile, heart 

rate in the 70s, blood pressure 87/53, pulse ox 98%.  Repeat blood work reveals 

WBC 16.1, hemoglobin 10.7, platelet count 164.  INR 1.8.  Dr. erazo are normal, 

BUN 69 and creatinine 2.18.  Blood sugars run between 189 and 228.  Pro-

calcitonin 0.82.  Echocardiogram reveals EF of 25-30% with global hypokinesia, 

moderate concentric left ventricular hypertrophy, mild aortic regurgitation, 

mild mitral regurgitation, mild tricuspid regurgitation, mild to moderate 

pulmonary hypertension.  Patient is also followed by cardiology for atrial 

fibrillation controlled ventricular rate on IV heparin and IV amiodarone.  

Patient has been seen by neurology and an urgent CAT scan of the brain has been 

ordered which revealed no acute intracranial hemorrhage or midline shift.  Mild 

to moderate diffuse age-related cerebral atrophy and mild chronic small vessel 

ischemic changes redemonstrated.  No significant change. EEG is pending, 

continue Keppra 500 mg twice daily.





7/7: Patient remains off sedation and has been unresponsive..  He is intubated 

and on mechanical ventilation with tidal volume 500, FiO2 40, PEEP 5.  

Granddaughter is at bedside.  EEG was completed yesterday with no epileptiform 

focus.  Patient is maintained on IV Keppra and followed by neurology.  Patient 

has been started on tube feedings.  Patient has been afebrile, heart rate 92, 

blood pressure 121/73, pulse ox 100%.  Repeat blood work reveals WBC 13.5, 

hemoglobin 9.6, platelet count 115.  INR 2.3 and heparin drip will be 

discontinued.  Electrolytes normal.  BUN 94 and creatinine 3.66.  Sugars running

between 147 and 161.  Family may look for comfort care today.   is 

following if needed.





7/8: Patient has been made comfort care and plan to extubate.  Plan to consult 

hospice care.  Family members are at bedside.





Review of Systems


ROS unobtainable: due to mental status





PHYSICAL EXAMINATION


Gen: This is a 76-year-old  male, intubated and on mechanical 

ventilation, appears to be in no acute distress.


HEENT: Head is atraumatic, normocephalic. 


LUNGS:  No intercostal retractions.


NEUROLOGICAL: Patient is unresponsive. 





ASSESSMENT AND PLAN


1.  Cardiac arrest, with initial resuscitation at home by the wife, 

approximately starting at 1705, was defibrillated by EMS at 1736, approximate 

downtime 35-40 minutes, patient currently is mechanically ventilated, maintain 

in ICU, Dr. Linares critical care medicine, consult with cardiology appreciated, 

neurology consult appreciated patient has been started on tube feedings the pa

tient to be extubated and started on comfort care.





2.  Acute hypoxemic respiratory failure, currently mechanically ventilated, 

related to cardiac event, and most likely would have secondary aspiration 

pneumonia, IV Zosyn to be started,





3.  A. fib paroxysmal with rapid ventricular rate, subtherapeutic INR, was on 

Coumadin, as the patient is sedated on vent, unsure about CVA event will discuss

with cardiology whether IV heparin would be appropriate,,





4  Acute on chronic systolic heart failure.  Discontinue Lasix, Zaroxolyn, 

continue Lopressor 12.5 mg twice daily. Continue to monitor I&O and daily 

weights, monitor electrolytes and renal function.  





5.  Acute kidney injury likely secondary to cardiorenal process.  Discontinued 

Lasix and Aldactone.





6  Paroxysmal atrial fibrillation on Coumadin.  Patient is on heparin drip





7  Chronic anemia.  





8.  Hypertension, hypertensive cardiovascular disease.  Continue metoprolol 

tartrate 12.5 mg oral twice daily, hydralazine 25 mg twice daily





9.  Mild coronary artery disease.  Continue metoprolol.





10.  Nonischemic cardiomyopathy.





11.  Hypothyroidism.  Continue levothyroxine 125 g daily.





12.  Diabetes mellitus type 2 with diabetic neuropathy, uncontrolled with 

hypoglycemia.  Continue Levemir decreased to 10 units at bedtime and NovoLog 

scale every 6 hours.  Discontinued gabapentin 300 mg three times daily.





13.  Hyperlipidemia.  Discontinue statin.





14.  Obstructive sleep apnea on CPAP.





15.  Benign prostatic hypertrophy status post TURP.





16.  Generalized osteoarthritis.  Discontinue tramadol for pain.





17.  Recurrent depression.  Discontinue Zoloft 100 mg daily.





18.  Anemia of chronic disease.  Discontinue ferrous sulfate.





19.  Restless leg syndrome.  Discontinue Requip 2.5 mg 3 times daily.





20.  Thrombocytopenia, chronic.





21.  DVT prophylaxis.  Heparin drip





22.  GI prophylaxis.  Protonix 40 mg IV daily.





23.  Chronic hypoxic respiratory failure on home oxygen therapy.





24.  BPH with urinary retention, currently on Serna catheter, Flomax.





25.  Lumbar disc disease with retrolisthesis, patient has difficulty in lying 

down supine, special in bed.  Refers to be in a recliner, however this also 

inhibits recovery of the anasarca, below the waist





26.  Anasarca without proteinuria, treatment as above





Prognoses poor.





Impression and plan of care have been directed as dictated by the signing 

physician.  Mehnaz Quezada nurse practitioner acting as scribe for signing 

physician.





Objective





- Vital Signs


Vital signs: 


                                   Vital Signs











Temp  99 F   07/09/21 08:00


 


Pulse  115 H  07/09/21 08:00


 


Resp  9 L  07/09/21 08:00


 


BP  118/68   07/09/21 08:00


 


Pulse Ox  93 L  07/09/21 08:00








                                 Intake & Output











 07/08/21 07/09/21 07/09/21





 18:59 06:59 18:59


 


Intake Total 29.7 196.733 44.767


 


Output Total 550 1200 


 


Balance -520.3 -1003.267 44.767


 


Weight   121.8 kg


 


Intake:   


 


  IV 10 120 


 


    0.9 Pressure bag 0  


 


    KVO 10 120 


 


  Intake, IV Titration 19.7 76.733 44.767





  Amount   


 


    Morphine Sulfate (100 mg/ 19.7 76.733 44.767





    2 ml) 100 mg In Sodium   





    Chloride 0.9% 100 ml @ 1   





    MG/HR 1.02 mls/hr IV .   





    Q24H [facility] Rx#:[id number]   


 


Output:   


 


  Urine 550 1200 


 


Other:   


 


  Voiding Method Indwelling Catheter Indwelling Catheter Indwelling Catheter








                       ABP, PAP, CO, CI - Last Documented











Arterial Blood Pressure        127/52

















- Labs


CBC & Chem 7: 


                                 07/07/21 04:22





                                 07/07/21 04:22

## 2021-07-09 NOTE — P.PN
Subjective


Progress Note Date: 21





HISTORY OF PRESENT ILLNESS


76 years old  male patient of Dr. Rojo for past medical history 

of paroxysmal atrial fibrillation on Coumadin, hyperlipidemia, hypertension, 

coronary artery disease, history of nonischemic cardiomyopathy with EF 40%, 

valvular heart disease, moderate to severe pulmonary hypertension, obstructive 

sleep apnea on CPAP, hypothyroidism, morbid obesity, diabetes mellitus type 2 

with diabetic neuropathy, benign prostatic hypertrophy status post TURP, 

generalized osteoarthritis, recurrent depression, anemia of chronic disease, 

restless leg syndrome, chronic thrombocytopenia, chronic hypoxic respiratory 

failure on home O2.  Patient had recent hospitalization May 14 through May 20 

which time he was treated for acute on chronic systolic heart failure and acute 

kidney injury.  Patient was stabilized and discharged home  ,  readmitted again 

, till , for anasarca, presents with edema to the legs and thighs 

and flank area, lightheadedness, shortness of breath and weight gain of 12-15 

pounds since he left the hospital.  He had follow-up with Dr. Rojo and 

with cardiologist during his last admission, insulin 14 told , he was 

diuresed, approximately losing 28 kg, was seen by multiple specialists, for 

anasarca, acute kidney injury, diastolic CHF, with cardiorenal process.  he had 

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

however Patient now presents to the emergency room, secondary to cardiac arrest 

related to atrial fibrillation, this happened at home, at 1705, CPR was provided

by the wife at 1709, EMS has a right, 1736, and was defibrillated once.  There  

is return of rhythm, atrial fibrillation, approximately down time was 35-40 

minutes.  Per emergency room physician.  Patient currently is mechanically 

ventilated, and will be transferred to ICU, consult will be made with pulmonary,

Dr. Linares labs are currently pending,  





In the emergency room, patient already is mechanically vented, hemoglobin 10.5, 

double basic count 9.7, INR 1.5, potassium 4.7 creatinine of 1.98, from a 

previous of 1.74, alkaline phosphatase elevated, ALT AST  pending labs, NP 

proBNP pending labs, chest x-ray pending, unsure what the CT of the brain was 

performed or not, possibly secondary to instability of current cardiac secretion

venous blood gas, pCO2 of 40, pH of 7.34, bicarb of 21 patient will proceed to 

ICU from the emergency room.  Critical care medicine sorry the aware, 

cardiologist with aware.  The decision is to hold off IV heparin at this time, 

as the patient has gerard CPR device, which could cause more hemorrhagic 

problems, while on IV heparin.  Further recommendations to follow regarding IV 

heparinization, depending on stability.  Patient has twitches when seen, patient

will be sedated with Versed, discussed with wife at detail, EEG of the brain to 

be done, for anoxic encephalopathy,





: She remains in intensive care unit and followed by multiple consultants.  

He remains on mechanical ventilation with tidal volume 500, FiO2 40, PEEP of 5. 

Patient has no withdrawal to pain, no corneal reflex.  He has minimal cough and 

gag reflex.  He is off sedation and is unresponsive.  He is on heparin drip, 

amiodarone drip, IV Keppra. Family did make him no code last evening.  Patient i

s managed by pulmonary medicine/intensivist.  Patient has been afebrile, heart 

rate in the 70s, blood pressure 87/53, pulse ox 98%.  Repeat blood work reveals 

WBC 16.1, hemoglobin 10.7, platelet count 164.  INR 1.8.  Dr. erazo are normal, 

BUN 69 and creatinine 2.18.  Blood sugars run between 189 and 228.  Pro-

calcitonin 0.82.  Echocardiogram reveals EF of 25-30% with global hypokinesia, 

moderate concentric left ventricular hypertrophy, mild aortic regurgitation, 

mild mitral regurgitation, mild tricuspid regurgitation, mild to moderate 

pulmonary hypertension.  Patient is also followed by cardiology for atrial 

fibrillation controlled ventricular rate on IV heparin and IV amiodarone.  

Patient has been seen by neurology and an urgent CAT scan of the brain has been 

ordered which revealed no acute intracranial hemorrhage or midline shift.  Mild 

to moderate diffuse age-related cerebral atrophy and mild chronic small vessel 

ischemic changes redemonstrated.  No significant change. EEG is pending, 

continue Keppra 500 mg twice daily.





: Patient remains off sedation and has been unresponsive..  He is intubated 

and on mechanical ventilation with tidal volume 500, FiO2 40, PEEP 5.  

Granddaughter is at bedside.  EEG was completed yesterday with no epileptiform 

focus.  Patient is maintained on IV Keppra and followed by neurology.  Patient 

has been started on tube feedings.  Patient has been afebrile, heart rate 92, 

blood pressure 121/73, pulse ox 100%.  Repeat blood work reveals WBC 13.5, 

hemoglobin 9.6, platelet count 115.  INR 2.3 and heparin drip will be 

discontinued.  Electrolytes normal.  BUN 94 and creatinine 3.66.  Sugars running

between 147 and 161.  Family may look for comfort care today.   is 

following if needed.





: Patient has been made comfort care and plan to extubate.  Plan to consult 

hospice care.  Family members are at bedside.





: Patient is under comfort care with [facility].  No aggressive 

treatment.  Anticipate the patient will  in the next 24 hours.





Review of Systems


ROS unobtainable: due to mental status





PHYSICAL EXAMINATION


Gen: This is a 76-year-old  male.


HEENT: Head is atraumatic, normocephalic. 


LUNGS:  No intercostal retractions.


NEUROLOGICAL: Patient is unresponsive. 





ASSESSMENT AND PLAN


1.  Cardiac arrest


2.  Acute hypoxemic respiratory failure


3.  A. fib paroxysmal with rapid ventricular rate


4  Acute on chronic systolic heart failure


5.  Acute kidney injury likely secondary to cardiorenal process


6  Paroxysmal atrial fibrillation


7  Chronic anemia.  


8.  Hypertension, hypertensive cardiovascular disease.  


9.  Mild coronary artery disease.  


10.  Nonischemic cardiomyopathy.


11.  Hypothyroidism.  


12.  Diabetes mellitus type 2 with diabetic neuropathy, uncontrolled with 

hypoglycemia. 


13.  Hyperlipidemia.  


14.  Obstructive sleep apnea on CPAP.


15.  Benign prostatic hypertrophy status post TURP.


16.  Generalized osteoarthritis. 


17.  Recurrent depression.  


18.  Anemia of chronic disease.  


19.  Restless leg syndrome.  


20.  Thrombocytopenia, chronic.


21.  Chronic hypoxic respiratory failure on home oxygen therapy.


22.  BPH with urinary retention


23.  Lumbar disc disease with retrolisthesis


24.  Anasarca without proteinuria, treatment as above


25.  Anoxic brain injury


26.  Cardiogenic shock requiring vasopressors.





PLAN: Continue comfort care





Prognoses poor.





Impression and plan of care have been directed as dictated by the signing 

physician.  Mehnaz Quezada nurse practitioner acting as scribe for signing 

physician.





Objective





- Vital Signs


Vital signs: 


                                   Vital Signs











Temp  99 F   21 08:00


 


Pulse  115 H  21 08:00


 


Resp  9 L  21 08:00


 


BP  118/68   21 08:00


 


Pulse Ox  93 L  21 08:00








                                 Intake & Output











 21/21





 18:59 06:59 18:59


 


Intake Total 29.7 196.733 44.767


 


Output Total 550 1200 


 


Balance -520.3 -1003.267 44.767


 


Weight   121.8 kg


 


Intake:   


 


  IV 10 120 


 


    0.9 Pressure bag 0  


 


    KVO 10 120 


 


  Intake, IV Titration 19.7 76.733 44.767





  Amount   


 


    Morphine Sulfate (100 mg/ 19.7 76.733 44.767





    2 ml) 100 mg In Sodium   





    Chloride 0.9% 100 ml @ 1   





    MG/HR 1.02 mls/hr IV .   





    Q24H [facility] Rx#:[id number]   


 


Output:   


 


  Urine 550 1200 


 


Other:   


 


  Voiding Method Indwelling Catheter Indwelling Catheter Indwelling Catheter








                       ABP, PAP, CO, CI - Last Documented











Arterial Blood Pressure        127/52

















- Labs


CBC & Chem 7: 


                                 21 04:22





                                 21 04:22

## 2021-07-10 RX ADMIN — MORPHINE SULFATE SCH MLS/HR: 50 INJECTION, SOLUTION, CONCENTRATE INTRAVENOUS at 08:44

## 2021-07-10 NOTE — P.DS
Providers


Date of admission: 


21 18:55





Attending physician: 


Ann Soliz





Consults: 





                                        





21 18:55


Consult Physician Routine 


   Consulting Provider: Josh Linares


   Consult Reason/Comments: Cardiac arrest


   Do you want consulting provider notified?: Already Contacted


Consult Physician Urgent 


   Consulting Provider: Frankie Linares


   Consult Reason/Comments: Cardiac arrest with ROSC


   Do you want consulting provider notified?: Already Contacted











Primary care physician: 


Mike Rojo





[facility] Course: 





HISTORY OF PRESENT ILLNESS


76 years old  male patient of Dr. Rojo for past medical history 

of paroxysmal atrial fibrillation on Coumadin, hyperlipidemia, hypertension, 

coronary artery disease, history of nonischemic cardiomyopathy with EF 40%, 

valvular heart disease, moderate to severe pulmonary hypertension, obstructive 

sleep apnea on CPAP, hypothyroidism, morbid obesity, diabetes mellitus type 2 

with diabetic neuropathy, benign prostatic hypertrophy status post TURP, 

generalized osteoarthritis, recurrent depression, anemia of chronic disease, 

restless leg syndrome, chronic thrombocytopenia, chronic hypoxic respiratory 

failure on home O2.  Patient had recent hospitalization May 14 through May 20 

which time he was treated for acute on chronic systolic heart failure and acute 

kidney injury.  Patient was stabilized and discharged home  ,  readmitted again 

, till , for anasarca, presents with edema to the legs and thighs 

and flank area, lightheadedness, shortness of breath and weight gain of 12-15 

pounds since he left the hospital.  He had follow-up with Dr. Rojo and 

with cardiologist during his last admission, insulin 14 told , he was 

diuresed, approximately losing 28 kg, was seen by multiple specialists, for 

anasarca, acute kidney injury, diastolic CHF, with cardiorenal process.  he had 

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

however Patient now presents to the emergency room, secondary to cardiac arrest 

related to atrial fibrillation, this happened at home, at 1705, CPR was provided

by the wife at 1709, EMS has a right, 1736, and was defibrillated once.  There  

is return of rhythm, atrial fibrillation, approximately down time was 35-40 

minutes.  Per emergency room physician.  Patient currently is mechanically 

ventilated, and will be transferred to ICU, consult will be made with pulmonary,

Dr. Linares labs are currently pending,  





In the emergency room, patient already is mechanically vented, hemoglobin 10.5, 

double basic count 9.7, INR 1.5, potassium 4.7 creatinine of 1.98, from a 

previous of 1.74, alkaline phosphatase elevated, ALT AST  pending labs, NP 

proBNP pending labs, chest x-ray pending, unsure what the CT of the brain was 

performed or not, possibly secondary to instability of current cardiac secretion

venous blood gas, pCO2 of 40, pH of 7.34, bicarb of 21 patient will proceed to 

ICU from the emergency room.  Critical care medicine sorry the aware, 

cardiologist with aware.  The decision is to hold off IV heparin at this time, 

as the patient has gerard CPR device, which could cause more hemorrhagic 

problems, while on IV heparin.  Further recommendations to follow regarding IV 

heparinization, depending on stability.  Patient has twitches when seen, patient

will be sedated with Versed, discussed with wife at detail, EEG of the brain to 

be done, for anoxic encephalopathy,





: She remains in intensive care unit and followed by multiple consultants.  

He remains on mechanical ventilation with tidal volume 500, FiO2 40, PEEP of 5. 

Patient has no withdrawal to pain, no corneal reflex.  He has minimal cough and 

gag reflex.  He is off sedation and is unresponsive.  He is on heparin drip, 

amiodarone drip, IV Keppra. Family did make him no code last evening.  Patient 

is managed by pulmonary medicine/intensivist.  Patient has been afebrile, heart 

rate in the 70s, blood pressure 87/53, pulse ox 98%.  Repeat blood work reveals 

WBC 16.1, hemoglobin 10.7, platelet count 164.  INR 1.8.  Dr. lites are normal, 

BUN 69 and creatinine 2.18.  Blood sugars run between 189 and 228.  Pro-

calcitonin 0.82.  Echocardiogram reveals EF of 25-30% with global hypokinesia, 

moderate concentric left ventricular hypertrophy, mild aortic regurgitation, 

mild mitral regurgitation, mild tricuspid regurgitation, mild to moderate 

pulmonary hypertension.  Patient is also followed by cardiology for atrial 

fibrillation controlled ventricular rate on IV heparin and IV amiodarone.  

Patient has been seen by neurology and an urgent CAT scan of the brain has been 

ordered which revealed no acute intracranial hemorrhage or midline shift.  Mild 

to moderate diffuse age-related cerebral atrophy and mild chronic small vessel 

ischemic changes redemonstrated.  No significant change. EEG is pending, 

continue Keppra 500 mg twice daily.





: Patient remains off sedation and has been unresponsive..  He is intubated 

and on mechanical ventilation with tidal volume 500, FiO2 40, PEEP 5.  

Granddaughter is at bedside.  EEG was completed yesterday with no epileptiform 

focus.  Patient is maintained on IV Keppra and followed by neurology.  Patient 

has been started on tube feedings.  Patient has been afebrile, heart rate 92, 

blood pressure 121/73, pulse ox 100%.  Repeat blood work reveals WBC 13.5, 

hemoglobin 9.6, platelet count 115.  INR 2.3 and heparin drip will be 

discontinued.  Electrolytes normal.  BUN 94 and creatinine 3.66.  Sugars running

between 147 and 161.  Family may look for comfort care today.   is 

following if needed.





: Patient has been made comfort care and plan to extubate.  Plan to consult 

hospice care.  Family members are at bedside.





: Patient is under comfort care with [facility] hospice.  No aggressive 

treatment.  Anticipate the patient will  in the next 24 hours.





7/10: Patient is under comfort care with [facility] hospice.  Patient 





Discharge diagnosis:


1.  Cardiac arrest


2.  Acute hypoxemic respiratory failure


3.  A. fib paroxysmal with rapid ventricular rate


4  Acute on chronic systolic heart failure


5.  Acute kidney injury likely secondary to cardiorenal process


6  Paroxysmal atrial fibrillation


7  Chronic anemia.  


8.  Hypertension, hypertensive cardiovascular disease.  


9.  Mild coronary artery disease.  


10.  Nonischemic cardiomyopathy.


11.  Hypothyroidism.  


12.  Diabetes mellitus type 2 with diabetic neuropathy, uncontrolled with 

hypoglycemia. 


13.  Hyperlipidemia.  


14.  Obstructive sleep apnea on CPAP.


15.  Benign prostatic hypertrophy status post TURP.


16.  Generalized osteoarthritis. 


17.  Recurrent depression.  


18.  Anemia of chronic disease.  


19.  Restless leg syndrome.  


20.  Thrombocytopenia, chronic.


21.  Chronic hypoxic respiratory failure on home oxygen therapy.


22.  BPH with urinary retention


23.  Lumbar disc disease with retrolisthesis


24.  Anasarca without proteinuria, treatment as above


25.  Anoxic brain injury


26.  Cardiogenic shock requiring vasopressors.





Disposition: 





Impression and plan of care have been directed as dictated by the signing 

physician.  Elizabeth Greco nurse practitioner acting as scribe for signing 

physician.


Patient Condition at Discharge: Serious





Plan - Discharge Summary


Discharge Rx Participant: No


New Discharge Prescriptions: 


No Action


   Ferrous Sulfate [Feosol] 325 mg PO TID


   Latanoprost Ophth [Xalatan 0.005%] 1 drops BOTH EYES HS


   Sertraline [Zoloft] 100 mg PO DAILY


   Simvastatin [Zocor] 40 mg PO HS


   Gabapentin [Neurontin] 300 mg PO TID PRN


     PRN Reason: Pain


   traMADol HCL [Ultram] 50 mg PO BID PRN


     PRN Reason: Pain


   Metoprolol Tartrate [Lopressor] 12.5 mg PO BID


   Fluocinolone Acetonide Body Oil 0.01% 1 applic TOPICAL HS


   Cholecalciferol (Vitamin D3) [Vitamin D3 (5000 Iu)] 125 mcg PO DAILY


   Triamcinolone 0.1% Cream [Kenalog 0.1% Cream] 1 applicatio TOPICAL BID PRN


     PRN Reason: Rash


   rOPINIRole HCL [Requip XL] 8 mg PO HS


   Warfarin [Coumadin] 2.5 mg PO TUFR


   Spironolactone 25 mg PO DAILY


   diphenhydrAMINE [Benadryl] 25 mg PO HS


   Losartan [Cozaar] 25 mg PO HS #90 tab


   Insulin Detemir (Levemir) [Levemir] 20 unit SQ HS #0


   metFORMIN HCL [Glucophage] 500 mg PO BID


   Warfarin [Coumadin] 5 mg PO SUTUWETHSA


   Levothyroxine Sodium [Synthroid] 125 mcg PO DAILY


   SILVER sulfADIAZINE CREAM [Silvadene Cream] 1 applic TOPICAL BID PRN


     PRN Reason: LEGS


   Spironolactone [Aldactone] 50 mg PO DAILY #90 tab


   Furosemide [Lasix] 100 mg PO BID@0900,1600 #180 tab


   metOLazone [Zaroxolyn] 2.5 mg PO MOWEFR  tab


   Tamsulosin [Flomax] 0.4 mg PO PC-SUPPER #30 cap.er.24h


   Midodrine [ProAmatine] 10 mg PO AC-TID #90 tab


   Potassium Chloride ER [K-Dur 20] 20 meq PO BID #90 tab.er.prt


Discharge Medication List





Ferrous Sulfate [Feosol] 325 mg PO TID 17 [History]


Gabapentin [Neurontin] 300 mg PO TID PRN 17 [History]


Latanoprost Ophth [Xalatan 0.005%] 1 drops BOTH EYES HS 17 [History]


Sertraline [Zoloft] 100 mg PO DAILY 17 [History]


Simvastatin [Zocor] 40 mg PO HS 17 [History]


traMADol HCL [Ultram] 50 mg PO BID PRN 17 [History]


Metoprolol Tartrate [Lopressor] 12.5 mg PO BID 18 [History]


Cholecalciferol (Vitamin D3) [Vitamin D3 (5000 Iu)] 125 mcg PO DAILY 21 

[History]


Fluocinolone Acetonide Body Oil 0.01% 1 applic TOPICAL HS 21 [History]


Levothyroxine Sodium [Synthroid] 125 mcg PO DAILY 21 [History]


Triamcinolone 0.1% Cream [Kenalog 0.1% Cream] 1 applicatio TOPICAL BID PRN 

21 [History]


Warfarin [Coumadin] 2.5 mg PO TUFR 21 [History]


Warfarin [Coumadin] 5 mg PO SUTUWETHSA 21 [History]


metFORMIN HCL [Glucophage] 500 mg PO BID 21 [History]


rOPINIRole HCL [Requip XL] 8 mg PO HS 21 [History]


SILVER sulfADIAZINE CREAM [Silvadene Cream] 1 applic TOPICAL BID PRN 21 

[History]


Spironolactone 25 mg PO DAILY 21 [History]


diphenhydrAMINE [Benadryl] 25 mg PO HS 21 [History]


Furosemide [Lasix] 100 mg PO BID@0900,1600 #180 tab 21 [Rx]


Insulin Detemir (Levemir) [Levemir] 20 unit SQ HS #0 21 [Rx]


Losartan [Cozaar] 25 mg PO HS #90 tab 21 [Rx]


Midodrine [ProAmatine] 10 mg PO AC-TID #90 tab 21 [Rx]


Potassium Chloride ER [K-Dur 20] 20 meq PO BID #90 tab.er.prt 21 [Rx]


Spironolactone [Aldactone] 50 mg PO DAILY #90 tab 21 [Rx]


Tamsulosin [Flomax] 0.4 mg PO PC-SUPPER #30 cap.er.24h 21 [Rx]


metOLazone [Zaroxolyn] 2.5 mg PO MOWEFR  tab 21 [Rx]








Follow up Appointment(s)/Referral(s): 


Mike Rojo DO [Primary Care Provider] - 1-2 days


Discharge Disposition: 





- Preliminary Cause of Death


Preliminary Cause of Death: Cardiac arrest, acute hypoxemic respiratory failure

## 2021-07-14 NOTE — CDI
Documentation Clarification Form



Date: 07/14/2021 10:06:45 AM

From: Gloria Li CCS, CCDS

Phone: [phone]

MRN: [id number]

Admit Date: 07/05/2021 06:55:00 PM

Patient Name: Garry Mercado

Visit Number: [id number]

Discharge Date:  07/10/2021 02:00:00 PM





ATTENTION: The Clinical Documentation Specialists (CDI) and [facility] Coding Staff 
appreciate your assistance in clarifying documentation. Please respond to the 
clarification below the line at the bottom and electronically sign. The CDI & 
[facility] Coding staff will review the response and follow-up if needed. Please note: 
Queries are made part of the Legal Health Record. If you have any questions, 
please contact the author of this message via ITS.



Dr. Jorge A Bennett:



The following is documented in the 7/6 Cardiology Consult: He has had the first 
set of troponin that was mildly elevated. Has not been rechecked. EKG shows 
atrial fibrillation with nonspecific ST-T wave changes suggestive of 
subendocardial ischemia.



Additional clarification regarding the above documentation is requested.



History/Risk Factors per the 7/5 H/P: Paroxysmal Atrial Fibrillation, Chronic 
Systolic CHF, Mild CAD, Nonischemic Cardiomyopathy, Hypothyroidism, DM II with 
Diabetic Neuropathy, Hyperlipidemia, Hypertension, FAM, Generalized OA, 
Pneumonia, BPH with Urinary Retention status post TURP, Recurrent Depression, 
Anemia of Chronic Disease, RLS, Chronic Thrombocytopenia, Chronic Hypoxic 
Respiratory Failure on Home O2.



Clinical Indicators: Presented to the ED on 7/5 via EMS, intubated in field in 
Cardiac Arrest, CPR initiated by the patient's wife, defibrillated by EMS for 
presumed Ventricular Fibrillation with ROSC. 

ED Clinical Impression. Cardiac Arrest with signs of ROSC, Atrial Fibrillation 
with RR, CHF



7/5 ED VS: T 97.4, P 121, R 12 - 22, /93, BP Mean 97/71 - 106/65; PO 99 
BVM - 99 100% Vent, BMI: 36.4

7/5 LAB: Hgb 10.5, Pl Ct 130, PT 15.1, INR 1.5, BUN 67, Creatinine 1.98, Glucose
309, Lactic Acid 6.7, Alkaline Phos 137

7/5 Blood Gas ABG: pCO2 52, pO2 282, HCO3 29, Total CO2 30, O2 Sat 100.0

7/5 Blood Gas VBG: HCO3 21



7/5 Troponin 0.059 (no repeat)

7/5 EKG: R 77 Atrial Fibrillation, Abnormal left axis deviation (QRS Axis <-30),
Low QRS Voltage in chest leads (QRS deflection <1.0 MV), ST & T Wave 
Abnormality, Possible anterior ischemia. Abnormal EKG. 



Treatment 7/5: Intubated on vent, Central Line, Arterial Line, NGT, IV Levophed,
IV Amiodarone in Dextrose/Water, IV Midazolam in NaCl, IV Versed 2 mg x2, IV 
Propofol, IV Apresoline, IV fl Na Cl 1,000 mls @ 999 mls/hr q1H

7/6: Insulin sq, IV Zosyn, IV Amiodarone in Dextrose, IV Keppra, IV Heparin 
drip, IV Ativan



Please clarify the type of MI, if known:



[  ]  STEMI (type 1)

[  ]  NSTEMI (type 1)

[  ]  Type II MI due to (please specify etiology) ________

[ x ]  Unable to determine

[  ]  Other Condition, please specify





(Template Last Revised: March 2021)

___________________________________________________________________________



MTDD

## 2024-12-10 NOTE — XR
EXAMINATION TYPE: XR chest 2V

 

DATE OF EXAM: 4/14/2021

 

COMPARISON: 9/21/2020

 

INDICATION: Hypertension

 

TECHNIQUE:  Frontal and lateral views of the chest are obtained.

 

FINDINGS:  

The heart size is normal.  

The pulmonary vasculature is prominent.

Mild diffuse increased lung markings are present. Correlate for volume overload. Findings are nonspec
ific and can be followed.

 

IMPRESSION:  

1. Mild diffuse increased lung markings with some ulnar vascular prominence. Correlate for volume ove
rload no